# Patient Record
Sex: FEMALE | Race: OTHER | HISPANIC OR LATINO | Employment: PART TIME | URBAN - METROPOLITAN AREA
[De-identification: names, ages, dates, MRNs, and addresses within clinical notes are randomized per-mention and may not be internally consistent; named-entity substitution may affect disease eponyms.]

---

## 2017-01-11 ENCOUNTER — ALLSCRIPTS OFFICE VISIT (OUTPATIENT)
Dept: OTHER | Facility: OTHER | Age: 32
End: 2017-01-11

## 2017-01-11 DIAGNOSIS — M54.50 LOW BACK PAIN: ICD-10-CM

## 2017-01-11 DIAGNOSIS — M46.1 SACROILIITIS, NOT ELSEWHERE CLASSIFIED (HCC): ICD-10-CM

## 2017-02-06 ENCOUNTER — HOSPITAL ENCOUNTER (OUTPATIENT)
Dept: RADIOLOGY | Facility: CLINIC | Age: 32
Discharge: HOME | End: 2017-02-06
Payer: COMMERCIAL

## 2017-02-06 ENCOUNTER — ALLSCRIPTS OFFICE VISIT (OUTPATIENT)
Dept: OTHER | Facility: OTHER | Age: 32
End: 2017-02-06

## 2017-02-06 ENCOUNTER — TRANSCRIBE ORDERS (OUTPATIENT)
Dept: RADIOLOGY | Facility: CLINIC | Age: 32
End: 2017-02-06

## 2017-02-06 DIAGNOSIS — M46.1 SACROILIITIS, NOT ELSEWHERE CLASSIFIED (HCC): ICD-10-CM

## 2017-02-06 PROCEDURE — 73521 X-RAY EXAM HIPS BI 2 VIEWS: CPT

## 2017-02-10 ENCOUNTER — ALLSCRIPTS OFFICE VISIT (OUTPATIENT)
Dept: RADIOLOGY | Facility: CLINIC | Age: 32
End: 2017-02-10
Payer: COMMERCIAL

## 2017-02-17 ENCOUNTER — GENERIC CONVERSION - ENCOUNTER (OUTPATIENT)
Dept: OTHER | Facility: OTHER | Age: 32
End: 2017-02-17

## 2017-02-28 ENCOUNTER — GENERIC CONVERSION - ENCOUNTER (OUTPATIENT)
Dept: OTHER | Facility: OTHER | Age: 32
End: 2017-02-28

## 2017-03-07 ENCOUNTER — GENERIC CONVERSION - ENCOUNTER (OUTPATIENT)
Dept: OTHER | Facility: OTHER | Age: 32
End: 2017-03-07

## 2017-03-07 ENCOUNTER — APPOINTMENT (OUTPATIENT)
Dept: PHYSICAL THERAPY | Facility: CLINIC | Age: 32
End: 2017-03-07
Payer: COMMERCIAL

## 2017-03-07 PROCEDURE — 97110 THERAPEUTIC EXERCISES: CPT

## 2017-03-07 PROCEDURE — 97162 PT EVAL MOD COMPLEX 30 MIN: CPT

## 2017-03-14 ENCOUNTER — APPOINTMENT (OUTPATIENT)
Dept: PHYSICAL THERAPY | Facility: CLINIC | Age: 32
End: 2017-03-14
Payer: COMMERCIAL

## 2017-03-15 ENCOUNTER — APPOINTMENT (OUTPATIENT)
Dept: PHYSICAL THERAPY | Facility: CLINIC | Age: 32
End: 2017-03-15
Payer: COMMERCIAL

## 2017-03-20 ENCOUNTER — APPOINTMENT (OUTPATIENT)
Dept: PHYSICAL THERAPY | Facility: CLINIC | Age: 32
End: 2017-03-20
Payer: COMMERCIAL

## 2017-03-20 PROCEDURE — 97110 THERAPEUTIC EXERCISES: CPT

## 2017-03-20 PROCEDURE — 97014 ELECTRIC STIMULATION THERAPY: CPT

## 2017-03-22 ENCOUNTER — APPOINTMENT (OUTPATIENT)
Dept: PHYSICAL THERAPY | Facility: CLINIC | Age: 32
End: 2017-03-22
Payer: COMMERCIAL

## 2017-03-27 ENCOUNTER — APPOINTMENT (OUTPATIENT)
Dept: PHYSICAL THERAPY | Facility: CLINIC | Age: 32
End: 2017-03-27
Payer: COMMERCIAL

## 2017-03-28 ENCOUNTER — APPOINTMENT (EMERGENCY)
Dept: ULTRASOUND IMAGING | Facility: HOSPITAL | Age: 32
End: 2017-03-28
Payer: COMMERCIAL

## 2017-03-28 ENCOUNTER — HOSPITAL ENCOUNTER (EMERGENCY)
Facility: HOSPITAL | Age: 32
Discharge: HOME | End: 2017-03-28
Admitting: EMERGENCY MEDICINE
Payer: COMMERCIAL

## 2017-03-28 VITALS
HEART RATE: 59 BPM | SYSTOLIC BLOOD PRESSURE: 108 MMHG | OXYGEN SATURATION: 97 % | TEMPERATURE: 97.3 F | RESPIRATION RATE: 18 BRPM | DIASTOLIC BLOOD PRESSURE: 62 MMHG

## 2017-03-28 DIAGNOSIS — L03.115 CELLULITIS OF RIGHT LEG: Primary | ICD-10-CM

## 2017-03-28 PROCEDURE — 99283 EMERGENCY DEPT VISIT LOW MDM: CPT

## 2017-03-28 PROCEDURE — 93971 EXTREMITY STUDY: CPT

## 2017-03-28 RX ORDER — TRAMADOL HYDROCHLORIDE 50 MG/1
50 TABLET ORAL AS NEEDED
COMMUNITY
End: 2017-09-15

## 2017-03-28 RX ORDER — CEPHALEXIN 500 MG/1
500 CAPSULE ORAL 2 TIMES DAILY
Qty: 14 CAPSULE | Refills: 0 | Status: SHIPPED | OUTPATIENT
Start: 2017-03-28 | End: 2017-04-04

## 2017-03-28 RX ORDER — NAPROXEN 250 MG/1
250 TABLET ORAL 2 TIMES DAILY WITH MEALS
Qty: 20 TABLET | Refills: 0 | Status: SHIPPED | OUTPATIENT
Start: 2017-03-28 | End: 2017-09-15

## 2017-03-28 RX ORDER — NAPROXEN 250 MG/1
375 TABLET ORAL ONCE
Status: COMPLETED | OUTPATIENT
Start: 2017-03-28 | End: 2017-03-28

## 2017-03-28 RX ADMIN — NAPROXEN 375 MG: 250 TABLET ORAL at 10:55

## 2017-03-29 ENCOUNTER — APPOINTMENT (OUTPATIENT)
Dept: PHYSICAL THERAPY | Facility: CLINIC | Age: 32
End: 2017-03-29
Payer: COMMERCIAL

## 2017-04-25 ENCOUNTER — ALLSCRIPTS OFFICE VISIT (OUTPATIENT)
Dept: OTHER | Facility: OTHER | Age: 32
End: 2017-04-25

## 2017-09-15 ENCOUNTER — HOSPITAL ENCOUNTER (EMERGENCY)
Facility: HOSPITAL | Age: 32
Discharge: HOME | End: 2017-09-15
Attending: EMERGENCY MEDICINE | Admitting: EMERGENCY MEDICINE
Payer: COMMERCIAL

## 2017-09-15 VITALS
WEIGHT: 155 LBS | RESPIRATION RATE: 16 BRPM | OXYGEN SATURATION: 99 % | SYSTOLIC BLOOD PRESSURE: 112 MMHG | DIASTOLIC BLOOD PRESSURE: 65 MMHG | TEMPERATURE: 98.5 F | HEART RATE: 65 BPM

## 2017-09-15 DIAGNOSIS — S83.91XA SPRAIN OF RIGHT KNEE, UNSPECIFIED LIGAMENT, INITIAL ENCOUNTER: Primary | ICD-10-CM

## 2017-09-15 PROCEDURE — 99283 EMERGENCY DEPT VISIT LOW MDM: CPT

## 2017-09-15 RX ORDER — NAPROXEN 250 MG/1
500 TABLET ORAL ONCE
Status: COMPLETED | OUTPATIENT
Start: 2017-09-15 | End: 2017-09-15

## 2017-09-15 RX ORDER — NAPROXEN 500 MG/1
500 TABLET ORAL 2 TIMES DAILY PRN
Qty: 20 TABLET | Refills: 0 | Status: SHIPPED | OUTPATIENT
Start: 2017-09-15 | End: 2018-03-04

## 2017-09-15 RX ADMIN — NAPROXEN 500 MG: 250 TABLET ORAL at 00:27

## 2017-09-20 ENCOUNTER — HOSPITAL ENCOUNTER (EMERGENCY)
Facility: HOSPITAL | Age: 32
Discharge: HOME | End: 2017-09-20
Attending: EMERGENCY MEDICINE
Payer: COMMERCIAL

## 2017-09-20 VITALS
WEIGHT: 146 LBS | SYSTOLIC BLOOD PRESSURE: 114 MMHG | OXYGEN SATURATION: 100 % | RESPIRATION RATE: 14 BRPM | TEMPERATURE: 98 F | DIASTOLIC BLOOD PRESSURE: 69 MMHG | HEART RATE: 61 BPM

## 2017-09-20 DIAGNOSIS — S83.411A SPRAIN OF MEDIAL COLLATERAL LIGAMENT OF RIGHT KNEE, INITIAL ENCOUNTER: Primary | ICD-10-CM

## 2017-09-20 PROCEDURE — 99283 EMERGENCY DEPT VISIT LOW MDM: CPT

## 2017-09-20 RX ORDER — MORPHINE SULFATE 30 MG/1
30 TABLET ORAL EVERY 4 HOURS PRN
Qty: 5 TABLET | Refills: 0 | Status: SHIPPED | OUTPATIENT
Start: 2017-09-20 | End: 2018-03-04

## 2017-10-10 ENCOUNTER — HOSPITAL ENCOUNTER (EMERGENCY)
Facility: HOSPITAL | Age: 32
Discharge: HOME | End: 2017-10-10
Attending: EMERGENCY MEDICINE | Admitting: EMERGENCY MEDICINE
Payer: COMMERCIAL

## 2017-10-10 ENCOUNTER — APPOINTMENT (EMERGENCY)
Dept: CT IMAGING | Facility: HOSPITAL | Age: 32
End: 2017-10-10
Payer: COMMERCIAL

## 2017-10-10 ENCOUNTER — APPOINTMENT (EMERGENCY)
Dept: ULTRASOUND IMAGING | Facility: HOSPITAL | Age: 32
End: 2017-10-10
Payer: COMMERCIAL

## 2017-10-10 VITALS
HEART RATE: 62 BPM | OXYGEN SATURATION: 99 % | TEMPERATURE: 97.6 F | RESPIRATION RATE: 16 BRPM | SYSTOLIC BLOOD PRESSURE: 100 MMHG | DIASTOLIC BLOOD PRESSURE: 62 MMHG

## 2017-10-10 DIAGNOSIS — Z34.90 PREGNANCY: Primary | ICD-10-CM

## 2017-10-10 LAB
ABO GROUP BLD: NORMAL
ALBUMIN SERPL BCP-MCNC: 3.8 G/DL (ref 3.5–5)
ALP SERPL-CCNC: 68 U/L (ref 46–116)
ALT SERPL W P-5'-P-CCNC: 26 U/L (ref 12–78)
ANION GAP SERPL CALCULATED.3IONS-SCNC: 9 MMOL/L (ref 4–13)
AST SERPL W P-5'-P-CCNC: 19 U/L (ref 5–45)
B-HCG SERPL-ACNC: 2680 MIU/ML
BASOPHILS # BLD AUTO: 0.03 THOUSANDS/ΜL (ref 0–0.1)
BASOPHILS NFR BLD AUTO: 0 % (ref 0–1)
BILIRUB SERPL-MCNC: 0.4 MG/DL (ref 0.2–1)
BLD GP AB SCN SERPL QL: NEGATIVE
BUN SERPL-MCNC: 14 MG/DL (ref 5–25)
CALCIUM SERPL-MCNC: 9.2 MG/DL (ref 8.3–10.1)
CHLORIDE SERPL-SCNC: 103 MMOL/L (ref 100–108)
CLARITY, POC: CLEAR
CO2 SERPL-SCNC: 25 MMOL/L (ref 21–32)
COLOR, POC: YELLOW
CREAT SERPL-MCNC: 0.86 MG/DL (ref 0.6–1.3)
EOSINOPHIL # BLD AUTO: 0.07 THOUSAND/ΜL (ref 0–0.61)
EOSINOPHIL NFR BLD AUTO: 1 % (ref 0–6)
ERYTHROCYTE [DISTWIDTH] IN BLOOD BY AUTOMATED COUNT: 12.8 % (ref 11.6–15.1)
EXT BILIRUBIN, UA: NEGATIVE
EXT BLOOD URINE: ABNORMAL
EXT GLUCOSE, UA: NEGATIVE
EXT KETONES: NEGATIVE
EXT NITRITE, UA: NEGATIVE
EXT PH, UA: 6.5
EXT PREG TEST URINE: POSITIVE
EXT PROTEIN, UA: ABNORMAL
EXT SPECIFIC GRAVITY, UA: 1.01
EXT UROBILINOGEN: 1
GFR SERPL CREATININE-BSD FRML MDRD: 90 ML/MIN/1.73SQ M
GLUCOSE SERPL-MCNC: 102 MG/DL (ref 65–140)
HCT VFR BLD AUTO: 39.6 % (ref 34.8–46.1)
HGB BLD-MCNC: 13 G/DL (ref 11.5–15.4)
HOLD SPECIMEN: NORMAL
LIPASE SERPL-CCNC: 114 U/L (ref 73–393)
LYMPHOCYTES # BLD AUTO: 1.42 THOUSANDS/ΜL (ref 0.6–4.47)
LYMPHOCYTES NFR BLD AUTO: 13 % (ref 14–44)
MCH RBC QN AUTO: 29.1 PG (ref 26.8–34.3)
MCHC RBC AUTO-ENTMCNC: 32.8 G/DL (ref 31.4–37.4)
MCV RBC AUTO: 89 FL (ref 82–98)
MONOCYTES # BLD AUTO: 0.51 THOUSAND/ΜL (ref 0.17–1.22)
MONOCYTES NFR BLD AUTO: 5 % (ref 4–12)
NEUTROPHILS # BLD AUTO: 8.65 THOUSANDS/ΜL (ref 1.85–7.62)
NEUTS SEG NFR BLD AUTO: 81 % (ref 43–75)
PLATELET # BLD AUTO: 237 THOUSANDS/UL (ref 149–390)
PMV BLD AUTO: 10.4 FL (ref 8.9–12.7)
POTASSIUM SERPL-SCNC: 3.7 MMOL/L (ref 3.5–5.3)
PROT SERPL-MCNC: 7.4 G/DL (ref 6.4–8.2)
RBC # BLD AUTO: 4.46 MILLION/UL (ref 3.81–5.12)
RH BLD: POSITIVE
SODIUM SERPL-SCNC: 137 MMOL/L (ref 136–145)
SPECIMEN EXPIRATION DATE: NORMAL
WBC # BLD AUTO: 10.68 THOUSAND/UL (ref 4.31–10.16)
WBC # BLD EST: NEGATIVE 10*3/UL

## 2017-10-10 PROCEDURE — 76801 OB US < 14 WKS SINGLE FETUS: CPT

## 2017-10-10 PROCEDURE — 96361 HYDRATE IV INFUSION ADD-ON: CPT

## 2017-10-10 PROCEDURE — 86850 RBC ANTIBODY SCREEN: CPT | Performed by: EMERGENCY MEDICINE

## 2017-10-10 PROCEDURE — 86900 BLOOD TYPING SEROLOGIC ABO: CPT | Performed by: EMERGENCY MEDICINE

## 2017-10-10 PROCEDURE — 86901 BLOOD TYPING SEROLOGIC RH(D): CPT | Performed by: EMERGENCY MEDICINE

## 2017-10-10 PROCEDURE — 81025 URINE PREGNANCY TEST: CPT | Performed by: EMERGENCY MEDICINE

## 2017-10-10 PROCEDURE — 85025 COMPLETE CBC W/AUTO DIFF WBC: CPT | Performed by: EMERGENCY MEDICINE

## 2017-10-10 PROCEDURE — 99285 EMERGENCY DEPT VISIT HI MDM: CPT

## 2017-10-10 PROCEDURE — 84702 CHORIONIC GONADOTROPIN TEST: CPT | Performed by: EMERGENCY MEDICINE

## 2017-10-10 PROCEDURE — 83690 ASSAY OF LIPASE: CPT | Performed by: EMERGENCY MEDICINE

## 2017-10-10 PROCEDURE — 36415 COLL VENOUS BLD VENIPUNCTURE: CPT | Performed by: EMERGENCY MEDICINE

## 2017-10-10 PROCEDURE — 96374 THER/PROPH/DIAG INJ IV PUSH: CPT

## 2017-10-10 PROCEDURE — 81002 URINALYSIS NONAUTO W/O SCOPE: CPT | Performed by: EMERGENCY MEDICINE

## 2017-10-10 PROCEDURE — 80053 COMPREHEN METABOLIC PANEL: CPT | Performed by: EMERGENCY MEDICINE

## 2017-10-10 RX ORDER — KETOROLAC TROMETHAMINE 30 MG/ML
15 INJECTION, SOLUTION INTRAMUSCULAR; INTRAVENOUS ONCE
Status: DISCONTINUED | OUTPATIENT
Start: 2017-10-10 | End: 2017-10-10

## 2017-10-10 RX ORDER — ONDANSETRON 2 MG/ML
4 INJECTION INTRAMUSCULAR; INTRAVENOUS ONCE
Status: COMPLETED | OUTPATIENT
Start: 2017-10-10 | End: 2017-10-10

## 2017-10-10 RX ADMIN — HYDROMORPHONE HYDROCHLORIDE 0.5 MG: 1 INJECTION, SOLUTION INTRAMUSCULAR; INTRAVENOUS; SUBCUTANEOUS at 17:46

## 2017-10-10 RX ADMIN — ONDANSETRON 4 MG: 2 INJECTION INTRAMUSCULAR; INTRAVENOUS at 16:44

## 2017-10-10 RX ADMIN — SODIUM CHLORIDE 1000 ML: 0.9 INJECTION, SOLUTION INTRAVENOUS at 16:21

## 2017-10-10 NOTE — ED NOTES
Dr Cyndy Penny made aware of + preg test, see new orders       Precilla JEREMY Collins  10/10/17 1473

## 2017-10-10 NOTE — DISCHARGE INSTRUCTIONS
Pregnancy   WHAT YOU NEED TO KNOW:   A normal pregnancy lasts about 40 weeks  The first trimester lasts from your last period through the 12th week of pregnancy  The second trimester lasts from the 13th week of your pregnancy through the 23rd week  The third trimester lasts from your 24th week of pregnancy until your baby is born  If you know the date of your last period, your healthcare provider can estimate your due date  You may give birth to your baby any time from 37 weeks to 2 weeks after your due date  DISCHARGE INSTRUCTIONS:   Return to the emergency department if:   · You develop a severe headache that does not go away  · You have new or increased vision changes, such as blurred or spotted vision  · You have new or increased swelling in your face or hands  · You have pain or cramping in your abdomen or low back  · You have vaginal bleeding  Contact your healthcare provider or obstetrician if:   · You have abdominal cramps, pressure, or tightening  · You have a change in vaginal discharge  · You cannot keep food or drinks down, and you are losing weight  · You have chills or a fever  · You have vaginal itching, burning, or pain  · You have yellow, green, white, or foul-smelling vaginal discharge  · You have pain or burning when you urinate, less urine than usual, or pink or bloody urine  · You have questions or concerns about your condition or care  Medicines:   · Prenatal vitamins  provide some of the extra vitamins and minerals you need during pregnancy  Prenatal vitamins may also help to decrease the risk of certain birth defects  · Take your medicine as directed  Contact your healthcare provider if you think your medicine is not helping or if you have side effects  Tell him or her if you are allergic to any medicine  Keep a list of the medicines, vitamins, and herbs you take  Include the amounts, and when and why you take them   Bring the list or the pill bottles to follow-up visits  Carry your medicine list with you in case of an emergency  Follow up with your healthcare provider or obstetrician as directed:  Go to all of your prenatal visits during your pregnancy  Write down your questions so you remember to ask them during your visits  Body changes that may occur during your pregnancy:   · Breast changes  you will experience include tenderness and tingling during the early part of your pregnancy  Your breasts will become larger  You may need to use a support bra  You may see a thin, yellow fluid, called colostrum, leak from your nipples during the second trimester  Colostrum is a liquid that changes to milk about 3 days after you give birth  · Skin changes and stretch marks  may occur during your pregnancy  You may have red marks, called stretch marks, on your skin  Stretch marks will usually fade after pregnancy  Use lotion if your skin is dry and itchy  The skin on your face, around your nipples, and below your belly button may darken  Most of the time, your skin will return to its normal color after your baby is born  · Morning sickness  is nausea and vomiting that can happen at any time of day  Avoid fatty and spicy foods  Eat small meals throughout the day instead of large meals  Yi may help to decrease nausea  Ask your healthcare provider about other ways of decreasing nausea and vomiting  · Heartburn  may be caused by changes in your hormones during pregnancy  Your growing uterus may also push your stomach upward and force stomach acid to back up into your esophagus  Eat 4 or 5 small meals each day instead of large meals  Avoid spicy foods  Avoid eating right before bedtime  · Constipation  may develop during your pregnancy  To treat constipation, eat foods high in fiber such as fiber cereals, beans, fruits, vegetables, whole-grain breads, and prune juice  Get regular exercise and drink plenty of water   Your healthcare provider may also suggest a fiber supplement to soften your bowel movements  Talk to your healthcare provider before you use any medicines to decrease constipation  · Hemorrhoids  are enlarged veins in the rectal area  They may cause pain, itching, and bright red bleeding from your rectum  To decrease your risk of hemorrhoids, prevent constipation and do not strain to have a bowel movement  If you have hemorrhoids, soak in a tub of warm water to ease discomfort  Ask your healthcare provider how you can treat hemorrhoids  · Leg cramps and swelling  may be caused by low calcium levels or the added weight of pregnancy  Raise your legs above the level of your heart to decrease swelling  During a leg cramp, stretch or massage the muscle that has the cramp  Heat may help decrease pain and muscle spasms  Apply heat on your muscle for 20 to 30 minutes every 2 hours for as many days as directed  · Back pain  may occur as your baby grows  Do not stand for long periods of time or lift heavy items  Use good posture while you stand, squat, or bend  Wear low-heeled shoes with good support  Rest may also help to relieve back pain  Ask your healthcare provider about exercises you can do to strengthen your back muscles  Stay healthy during your pregnancy:   · Eat a variety of healthy foods  Healthy foods include fruits, vegetables, whole-grain breads, low-fat dairy foods, beans, lean meats, and fish  Drink liquids as directed  Ask how much liquid to drink each day and which liquids are best for you  Limit caffeine to less than 200 milligrams each day  Limit your intake of fish to 2 servings each week  Choose fish low in mercury such as canned light tuna, shrimp, crab, salmon, cod, or tilapia  Do not  eat fish high in mercury such as swordfish, tilefish, celestino mackerel, and shark  · Take prenatal vitamins as directed  Your need for certain vitamins and minerals, such as folic acid, increases during pregnancy   Prenatal vitamins provide some of the extra vitamins and minerals you need  Prenatal vitamins may also help to decrease the risk of certain birth defects  · Ask how much weight you should gain during your pregnancy  Too much or too little weight gain can be unhealthy for you and your baby  · Talk to your healthcare provider about exercise  Moderate exercise can help you stay fit  Your healthcare provider will help you plan an exercise program that is safe for you during pregnancy  · Do not smoke  If you smoke, it is never too late to quit  Smoking increases your risk of a miscarriage and other health problems during your pregnancy  Smoking can cause your baby to be born too early or weigh less at birth  Ask your healthcare provider for information if you need help quitting  · Do not drink alcohol  Alcohol passes from your body to your baby through the placenta  It can affect your baby's brain development and cause fetal alcohol syndrome (FAS)  FAS is a group of conditions that causes mental, behavior, and growth problems  · Talk to your healthcare provider before you take any medicines  Many medicines may harm your baby if you take them when you are pregnant  Do not take any medicines, vitamins, herbs, or supplements without first talking to your healthcare provider  Never use illegal or street drugs (such as marijuana or cocaine) while you are pregnant  Safety tips:   · Avoid hot tubs and saunas  Do not use a hot tub or sauna while you are pregnant, especially during your first trimester  Hot tubs and saunas may raise your baby's temperature and increase the risk of birth defects  · Avoid toxoplasmosis  This is an infection caused by eating raw meat or being around infected cat feces  It can cause birth defects, miscarriages, and other problems  Wash your hands after you touch raw meat  Make sure any meat is well-cooked before you eat it  Avoid raw eggs and unpasteurized milk   Use gloves or ask someone else to clean your cat's litter box while you are pregnant  · Ask your healthcare provider about travel  The most comfortable time to travel is during the second trimester  Ask your healthcare provider if you can travel after 36 weeks  You may not be able to travel in an airplane after 36 weeks  He may also recommend that you avoid long road trips  © 2017 [employer] Information is for End User's use only and may not be sold, redistributed or otherwise used for commercial purposes  All illustrations and images included in CareNotes® are the copyrighted property of [employer]  or [employer]  The above information is an  only  It is not intended as medical advice for individual conditions or treatments  Talk to your doctor, nurse or pharmacist before following any medical regimen to see if it is safe and effective for you

## 2017-10-11 NOTE — ED PROVIDER NOTES
History  Chief Complaint   Patient presents with    Abdominal Pain     Pt reports severe abd pain and nausea since 150       28-year-old female comes in complaining of abrupt onset of abdominal pain  The patient states pain started about an hour half ago and is severe and all-over some nausea no vomiting no diarrhea  Patient states her last   Within the is the last month  History provided by:  Patient and significant other   used: No    Abdominal Pain   Pain location:  Generalized  Pain quality: sharp and stabbing    Pain radiates to:  Does not radiate  Pain severity:  Severe  Onset quality:  Sudden  Duration:  2 hours  Timing:  Constant  Progression:  Worsening  Chronicity:  New  Context: not alcohol use, not recent illness and not trauma    Ineffective treatments:  None tried  Associated symptoms: anorexia    Associated symptoms: no chest pain, no cough, no diarrhea, no fatigue, no fever, no hematuria, no shortness of breath and no vaginal bleeding    Risk factors: alcohol abuse    Risk factors: no NSAID use and no recent hospitalization        Prior to Admission Medications   Prescriptions Last Dose Informant Patient Reported? Taking? morphine (MSIR) 30 MG tablet   No No   Sig: Take 1 tablet by mouth every 4 (four) hours as needed for severe pain for up to 5 doses Max Daily Amount: 180 mg   naproxen (NAPROSYN) 500 mg tablet   No No   Sig: Take 1 tablet by mouth 2 (two) times a day as needed for mild pain      Facility-Administered Medications: None       Past Medical History:   Diagnosis Date    Asthma        History reviewed  No pertinent surgical history  History reviewed  No pertinent family history  I have reviewed and agree with the history as documented  Social History   Substance Use Topics    Smoking status: Never Smoker    Smokeless tobacco: Never Used    Alcohol use No        Review of Systems   Constitutional: Negative for fatigue and fever     HENT: Negative for congestion and ear pain  Eyes: Negative for discharge and redness  Respiratory: Negative for apnea, cough, shortness of breath and wheezing  Cardiovascular: Negative for chest pain  Gastrointestinal: Positive for abdominal pain and anorexia  Negative for diarrhea  Endocrine: Negative for cold intolerance and polydipsia  Genitourinary: Negative for difficulty urinating, hematuria and vaginal bleeding  Musculoskeletal: Negative for arthralgias and back pain  Skin: Negative for color change and rash  Allergic/Immunologic: Negative for environmental allergies and immunocompromised state  Neurological: Negative for numbness and headaches  Hematological: Negative for adenopathy  Does not bruise/bleed easily  Psychiatric/Behavioral: Negative for agitation and behavioral problems  Physical Exam  ED Triage Vitals   Temperature Pulse Respirations Blood Pressure SpO2   10/10/17 1517 10/10/17 1517 10/10/17 1517 10/10/17 1517 10/10/17 1517   97 6 °F (36 4 °C) 72 20 97/58 100 %      Temp Source Heart Rate Source Patient Position - Orthostatic VS BP Location FiO2 (%)   10/10/17 1517 10/10/17 1517 10/10/17 1517 10/10/17 1615 --   Oral Monitor Lying Right arm       Pain Score       10/10/17 1517       9           Physical Exam   Constitutional: She is oriented to person, place, and time  Vital signs are normal  She appears well-developed and well-nourished  Non-toxic appearance  She appears distressed  HENT:   Head: Normocephalic and atraumatic  Right Ear: Tympanic membrane and external ear normal    Left Ear: Tympanic membrane and external ear normal    Nose: Nose normal  No rhinorrhea, sinus tenderness or nasal deformity  Mouth/Throat: Uvula is midline and oropharynx is clear and moist  Normal dentition  Eyes: Conjunctivae, EOM and lids are normal  Pupils are equal, round, and reactive to light  Right eye exhibits no discharge  Left eye exhibits no discharge     Neck: Trachea normal and normal range of motion  Neck supple  No JVD present  Carotid bruit is not present  Cardiovascular: Normal rate, regular rhythm, intact distal pulses and normal pulses  No extrasystoles are present  PMI is not displaced  Pulmonary/Chest: Effort normal and breath sounds normal  No accessory muscle usage  No respiratory distress  She has no wheezes  She has no rhonchi  She has no rales  Abdominal: Soft  Normal appearance and bowel sounds are normal  She exhibits no mass  There is generalized tenderness  There is no rigidity, no rebound and no guarding  Musculoskeletal:        Right shoulder: She exhibits normal range of motion, no bony tenderness, no swelling and no deformity  Cervical back: Normal  She exhibits normal range of motion, no tenderness, no bony tenderness and no deformity  Lymphadenopathy:     She has no cervical adenopathy  She has no axillary adenopathy  Neurological: She is alert and oriented to person, place, and time  She has normal strength and normal reflexes  No cranial nerve deficit or sensory deficit  GCS eye subscore is 4  GCS verbal subscore is 5  GCS motor subscore is 6  Skin: Skin is warm and dry  No rash noted  Psychiatric: She has a normal mood and affect  Her speech is normal and behavior is normal    Nursing note and vitals reviewed        ED Medications  Medications   ondansetron (ZOFRAN) injection 4 mg (4 mg Intravenous Given 10/10/17 1644)   HYDROmorphone (DILAUDID) 1 mg/mL injection 0 5 mg (0 5 mg Intravenous Given 10/10/17 1746)   sodium chloride 0 9 % bolus 1,000 mL (0 mL Intravenous Stopped 10/10/17 1846)       Diagnostic Studies  Labs Reviewed   CBC AND DIFFERENTIAL - Abnormal        Result Value Ref Range Status    WBC 10 68 (*) 4 31 - 10 16 Thousand/uL Final    Neutrophils Relative 81 (*) 43 - 75 % Final    Lymphocytes Relative 13 (*) 14 - 44 % Final    Neutrophils Absolute 8 65 (*) 1 85 - 7 62 Thousands/µL Final    RBC 4 46  3 81 - 5 12 Million/uL Final    Hemoglobin 13 0  11 5 - 15 4 g/dL Final    Hematocrit 39 6  34 8 - 46 1 % Final    MCV 89  82 - 98 fL Final    MCH 29 1  26 8 - 34 3 pg Final    MCHC 32 8  31 4 - 37 4 g/dL Final    RDW 12 8  11 6 - 15 1 % Final    MPV 10 4  8 9 - 12 7 fL Final    Platelets 893  521 - 390 Thousands/uL Final    Monocytes Relative 5  4 - 12 % Final    Eosinophils Relative 1  0 - 6 % Final    Basophils Relative 0  0 - 1 % Final    Lymphocytes Absolute 1 42  0 60 - 4 47 Thousands/µL Final    Monocytes Absolute 0 51  0 17 - 1 22 Thousand/µL Final    Eosinophils Absolute 0 07  0 00 - 0 61 Thousand/µL Final    Basophils Absolute 0 03  0 00 - 0 10 Thousands/µL Final   HCG, QUANTITATIVE - Abnormal     HCG, Quant 2,680 (*) <=6 mIU/mL Final    Narrative:      Expected Ranges:     Approximate               Approximate HCG  Gestation age          Concentration ( mIU/mL)  _____________          ______________________   Becky Simmers                      HCG values  0 2-1                       5-50  1-2                           2-3                         100-5000  3-4                         500-68368  4-5                         1000-03012  5-6                         [id number]  6-8                         [id number]  8-12                        [id number]   POCT PREGNANCY, URINE - Abnormal     EXT PREG TEST UR (Ref: Negative) POSITIVE   Final   POCT URINALYSIS DIPSTICK - Abnormal     Color, UA yellow   Final    Clarity, UA clear   Final    EXT Glucose, UA negative   Final    EXT Bilirubin, UA (Ref: Negative) negative   Final    EXT Ketones, UA (Ref: Negative) negative   Final    EXT Spec Grav, UA 1 015   Final    EXT Blood, UA (Ref: Negative) trace   Final    EXT pH, UA 6 5   Final    EXT Protein, UA (Ref: Negative) 30++   Final    EXT Urobilinogen, UA (Ref: 0 2- 1 0) 1   Final    EXT Leukocytes, UA (Ref: Negative) negative   Final    EXT Nitrite, UA (Ref: Negative) negative   Final   LIPASE - Normal    Lipase 114  73 - 393 u/L Final   COMPREHENSIVE METABOLIC PANEL    Sodium 174  136 - 145 mmol/L Final    Potassium 3 7  3 5 - 5 3 mmol/L Final    Chloride 103  100 - 108 mmol/L Final    CO2 25  21 - 32 mmol/L Final    Anion Gap 9  4 - 13 mmol/L Final    BUN 14  5 - 25 mg/dL Final    Creatinine 0 86  0 60 - 1 30 mg/dL Final    Comment: Standardized to IDMS reference method    Glucose 102  65 - 140 mg/dL Final    Comment:   If the patient is fasting, the ADA then defines impaired fasting glucose as > 100 mg/dL and diabetes as > or equal to 123 mg/dL  Specimen collection should occur prior to Sulfasalazine administration due to the potential for falsely depressed results  Specimen collection should occur prior to Sulfapyridine administration due to the potential for falsely elevated results  Calcium 9 2  8 3 - 10 1 mg/dL Final    AST 19  5 - 45 U/L Final    Comment:   Specimen collection should occur prior to Sulfasalazine administration due to the potential for falsely depressed results  ALT 26  12 - 78 U/L Final    Comment:   Specimen collection should occur prior to Sulfasalazine administration due to the potential for falsely depressed results  Alkaline Phosphatase 68  46 - 116 U/L Final    Total Protein 7 4  6 4 - 8 2 g/dL Final    Albumin 3 8  3 5 - 5 0 g/dL Final    Total Bilirubin 0 40  0 20 - 1 00 mg/dL Final    eGFR 90  ml/min/1 73sq m Final    Narrative:     National Kidney Disease Education Program recommendations are as follows:  GFR calculation is accurate only with a steady state creatinine  Chronic Kidney disease less than 60 ml/min/1 73 sq  meters  Kidney failure less than 15 ml/min/1 73 sq  meters  RAINBOW DRAW    Narrative: The following orders were created for panel order [place] draw    Procedure                               Abnormality         Status                     ---------                               -----------         ------                     Eloisa Mccabe on [id number]] Final result               Gold top on AKOX[[id number]]                                  In process                 Green / Black tube on MNOB[[id number]]                        Final result                 Please view results for these tests on the individual orders  TYPE AND SCREEN    ABO Grouping O   Final    Rh Factor Positive   Final    Antibody Screen Negative   Final    Specimen Expiration Date [id number]   Final   LIGHT BLUE TOP   GREEN / BLACK TUBE ON HOLD    Extra Tube Hold for add-ons  Final    Comment: Auto resulted  GOLD TOP ON HOLD       US OB < 14 weeks with transvaginal   Final Result      Evidence of intrauterine gestation with gestational sac containing a yolk sac  Fetal pole not seen  No acute findings  Workstation performed: [id number]             Procedures  Pelvic Ultrasound  Performed by: Tor Palacios  Authorized by: oTr Palacios     Procedure date/time:  10/10/2017 4:15 PM  Patient location:  Bedside  Procedure details:     Indications: evaluate for IUP, suspected ectopic pregnancy and pregnant with abdominal pain      Assess:  Intrauterine pregnancy    Technique:  Transabdominal obstetric (limited) exam  Uterine findings:     Intrauterine pregnancy: identified      Single gestation: identified      Gestational sac: identified    Other findings:     Free pelvic fluid: not identified      Free peritoneal fluid: not identified            Phone Contacts  ED Phone Contact    ED Course  ED Course                                MDM  Number of Diagnoses or Management Options  Pregnancy: new and requires workup  Diagnosis management comments: Patient's urine pregnancy test came back urine will cancel CT of the abdomen and pelvis looking for a kidney stone  Will get lab work to assess pregnancy as well as an ultrasound         Amount and/or Complexity of Data Reviewed  Clinical lab tests: ordered and reviewed  Tests in the radiology section of CPT®: ordered and reviewed  Tests in the medicine section of CPT®: ordered and reviewed  Independent visualization of images, tracings, or specimens: yes (Bedside ultrasound shows no free fluid in the pelvis and a gestational sac )    Patient Progress  Patient progress: stable    CritCare Time    Disposition  Final diagnoses:   Pregnancy     ED Disposition     ED Disposition Condition Comment    Discharge  [facility] discharge to home/self care  Condition at discharge: Good        Follow-up Information     Follow up With Specialties Details Why [id number] [facility] Ob[facility]etrics and [facility]necology Schedule an appointment as soon as possible for a visit  [address] [place] [address]  [phone]          Discharge Medication List as of 10/10/2017  6:13 PM      CONTINUE these medications which have NOT CHANGED    Details   morphine (MSIR) 30 MG tablet Take 1 tablet by mouth every 4 (four) hours as needed for severe pain for up to 5 doses Max Daily Amount: 180 mg, Starting Wed 9/20/2017, Print      naproxen (NAPROSYN) 500 mg tablet Take 1 tablet by mouth 2 (two) times a day as needed for mild pain, Starting Fri 9/15/2017, Print           No discharge procedures on file      ED Provider  Electronically Signed by       Kirti Carpenter DO  10/11/17 3278

## 2017-10-31 ENCOUNTER — APPOINTMENT (EMERGENCY)
Dept: CT IMAGING | Facility: HOSPITAL | Age: 32
End: 2017-10-31
Payer: COMMERCIAL

## 2017-10-31 ENCOUNTER — APPOINTMENT (EMERGENCY)
Dept: RADIOLOGY | Facility: HOSPITAL | Age: 32
End: 2017-10-31
Payer: COMMERCIAL

## 2017-10-31 ENCOUNTER — HOSPITAL ENCOUNTER (EMERGENCY)
Facility: HOSPITAL | Age: 32
Discharge: HOME | End: 2017-10-31
Attending: EMERGENCY MEDICINE | Admitting: EMERGENCY MEDICINE
Payer: COMMERCIAL

## 2017-10-31 VITALS
DIASTOLIC BLOOD PRESSURE: 86 MMHG | SYSTOLIC BLOOD PRESSURE: 117 MMHG | TEMPERATURE: 97.9 F | WEIGHT: 146.39 LBS | HEART RATE: 77 BPM | OXYGEN SATURATION: 99 % | RESPIRATION RATE: 18 BRPM

## 2017-10-31 DIAGNOSIS — R07.89 ATYPICAL CHEST PAIN: Primary | ICD-10-CM

## 2017-10-31 LAB
ANION GAP SERPL CALCULATED.3IONS-SCNC: 7 MMOL/L (ref 4–13)
BASOPHILS # BLD AUTO: 0.04 THOUSANDS/ΜL (ref 0–0.1)
BASOPHILS NFR BLD AUTO: 1 % (ref 0–1)
BUN SERPL-MCNC: 11 MG/DL (ref 5–25)
CALCIUM SERPL-MCNC: 8.6 MG/DL (ref 8.3–10.1)
CHLORIDE SERPL-SCNC: 103 MMOL/L (ref 100–108)
CO2 SERPL-SCNC: 28 MMOL/L (ref 21–32)
CREAT SERPL-MCNC: 0.69 MG/DL (ref 0.6–1.3)
DEPRECATED D DIMER PPP: 406 NG/ML (FEU) (ref 0–424)
EOSINOPHIL # BLD AUTO: 0.17 THOUSAND/ΜL (ref 0–0.61)
EOSINOPHIL NFR BLD AUTO: 3 % (ref 0–6)
ERYTHROCYTE [DISTWIDTH] IN BLOOD BY AUTOMATED COUNT: 12.4 % (ref 11.6–15.1)
EXT PREG TEST URINE: NEGATIVE
GFR SERPL CREATININE-BSD FRML MDRD: 116 ML/MIN/1.73SQ M
GLUCOSE SERPL-MCNC: 96 MG/DL (ref 65–140)
HCT VFR BLD AUTO: 37.8 % (ref 34.8–46.1)
HGB BLD-MCNC: 12.1 G/DL (ref 11.5–15.4)
LYMPHOCYTES # BLD AUTO: 1.62 THOUSANDS/ΜL (ref 0.6–4.47)
LYMPHOCYTES NFR BLD AUTO: 27 % (ref 14–44)
MCH RBC QN AUTO: 28.8 PG (ref 26.8–34.3)
MCHC RBC AUTO-ENTMCNC: 32 G/DL (ref 31.4–37.4)
MCV RBC AUTO: 90 FL (ref 82–98)
MONOCYTES # BLD AUTO: 0.61 THOUSAND/ΜL (ref 0.17–1.22)
MONOCYTES NFR BLD AUTO: 10 % (ref 4–12)
NEUTROPHILS # BLD AUTO: 3.56 THOUSANDS/ΜL (ref 1.85–7.62)
NEUTS SEG NFR BLD AUTO: 59 % (ref 43–75)
PLATELET # BLD AUTO: 211 THOUSANDS/UL (ref 149–390)
PMV BLD AUTO: 10.1 FL (ref 8.9–12.7)
POTASSIUM SERPL-SCNC: 3.5 MMOL/L (ref 3.5–5.3)
RBC # BLD AUTO: 4.2 MILLION/UL (ref 3.81–5.12)
SODIUM SERPL-SCNC: 138 MMOL/L (ref 136–145)
TROPONIN I SERPL-MCNC: <0.02 NG/ML
TSH SERPL DL<=0.05 MIU/L-ACNC: 0.78 UIU/ML (ref 0.36–3.74)
WBC # BLD AUTO: 6 THOUSAND/UL (ref 4.31–10.16)

## 2017-10-31 PROCEDURE — 71020 HB CHEST X-RAY 2VW FRONTAL&LATL: CPT

## 2017-10-31 PROCEDURE — 99285 EMERGENCY DEPT VISIT HI MDM: CPT

## 2017-10-31 PROCEDURE — 93005 ELECTROCARDIOGRAM TRACING: CPT | Performed by: PHYSICIAN ASSISTANT

## 2017-10-31 PROCEDURE — 85379 FIBRIN DEGRADATION QUANT: CPT | Performed by: PHYSICIAN ASSISTANT

## 2017-10-31 PROCEDURE — 71275 CT ANGIOGRAPHY CHEST: CPT

## 2017-10-31 PROCEDURE — 93005 ELECTROCARDIOGRAM TRACING: CPT

## 2017-10-31 PROCEDURE — 84443 ASSAY THYROID STIM HORMONE: CPT | Performed by: PHYSICIAN ASSISTANT

## 2017-10-31 PROCEDURE — 80048 BASIC METABOLIC PNL TOTAL CA: CPT | Performed by: PHYSICIAN ASSISTANT

## 2017-10-31 PROCEDURE — 81025 URINE PREGNANCY TEST: CPT | Performed by: PHYSICIAN ASSISTANT

## 2017-10-31 PROCEDURE — 84484 ASSAY OF TROPONIN QUANT: CPT | Performed by: PHYSICIAN ASSISTANT

## 2017-10-31 PROCEDURE — 94640 AIRWAY INHALATION TREATMENT: CPT

## 2017-10-31 PROCEDURE — 36415 COLL VENOUS BLD VENIPUNCTURE: CPT | Performed by: PHYSICIAN ASSISTANT

## 2017-10-31 PROCEDURE — 85025 COMPLETE CBC W/AUTO DIFF WBC: CPT | Performed by: PHYSICIAN ASSISTANT

## 2017-10-31 RX ORDER — ALBUTEROL SULFATE 2.5 MG/3ML
5 SOLUTION RESPIRATORY (INHALATION) ONCE
Status: COMPLETED | OUTPATIENT
Start: 2017-10-31 | End: 2017-10-31

## 2017-10-31 RX ADMIN — ALBUTEROL SULFATE 5 MG: 2.5 SOLUTION RESPIRATORY (INHALATION) at 19:11

## 2017-10-31 RX ADMIN — IOHEXOL 85 ML: 350 INJECTION, SOLUTION INTRAVENOUS at 21:33

## 2017-10-31 NOTE — ED PROVIDER NOTES
History  Chief Complaint   Patient presents with    Chest Pain     Pt reports chest pain since this morning that "went down my arm twice  "Pt reports being dizzy while walking twice today  Pt  denies SOB     70-year-old female presents emergency room for evaluation of chest pain  Onset this morning after waking up around 7:00 a m , described as a tightness  Located midsternal area  Patient states she thought maybe it was her asthma so she used her son's albuterol inhaler  States this did not help  Patient states around 11:00 a m  it started giving her a sharp pain into her right shoulder  This lasted for a short time then around 2 o'clock pain in her chest increased as she was at work  Patient is a  and was drying someones hair at the time  She is right-handed  Pain improved with rest   States every time she is more active the pain increases  Complains of a sharp pain with taking a deep breath  Denies anxiety  Admits to increased stress due to her kids being sick and the 3year-old kept her up last night  Patient states she also had an elective  about 8-10 days ago  No complications with this procedure  The vaginal bleeding has progressively improved  History provided by:  Patient  Chest Pain   Associated symptoms: nausea    Associated symptoms: no abdominal pain, no back pain, no cough, no diaphoresis, no dizziness, no fever, no headache, no numbness, no palpitations, no shortness of breath, not vomiting and no weakness        Prior to Admission Medications   Prescriptions Last Dose Informant Patient Reported? Taking?    morphine (MSIR) 30 MG tablet   No No   Sig: Take 1 tablet by mouth every 4 (four) hours as needed for severe pain for up to 5 doses Max Daily Amount: 180 mg   naproxen (NAPROSYN) 500 mg tablet   No No   Sig: Take 1 tablet by mouth 2 (two) times a day as needed for mild pain      Facility-Administered Medications: None       Past Medical History:   Diagnosis Date    Asthma        History reviewed  No pertinent surgical history  History reviewed  No pertinent family history  I have reviewed and agree with the history as documented  Social History   Substance Use Topics    Smoking status: Never Smoker    Smokeless tobacco: Never Used    Alcohol use No        Review of Systems   Constitutional: Negative for chills, diaphoresis and fever  HENT: Negative for congestion  Respiratory: Negative for cough and shortness of breath  Cardiovascular: Positive for chest pain  Negative for palpitations and leg swelling  Gastrointestinal: Positive for nausea  Negative for abdominal pain and vomiting  Musculoskeletal: Negative for back pain and neck pain  Skin: Negative for rash and wound  Neurological: Negative for dizziness, syncope, weakness, numbness and headaches  Physical Exam  ED Triage Vitals [10/31/17 1803]   Temperature Pulse Respirations Blood Pressure SpO2   97 9 °F (36 6 °C) 70 20 117/62 100 %      Temp Source Heart Rate Source Patient Position - Orthostatic VS BP Location FiO2 (%)   Oral Monitor Sitting Left arm --      Pain Score       5           Orthostatic Vital Signs  Vitals:    10/31/17 1803 10/31/17 1830 10/31/17 2025 10/31/17 2100   BP: 117/62 112/69 102/58 112/78   Pulse: 70 70 80 92   Patient Position - Orthostatic VS: Sitting Lying Sitting Lying       Physical Exam   Constitutional: She appears well-developed and well-nourished  Eyes: Conjunctivae are normal    Neck: Neck supple  Cardiovascular: Normal rate, regular rhythm, normal heart sounds and intact distal pulses  Pulmonary/Chest: Effort normal and breath sounds normal    Abdominal: Soft  She exhibits no distension  There is no tenderness  There is no CVA tenderness  Musculoskeletal: She exhibits no edema or tenderness  Neurological: She is alert  Skin: Skin is warm and dry  No rash noted  Psychiatric: She has a normal mood and affect     Nursing note and vitals reviewed  ED Medications  Medications   albuterol inhalation solution 5 mg (5 mg Nebulization Given 10/31/17 1911)   iohexol (OMNIPAQUE) 350 MG/ML injection (SINGLE-DOSE) 85 mL (85 mL Intravenous Given 10/31/17 2133)       Diagnostic Studies  Results Reviewed     Procedure Component Value Units Date/Time    Basic metabolic panel [[id number]] Collected:  10/31/17 1907    Lab Status:  Final result Specimen:  Blood from Arm, Right Updated:  10/31/17 1957     Sodium 138 mmol/L      Potassium 3 5 mmol/L      Chloride 103 mmol/L      CO2 28 mmol/L      Anion Gap 7 mmol/L      BUN 11 mg/dL      Creatinine 0 69 mg/dL      Glucose 96 mg/dL      Calcium 8 6 mg/dL      eGFR 116 ml/min/1 73sq m     Narrative:         National Kidney Disease Education Program recommendations are as follows:  GFR calculation is accurate only with a steady state creatinine  Chronic Kidney disease less than 60 ml/min/1 73 sq  meters  Kidney failure less than 15 ml/min/1 73 sq  meters  TSH [[id number]]  (Normal) Collected:  10/31/17 1907    Lab Status:  Final result Specimen:  Blood from Arm, Right Updated:  10/31/17 1957     TSH 3RD GENERATON 0 779 uIU/mL     Narrative:         Patients undergoing fluorescein dye angiography may retain small amounts of fluorescein in the body for 48-72 hours post procedure  Samples containing fluorescein can produce falsely depressed TSH values  If the patient had this procedure,a specimen should be resubmitted post fluorescein clearance            The recommended reference ranges for TSH during pregnancy are as follows:  First trimester 0 1 to 2 5 uIU/mL  Second trimester  0 2 to 3 0 uIU/mL  Third trimester 0 3 to 3 0 uIU/m      Troponin I [[id number]]  (Normal) Collected:  10/31/17 1907    Lab Status:  Final result Specimen:  Blood from Arm, Right Updated:  10/31/17 1951     Troponin I <0 02 ng/mL     Narrative:         [employer] Chemistry analyzer 99% cutoff is > 0 04 ng/mL in network labs    o cTnI 99% cutoff is useful only when applied to patients in the clinical setting of myocardial ischemia  o cTnI 99% cutoff should be interpreted in the context of clinical history, ECG findings and possibly cardiac imaging to establish correct diagnosis  o cTnI 99% cutoff may be suggestive but clearly not indicative of a coronary event without the clinical setting of myocardial ischemia  D-Dimer [[id number]]  (Normal) Collected:  10/31/17 1907    Lab Status:  Final result Specimen:  Blood from Arm, Right Updated:  10/31/17 1944     D-Dimer, Quant 406 ng/ml (FEU)     CBC and differential [[id number]]  (Normal) Collected:  10/31/17 1907    Lab Status:  Final result Specimen:  Blood from Arm, Right Updated:  10/31/17 1932     WBC 6 00 Thousand/uL      RBC 4 20 Million/uL      Hemoglobin 12 1 g/dL      Hematocrit 37 8 %      MCV 90 fL      MCH 28 8 pg      MCHC 32 0 g/dL      RDW 12 4 %      MPV 10 1 fL      Platelets 147 Thousands/uL      Neutrophils Relative 59 %      Lymphocytes Relative 27 %      Monocytes Relative 10 %      Eosinophils Relative 3 %      Basophils Relative 1 %      Neutrophils Absolute 3 56 Thousands/µL      Lymphocytes Absolute 1 62 Thousands/µL      Monocytes Absolute 0 61 Thousand/µL      Eosinophils Absolute 0 17 Thousand/µL      Basophils Absolute 0 04 Thousands/µL     POCT pregnancy, urine [[id number]]  (Normal) Resulted:  10/31/17 1918    Lab Status:  Final result Updated:  10/31/17 1918     EXT PREG TEST UR (Ref: Negative) Negative                 CTA chest pe study   Final Result by Donya Breaux MD (10/31 2201)   1  No pulmonary emboli demonstrated                                      Workstation performed: [id number]         XR chest 2 views   ED Interpretation by Beryl Robertson PA-C (10/31 1924)   No acute disease                 Procedures  ECG 12 Lead Documentation  Date/Time: 10/31/2017 7:00 PM  Performed by: Ramone Rodriguez by: Noemí Singh     Indications / Diagnosis:  Chest pain  ECG reviewed by me, the ED Provider: yes    Patient location:  ED  Previous ECG:     Previous ECG:  Compared to current    Similarity:  Changes noted (t waves now flipped in V3, and upright in V2)  Interpretation:     Interpretation: normal    Rate:     ECG rate:  62    ECG rate assessment: normal    Rhythm:     Rhythm: sinus rhythm    Ectopy:     Ectopy: none    QRS:     QRS axis:  Normal  Conduction:     Conduction: normal    ST segments:     ST segments:  Normal  T waves:     T waves: inverted      Inverted:  V3           Phone Contacts  ED Phone Contact    ED Course  ED Course as of Oct 31 2210   Tue Oct 31, 2017   1922 Symptoms unchanged after neb treatment    2110 Updates patient that we will get CT to further r/o PE as her ambulatory O2 dropped to 93%, she understands and agrees to do so                          Lucian' Criteria for PE    Flowsheet Row Most Recent Value   Wells' Criteria for PE   Clinical signs and symptoms of DVT  0 Filed at: 10/31/2017 7102   PE is primary diagnosis or equally likely  0 Filed at: 10/31/2017 1832   HR >100  0 Filed at: 10/31/2017 1832   Immobilization at least 3 days or Surgery in the previous 4 weeks  0 Filed at: 10/31/2017 1832   Previous, objectively diagnosed PE or DVT  0 Filed at: 10/31/2017 1832   Hemoptysis  0 Filed at: 10/31/2017 1832   Malignancy with treatment within 6 months or palliative  0 Filed at: 10/31/2017 [address] Criteria Total  0 Filed at: 10/31/2017 1832            MDM  Number of Diagnoses or Management Options  Atypical chest pain:      Amount and/or Complexity of Data Reviewed  Clinical lab tests: ordered and reviewed  Tests in the radiology section of CPT®: ordered and reviewed  Discuss the patient with other providers: yes    Risk of Complications, Morbidity, and/or Mortality  Presenting problems: high  Diagnostic procedures: high  Management options: high    Patient Progress  Patient progress: stable    CritCare Time    Disposition  Final diagnoses: Atypical chest pain     Time reflects when diagnosis was documented in both MDM as applicable and the Disposition within this note     Time User Action Codes Description Comment    10/31/2017 10:10 PM Cassandra Dow Add [R07 89] Atypical chest pain       ED Disposition     ED Disposition Condition Comment    Discharge  [facility] discharge to home/self care  Condition at discharge: Good        Follow-up Information     Follow up With Specialties Details Why Contact Info Additional Information    Tor Juan MD Family Medicine In 3 days  [facility] [address] Emergency Department Emergency Medicine  If symptoms worsen [address] [facility]  AN ED,  [address], [place] [address]        Patient's Medications   Discharge Prescriptions    No medications on file     No discharge procedures on file      ED Provider  Electronically Signed by           Kinsey Rabago PA-C  11/03/17 0022

## 2017-10-31 NOTE — ED NOTES
Provider at bedside      Mona Rodas [facility]  10/31/17 2318

## 2017-11-01 LAB
ATRIAL RATE: 62 BPM
P AXIS: 50 DEGREES
PR INTERVAL: 148 MS
QRS AXIS: 25 DEGREES
QRSD INTERVAL: 104 MS
QT INTERVAL: 382 MS
QTC INTERVAL: 387 MS
T WAVE AXIS: 45 DEGREES
VENTRICULAR RATE: 62 BPM

## 2017-11-01 NOTE — DISCHARGE INSTRUCTIONS
Noncardiac Chest Pain   WHAT YOU NEED TO KNOW:   Noncardiac chest pain is pain or discomfort in your chest not caused by a heart problem  It may be caused by acid reflux, nerve or muscle problems within your esophagus, or chest wall, muscle, or rib pain  It may also be caused by panic attacks, anxiety, or depression  DISCHARGE INSTRUCTIONS:   Return to the emergency department if:   · You have severe chest pain  Contact your healthcare provider if:   · Your chest pain does not get better, even with treatment  · You have questions or concerns about your condition or care  Medicines:   · Medicines  may be given to treat the cause of your chest pain  You may be given medicines to decrease pain, relieve anxiety, decrease acid reflux, or relax muscles in your esophagus  · Take your medicine as directed  Contact your healthcare provider if you think your medicine is not helping or if you have side effects  Tell him of her if you are allergic to any medicine  Keep a list of the medicines, vitamins, and herbs you take  Include the amounts, and when and why you take them  Bring the list or the pill bottles to follow-up visits  Carry your medicine list with you in case of an emergency  Cognitive therapy:  Cognitive therapy may be helpful if you have panic attacks, anxiety, or depression  It can help you change how you react to situations that tend to trigger your chest pain  Follow up with your healthcare provider as directed:  Write down your questions so you remember to ask them during your visits  © 2017 [address] Information is for End User's use only and may not be sold, redistributed or otherwise used for commercial purposes  All illustrations and images included in CareNotes® are the copyrighted property of A D A M , Inc  or [employer] Crenshaw  The above information is an  only  It is not intended as medical advice for individual conditions or treatments   Talk to your doctor, nurse or pharmacist before following any medical regimen to see if it is safe and effective for you

## 2017-11-01 NOTE — ED NOTES
Pt ambulated without difficulties O2 stats was 93-98 pt did complain of SOB and chest tightness pain level 3 no dizziness or light headedness      [address] ROGER  10/31/17 2028

## 2017-12-11 ENCOUNTER — ALLSCRIPTS OFFICE VISIT (OUTPATIENT)
Dept: OTHER | Facility: OTHER | Age: 32
End: 2017-12-11

## 2017-12-12 NOTE — PROGRESS NOTES
Assessment    1  URTI (acute upper respiratory infection) (465 9) (J06 9)    Plan  URTI (acute upper respiratory infection)    · Azithromycin 250 MG Oral Tablet; TAKE 2 TABLETS ON DAY 1 THEN TAKE 1TABLET A DAY FOR 4 DAYS   · Promethazine-Codeine 6 25-10 MG/5ML Oral Syrup; TAKE 5 ML EVERY 4 TO 6HOURS AS NEEDED    Discussion/Summary    Upper respiratory infection  Patient given prescription for Zithromax Z-Jack to take as directed  She was also given promethazine with codeine to use at as ordered  Patient will call if symptoms persist after medication completed  Chief Complaint  Pt states that she has not felt well for the past week  Pt is c/o runny nose, headaches, and scratchy throat  Pt states that she now has chest congestion and hurts from coughing, but can not bring anything up  History of Present Illness  HPI: I reviewed chief complaint with the patient  She states her three children have also been sick with similar symptoms  She is a nonsmoker      Review of Systems   Constitutional: feeling tired  ENT: as noted in HPI  Cardiovascular: no complaints of slow or fast heart rate, no chest pain, no palpitations, no leg claudication or lower extremity edema  Respiratory: as noted in HPI  Gastrointestinal: no complaints of abdominal pain, no constipation, no nausea or diarrhea, no vomiting, no bloody stools  Genitourinary: no complaints of dysuria, no incontinence, no pelvic pain, no dysmenorrhea, no vaginal discharge or abnormal vaginal bleeding  Musculoskeletal: Patient complained of some mild chest and back pain related to her cough  Active Problems  1  Acute bronchospasm (519 11) (J98 01)   2  Acute conjunctivitis of both eyes, unspecified acute conjunctivitis type (372 00) (H10 33)   3  Back Pain   4  Bacterial vaginosis (616 10,041 9) (N76 0,B96 89)   5  Hamstring tendinitis at origin (727 09) (M76 899)   6  Insomnia (780 52) (G47 00)   7  Lower back pain (724 2) (M54 5)   8   Lumbar radiculopathy (724 4) (M54 16)   9  Migraine headache (346 90) (G43 909)   10  Paresthesia (782 0) (R20 2)   11  Pregnancy, obstetrical care (V22 1) (Z34 90)   12  Presence of (intrauterine) contraceptive device (V45 51) (Z97 5)   13  Right lower quadrant pain (789 03) (R10 31)   14  Sacroiliitis (720 2) (M46 1)   15  URTI (acute upper respiratory infection) (465 9) (J06 9)   16  Viral infection (079 99) (B34 9)    Past Medical History  1  History of Age At First Period 15 Years Old (Menarche)   2  History of Asthma (493 90) (J45 909)   3  History Of 4  Previous Pregnancies (V61 5)   4  History of chlamydia infection (V12 09) (Z86 19)   5  History of pregnancy (V13 29)    Family History  Mother    1  No pertinent family history    Social History   · Denied: History of Alcohol use   · Denied: History of Drug use   · Never A Smoker   · Presence of (intrauterine) contraceptive device (V45 51) (Z97 5)  The social history was reviewed and updated today  Current Meds    The medication list was reviewed and updated today  Allergies  1  No Known Drug Allergies  2  No Known Environmental Allergies   3  No Known Food Allergies    Vitals   Recorded: 22SPR7569 10:06AM   Temperature 97 7 F   Heart Rate 70   Systolic 222   Diastolic 60   Height 5 ft 5 in   Weight 145 lb 6 oz   BMI Calculated 24 19   BSA Calculated 1 73       Physical Exam   Constitutional  General appearance: No acute distress, well appearing and well nourished  Ears, Nose, Mouth, and Throat  External inspection of ears and nose: Normal    Otoscopic examination: Tympanic membranes translucent with normal light reflex  Canals patent without erythema  Oropharynx: Normal with no erythema, edema, exudate or lesions  -- hoarse voice  Pulmonary  Respiratory effort: No increased work of breathing or signs of respiratory distress  Auscultation of lungs: Clear to auscultation  -- Harsh cough    Cardiovascular  Auscultation of heart: Normal rate and rhythm, normal S1 and S2, without murmurs  Lymphatic  Palpation of lymph nodes in neck: No lymphadenopathy  Skin  Skin and subcutaneous tissue: Normal without rashes or lesions           Signatures   Electronically signed by : FAINA Hidalgo ; Dec 11 9690 10:37AM EST                       (Author)

## 2018-01-13 VITALS
SYSTOLIC BLOOD PRESSURE: 102 MMHG | TEMPERATURE: 98.7 F | HEIGHT: 65 IN | WEIGHT: 155.25 LBS | DIASTOLIC BLOOD PRESSURE: 68 MMHG | BODY MASS INDEX: 25.87 KG/M2 | HEART RATE: 72 BPM | RESPIRATION RATE: 14 BRPM

## 2018-01-13 VITALS
HEART RATE: 76 BPM | BODY MASS INDEX: 25.33 KG/M2 | WEIGHT: 152 LBS | DIASTOLIC BLOOD PRESSURE: 68 MMHG | SYSTOLIC BLOOD PRESSURE: 100 MMHG | RESPIRATION RATE: 14 BRPM | HEIGHT: 65 IN

## 2018-01-13 VITALS
HEIGHT: 65 IN | TEMPERATURE: 98.8 F | WEIGHT: 149 LBS | BODY MASS INDEX: 24.83 KG/M2 | SYSTOLIC BLOOD PRESSURE: 118 MMHG | DIASTOLIC BLOOD PRESSURE: 72 MMHG | HEART RATE: 74 BPM

## 2018-01-13 NOTE — RESULT NOTES
Message   can you please let pt know thta her x ray was normal? Thank you     Verified Results  * XR SPINE LUMBAR MINIMUM 4 VIEWS 30Jun2016 11:43AM Rayma Knobel Order Number: [id number]     Test Name Result Flag Reference   XR SPINE LUMBAR MINIMUM 4 VIEWS (Report)     LUMBAR SPINE     INDICATION: Lower back pain for 2 weeks  COMPARISON: None     VIEWS: AP, lateral, bilateral oblique and coned down projections; 5 images     FINDINGS:     Alignment is unremarkable  There is no radiographic evidence of acute fracture or destructive osseous lesion  No significant lumbar degenerative change noted  Visualized soft tissues appear unremarkable  IMPRESSION:     Normal examination  Workstation performed: [id number]     Signed by:    Pati Oneil MD   7/1/16   WD positioned

## 2018-01-13 NOTE — MISCELLANEOUS
Message   Recorded as Task   Date: 02/15/2017 10:57 AM, Created By: Cameron Murphy   Task Name: Follow Up   Assigned To: Latisha Talavera procedure,Team   Regarding Patient: Smiley Mann, Status: Active   CommentEzequiel Wilson - 15 Feb 2017 10:57 AM     TASK CREATED  s/p R SIJ INJECTION with FQ on 2/10/17  No POVS scheduled  Cecilia Knutson - 17 Feb 2017 3:32 PM     TASK EDITED  Spoke with pt who reports 100% relief from inj  Pt will call should pain return  Alma Hawkins - 74 Feb 2017 4:07 PM     TASK REPLIED TO: Previously Assigned To Alma Hawkins md aware   Lisa Wang - 17 Feb 2017 4:26 PM     TASK COMPLETED   Libia Montez - 28 Feb 2017 10:14 AM     TASK REACTIVATED   Libia Montez - 28 Feb 2017 10:26 AM     TASK EDITED  Received a vmlom from 810 today from the pt  stating for the past four days she has been having pain  Looks like her pain has returned  FQ to advise  Thanks   Alma Hawkins - 28 Feb 2017 10:36 AM     TASK REPLIED TO: Previously Assigned To Alma Hawkins  pt has to start PT now   pain will get better   Kishorbrandy Chaya - 28 Feb 2017 11:32 AM     TASK EDITED  spoke to pt she stated she will start PT but would like a med for her pain  Her pain is worse when she is laying down and gets up  She is taken Tylenol OTC but is not helping   Alma Hawkins - 28 Feb 2017 12:03 PM     TASK REPLIED TO: Previously Assigned To Alma Hawkins  e-rx sent for diclofenac 75 mg BID   Vidya García - 28 Feb 2017 1:17 PM     TASK EDITED  pt made aware        Active Problems    1  Acute bronchospasm (519 11) (J98 01)   2  Back Pain   3  Bacterial vaginosis (616 10,041 9) (N76 0,B96 89)   4  Hamstring tendinitis at origin (727 09) (M76 899)   5  Lower back pain (724 2) (M54 5)   6  Lumbar radiculopathy (724 4) (M54 16)   7  Migraine headache (346 90) (G43 909)   8  Paresthesia (782 0) (R20 2)   9  Pregnancy, obstetrical care (V22 1) (Z87 90)   10   Presence of (intrauterine) contraceptive device (V45 51) (Z97 5)   11  Right lower quadrant pain (789 03) (R10 31)   12  Sacroiliitis (720 2) (M46 1)   13  URTI (acute upper respiratory infection) (465 9) (J06 9)   14  Viral infection (079 99) (B34 9)    Current Meds   1  Aviane 0 1-20 MG-MCG Oral Tablet; Take 1 tablet daily; Therapy: 24QBF9492 to (Evaluate:28Jun2017) Recorded   2  Diclofenac Sodium 75 MG Oral Tablet Delayed Release; TAKE 1 TABLET 2 TIMES DAILY   AFTER MEALS; Therapy: [id number] to (Juana Avendaño)  Requested for: 85FHF0807; Last   Rx:[id number] Ordered   3  TraMADol HCl - 50 MG Oral Tablet; TAKE ONE (1) TABLET(S) THREE TIMES DAILYAS   NEEDED FOR PAIN;   Therapy: 64GOS4995 to (Evaluate:31Jan2017); Last Rx:11Jan2017 Ordered    Allergies    1  No Known Drug Allergies    2  No Known Environmental Allergies   3   No Known Food Allergies    Signatures   Electronically signed by : Cosme Eagle, ; Feb 28 2017  1:17PM EST                       (Author)

## 2018-01-13 NOTE — MISCELLANEOUS
Message   Recorded as Task   Date: 02/15/2017 10:57 AM, Created By: Ledy Castillo   Task Name: Follow Up   Assigned To: Mynor martin,Team   Regarding Patient: Johnathan Marshall, Status: Active   CommentJade Greg - 15 Feb 2017 10:57 AM     TASK CREATED  s/p R SIJ INJECTION with FQ on 2/10/17  No POVS scheduled  Cecilia Knutson - 17 Feb 2017 3:32 PM     TASK EDITED  Spoke with pt who reports 100% relief from inj  Pt will call should pain return  Josie Bueno - 91 Feb 2017 4:07 PM     TASK REPLIED TO: Previously Assigned To Josie Bueno md aware        Active Problems    1  Acute bronchospasm (519 11) (J98 01)   2  Back Pain   3  Bacterial vaginosis (616 10,041 9) (N76 0,B96 89)   4  Hamstring tendinitis at origin (727 09) (M76 899)   5  Lower back pain (724 2) (M54 5)   6  Lumbar radiculopathy (724 4) (M54 16)   7  Migraine headache (346 90) (G43 909)   8  Paresthesia (782 0) (R20 2)   9  Pregnancy, obstetrical care (V22 1) (Z34 90)   10  Presence of (intrauterine) contraceptive device (V45 51) (Z97 5)   11  Right lower quadrant pain (789 03) (R10 31)   12  Sacroiliitis (720 2) (M46 1)   13  URTI (acute upper respiratory infection) (465 9) (J06 9)   14  Viral infection (079 99) (B34 9)    Current Meds   1  Aviane 0 1-20 MG-MCG Oral Tablet; Take 1 tablet daily; Therapy: 37CPN8595 to (Evaluate:28Jun2017) Recorded   2  TraMADol HCl - 50 MG Oral Tablet; TAKE ONE (1) TABLET(S) THREE TIMES DAILYAS   NEEDED FOR PAIN;   Therapy: 56IAM2960 to (Evaluate:31Jan2017); Last Rx:11Jan2017 Ordered    Allergies    1  No Known Drug Allergies    2  No Known Environmental Allergies   3   No Known Food Allergies    Signatures   Electronically signed by : Indra Bennett, ; Feb 17 2017  4:26PM EST                       (Author)

## 2018-01-22 VITALS
DIASTOLIC BLOOD PRESSURE: 60 MMHG | HEART RATE: 70 BPM | WEIGHT: 145.38 LBS | BODY MASS INDEX: 24.22 KG/M2 | SYSTOLIC BLOOD PRESSURE: 118 MMHG | TEMPERATURE: 97.7 F | HEIGHT: 65 IN

## 2018-03-04 ENCOUNTER — HOSPITAL ENCOUNTER (EMERGENCY)
Facility: HOSPITAL | Age: 33
Discharge: HOME | End: 2018-03-04
Attending: EMERGENCY MEDICINE | Admitting: EMERGENCY MEDICINE
Payer: COMMERCIAL

## 2018-03-04 VITALS
OXYGEN SATURATION: 100 % | WEIGHT: 135 LBS | HEART RATE: 66 BPM | TEMPERATURE: 98.7 F | BODY MASS INDEX: 21.69 KG/M2 | SYSTOLIC BLOOD PRESSURE: 120 MMHG | RESPIRATION RATE: 18 BRPM | DIASTOLIC BLOOD PRESSURE: 82 MMHG | HEIGHT: 66 IN

## 2018-03-04 DIAGNOSIS — I80.01 SUPERFICIAL THROMBOPHLEBITIS OF RIGHT LEG: Primary | ICD-10-CM

## 2018-03-04 PROCEDURE — 99283 EMERGENCY DEPT VISIT LOW MDM: CPT

## 2018-03-04 RX ORDER — CEPHALEXIN 250 MG/1
500 CAPSULE ORAL EVERY 6 HOURS SCHEDULED
Qty: 56 CAPSULE | Refills: 0 | Status: SHIPPED | OUTPATIENT
Start: 2018-03-04 | End: 2018-03-11

## 2018-03-04 RX ORDER — NAPROXEN 500 MG/1
500 TABLET ORAL 2 TIMES DAILY WITH MEALS
Qty: 14 TABLET | Refills: 0 | Status: SHIPPED | OUTPATIENT
Start: 2018-03-04 | End: 2018-04-10 | Stop reason: ALTCHOICE

## 2018-03-04 RX ORDER — CEPHALEXIN 250 MG/1
500 CAPSULE ORAL ONCE
Status: COMPLETED | OUTPATIENT
Start: 2018-03-04 | End: 2018-03-04

## 2018-03-04 RX ORDER — CEPHALEXIN 250 MG/1
500 CAPSULE ORAL EVERY 12 HOURS SCHEDULED
Qty: 28 CAPSULE | Refills: 0 | Status: SHIPPED | OUTPATIENT
Start: 2018-03-04 | End: 2018-03-04

## 2018-03-04 RX ORDER — NAPROXEN 500 MG/1
500 TABLET ORAL ONCE
Status: COMPLETED | OUTPATIENT
Start: 2018-03-04 | End: 2018-03-04

## 2018-03-04 RX ADMIN — CEPHALEXIN 500 MG: 250 CAPSULE ORAL at 16:03

## 2018-03-04 RX ADMIN — NAPROXEN 500 MG: 500 TABLET ORAL at 16:03

## 2018-03-04 NOTE — DISCHARGE INSTRUCTIONS
Take medications as directed  Stay well hydrated  Follow up with primary care physician within 2-3 days for reevaluation of symptoms  Return to ED if new or worsening symptoms for immediate reevaluation  Superficial Thrombophlebitis   WHAT YOU NEED TO KNOW:   Superficial thrombophlebitis (STP) is inflammation of a vein just under your skin (superficial vein)  The inflammation causes a blood clot to form in your vein  STP most often happens in your leg but may also happen in your arm  DISCHARGE INSTRUCTIONS:   Call 911 for any of the following:   · You feel lightheaded, short of breath, and have chest pain  · You cough up blood  Return to the emergency department if:   · Your leg or arm turns pale or blue  · Your leg or arm feels hot or cold  · Your arm or leg feels warm, tender, and painful  It may look swollen and red  Contact your healthcare provider or hematologist if:   · Your symptoms return after treatment  · You have questions or concerns about your condition or care  Medicines: You may  need any of the following:  · Antibiotics  may be given to treat a bacterial infection  · NSAIDs , such as ibuprofen, help decrease swelling, pain, and fever  NSAIDs can cause stomach bleeding or kidney problems in certain people  If you take blood thinner medicine, always ask your healthcare provider if NSAIDs are safe for you  Always read the medicine label and follow directions  · Blood thinners    help prevent blood clots  Examples of blood thinners include heparin and warfarin  Clots can cause strokes, heart attacks, and death  The following are general safety guidelines to follow while you are taking a blood thinner:    ¨ Watch for bleeding and bruising while you take blood thinners  Watch for bleeding from your gums or nose  Watch for blood in your urine and bowel movements  Use a soft washcloth on your skin, and a soft toothbrush to brush your teeth   This can keep your skin and gums from bleeding  If you shave, use an electric shaver  Do not play contact sports  ¨ Tell your dentist and other healthcare providers that you take anticoagulants  Wear a bracelet or necklace that says you take this medicine  ¨ Do not start or stop any medicines unless your healthcare provider tells you to  Many medicines cannot be used with blood thinners  ¨ Tell your healthcare provider right away if you forget to take the medicine, or if you take too much  ¨ Warfarin  is a blood thinner that you may need to take  The following are things you should be aware of if you take warfarin  § Foods and medicines can affect the amount of warfarin in your blood  Do not make major changes to your diet while you take warfarin  Warfarin works best when you eat about the same amount of vitamin K every day  Vitamin K is found in green leafy vegetables and certain other foods  Ask for more information about what to eat when you are taking warfarin  § You will need to see your healthcare provider for follow-up visits when you are on warfarin  You will need regular blood tests  These tests are used to decide how much medicine you need  · Antiplatelets , such as aspirin, help prevent blood clots  Take your antiplatelet medicine exactly as directed  These medicines make it more likely for you to bleed or bruise  If you are told to take aspirin, do not take acetaminophen or ibuprofen instead  · Take your medicine as directed  Contact your healthcare provider if you think your medicine is not helping or if you have side effects  Tell him of her if you are allergic to any medicine  Keep a list of the medicines, vitamins, and herbs you take  Include the amounts, and when and why you take them  Bring the list or the pill bottles to follow-up visits  Carry your medicine list with you in case of an emergency    Follow up with your healthcare provider as directed:  Write down your questions so you remember to ask them during your visits  Manage your symptoms and prevent STP:  STP can increase your risk for a blood clot in deeper veins in your arms or legs  It can also increase your risk for a blood clot in your lungs  Do the following to decrease your risk for more blood clots and manage your symptoms:  · Wear pressure stockings as directed  Pressure stockings improve blood flow and help prevent clots in your legs  Wear the stockings during the day  Do not wear them when you sleep  · Elevate your leg or arm above the level of your heart as often as you can  This will help decrease swelling and pain  Prop your leg or arm on pillows or blankets to keep it elevated comfortably  · Apply a warm compress to your arm or leg  This will help decrease swelling and pain  Wet a washcloth in warm water  Do not  use hot water  Apply the warm compress for 10 minutes  Repeat this 4 times each day  · Maintain a healthy weight  This will help decrease your risk for another blood clot  Ask your healthcare provider how much you should weigh  Ask him or her to help you create a weight loss plan if you are overweight  · Do not smoke  Nicotine and other chemicals in cigarettes and cigars can damage blood vessels and increase your risk for blood clots  Ask your healthcare provider for information if you currently smoke and need help to quit  E-cigarettes or smokeless tobacco still contain nicotine  Talk to your healthcare provider before you use these products  · Stay active  Activity helps prevent blood clots  Do not sit for more than an hour  If you travel by car or work at a desk, move and stretch in your seat several times each hour  In an airplane, get up and walk every hour  Exercise your legs while you are sitting by raising and lowering your heels  Keep your toes on the floor while you do this  You can also raise and lower your toes while keeping your heels on the floor   Also tighten and release your leg muscles while you are sitting  · Exercise regularly  Exercise can help increase your blood flow and prevent a blood clot  Walking is a good low-impact exercise  Talk to your healthcare provider about the best exercise plan for you  · Do not inject illegal drugs  Talk to your healthcare provider if you use IV drugs and need help to quit  © 2017 [employer] Information is for End User's use only and may not be sold, redistributed or otherwise used for commercial purposes  All illustrations and images included in CareNotes® are the copyrighted property of [employer]  or [employer]  The above information is an  only  It is not intended as medical advice for individual conditions or treatments  Talk to your doctor, nurse or pharmacist before following any medical regimen to see if it is safe and effective for you

## 2018-03-04 NOTE — ED PROVIDER NOTES
History  Chief Complaint   Patient presents with    Leg Swelling     Pt states that she started with leg swelling behind her R leg Friday  Pt denies any injury to the leg     27 yo F presents to ED for evaluation of localized area of redness/swelling/pain to posterolateral right thigh that was first noted on Friday  Pt also c/o several day hx of nasal congestion, rhinorrhea and postnasal drip  Pt denies any known trauma/injury, HA, visual changes, neck/back pain, chest pain, dyspnea, productive cough, fever/chills, abdominal pain, N/V/D, urinary symptoms, unilateral/bilateral calf swelling/pain or focal weakness/numbness/paresthesias  Pt has PMH of remote asthma, no significant PSH  Pt is a nonsmoker, social ETOH use, denies recreational drug use  No improvement in symptoms with ice application, no other interventions prior to arrival  No recent travel or known sick contacts  No recent trauma, surgery, malignancy, OCPs or hemoptysis  Pt with similar symptoms in April 2017 with negative duplex, treated with Keflex with resolution of symptoms  None       Past Medical History:   Diagnosis Date    Asthma        History reviewed  No pertinent surgical history  History reviewed  No pertinent family history  I have reviewed and agree with the history as documented  Social History   Substance Use Topics    Smoking status: Never Smoker    Smokeless tobacco: Never Used    Alcohol use Yes      Comment: social        Review of Systems   Constitutional: Negative for chills, fatigue and fever  HENT: Positive for congestion, postnasal drip and rhinorrhea  Negative for sore throat  Eyes: Negative for pain, redness and visual disturbance  Respiratory: Negative for cough, chest tightness, shortness of breath, wheezing and stridor  Cardiovascular: Negative for chest pain, palpitations and leg swelling     Gastrointestinal: Negative for abdominal pain, blood in stool, constipation, diarrhea, nausea and vomiting  Genitourinary: Negative for dysuria, frequency, hematuria and urgency  Musculoskeletal: Negative for back pain and neck pain  Localized R posterolateral thigh pain with redness/warmth, no radiculopathy or focal neuro deficits,    Skin: Negative for pallor and wound  Neurological: Negative for dizziness, seizures, syncope, weakness, light-headedness and headaches  Psychiatric/Behavioral: Negative for agitation and confusion  Physical Exam  ED Triage Vitals [03/04/18 1507]   Temperature Pulse Respirations Blood Pressure SpO2   98 7 °F (37 1 °C) 76 18 116/56 99 %      Temp Source Heart Rate Source Patient Position - Orthostatic VS BP Location FiO2 (%)   Oral Monitor Sitting Left arm --      Pain Score       --           Orthostatic Vital Signs  Vitals:    03/04/18 1507 03/04/18 1627   BP: 116/56 120/82   Pulse: 76 66   Patient Position - Orthostatic VS: Sitting Sitting       Physical Exam   Constitutional: She is oriented to person, place, and time  She appears well-developed and well-nourished  No distress  HENT:   Head: Normocephalic and atraumatic  Mouth/Throat: Oropharynx is clear and moist  No oropharyngeal exudate  Eyes: Conjunctivae and EOM are normal  Pupils are equal, round, and reactive to light  Right eye exhibits no discharge  Left eye exhibits no discharge  Neck: Normal range of motion  Neck supple  No JVD present  No tracheal deviation present  Cardiovascular: Normal rate, regular rhythm, normal heart sounds and intact distal pulses  Exam reveals no gallop and no friction rub  No murmur heard  Pulmonary/Chest: Effort normal and breath sounds normal  No stridor  No respiratory distress  She has no wheezes  She has no rales  She exhibits no tenderness  Abdominal: Soft  Bowel sounds are normal  She exhibits no distension  There is no tenderness  There is no rebound and no guarding  Musculoskeletal: Normal range of motion  She exhibits tenderness   She exhibits no deformity  Area of mild localized tenderness/redness/warmth to distal lateral right hamstring, no fluctuance noted, bedside ultrasound shows no loculated fluid collection   Neurological: She is alert and oriented to person, place, and time  No cranial nerve deficit  Skin: Skin is warm and dry  No rash noted  She is not diaphoretic  Psychiatric: She has a normal mood and affect  Nursing note and vitals reviewed  ED Medications  Medications   naproxen (NAPROSYN) tablet 500 mg (500 mg Oral Given 3/4/18 1603)   cephalexin (KEFLEX) capsule 500 mg (500 mg Oral Given 3/4/18 1603)       Diagnostic Studies  Results Reviewed     None                 No orders to display         Procedures  Procedures      Phone Consults  ED Phone Contact    ED Course  ED Course                 Patient updated on plan of care  Discharge instructions given including medications, follow-up and return precautions with understanding verbalized  MDM  CritCare Time    Disposition  Final diagnoses:   Superficial thrombophlebitis of right leg     Time reflects when diagnosis was documented in both MDM as applicable and the Disposition within this note     Time User Action Codes Description Comment    3/4/2018  4:15 PM Antonina Blair Add [I80 01] Superficial thrombophlebitis of right leg       ED Disposition     ED Disposition Condition Comment    Discharge  [facility] discharge to home/self care      Condition at discharge: Stable        Follow-up Information     Follow up With Specialties Details Why Contact Aileen Mejía MD Family Medicine  within 2-3 days for reevaluation of symptoms [id number]  [phone]          Discharge Medication List as of 3/4/2018  4:28 PM      START taking these medications    Details   cephalexin (KEFLEX) 250 mg capsule Take 2 capsules (500 mg total) by mouth every 6 (six) hours for 7 days, Starting Sun 3/4/2018, Until Sun 3/11/2018, Print naproxen (NAPROSYN) 500 mg tablet Take 1 tablet (500 mg total) by mouth 2 (two) times a day with meals, Starting Sun 3/4/2018, Print           No discharge procedures on file  ED Provider  Attending physically available and evaluated [facility]  I managed the patient along with the ED Attending      Electronically Signed by         Rudolph Falcon DO  03/04/18 7976

## 2018-03-04 NOTE — ED ATTENDING ATTESTATION
Aminata Rojas MD, saw and evaluated the patient  I have discussed the patient with the resident/non-physician practitioner and agree with the resident's/non-physician practitioner's findings, Plan of Care, and MDM as documented in the resident's/non-physician practitioner's note, except where noted  All available labs and Radiology studies were reviewed  At this point I agree with the current assessment done in the Emergency Department  I have conducted an independent evaluation of this patient a history and physical is as follows:Noted tenderness swelling right posterolateral thigh just above  Localized swelling with redness and warmth  No fluid on US   Local area of erythema with palpable superfiical cord      Critical Care Time  CritCare Time    Procedures

## 2018-03-06 ENCOUNTER — OFFICE VISIT (OUTPATIENT)
Dept: FAMILY MEDICINE CLINIC | Facility: CLINIC | Age: 33
End: 2018-03-06
Payer: COMMERCIAL

## 2018-03-06 VITALS
TEMPERATURE: 98.2 F | SYSTOLIC BLOOD PRESSURE: 96 MMHG | HEART RATE: 80 BPM | HEIGHT: 66 IN | DIASTOLIC BLOOD PRESSURE: 58 MMHG | WEIGHT: 148.2 LBS | BODY MASS INDEX: 23.82 KG/M2

## 2018-03-06 DIAGNOSIS — F39 MOOD DISORDER (HCC): ICD-10-CM

## 2018-03-06 DIAGNOSIS — L03.115 CELLULITIS OF RIGHT LOWER EXTREMITY: Primary | ICD-10-CM

## 2018-03-06 PROCEDURE — 99214 OFFICE O/P EST MOD 30 MIN: CPT | Performed by: FAMILY MEDICINE

## 2018-03-06 RX ORDER — AMOXICILLIN AND CLAVULANATE POTASSIUM 875; 125 MG/1; MG/1
1 TABLET, FILM COATED ORAL EVERY 12 HOURS SCHEDULED
Qty: 20 TABLET | Refills: 0 | Status: SHIPPED | OUTPATIENT
Start: 2018-03-06 | End: 2018-03-16

## 2018-03-06 RX ORDER — PREDNISONE 20 MG/1
TABLET ORAL
Qty: 7 TABLET | Refills: 0 | Status: SHIPPED | OUTPATIENT
Start: 2018-03-06 | End: 2018-04-10 | Stop reason: ALTCHOICE

## 2018-03-06 RX ORDER — OXYCODONE HYDROCHLORIDE AND ACETAMINOPHEN 5; 325 MG/1; MG/1
1 TABLET ORAL EVERY 6 HOURS PRN
Qty: 30 TABLET | Refills: 0 | Status: SHIPPED | OUTPATIENT
Start: 2018-03-06 | End: 2018-04-10 | Stop reason: ALTCHOICE

## 2018-03-06 NOTE — ASSESSMENT & PLAN NOTE
Mood disorder  I had a long discussion with the patient regarding her symptoms  She was given referral to get behavioral counseling with Dedrick Cano  Once she has treated her cellulitis she may call back to consider initiating medication    At this time patient would like to hold off on any medication treatment

## 2018-03-06 NOTE — PROGRESS NOTES
FAMILY PRACTICE OFFICE VISIT       NAME: Sandra Bocanegra  AGE: 28 y o  SEX: female       : 1985        MRN: [id number]    DATE: 3/6/2018  TIME: 6:37 PM    Assessment and Plan     Problem List Items Addressed This Visit     Cellulitis of right lower extremity - Primary     Cellulitis of right leg  Patient told to discontinue cephalexin and naproxen in place of Augmentin 875 milligrams b i d  with food as well as a prednisone tapering dose as ordered  Patient will continue with warm compresses to the affected area  If symptoms do not decrease in swelling or redness over the next 48 hours she was instructed to go back to the emergency room for re-evaluation and possible IV antibiotics  Relevant Medications    amoxicillin-clavulanate (AUGMENTIN) 875-125 mg per tablet    predniSONE 20 mg tablet    oxyCODONE-acetaminophen (PERCOCET) 5-325 mg per tablet    Mood disorder (HCC)     Mood disorder  I had a long discussion with the patient regarding her symptoms  She was given referral to get behavioral counseling with Marco A Eldridge  Once she has treated her cellulitis she may call back to consider initiating medication  At this time patient would like to hold off on any medication treatment                 There are no Patient Instructions on file for this visit  Chief Complaint     Chief Complaint   Patient presents with    Follow-up     Pt states that she was evaluated in [facility] on   Pt went to the ER with right leg pain  Pt states that she was prescribed cephalexin and Naproxen  Pt states that the medication has not helped at all but feels that the lump is getting bigger and warm to touch  History of Present Illness     Patient states she was seen a few days ago at emergency room for suspected superficial phlebitis  She was prescribed cephalexin and naproxen to use for symptoms    Unfortunately over the past 48 hours symptoms have become worse with increased swelling redness and pain along right popliteal area  Patient has difficulties ambulating due to pain  She states she did have a fever yesterday  Patient also reports unrelated 4 week history of increased mood swings where by patient is easily irritated  She lives at home with her children and her fiance  Her fiance recently lost his job 4 weeks ago  She has had increased stress with work and home life  Some days she feels fine and other days she feels very emotional with crying spells and anger  Her mother does have a history of bipolar disorder  Patient states she has never been treated for any psychiatric condition previously  Review of Systems   Review of Systems   Constitutional: Positive for fatigue and fever  Respiratory: Negative  Cardiovascular: Negative  Gastrointestinal: Negative  Musculoskeletal:        As per HPI   Skin:        As per HPI   Psychiatric/Behavioral:        As per HPI       Active Problem List     Patient Active Problem List   Diagnosis    Cellulitis of right lower extremity    Mood disorder [facility])       Past Medical History:  Past Medical History:   Diagnosis Date    Asthma        Past Surgical History:  No past surgical history on file  Family History:  No family history on file  Social History:  Social History     Social History    Marital status: /Civil Union     Spouse name: N/A    Number of children: N/A    Years of education: N/A     Occupational History    Not on file       Social History Main Topics    Smoking status: Never Smoker    Smokeless tobacco: Never Used    Alcohol use Yes      Comment: social    Drug use: No    Sexual activity: Not on file     Other Topics Concern    Not on file     Social History Narrative    No narrative on file       Objective     Vitals:    03/06/18 1551   BP: 96/58   Pulse: 80   Temp: 98 2 °F (36 8 °C)     Wt Readings from Last 3 Encounters:   03/06/18 67 2 kg (148 lb 3 2 oz)   03/04/18 61 2 kg (135 lb) 12/11/17 65 9 kg (145 lb 6 1 oz)       Physical Exam   Constitutional:   Patient no acute distress   Skin:   Patient with large area of redness and swelling along right popliteal area measuring approximately 20 x 10 millimeters  The area is tender to palpation  Patient has difficulties having full extension of her leg at the knee  Negative Homans sign  Psychiatric:   Patient denies suicidal ideations    She is frustrated with her ability to control her mood swings from irritability, crying, anger her to feeling back to which she describes as her normal          Pertinent Laboratory/Diagnostic Studies:  Lab Results   Component Value Date    GLUCOSE 96 10/31/2017    BUN 11 10/31/2017    CREATININE 0 69 10/31/2017    CALCIUM 8 6 10/31/2017     10/31/2017    K 3 5 10/31/2017    CO2 28 10/31/2017     10/31/2017     Lab Results   Component Value Date    ALT 26 10/10/2017    AST 19 10/10/2017    ALKPHOS 68 10/10/2017    BILITOT 0 40 10/10/2017       Lab Results   Component Value Date    WBC 6 00 10/31/2017    HGB 12 1 10/31/2017    HCT 37 8 10/31/2017    MCV 90 10/31/2017     10/31/2017       No results found for: TSH    No results found for: CHOL  No results found for: TRIG  No results found for: HDL  No results found for: LDLCALC  No results found for: HGBA1C    Results for orders placed or performed during the hospital encounter of 10/31/17   CBC and differential   Result Value Ref Range    WBC 6 00 4 31 - 10 16 Thousand/uL    RBC 4 20 3 81 - 5 12 Million/uL    Hemoglobin 12 1 11 5 - 15 4 g/dL    Hematocrit 37 8 34 8 - 46 1 %    MCV 90 82 - 98 fL    MCH 28 8 26 8 - 34 3 pg    MCHC 32 0 31 4 - 37 4 g/dL    RDW 12 4 11 6 - 15 1 %    MPV 10 1 8 9 - 12 7 fL    Platelets 276 979 - 617 Thousands/uL    Neutrophils Relative 59 43 - 75 %    Lymphocytes Relative 27 14 - 44 %    Monocytes Relative 10 4 - 12 %    Eosinophils Relative 3 0 - 6 %    Basophils Relative 1 0 - 1 %    Neutrophils Absolute 3 56 1 85 - 7 62 Thousands/µL    Lymphocytes Absolute 1 62 0 60 - 4 47 Thousands/µL    Monocytes Absolute 0 61 0 17 - 1 22 Thousand/µL    Eosinophils Absolute 0 17 0 00 - 0 61 Thousand/µL    Basophils Absolute 0 04 0 00 - 0 10 Thousands/µL   Basic metabolic panel   Result Value Ref Range    Sodium 138 136 - 145 mmol/L    Potassium 3 5 3 5 - 5 3 mmol/L    Chloride 103 100 - 108 mmol/L    CO2 28 21 - 32 mmol/L    Anion Gap 7 4 - 13 mmol/L    BUN 11 5 - 25 mg/dL    Creatinine 0 69 0 60 - 1 30 mg/dL    Glucose 96 65 - 140 mg/dL    Calcium 8 6 8 3 - 10 1 mg/dL    eGFR 116 ml/min/1 73sq m   Troponin I   Result Value Ref Range    Troponin I <0 02 <=0 04 ng/mL   TSH   Result Value Ref Range    TSH 3RD GENERATON 0 779 0 358 - 3 740 uIU/mL   D-Dimer   Result Value Ref Range    D-Dimer, Quant 406 0 - 424 ng/ml (FEU)   POCT pregnancy, urine   Result Value Ref Range    EXT PREG TEST UR (Ref: Negative) Negative    ECG 12 lead   Result Value Ref Range    Ventricular Rate 62 BPM    Atrial Rate 62 BPM    VT Interval 148 ms    QRSD Interval 104 ms    QT Interval 382 ms    QTC Interval 387 ms    P Axis 50 degrees    QRS Axis 25 degrees    T Wave Axis 45 degrees       No orders of the defined types were placed in this encounter        ALLERGIES:  No Known Allergies    Current Medications     Current Outpatient Prescriptions   Medication Sig Dispense Refill    cephalexin (KEFLEX) 250 mg capsule Take 2 capsules (500 mg total) by mouth every 6 (six) hours for 7 days 56 capsule 0    naproxen (NAPROSYN) 500 mg tablet Take 1 tablet (500 mg total) by mouth 2 (two) times a day with meals 14 tablet 0    amoxicillin-clavulanate (AUGMENTIN) 875-125 mg per tablet Take 1 tablet by mouth every 12 (twelve) hours for 10 days 20 tablet 0    oxyCODONE-acetaminophen (PERCOCET) 5-325 mg per tablet Take 1 tablet by mouth every 6 (six) hours as needed for moderate pain Max Daily Amount: 4 tablets 30 tablet 0    predniSONE 20 mg tablet 2 tablets daily X 2 days, 1 tablet daily x 2 days, 1/2 tablet daily X 2 days 7 tablet 0     No current facility-administered medications for this visit  Health Maintenance     Health Maintenance   Topic Date Due    PNEUMOCOCCAL POLYSACCHARIDE VACCINE AGE 2-64 HIGH RISK  09/04/1987    Depression Screening PHQ-9  09/04/1997    DTaP,Tdap,and Td Vaccines (3 - Td) 07/20/2015    HIV SCREENING  06/24/2017    INFLUENZA VACCINE  09/01/2017       There is no immunization history on file for this patient      Karolina Wetzel MD

## 2018-03-06 NOTE — ASSESSMENT & PLAN NOTE
Cellulitis of right leg  Patient told to discontinue cephalexin and naproxen in place of Augmentin 875 milligrams b i d  with food as well as a prednisone tapering dose as ordered  Patient will continue with warm compresses to the affected area  If symptoms do not decrease in swelling or redness over the next 48 hours she was instructed to go back to the emergency room for re-evaluation and possible IV antibiotics

## 2018-04-10 ENCOUNTER — OFFICE VISIT (OUTPATIENT)
Dept: FAMILY MEDICINE CLINIC | Facility: CLINIC | Age: 33
End: 2018-04-10
Payer: COMMERCIAL

## 2018-04-10 VITALS
TEMPERATURE: 98 F | BODY MASS INDEX: 23.46 KG/M2 | DIASTOLIC BLOOD PRESSURE: 68 MMHG | HEART RATE: 62 BPM | SYSTOLIC BLOOD PRESSURE: 116 MMHG | HEIGHT: 66 IN | WEIGHT: 146 LBS | RESPIRATION RATE: 16 BRPM

## 2018-04-10 DIAGNOSIS — L03.90 CELLULITIS, UNSPECIFIED CELLULITIS SITE: Primary | ICD-10-CM

## 2018-04-10 PROCEDURE — 99213 OFFICE O/P EST LOW 20 MIN: CPT | Performed by: FAMILY MEDICINE

## 2018-04-10 RX ORDER — AMOXICILLIN AND CLAVULANATE POTASSIUM 875; 125 MG/1; MG/1
1 TABLET, FILM COATED ORAL EVERY 12 HOURS SCHEDULED
Qty: 20 TABLET | Refills: 0 | Status: SHIPPED | OUTPATIENT
Start: 2018-04-10 | End: 2018-04-20

## 2018-04-11 ENCOUNTER — TELEPHONE (OUTPATIENT)
Dept: FAMILY MEDICINE CLINIC | Facility: CLINIC | Age: 33
End: 2018-04-11

## 2018-04-11 DIAGNOSIS — L03.90 CELLULITIS, UNSPECIFIED CELLULITIS SITE: Primary | ICD-10-CM

## 2018-04-11 RX ORDER — PREDNISONE 20 MG/1
TABLET ORAL
Qty: 7 TABLET | Refills: 0 | Status: SHIPPED | OUTPATIENT
Start: 2018-04-11 | End: 2018-04-16

## 2018-04-11 NOTE — TELEPHONE ENCOUNTER
Called pt, she reports the pain is behind right knee  She did take take one abx yesterday and this morning,  However, pain is very bad, Unable to bend knee, she is limping, pain is described as a burning and throbbing, it worsens when pt is standing or walking  Swelling is the same as yesterday  Pt is asking for pain medication, pain scale 7/10

## 2018-04-11 NOTE — TELEPHONE ENCOUNTER
Patient states she was here yesterday and was given an antibiotic but now she is having pain and had to cancel her afternoon schedule  Can you please order her a pain medicine?   Please call and advise

## 2018-04-16 ENCOUNTER — HOSPITAL ENCOUNTER (EMERGENCY)
Facility: HOSPITAL | Age: 33
Discharge: HOME | End: 2018-04-16
Attending: EMERGENCY MEDICINE
Payer: COMMERCIAL

## 2018-04-16 ENCOUNTER — OFFICE VISIT (OUTPATIENT)
Dept: FAMILY MEDICINE CLINIC | Facility: CLINIC | Age: 33
End: 2018-04-16
Payer: COMMERCIAL

## 2018-04-16 VITALS
HEART RATE: 65 BPM | BODY MASS INDEX: 23.57 KG/M2 | DIASTOLIC BLOOD PRESSURE: 57 MMHG | RESPIRATION RATE: 18 BRPM | WEIGHT: 146 LBS | TEMPERATURE: 97.5 F | OXYGEN SATURATION: 97 % | SYSTOLIC BLOOD PRESSURE: 110 MMHG

## 2018-04-16 VITALS
DIASTOLIC BLOOD PRESSURE: 62 MMHG | HEART RATE: 64 BPM | WEIGHT: 146.6 LBS | SYSTOLIC BLOOD PRESSURE: 98 MMHG | TEMPERATURE: 96.8 F | RESPIRATION RATE: 14 BRPM | BODY MASS INDEX: 23.56 KG/M2 | HEIGHT: 66 IN

## 2018-04-16 DIAGNOSIS — L03.115 CELLULITIS OF RIGHT LOWER EXTREMITY: Primary | ICD-10-CM

## 2018-04-16 DIAGNOSIS — M77.9 TENDONITIS: Primary | ICD-10-CM

## 2018-04-16 PROCEDURE — 99212 OFFICE O/P EST SF 10 MIN: CPT | Performed by: FAMILY MEDICINE

## 2018-04-16 PROCEDURE — 99283 EMERGENCY DEPT VISIT LOW MDM: CPT

## 2018-04-16 NOTE — ED PROVIDER NOTES
History  Chief Complaint   Patient presents with    Leg Pain     pt reports right leg infection  for 7 days on antibiotics no relief , no fever or chills     60-year-old female presents to emergency room for evaluation of right lateral knee pain swelling, redness, and itching  Onset 1 week ago  States she had similar incident about 1 month ago where she was given Augmentin and prednisone and symptoms resolved  Previous incident resolved  Pain is increased with the fully flexing and extending the knee  Patient seen by primary physician last week for this and since she restarted antibiotic and prednisone she has noticed that the redness has gone down, however pain and itching continues to increase  She denies recent travel or surgery  Denies history of blood clots  Denies new allergic contacts  States calf only hurts when she walks  Denies ankle or hip pain  Denies shortness of breath or chest pain  History provided by:  Patient      Prior to Admission Medications   Prescriptions Last Dose Informant Patient Reported? Taking?   amoxicillin-clavulanate (AUGMENTIN) 875-125 mg per tablet 4/16/2018 at Unknown time  No Yes   Sig: Take 1 tablet by mouth every 12 (twelve) hours for 10 days      Facility-Administered Medications: None       Past Medical History:   Diagnosis Date    Asthma        History reviewed  No pertinent surgical history  History reviewed  No pertinent family history  I have reviewed and agree with the history as documented  Social History   Substance Use Topics    Smoking status: Never Smoker    Smokeless tobacco: Never Used    Alcohol use Yes      Comment: social        Review of Systems   Constitutional: Negative for chills and fever  Respiratory: Negative for shortness of breath  Cardiovascular: Negative for chest pain  Skin: Positive for rash  Negative for wound  Neurological: Negative for weakness and numbness         Physical Exam  ED Triage Vitals [04/16/18 1609]   Temperature Pulse Respirations Blood Pressure SpO2   97 5 °F (36 4 °C) 65 18 110/57 97 %      Temp Source Heart Rate Source Patient Position - Orthostatic VS BP Location FiO2 (%)   Tympanic Monitor Sitting Left arm --      Pain Score       3           Orthostatic Vital Signs  Vitals:    04/16/18 1609   BP: 110/57   Pulse: 65   Patient Position - Orthostatic VS: Sitting       Physical Exam   Constitutional: She appears well-developed and well-nourished  Cardiovascular: Intact distal pulses  Musculoskeletal:        Right knee: She exhibits decreased range of motion (mild) and swelling (mild)  She exhibits normal patellar mobility  Right ankle: Normal         Right lower leg: She exhibits no tenderness, no swelling and no edema  Skin: Skin is warm and dry  Capillary refill takes less than 2 seconds  Faint local area of erythema over the right lateral/posterior knee, there is local tenderness over this area with mild cord palpable just outside or posterior to this area  States this is where the redness she reported improved  No warmth  Mild generalized knee swelling without effusion  No fluctuance or induration of this local area  Patient complains of itching over this area  There is no streaking  Negative Homans sign  Psychiatric: She has a normal mood and affect  Nursing note and vitals reviewed        ED Medications  Medications - No data to display    Diagnostic Studies  Results Reviewed     None                 No orders to display              Procedures  Procedures       Phone Contacts  ED Phone Contact    ED Course  ED Course                          Wells' Criteria for PE    Flowsheet Row Most Recent Value   Wells' Criteria for PE   Clinical signs and symptoms of DVT  0 Filed at: 04/16/2018 1718   PE is primary diagnosis or equally likely  0 Filed at: 04/16/2018 1718   HR >100  0 Filed at: 04/16/2018 1718   Immobilization at least 3 days or Surgery in the previous 4 weeks  0 Filed at: 04/16/2018 1718   Previous, objectively diagnosed PE or DVT  0 Filed at: 04/16/2018 1718   Hemoptysis  0 Filed at: 04/16/2018 1718   Malignancy with treatment within 6 months or palliative  0 Filed at: 04/16/2018 1718   Wells' Criteria Total  0 Filed at: 04/16/2018 1718            [facility]  Number of Diagnoses or Management Options  Tendonitis:      Amount and/or Complexity of Data Reviewed  Review and summarize past medical records: yes  Discuss the patient with other providers: yes (Dr Janneth Hsieh - reviewed case with him, saw and examined patient with bedside US, and advised asprin/warm compresses, ace wrap, and f/u with Orthopedics for tendonitis of lateral head of biceps femoris  States the vein was not inflamed )    Risk of Complications, Morbidity, and/or Mortality  General comments: Reviewed plan mentioned above with patient she is in understanding and agreement  Patient Progress  Patient progress: stable    CritCare Time    Disposition  Final diagnoses:   Tendonitis     Time reflects when diagnosis was documented in both MDM as applicable and the Disposition within this note     Time User Action Codes Description Comment    4/16/2018  5:46 PM Anaid Garner Add [M77 9] Tendonitis       ED Disposition     ED Disposition Condition Comment    Discharge  [facility] discharge to home/self care  Condition at discharge: Good        Follow-up Information     Follow up With Specialties Details Why Contact Info Additional Information    [facility] [address] Orthopedic Surgery In 3 days  [address] [id number]  [phone]     [address] [place] [address] Emergency Department Emergency Medicine  If symptoms worsen [address]  [phone]  ED, [address] [place] [address], [id number]        Patient's Medications   Discharge Prescriptions    No medications on file     No discharge procedures on file      ED Provider  Electronically Signed by           Jennifer Tamayo PA-C  04/16/18 0748

## 2018-04-16 NOTE — PROGRESS NOTES
FAMILY PRACTICE OFFICE VISIT       NAME: Ollie Cardenas  AGE: 28 y o  SEX: female       : 1985        MRN: [id number]    DATE: 2018  TIME: 3:21 PM    Assessment and Plan     Problem List Items Addressed This Visit     None            There are no Patient Instructions on file for this visit  Chief Complaint     Chief Complaint   Patient presents with    Leg Pain     Patient is here c/o an ongoing issue with right lower leg pain and swelling x's 1 wk which has gottten progressively worse  History of Present Illness     Patient feels swelling and pain in right leg has been worsening over the past 48 hours  She is on her Augmentin as well as prednisone tapering dose  The patient had similar incident 1 month ago that resolved after 48 hours of medication  The she also has noticed increased pain that has made it difficult to ambulate  She did have a prior Doppler ultrasound in the emergency room 1 month ago that was negative for DVT  Review of Systems   Review of Systems   Constitutional: Negative for fatigue and fever  Respiratory: Negative  Cardiovascular: Negative  Musculoskeletal:        As per HPI   Skin:        As per HPI       Active Problem List     Patient Active Problem List   Diagnosis    Cellulitis of right lower extremity    Mood disorder ([facility] [address] )    Cellulitis       Past Medical History:  Past Medical History:   Diagnosis Date    Asthma        Past Surgical History:  No past surgical history on file  Family History:  No family history on file  Social History:  Social History     Social History    Marital status: /Civil Union     Spouse name: N/A    Number of children: N/A    Years of education: N/A     Occupational History    Not on file       Social History Main Topics    Smoking status: Never Smoker    Smokeless tobacco: Never Used    Alcohol use Yes      Comment: social    Drug use: No    Sexual activity: Not on file     Other Topics Concern    Not on file     Social History Narrative    No narrative on file       Objective     Vitals:    04/16/18 1502   BP: 98/62   Pulse: 64   Resp: 14   Temp: (!) 96 8 °F (36 °C)     Wt Readings from Last 3 Encounters:   04/16/18 66 5 kg (146 lb 9 6 oz)   04/10/18 66 2 kg (146 lb)   03/06/18 67 2 kg (148 lb 3 2 oz)       Physical Exam   Constitutional: No distress  Musculoskeletal: She exhibits no edema  Patient with increasing swelling of right popliteal area  There is also increasing redness in the same area and tenderness to palpation along the lateral joint space of knee  Patient is ambulating with slight limping due to pain of right knee  Area of redness along right popliteal areas approximately 3 x 4 inches   Skin: Rash noted         Pertinent Laboratory/Diagnostic Studies:  Lab Results   Component Value Date    GLUCOSE 96 10/31/2017    BUN 11 10/31/2017    CREATININE 0 69 10/31/2017    CALCIUM 8 6 10/31/2017     10/31/2017    K 3 5 10/31/2017    CO2 28 10/31/2017     10/31/2017     Lab Results   Component Value Date    ALT 26 10/10/2017    AST 19 10/10/2017    ALKPHOS 68 10/10/2017    BILITOT 0 40 10/10/2017       Lab Results   Component Value Date    WBC 6 00 10/31/2017    HGB 12 1 10/31/2017    HCT 37 8 10/31/2017    MCV 90 10/31/2017     10/31/2017       No results found for: TSH    No results found for: CHOL  No results found for: TRIG  No results found for: HDL  No results found for: LDLCALC  No results found for: HGBA1C    Results for orders placed or performed during the hospital encounter of 10/31/17   CBC and differential   Result Value Ref Range    WBC 6 00 4 31 - 10 16 Thousand/uL    RBC 4 20 3 81 - 5 12 Million/uL    Hemoglobin 12 1 11 5 - 15 4 g/dL    Hematocrit 37 8 34 8 - 46 1 %    MCV 90 82 - 98 fL    MCH 28 8 26 8 - 34 3 pg    MCHC 32 0 31 4 - 37 4 g/dL    RDW 12 4 11 6 - 15 1 %    MPV 10 1 8 9 - 12 7 fL    Platelets 863 779 - 724 Thousands/uL    Neutrophils Relative 59 43 - 75 %    Lymphocytes Relative 27 14 - 44 %    Monocytes Relative 10 4 - 12 %    Eosinophils Relative 3 0 - 6 %    Basophils Relative 1 0 - 1 %    Neutrophils Absolute 3 56 1 85 - 7 62 Thousands/µL    Lymphocytes Absolute 1 62 0 60 - 4 47 Thousands/µL    Monocytes Absolute 0 61 0 17 - 1 22 Thousand/µL    Eosinophils Absolute 0 17 0 00 - 0 61 Thousand/µL    Basophils Absolute 0 04 0 00 - 0 10 Thousands/µL   Basic metabolic panel   Result Value Ref Range    Sodium 138 136 - 145 mmol/L    Potassium 3 5 3 5 - 5 3 mmol/L    Chloride 103 100 - 108 mmol/L    CO2 28 21 - 32 mmol/L    Anion Gap 7 4 - 13 mmol/L    BUN 11 5 - 25 mg/dL    Creatinine 0 69 0 60 - 1 30 mg/dL    Glucose 96 65 - 140 mg/dL    Calcium 8 6 8 3 - 10 1 mg/dL    eGFR 116 ml/min/1 73sq m   Troponin I   Result Value Ref Range    Troponin I <0 02 <=0 04 ng/mL   TSH   Result Value Ref Range    TSH 3RD GENERATON 0 779 0 358 - 3 740 uIU/mL   D-Dimer   Result Value Ref Range    D-Dimer, Quant 406 0 - 424 ng/ml (FEU)   POCT pregnancy, urine   Result Value Ref Range    EXT PREG TEST UR (Ref: Negative) Negative    ECG 12 lead   Result Value Ref Range    Ventricular Rate 62 BPM    Atrial Rate 62 BPM    ME Interval 148 ms    QRSD Interval 104 ms    QT Interval 382 ms    QTC Interval 387 ms    P Axis 50 degrees    QRS Axis 25 degrees    T Wave Axis 45 degrees       No orders of the defined types were placed in this encounter  ALLERGIES:  No Known Allergies    Current Medications     Current Outpatient Prescriptions   Medication Sig Dispense Refill    amoxicillin-clavulanate (AUGMENTIN) 875-125 mg per tablet Take 1 tablet by mouth every 12 (twelve) hours for 10 days 20 tablet 0    predniSONE 20 mg tablet 2 tabs daily X 2 days, 1 tab daily x 2 days, 1/2 tab daily x 2 days 7 tablet 0     No current facility-administered medications for this visit            Health Maintenance     Health Maintenance   Topic Date Due    PNEUMOCOCCAL POLYSACCHARIDE VACCINE AGE 2-64 HIGH RISK  09/04/1987    Depression Screening PHQ-9  09/04/1997    HIV SCREENING  06/24/2017    INFLUENZA VACCINE  09/01/2017    DTaP,Tdap,and Td Vaccines (2 - Td) 01/20/2025       There is no immunization history on file for this patient      Behzad Zelaya MD

## 2018-04-16 NOTE — DISCHARGE INSTRUCTIONS
Tendinitis   WHAT YOU NEED TO KNOW:   Tendinitis is painful inflammation or breakdown of your tendons  It may also be called tendinopathy  Tendinitis often occurs in the knee, shoulder, ankle, hip, or elbow  DISCHARGE INSTRUCTIONS:   Medicines:   · Pain medicines  such as acetaminophen or NSAIDs may decrease swelling and pain or fever  These medicines are available without a doctor's order  Ask which medicine to take  Ask how much to take and when to take it  Follow directions  Acetaminophen and NSAIDs can cause liver or kidney damage if not taken correctly  If you take blood thinner medicine, always ask your healthcare provider if NSAIDs are safe for you  Always read the medicine label and follow the directions on it before using these medicine  · Take your medicine as directed  Contact your healthcare provider if you think your medicine is not helping or if you have side effects  Tell him if you are allergic to any medicine  Keep a list of the medicines, vitamins, and herbs you take  Include the amounts, and when and why you take them  Bring the list or the pill bottles to follow-up visits  Carry your medicine list with you in case of an emergency  Management:   · Rest  your tendon as directed to help it heal  Ask your healthcare provider if you need to stop putting weight on your affected area  · Ice  helps decrease swelling and pain  Ice may also help prevent tissue damage  Use an ice pack, or put crushed ice in a plastic bag  Cover it with a towel and place it on the affected area for 10 to 15 minutes every hour or as directed  · Support devices  such as a cane, splint, shoe insert, or brace may help reduce your pain  · Physical therapy  may be ordered by your healthcare provider  This may be used to teach you exercises to help improve movement and strength, and to decrease pain  You may also learn how to improve your posture, and how to lift or exercise correctly    Prevention:   · Stretch and warm up  before you exercise  · Exercise regularly  to strengthen the muscles around your joint  Ease into an exercise routine for the first 3 weeks to prevent another injury  Ask your healthcare provider about the best exercise plan for you  Rest fully between activities  · Use the right equipment  for sports and exercise  Wear braces or tape around weak joints as directed  Follow up with your healthcare provider as directed:  Write down your questions so you remember to ask them during your visits  Contact your healthcare provider if:   · You have increased pain even after you take medicine  · You have questions or concerns about your condition or care  Return to the emergency department if:   · You have increased redness over the joint, or swelling in the joint  · You suddenly cannot move your joint  © 2017 [facility] Information is for End User's use only and may not be sold, redistributed or otherwise used for commercial purposes  All illustrations and images included in CareNotes® are the copyrighted property of [employer] D [employer] M [employer]  or [employer]  The above information is an  only  It is not intended as medical advice for individual conditions or treatments  Talk to your doctor, nurse or pharmacist before following any medical regimen to see if it is safe and effective for you

## 2018-04-16 NOTE — ASSESSMENT & PLAN NOTE
Cellulitis of right leg  The patient was referred to [facility] Emergency room for further evaluation of worsening infection of right popliteal area   PAC system was notified of pending arrival

## 2018-04-16 NOTE — ED ATTENDING ATTESTATION
Rakesh Batista DO, saw and evaluated the patient  I have discussed the patient with the resident/non-physician practitioner and agree with the resident's/non-physician practitioner's findings, Plan of Care, and MDM as documented in the resident's/non-physician practitioner's note, except where noted  All available labs and Radiology studies were reviewed  At this point I agree with the current assessment done in the Emergency Department  I have conducted an independent evaluation of this patient a history and physical is as follows:    29 yo female c/o recurrent R lateral leg pain just above the popliteal crease radiating anteriorly  Happened previously, was put on augmentin and prednisone with improvement  Recurred and has now had 5 days of augmentin and prednisone with some improvement  Initially she describes an area just above the popliteal crease on the lateral aspect of the distal thigh which was red, swollen  This seems to have resolved, now has diffuse macular erythema over a 4x4cm area over the lateral knee which is both pruritic and painful  Worse with ambulation, no other a/e factors  Denies f/c, focal weakness/numbness/tingling  No crepitus on exam  FROM  No pain out of proportion  When pt extends knee she feels like something is "pulling" over her lateral head of biceps femoris  No knee effusion noted  NVI distally  Limited bedside ultrasound shows some superficial veins with surrounding inflammation of the subcutaneous tissues but no thrombus  There is trace edema and hyperechoic signal from the lateral head of the biceps femoris tracking approximately 6cm cephalad from the popliteal crease and about 2cm caudad from the popliteal crease  There is very minimal TTP over this entire area  No erythema over the area imaged  Imp: R distal lateral leg pain, redness  ddx includes superficial thrombophlebitis although no thrombus noted, cellulitis, myositis  Plan: continue augmentin  Start ASA  Knee immobilizer, crutches  Recommend f/u with ortho for further eval, potential MRI to elucidate cause of inflammation in leg  At this time no evidence of serious infection or inflammatory process going on        Critical Care Time  CritCare Time    Procedures

## 2018-06-14 ENCOUNTER — OFFICE VISIT (OUTPATIENT)
Dept: FAMILY MEDICINE CLINIC | Facility: CLINIC | Age: 33
End: 2018-06-14
Payer: COMMERCIAL

## 2018-06-14 VITALS
RESPIRATION RATE: 16 BRPM | BODY MASS INDEX: 23.69 KG/M2 | WEIGHT: 147.4 LBS | HEART RATE: 64 BPM | TEMPERATURE: 98.1 F | SYSTOLIC BLOOD PRESSURE: 100 MMHG | DIASTOLIC BLOOD PRESSURE: 60 MMHG | HEIGHT: 66 IN

## 2018-06-14 DIAGNOSIS — M77.9 TENDONITIS: Primary | ICD-10-CM

## 2018-06-14 DIAGNOSIS — M77.9 TENDINITIS: ICD-10-CM

## 2018-06-14 PROCEDURE — 99213 OFFICE O/P EST LOW 20 MIN: CPT | Performed by: FAMILY MEDICINE

## 2018-06-14 PROCEDURE — 3725F SCREEN DEPRESSION PERFORMED: CPT | Performed by: FAMILY MEDICINE

## 2018-06-14 RX ORDER — PREDNISONE 20 MG/1
TABLET ORAL
Qty: 11 TABLET | Refills: 0 | Status: SHIPPED | OUTPATIENT
Start: 2018-06-14 | End: 2018-09-06

## 2018-06-14 NOTE — ASSESSMENT & PLAN NOTE
Tendinitis right leg  Patient given prescription for prednisone tapering dose consisting of 40 mg x3 days, 20 mg x3 days, 10 mg x4 days  She will continue apply ice to the affected area    She was referred to [address] [place] [address] physical therapy in [facility] for further evaluation and treatment

## 2018-06-14 NOTE — PROGRESS NOTES
FAMILY PRACTICE OFFICE VISIT       NAME: Tanya Thorpe  AGE: 28 y o  SEX: female       : 1985        MRN: [id number]    DATE: 2018  TIME: 1:31 PM    Assessment and Plan     Problem List Items Addressed This Visit     Tendinitis     Tendinitis right leg  Patient given prescription for prednisone tapering dose consisting of 40 mg x3 days, 20 mg x3 days, 10 mg x4 days  She will continue apply ice to the affected area  She was referred to [facility] physical therapy in [facility] for further evaluation and treatment           Other Visit Diagnoses     Tendonitis    -  Primary    Relevant Medications    predniSONE 20 mg tablet    Other Relevant Orders    Ambulatory referral to Physical Therapy            There are no Patient Instructions on file for this visit  Chief Complaint     Chief Complaint   Patient presents with    Leg Swelling     Patient states her right leg is swollen x 2 days  She states it has happened 3 times so far  Denies any current treatment  History of Present Illness     Patient states she has began to experience similar discomfort that she has had the past but not to the severity  Her discomfort is currently along lateral aspect of right leg near knee  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  She denies any acute trauma        Review of Systems   Review of Systems   Constitutional: Negative  Musculoskeletal:        As per HPI   Skin: Negative  Active Problem List     Patient Active Problem List   Diagnosis    Cellulitis of right lower extremity    Mood disorder (HCC)    Cellulitis    Tendinitis       Past Medical History:  Past Medical History:   Diagnosis Date    Asthma        Past Surgical History:  No past surgical history on file  Family History:  No family history on file      Social History:  Social History     Social History    Marital status: /Civil Union     Spouse name: N/A  Number of children: N/A    Years of education: N/A     Occupational History    Not on file  Social History Main Topics    Smoking status: Never Smoker    Smokeless tobacco: Never Used    Alcohol use Yes      Comment: social    Drug use: No    Sexual activity: Not on file     Other Topics Concern    Not on file     Social History Narrative    No narrative on file       Objective     Vitals:    06/14/18 1307   BP: 100/60   Pulse: 64   Resp: 16   Temp: 98 1 °F (36 7 °C)     Wt Readings from Last 3 Encounters:   06/14/18 66 9 kg (147 lb 6 4 oz)   04/16/18 66 2 kg (146 lb)   04/16/18 66 5 kg (146 lb 9 6 oz)       Physical Exam   Constitutional: No distress  Musculoskeletal:   Patient is significantly tender along lateral posterior knee area    There is no redness or swelling noted       Pertinent Laboratory/Diagnostic Studies:  Lab Results   Component Value Date    GLUCOSE 96 10/31/2017    BUN 11 10/31/2017    CREATININE 0 69 10/31/2017    CALCIUM 8 6 10/31/2017     10/31/2017    K 3 5 10/31/2017    CO2 28 10/31/2017     10/31/2017     Lab Results   Component Value Date    ALT 26 10/10/2017    AST 19 10/10/2017    ALKPHOS 68 10/10/2017    BILITOT 0 40 10/10/2017       Lab Results   Component Value Date    WBC 6 00 10/31/2017    HGB 12 1 10/31/2017    HCT 37 8 10/31/2017    MCV 90 10/31/2017     10/31/2017       No results found for: TSH    No results found for: CHOL  No results found for: TRIG  No results found for: HDL  No results found for: LDLCALC  No results found for: HGBA1C    Results for orders placed or performed during the hospital encounter of 10/31/17   CBC and differential   Result Value Ref Range    WBC 6 00 4 31 - 10 16 Thousand/uL    RBC 4 20 3 81 - 5 12 Million/uL    Hemoglobin 12 1 11 5 - 15 4 g/dL    Hematocrit 37 8 34 8 - 46 1 %    MCV 90 82 - 98 fL    MCH 28 8 26 8 - 34 3 pg    MCHC 32 0 31 4 - 37 4 g/dL    RDW 12 4 11 6 - 15 1 %    MPV 10 1 8 9 - 12 7 fL Platelets 167 819 - 413 Thousands/uL    Neutrophils Relative 59 43 - 75 %    Lymphocytes Relative 27 14 - 44 %    Monocytes Relative 10 4 - 12 %    Eosinophils Relative 3 0 - 6 %    Basophils Relative 1 0 - 1 %    Neutrophils Absolute 3 56 1 85 - 7 62 Thousands/µL    Lymphocytes Absolute 1 62 0 60 - 4 47 Thousands/µL    Monocytes Absolute 0 61 0 17 - 1 22 Thousand/µL    Eosinophils Absolute 0 17 0 00 - 0 61 Thousand/µL    Basophils Absolute 0 04 0 00 - 0 10 Thousands/µL   Basic metabolic panel   Result Value Ref Range    Sodium 138 136 - 145 mmol/L    Potassium 3 5 3 5 - 5 3 mmol/L    Chloride 103 100 - 108 mmol/L    CO2 28 21 - 32 mmol/L    Anion Gap 7 4 - 13 mmol/L    BUN 11 5 - 25 mg/dL    Creatinine 0 69 0 60 - 1 30 mg/dL    Glucose 96 65 - 140 mg/dL    Calcium 8 6 8 3 - 10 1 mg/dL    eGFR 116 ml/min/1 73sq m   Troponin I   Result Value Ref Range    Troponin I <0 02 <=0 04 ng/mL   TSH   Result Value Ref Range    TSH 3RD GENERATON 0 779 0 358 - 3 740 uIU/mL   D-Dimer   Result Value Ref Range    D-Dimer, Quant 406 0 - 424 ng/ml (FEU)   POCT pregnancy, urine   Result Value Ref Range    EXT PREG TEST UR (Ref: Negative) Negative    ECG 12 lead   Result Value Ref Range    Ventricular Rate 62 BPM    Atrial Rate 62 BPM    NH Interval 148 ms    QRSD Interval 104 ms    QT Interval 382 ms    QTC Interval 387 ms    P Axis 50 degrees    QRS Axis 25 degrees    T Wave Axis 45 degrees       Orders Placed This Encounter   Procedures    Ambulatory referral to Physical Therapy       ALLERGIES:  No Known Allergies    Current Medications     Current Outpatient Prescriptions   Medication Sig Dispense Refill    predniSONE 20 mg tablet Take 2 tabs X 3 days, 1 tab X 3 days, 1/2 tab X 4 days 11 tablet 0     No current facility-administered medications for this visit            Health Maintenance     Health Maintenance   Topic Date Due    Depression Screening PHQ-9  1985    PNEUMOCOCCAL POLYSACCHARIDE VACCINE AGE 2-64 HIGH RISK 09/04/1987    HIV SCREENING  06/24/2017    INFLUENZA VACCINE  09/01/2018    DTaP,Tdap,and Td Vaccines (2 - Td) 01/20/2025       There is no immunization history on file for this patient      Titi Vilchis MD

## 2018-09-06 ENCOUNTER — OFFICE VISIT (OUTPATIENT)
Dept: FAMILY MEDICINE CLINIC | Facility: CLINIC | Age: 33
End: 2018-09-06
Payer: COMMERCIAL

## 2018-09-06 VITALS
RESPIRATION RATE: 16 BRPM | HEIGHT: 66 IN | SYSTOLIC BLOOD PRESSURE: 120 MMHG | TEMPERATURE: 98.7 F | HEART RATE: 72 BPM | BODY MASS INDEX: 24.04 KG/M2 | WEIGHT: 149.6 LBS | DIASTOLIC BLOOD PRESSURE: 70 MMHG

## 2018-09-06 DIAGNOSIS — M54.50 ACUTE MIDLINE LOW BACK PAIN WITHOUT SCIATICA: Primary | ICD-10-CM

## 2018-09-06 PROCEDURE — 99213 OFFICE O/P EST LOW 20 MIN: CPT | Performed by: FAMILY MEDICINE

## 2018-09-06 RX ORDER — METHYLPREDNISOLONE 4 MG/1
TABLET ORAL
Qty: 21 TABLET | Refills: 0 | Status: SHIPPED | OUTPATIENT
Start: 2018-09-06 | End: 2018-09-26 | Stop reason: ALTCHOICE

## 2018-09-06 RX ORDER — LEVONORGESTREL AND ETHINYL ESTRADIOL 0.1-0.02MG
1 KIT ORAL
COMMUNITY
Start: 2018-06-14 | End: 2019-11-07 | Stop reason: ALTCHOICE

## 2018-09-06 NOTE — PROGRESS NOTES
FAMILY PRACTICE OFFICE VISIT       NAME: Felix Cespedes  AGE: 35 y o  SEX: female       : 1985        MRN: [id number]    DATE: 2018  TIME: 3:47 PM    Assessment and Plan     Problem List Items Addressed This Visit     Acute midline low back pain without sciatica - Primary    Relevant Medications    Methylprednisolone 4 MG TBPK    Other Relevant Orders    XR sacrum and coccyx    XR spine lumbar 2 or 3 views injury          Patient was given a prescription to obtain x-rays of her lumbar, sacral, and coccyx area for further evaluation  She was also placed on a Medrol Dosepak to use as directed for 6 days  She may take over-the-counter Tylenol for pain management as well as ice pack to the affected area 3 times daily for 15-20 minutes  Patient will call if symptoms persist after medication completed  There are no Patient Instructions on file for this visit  Chief Complaint     Chief Complaint   Patient presents with    Back Pain     Pt is here for lower back pain and swelling 5 + days       History of Present Illness     Patient states she began experiencing back pain yesterday along her lumbosacral spine area  Today she awoke with a soft tissue swelling in the area in question  Patient has significant discomfort with sitting down or riding in a car  She denies any acute trauma  There is no radiation of pain to her buttocks or legs  She denies any recent illness or fevers        Review of Systems   Review of Systems   Constitutional: Negative  Respiratory: Negative  Cardiovascular: Negative  Gastrointestinal: Negative      Musculoskeletal:        As per HPI   Skin:        As per HPI       Active Problem List     Patient Active Problem List   Diagnosis    Cellulitis of right lower extremity    Mood disorder ([facility] [address] )    Cellulitis    Tendinitis    Acute midline low back pain without sciatica       Past Medical History:  Past Medical History:   Diagnosis Date    Asthma     Chlamydia infection        Past Surgical History:  No past surgical history on file  Family History:  Family History   Problem Relation Age of Onset    No Known Problems Mother        Social History:  Social History     Social History    Marital status: /Civil Union     Spouse name: N/A    Number of children: N/A    Years of education: N/A     Occupational History    Not on file  Social History Main Topics    Smoking status: Never Smoker    Smokeless tobacco: Never Used    Alcohol use Yes      Comment: social ; Denied history of alcohol use    Drug use: No    Sexual activity: Not on file     Other Topics Concern    Not on file     Social History Narrative    No narrative on file       Objective     Vitals:    09/06/18 1501   BP: 120/70   Pulse: 72   Resp: 16   Temp: 98 7 °F (37 1 °C)     Wt Readings from Last 3 Encounters:   09/06/18 67 9 kg (149 lb 9 6 oz)   06/14/18 66 9 kg (147 lb 6 4 oz)   04/16/18 66 2 kg (146 lb)       Physical Exam   Constitutional: No distress  Musculoskeletal:   Patient with soft tissue swelling along lumbosacral spine approximately 3 x 3 inches  It is minimally tender to palpation  Patient with normal range of motion of spine  There is no redness or ecchymosis noted with this area  Muscle strength 5/5 lower extremity  Skin: No rash noted         Pertinent Laboratory/Diagnostic Studies:  Lab Results   Component Value Date    GLUCOSE 88 08/28/2015    BUN 11 10/31/2017    CREATININE 0 69 10/31/2017    CALCIUM 8 6 10/31/2017     10/31/2017    K 3 5 10/31/2017    CO2 28 10/31/2017     10/31/2017     Lab Results   Component Value Date    ALT 26 10/10/2017    AST 19 10/10/2017    ALKPHOS 68 10/10/2017    BILITOT 0 43 08/28/2015       Lab Results   Component Value Date    WBC 6 00 10/31/2017    HGB 12 1 10/31/2017    HCT 37 8 10/31/2017    MCV 90 10/31/2017     10/31/2017       No results found for: TSH    No results found for: CHOL  No results found for: TRIG  No results found for: HDL  No results found for: LDLCALC  No results found for: HGBA1C    Results for orders placed or performed during the hospital encounter of 10/31/17   CBC and differential   Result Value Ref Range    WBC 6 00 4 31 - 10 16 Thousand/uL    RBC 4 20 3 81 - 5 12 Million/uL    Hemoglobin 12 1 11 5 - 15 4 g/dL    Hematocrit 37 8 34 8 - 46 1 %    MCV 90 82 - 98 fL    MCH 28 8 26 8 - 34 3 pg    MCHC 32 0 31 4 - 37 4 g/dL    RDW 12 4 11 6 - 15 1 %    MPV 10 1 8 9 - 12 7 fL    Platelets 212 022 - 399 Thousands/uL    Neutrophils Relative 59 43 - 75 %    Lymphocytes Relative 27 14 - 44 %    Monocytes Relative 10 4 - 12 %    Eosinophils Relative 3 0 - 6 %    Basophils Relative 1 0 - 1 %    Neutrophils Absolute 3 56 1 85 - 7 62 Thousands/µL    Lymphocytes Absolute 1 62 0 60 - 4 47 Thousands/µL    Monocytes Absolute 0 61 0 17 - 1 22 Thousand/µL    Eosinophils Absolute 0 17 0 00 - 0 61 Thousand/µL    Basophils Absolute 0 04 0 00 - 0 10 Thousands/µL   Basic metabolic panel   Result Value Ref Range    Sodium 138 136 - 145 mmol/L    Potassium 3 5 3 5 - 5 3 mmol/L    Chloride 103 100 - 108 mmol/L    CO2 28 21 - 32 mmol/L    ANION GAP 7 4 - 13 mmol/L    BUN 11 5 - 25 mg/dL    Creatinine 0 69 0 60 - 1 30 mg/dL    Glucose 96 65 - 140 mg/dL    Calcium 8 6 8 3 - 10 1 mg/dL    eGFR 116 ml/min/1 73sq m   Troponin I   Result Value Ref Range    Troponin I <0 02 <=0 04 ng/mL   TSH   Result Value Ref Range    TSH 3RD GENERATON 0 779 0 358 - 3 740 uIU/mL   D-Dimer   Result Value Ref Range    D-Dimer, Quant 406 0 - 424 ng/ml (FEU)   POCT pregnancy, urine   Result Value Ref Range    EXT PREG TEST UR (Ref: Negative) Negative    ECG 12 lead   Result Value Ref Range    Ventricular Rate 62 BPM    Atrial Rate 62 BPM    LA Interval 148 ms    QRSD Interval 104 ms    QT Interval 382 ms    QTC Interval 387 ms    P Axis 50 degrees    QRS Axis 25 degrees    T Wave Axis 45 degrees       Orders Placed This Encounter   Procedures    XR sacrum and coccyx    XR spine lumbar 2 or 3 views injury       ALLERGIES:  No Known Allergies    Current Medications     Current Outpatient Prescriptions   Medication Sig Dispense Refill    levonorgestrel-ethinyl estradiol (AVIANE,ALESSE,LESSINA) 0 1-20 MG-MCG per tablet Take 1 tablet by mouth      Methylprednisolone 4 MG TBPK Use as directed on package 21 tablet 0     No current facility-administered medications for this visit  Health Maintenance     Health Maintenance   Topic Date Due    INFLUENZA VACCINE  09/01/2018    Depression Screening PHQ  06/14/2019    DTaP,Tdap,and Td Vaccines (2 - Td) 01/20/2025       There is no immunization history on file for this patient      Sultana Danielson MD

## 2018-09-26 ENCOUNTER — OFFICE VISIT (OUTPATIENT)
Dept: FAMILY MEDICINE CLINIC | Facility: CLINIC | Age: 33
End: 2018-09-26
Payer: COMMERCIAL

## 2018-09-26 VITALS
HEART RATE: 80 BPM | DIASTOLIC BLOOD PRESSURE: 80 MMHG | HEIGHT: 66 IN | RESPIRATION RATE: 16 BRPM | BODY MASS INDEX: 24.46 KG/M2 | WEIGHT: 152.2 LBS | TEMPERATURE: 97.5 F | SYSTOLIC BLOOD PRESSURE: 110 MMHG

## 2018-09-26 DIAGNOSIS — M54.50 ACUTE MIDLINE LOW BACK PAIN WITHOUT SCIATICA: ICD-10-CM

## 2018-09-26 DIAGNOSIS — G89.29 CHRONIC MIDLINE LOW BACK PAIN WITHOUT SCIATICA: Primary | ICD-10-CM

## 2018-09-26 DIAGNOSIS — M54.50 CHRONIC MIDLINE LOW BACK PAIN WITHOUT SCIATICA: Primary | ICD-10-CM

## 2018-09-26 PROCEDURE — 99213 OFFICE O/P EST LOW 20 MIN: CPT | Performed by: FAMILY MEDICINE

## 2018-09-26 PROCEDURE — 3008F BODY MASS INDEX DOCD: CPT | Performed by: FAMILY MEDICINE

## 2018-09-26 RX ORDER — OXYCODONE HYDROCHLORIDE 5 MG/1
5 TABLET ORAL 2 TIMES DAILY PRN
Qty: 40 TABLET | Refills: 0 | Status: SHIPPED | OUTPATIENT
Start: 2018-09-26 | End: 2018-10-12 | Stop reason: SDUPTHER

## 2018-09-26 RX ORDER — IBUPROFEN 600 MG/1
200 TABLET ORAL EVERY 6 HOURS PRN
COMMUNITY
End: 2019-11-07 | Stop reason: ALTCHOICE

## 2018-09-26 RX ORDER — ACETAMINOPHEN 500 MG
500 TABLET ORAL EVERY 6 HOURS PRN
COMMUNITY

## 2018-09-26 NOTE — PROGRESS NOTES
FAMILY PRACTICE OFFICE VISIT       NAME: Donovan David  AGE: 35 y o  SEX: female       : 1985        MRN: [id number]    DATE: 2018  TIME: 11:31 AM    Assessment and Plan     Problem List Items Addressed This Visit     Acute midline low back pain without sciatica     Back pain  Due to the nature of her condition with pain and swelling in the lumbosacral area we will obtain a CT scan without contrast for further evaluation  Patient was given prescription for oxycodone for pain control  Patient may require surgical intervention  We will make further recommendations pending results of test            Other Visit Diagnoses     Chronic midline low back pain without sciatica    -  Primary    Relevant Medications    oxyCODONE (ROXICODONE) 5 mg immediate release tablet    Other Relevant Orders    CT spine lumbar w wo contrast    CT spine lumbar wo contrast            There are no Patient Instructions on file for this visit  Chief Complaint     Chief Complaint   Patient presents with    Back Pain     Patient is here due to having back pain x  2 months  History of Present Illness     Patient states when she took steroid pack symptoms began to slowly improve however by the time she was done with last dose symptoms began intensifying once again  Patient has difficulties with sitting or standing for extended periods of time  She continues to have swelling of her low lumbosacral area  She has difficulties with working and driving due to discomfort        Review of Systems   Review of Systems   Constitutional: Negative      Musculoskeletal:        As per hpi   Skin:        As per hpi       Active Problem List     Patient Active Problem List   Diagnosis    Cellulitis of right lower extremity    Mood disorder ([facility] [address] [facility]    Cellulitis    Tendinitis    Acute midline low back pain without sciatica       Past Medical History:  Past Medical History:   Diagnosis Date    Asthma     Chlamydia infection Past Surgical History:  No past surgical history on file  Family History:  Family History   Problem Relation Age of Onset    No Known Problems Mother        Social History:  Social History     Social History    Marital status: /Civil Union     Spouse name: N/A    Number of children: N/A    Years of education: N/A     Occupational History    Not on file  Social History Main Topics    Smoking status: Never Smoker    Smokeless tobacco: Never Used    Alcohol use Yes      Comment: social ; Denied history of alcohol use    Drug use: No    Sexual activity: Not on file     Other Topics Concern    Not on file     Social History Narrative    No narrative on file       Objective     Vitals:    09/26/18 1032   BP: 110/80   Pulse: 80   Resp: 16   Temp: 97 5 °F (36 4 °C)     Wt Readings from Last 3 Encounters:   09/26/18 69 kg (152 lb 3 2 oz)   09/06/18 67 9 kg (149 lb 9 6 oz)   06/14/18 66 9 kg (147 lb 6 4 oz)       Physical Exam   Constitutional: No distress  Skin:   Soft tissue swelling at lumbosacral area that is mildly tender to palpation  It is mildly red and 3X4" in diameter   No d/c noted       Pertinent Laboratory/Diagnostic Studies:  Lab Results   Component Value Date    GLUCOSE 88 08/28/2015    BUN 11 10/31/2017    CREATININE 0 69 10/31/2017    CALCIUM 8 6 10/31/2017     10/31/2017    K 3 5 10/31/2017    CO2 28 10/31/2017     10/31/2017     Lab Results   Component Value Date    ALT 26 10/10/2017    AST 19 10/10/2017    ALKPHOS 68 10/10/2017    BILITOT 0 43 08/28/2015       Lab Results   Component Value Date    WBC 6 00 10/31/2017    HGB 12 1 10/31/2017    HCT 37 8 10/31/2017    MCV 90 10/31/2017     10/31/2017       No results found for: TSH    No results found for: CHOL  No results found for: TRIG  No results found for: HDL  No results found for: LDLCALC  No results found for: HGBA1C    Results for orders placed or performed during the hospital encounter of 10/31/17 CBC and differential   Result Value Ref Range    WBC 6 00 4 31 - 10 16 Thousand/uL    RBC 4 20 3 81 - 5 12 Million/uL    Hemoglobin 12 1 11 5 - 15 4 g/dL    Hematocrit 37 8 34 8 - 46 1 %    MCV 90 82 - 98 fL    MCH 28 8 26 8 - 34 3 pg    MCHC 32 0 31 4 - 37 4 g/dL    RDW 12 4 11 6 - 15 1 %    MPV 10 1 8 9 - 12 7 fL    Platelets 463 418 - 919 Thousands/uL    Neutrophils Relative 59 43 - 75 %    Lymphocytes Relative 27 14 - 44 %    Monocytes Relative 10 4 - 12 %    Eosinophils Relative 3 0 - 6 %    Basophils Relative 1 0 - 1 %    Neutrophils Absolute 3 56 1 85 - 7 62 Thousands/µL    Lymphocytes Absolute 1 62 0 60 - 4 47 Thousands/µL    Monocytes Absolute 0 61 0 17 - 1 22 Thousand/µL    Eosinophils Absolute 0 17 0 00 - 0 61 Thousand/µL    Basophils Absolute 0 04 0 00 - 0 10 Thousands/µL   Basic metabolic panel   Result Value Ref Range    Sodium 138 136 - 145 mmol/L    Potassium 3 5 3 5 - 5 3 mmol/L    Chloride 103 100 - 108 mmol/L    CO2 28 21 - 32 mmol/L    ANION GAP 7 4 - 13 mmol/L    BUN 11 5 - 25 mg/dL    Creatinine 0 69 0 60 - 1 30 mg/dL    Glucose 96 65 - 140 mg/dL    Calcium 8 6 8 3 - 10 1 mg/dL    eGFR 116 ml/min/1 73sq m   Troponin I   Result Value Ref Range    Troponin I <0 02 <=0 04 ng/mL   TSH   Result Value Ref Range    TSH 3RD GENERATON 0 779 0 358 - 3 740 uIU/mL   D-Dimer   Result Value Ref Range    D-Dimer, Quant 406 0 - 424 ng/ml (FEU)   POCT pregnancy, urine   Result Value Ref Range    EXT PREG TEST UR (Ref: Negative) Negative    ECG 12 lead   Result Value Ref Range    Ventricular Rate 62 BPM    Atrial Rate 62 BPM    NH Interval 148 ms    QRSD Interval 104 ms    QT Interval 382 ms    QTC Interval 387 ms    P Axis 50 degrees    QRS Axis 25 degrees    T Wave Axis 45 degrees       Orders Placed This Encounter   Procedures    CT spine lumbar w wo contrast    CT spine lumbar wo contrast       ALLERGIES:  No Known Allergies    Current Medications     Current Outpatient Prescriptions   Medication Sig Dispense Refill    acetaminophen (TYLENOL) 500 mg tablet Take 500 mg by mouth every 6 (six) hours as needed for mild pain      ibuprofen (MOTRIN) 600 mg tablet Take 200 mg by mouth every 6 (six) hours as needed for mild pain      levonorgestrel-ethinyl estradiol (AVIANE,ALESSE,LESSINA) 0 1-20 MG-MCG per tablet Take 1 tablet by mouth      oxyCODONE (ROXICODONE) 5 mg immediate release tablet Take 1 tablet (5 mg total) by mouth 2 (two) times a day as needed for moderate pain Max Daily Amount: 10 mg 40 tablet 0     No current facility-administered medications for this visit  Health Maintenance     Health Maintenance   Topic Date Due    INFLUENZA VACCINE  09/01/2018    Depression Screening PHQ  06/14/2019    DTaP,Tdap,and Td Vaccines (2 - Td) 01/20/2025       There is no immunization history on file for this patient      Shreya Arnold MD

## 2018-09-26 NOTE — ASSESSMENT & PLAN NOTE
Back pain  Due to the nature of her condition with pain and swelling in the lumbosacral area we will obtain a CT scan without contrast for further evaluation  Patient was given prescription for oxycodone for pain control  Patient may require surgical intervention    We will make further recommendations pending results of test

## 2018-10-03 ENCOUNTER — HOSPITAL ENCOUNTER (OUTPATIENT)
Dept: RADIOLOGY | Facility: HOSPITAL | Age: 33
Discharge: HOME | End: 2018-10-03
Payer: COMMERCIAL

## 2018-10-03 DIAGNOSIS — M54.50 CHRONIC MIDLINE LOW BACK PAIN WITHOUT SCIATICA: ICD-10-CM

## 2018-10-03 DIAGNOSIS — G89.29 CHRONIC MIDLINE LOW BACK PAIN WITHOUT SCIATICA: ICD-10-CM

## 2018-10-03 PROCEDURE — 72131 CT LUMBAR SPINE W/O DYE: CPT

## 2018-10-05 ENCOUNTER — TELEPHONE (OUTPATIENT)
Dept: FAMILY MEDICINE CLINIC | Facility: CLINIC | Age: 33
End: 2018-10-05

## 2018-10-05 NOTE — TELEPHONE ENCOUNTER
----- Message from Jazmine Mccord MD sent at 10/5/2018  4:13 PM EDT -----  Can you please let patient know that her CT scan of her lower spine was overall normal   She has very minor arthritic changes  There were no herniations or bulging discs noted  I would like patient to schedule follow-up appointment with Dr Teo Nix for further recommendations    Thank you

## 2018-10-05 NOTE — PROGRESS NOTES
Can you please let patient know that her CT scan of her lower spine was overall normal   She has very minor arthritic changes  There were no herniations or bulging discs noted  I would like patient to schedule follow-up appointment with Dr Jenelle Granados for further recommendations    Thank you

## 2018-10-11 ENCOUNTER — TELEPHONE (OUTPATIENT)
Dept: FAMILY MEDICINE CLINIC | Facility: CLINIC | Age: 33
End: 2018-10-11

## 2018-10-11 DIAGNOSIS — M54.50 CHRONIC MIDLINE LOW BACK PAIN WITHOUT SCIATICA: Primary | ICD-10-CM

## 2018-10-11 DIAGNOSIS — G89.29 CHRONIC MIDLINE LOW BACK PAIN WITHOUT SCIATICA: Primary | ICD-10-CM

## 2018-10-11 NOTE — TELEPHONE ENCOUNTER
Please call patient  Since her CT scan of her back was not able to determine why she is having pain and swelling I would prefer that she see  OF THE [facility] orthopedist for an evaluation since there is not much I can offer her at this time  I would be willing to continue her pain medication if she needs a refill until she can see someone at the orthopedic office    Please give number and I will put an order in epic

## 2018-10-12 DIAGNOSIS — M54.50 CHRONIC MIDLINE LOW BACK PAIN WITHOUT SCIATICA: ICD-10-CM

## 2018-10-12 DIAGNOSIS — G89.29 CHRONIC MIDLINE LOW BACK PAIN WITHOUT SCIATICA: ICD-10-CM

## 2018-10-12 RX ORDER — OXYCODONE HYDROCHLORIDE 5 MG/1
5 TABLET ORAL 2 TIMES DAILY PRN
Qty: 40 TABLET | Refills: 0 | Status: SHIPPED | OUTPATIENT
Start: 2018-10-12 | End: 2018-11-02 | Stop reason: SDUPTHER

## 2018-11-02 DIAGNOSIS — M54.50 CHRONIC MIDLINE LOW BACK PAIN WITHOUT SCIATICA: ICD-10-CM

## 2018-11-02 DIAGNOSIS — G89.29 CHRONIC MIDLINE LOW BACK PAIN WITHOUT SCIATICA: ICD-10-CM

## 2018-11-02 RX ORDER — OXYCODONE HYDROCHLORIDE 5 MG/1
5 TABLET ORAL 2 TIMES DAILY PRN
Qty: 40 TABLET | Refills: 0 | Status: SHIPPED | OUTPATIENT
Start: 2018-11-02 | End: 2019-10-18

## 2018-11-06 ENCOUNTER — HOSPITAL ENCOUNTER (EMERGENCY)
Facility: HOSPITAL | Age: 33
Discharge: HOME | End: 2018-11-06
Attending: EMERGENCY MEDICINE
Payer: COMMERCIAL

## 2018-11-06 VITALS
OXYGEN SATURATION: 99 % | TEMPERATURE: 98.2 F | RESPIRATION RATE: 18 BRPM | HEART RATE: 61 BPM | WEIGHT: 155.2 LBS | DIASTOLIC BLOOD PRESSURE: 72 MMHG | BODY MASS INDEX: 25.05 KG/M2 | SYSTOLIC BLOOD PRESSURE: 111 MMHG

## 2018-11-06 DIAGNOSIS — M25.461 PAIN AND SWELLING OF RIGHT KNEE: Primary | ICD-10-CM

## 2018-11-06 DIAGNOSIS — M25.561 PAIN AND SWELLING OF RIGHT KNEE: Primary | ICD-10-CM

## 2018-11-06 LAB — EXT PREG TEST URINE: NEGATIVE

## 2018-11-06 PROCEDURE — 96372 THER/PROPH/DIAG INJ SC/IM: CPT

## 2018-11-06 PROCEDURE — 81025 URINE PREGNANCY TEST: CPT | Performed by: PHYSICIAN ASSISTANT

## 2018-11-06 PROCEDURE — 99283 EMERGENCY DEPT VISIT LOW MDM: CPT

## 2018-11-06 RX ORDER — KETOROLAC TROMETHAMINE 30 MG/ML
30 INJECTION, SOLUTION INTRAMUSCULAR; INTRAVENOUS ONCE
Status: COMPLETED | OUTPATIENT
Start: 2018-11-06 | End: 2018-11-06

## 2018-11-06 RX ORDER — IBUPROFEN 600 MG/1
600 TABLET ORAL EVERY 6 HOURS PRN
Qty: 30 TABLET | Refills: 0 | Status: SHIPPED | OUTPATIENT
Start: 2018-11-06 | End: 2019-10-18

## 2018-11-06 RX ADMIN — KETOROLAC TROMETHAMINE 30 MG: 30 INJECTION, SOLUTION INTRAMUSCULAR at 12:57

## 2018-11-06 NOTE — DISCHARGE INSTRUCTIONS
Knee Pain   WHAT YOU NEED TO KNOW:   Knee pain may start suddenly, or it may be a long-term problem  You may have pain on the side, front, or back of your knee  You may have knee stiffness and swelling  You may hear popping sounds or feel like your knee is giving way or locking up as you walk  You may feel pain when you sit, stand, walk, or climb up and down stairs  Knee pain can be caused by conditions such as obesity, inflammation, or strains or tears in ligaments or tendons  DISCHARGE INSTRUCTIONS:   Follow up with your healthcare provider within 24 hours or as directed: You may need follow-up treatments, such as steroid injections to decrease pain  Write down your questions so you remember to ask them during your visits  Self-care:   · Rest  your knee so it can heal  Limit activities that increase your pain  · Ice  can help reduce swelling  Wrap ice in a towel and put it on your knee for as long and as often as directed  · Compression  with a brace or bandage can help reduce swelling  Use a brace or bandage only as directed  · Elevation  helps decrease pain and swelling  Elevate your knee while you are sitting or lying down  Prop your leg on pillows to keep your knee above the level of your heart  Medicines:   · NSAIDs  help decrease swelling and pain or fever  This medicine is available with or without a doctor's order  NSAIDs can cause stomach bleeding or kidney problems in certain people  If you take blood thinner medicine, always ask your healthcare provider if NSAIDs are safe for you  Always read the medicine label and follow directions  · Acetaminophen  decreases pain and fever  It is available without a doctor's order  Ask how much to take and when to take it  Follow directions  Acetaminophen can cause liver damage if not taken correctly  · Take your medicine as directed  Contact your healthcare provider if you think your medicine is not helping or if you have side effects   Tell him or her if you are allergic to any medicine  Keep a list of the medicines, vitamins, and herbs you take  Include the amounts, and when and why you take them  Bring the list or the pill bottles to follow-up visits  Carry your medicine list with you in case of an emergency  Exercise as directed: You may need to see a physical therapist or do recommended exercises to improve movement and decrease your pain  You may be directed to walk, swim, or ride a bike  Follow your exercise plan exactly as directed to avoid further injury  Contact your healthcare provider if:   · You have questions or concerns about your condition or care  Return to the emergency department if:   · Your pain is worse, even after treatment  · You cannot bend or straighten your leg completely  · The swelling around your knee does not go down even with treatment  · Your knee is painful and hot to the touch  © 2017 [address] Information is for End User's use only and may not be sold, redistributed or otherwise used for commercial purposes  All illustrations and images included in CareNotes® are the copyrighted property of [employer]  or [employer]  The above information is an  only  It is not intended as medical advice for individual conditions or treatments  Talk to your doctor, nurse or pharmacist before following any medical regimen to see if it is safe and effective for you  Bakers Cyst   WHAT YOU NEED TO KNOW:   A Bakers cyst, or popliteal cyst, is a bulging lump behind your knee  Inside the lump is a sac filled with fluid  The cyst is caused by fluid buildup in your knee joint  This can happen if you have a knee injury, such as a cartilage tear  Osteoarthritis or rheumatoid arthritis can also cause an abnormal buildup of joint fluid  DISCHARGE INSTRUCTIONS:   Return to the emergency department if:   · You have severe pain      · You have bruising on the ankle below the cyst     · Your calf turns blue below the cyst     · Your calf or knee is swollen or bleeding  Contact your healthcare provider if:   · You have a fever  · Your pain does not improve with medicine  · You have questions or concerns about your condition or care  Medicines:   · NSAIDs  help decrease swelling and pain  This medicine is available without a doctor's order  Your healthcare provider will tell you which medicine to take and how often to take it  Follow directions  NSAIDs can cause stomach bleeding or kidney problems if they are not taken correctly  · Take your medicine as directed  Contact your healthcare provider if you think your medicine is not helping or if you have side effects  Tell him of her if you are allergic to any medicine  Keep a list of the medicines, vitamins, and herbs you take  Include the amounts, and when and why you take them  Bring the list or the pill bottles to follow-up visits  Carry your medicine list with you in case of an emergency  Care for your knee:   · Rest as needed  Limit movement as your knee heals  This will help decrease the risk of more damage to your knee  You may need crutches to take weight off your injured knee  Use crutches as directed  · Ice your knee  Ice helps decrease swelling and pain  Use an ice pack, or put ice in a plastic bag  Cover the ice pack with a towel and place the ice on your knee for 15 to 20 minutes, 3 to 4 times each day  Do this for 2 to 3 days  · Support your knee  Wrap your knee with an elastic bandage  Ask your healthcare provider if you need a brace for more support  This will help decrease swelling and movement so your knee can heal     · Elevate your knee  Use pillows to raise your knee above the level of your heart as often as you can  This will help decrease swelling  · Go to physical therapy as directed    A physical therapist teaches you exercises to help improve movement and strength, and to decrease pain   Follow up with your healthcare provider as directed:  Write down your questions so you remember to ask them during your visits  © 2017 [address] Information is for End User's use only and may not be sold, redistributed or otherwise used for commercial purposes  All illustrations and images included in CareNotes® are the copyrighted property of [employer]  or [employer]  The above information is an  only  It is not intended as medical advice for individual conditions or treatments  Talk to your doctor, nurse or pharmacist before following any medical regimen to see if it is safe and effective for you

## 2018-11-06 NOTE — ED NOTES
PT awake and alert, no distress noted  No other questions upon d/c       Marli Hale, RN  11/06/18 1300

## 2018-11-06 NOTE — ED PROVIDER NOTES
History  Chief Complaint   Patient presents with    Knee Pain     PT reports "Had back problems, but got that taken care of, but now my right leg is giving me problems  Feels like a hard ball behind my knee and pain goes up"     34 y/o F with asthma, who presents to the ED for right knee pain that started suddenly yesterday while the patient was standing  Complains of swelling and pain to the posterior knee that feels like a "hard ball behind the knee" with pain radiating proximally  Describes it as aching, constant, 5/10  Worse with movement of the right knee  Denies numbness, tingling, weakness  Denies fevers, chills, redness, swelling, warmth  No Rx taken prior to arrival in the ED  History provided by:  Patient   used: No    Knee Pain   Associated symptoms: no back pain, no fever and no neck pain        Prior to Admission Medications   Prescriptions Last Dose Informant Patient Reported? Taking?   acetaminophen (TYLENOL) 500 mg tablet  Self Yes Yes   Sig: Take 500 mg by mouth every 6 (six) hours as needed for mild pain   ibuprofen (MOTRIN) 600 mg tablet  Self Yes Yes   Sig: Take 200 mg by mouth every 6 (six) hours as needed for mild pain   levonorgestrel-ethinyl estradiol (AVIANE,ALESSE,LESSINA) 0 1-20 MG-MCG per tablet   Yes Yes   Sig: Take 1 tablet by mouth   oxyCODONE (ROXICODONE) 5 mg immediate release tablet   No Yes   Sig: Take 1 tablet (5 mg total) by mouth 2 (two) times a day as needed for moderate pain Max Daily Amount: 10 mg      Facility-Administered Medications: None       Past Medical History:   Diagnosis Date    Asthma     Chlamydia infection        History reviewed  No pertinent surgical history  Family History   Problem Relation Age of Onset    No Known Problems Mother      I have reviewed and agree with the history as documented      Social History   Substance Use Topics    Smoking status: Never Smoker    Smokeless tobacco: Never Used    Alcohol use Yes Comment: social ; Denied history of alcohol use        Review of Systems   Constitutional: Negative for chills and fever  HENT: Negative  Eyes: Negative  Respiratory: Negative for cough, chest tightness, shortness of breath and wheezing  Cardiovascular: Negative for chest pain, palpitations and leg swelling  Gastrointestinal: Negative for abdominal pain, constipation, diarrhea, nausea and vomiting  Genitourinary: Negative for dysuria, flank pain, frequency, hematuria and urgency  Musculoskeletal: Positive for arthralgias, joint swelling and myalgias  Negative for back pain, gait problem, neck pain and neck stiffness  Skin: Negative for color change, pallor, rash and wound  Neurological: Negative for dizziness, syncope, weakness, light-headedness, numbness and headaches  Psychiatric/Behavioral: Negative  Physical Exam  Physical Exam   Constitutional: She is oriented to person, place, and time  She appears well-developed and well-nourished  No distress  HENT:   Head: Normocephalic and atraumatic  Eyes: Pupils are equal, round, and reactive to light  Conjunctivae and EOM are normal    Neck: Normal range of motion  Neck supple  Cardiovascular: Normal rate, regular rhythm and intact distal pulses  Pulmonary/Chest: Effort normal  No respiratory distress  Abdominal: Soft  There is no tenderness  Musculoskeletal: Normal range of motion  She exhibits tenderness (Mild tenderness to palpation and swelling to the right posterior knee  Mild TTP to the right posterior distal thigh    )  She exhibits no edema or deformity  No calf swelling or pain on palpation  Neurological: She is alert and oriented to person, place, and time  No cranial nerve deficit or sensory deficit  She exhibits normal muscle tone  Coordination normal    Skin: Skin is warm and dry  Capillary refill takes less than 2 seconds  She is not diaphoretic  Psychiatric: She has a normal mood and affect   Her behavior is normal    Nursing note and vitals reviewed  Vital Signs  ED Triage Vitals [11/06/18 1208]   Temperature Pulse Respirations Blood Pressure SpO2   98 2 °F (36 8 °C) 61 18 111/72 99 %      Temp Source Heart Rate Source Patient Position - Orthostatic VS BP Location FiO2 (%)   Oral Monitor Sitting Right arm --      Pain Score       --           Vitals:    11/06/18 1208   BP: 111/72   Pulse: 61   Patient Position - Orthostatic VS: Sitting       Visual Acuity      ED Medications  Medications   ketorolac (TORADOL) injection 30 mg (30 mg Intramuscular Given 11/6/18 1257)       Diagnostic Studies  Results Reviewed     Procedure Component Value Units Date/Time    POCT pregnancy, urine [[id number]]  (Normal) Resulted:  11/06/18 1255    Lab Status:  Final result Updated:  11/06/18 1255     EXT PREG TEST UR (Ref: Negative) NEGATIVE                 No orders to display              Procedures  Procedures       Phone Contacts  ED Phone Contact    ED Course                               MDM  Number of Diagnoses or Management Options  Pain and swelling of right knee:   Diagnosis management comments: Differential Diagnosis includes but is not limited to: sprain/strain, contusion, musculoskeletal pain, baker's cyst  Low suspicion for DVT as patient does not have any distal extremity swelling or pain  Low suspicion for fx dislocation as patient has full ROM  Patient likely has musculoskeletal pain vs baker's cyst  ACE wrap applied  Pain rx given in the ED  Close return precautions discussed with the pt       CritCare Time    Disposition  Final diagnoses:   Pain and swelling of right knee     Time reflects when diagnosis was documented in both MDM as applicable and the Disposition within this note     Time User Action Codes Description Comment    11/6/2018 12:38 PM Divine Yang Add [M29 110,  H00 460] Pain and swelling of right knee     11/6/2018 12:38 PM Lima Crawford Remove [K79 669,  T02 347] Pain and swelling of right knee 11/6/2018 12:38 PM Fly Crawford Add [N23 930,  M25 461] Pain and swelling of right knee       ED Disposition     ED Disposition Condition Comment    Discharge  [facility] discharge to home/self care  Condition at discharge: Good        Follow-up Information     Follow up With Specialties Details Why Contact Info Additional Information    Rosario Guthrie MD Family Medicine In 1 week As needed, If symptoms worsen [address]       [address] [facility] Orthopedic Surgery In 2 weeks As needed, If symptoms worsen [address] [id number]  [facility] [address], [place], [address]          Discharge Medication List as of 11/6/2018 12:40 PM      START taking these medications    Details   !! ibuprofen (MOTRIN) 600 mg tablet Take 1 tablet (600 mg total) by mouth every 6 (six) hours as needed for mild pain or moderate pain, Starting Tue 11/6/2018, Normal       !! - Potential duplicate medications found  Please discuss with provider  CONTINUE these medications which have NOT CHANGED    Details   acetaminophen (TYLENOL) 500 mg tablet Take 500 mg by mouth every 6 (six) hours as needed for mild pain, Historical Med      !! ibuprofen (MOTRIN) 600 mg tablet Take 200 mg by mouth every 6 (six) hours as needed for mild pain, Historical Med      levonorgestrel-ethinyl estradiol (AVIANE,ALESSE,LESSINA) 0 1-20 MG-MCG per tablet Take 1 tablet by mouth, Starting u 6/14/2018, Until Tue 11/6/2018, Historical Med      oxyCODONE (ROXICODONE) 5 mg immediate release tablet Take 1 tablet (5 mg total) by mouth 2 (two) times a day as needed for moderate pain Max Daily Amount: 10 mg, Starting Fri 11/2/2018, Normal       !! - Potential duplicate medications found  Please discuss with provider  No discharge procedures on file      ED Provider  Electronically Signed by           Mounika Santizo PA-C  11/06/18 2581

## 2018-11-19 ENCOUNTER — OFFICE VISIT (OUTPATIENT)
Dept: OBGYN CLINIC | Facility: OTHER | Age: 33
End: 2018-11-19
Payer: COMMERCIAL

## 2018-11-19 VITALS
HEIGHT: 66 IN | SYSTOLIC BLOOD PRESSURE: 115 MMHG | WEIGHT: 154.6 LBS | HEART RATE: 75 BPM | BODY MASS INDEX: 24.85 KG/M2 | DIASTOLIC BLOOD PRESSURE: 84 MMHG

## 2018-11-19 DIAGNOSIS — G89.29 CHRONIC BILATERAL LOW BACK PAIN WITHOUT SCIATICA: Primary | ICD-10-CM

## 2018-11-19 DIAGNOSIS — M54.50 CHRONIC BILATERAL LOW BACK PAIN WITHOUT SCIATICA: Primary | ICD-10-CM

## 2018-11-19 PROCEDURE — 99203 OFFICE O/P NEW LOW 30 MIN: CPT | Performed by: ORTHOPAEDIC SURGERY

## 2018-11-19 NOTE — PROGRESS NOTES
Assessment:       1  Chronic bilateral low back pain without sciatica          Plan:        Explain my current clinical findings and reviewed radiological findings with Abraham Olguin  Her lower back pain is likely multifactorial in nature  I did explain that gynecological problems like urinary infection or pelvic inflammatory disease may also cause lower back pain for which she suggested to follow-up with her gynecologist   I will also refer her for a course of physical therapy and advised her to try TENS therapy for her lower back symptoms  If she fails to improve with these measures, she may consider an opinion from pain management in this regard  Subjective:     Patient ID: Susanne Fothergill is a 35 y o  female  Chief Complaint:  Low back pain    HPI  Abraham Ogluin is a 60-year-old lady who is here today for evaluation of acute on chronic low back pain over the last 3 months  Does have a history of chronic low back pain for which she has previously received a right-sided sacroiliac joint injection through pain management about 1 5 years ago  This did provide her with some relief of her right-sided lower back pain  Currently, she 1st noticed a "swelling" of her lower mid back about 3 months ago which was subsequently followed by some low back burning pain after a few days  Since then, she has reported and intermittent "swelling" and "burning" sensation of her lower mid back and sometimes on the left side  She denies any radiation of her pain currently to the lower extremities and denies any lower extremity burning, tingling, numbness or weakness  She denies any current systemic symptoms like fever, chills or rigors or any vaginal discharge  She has also recently been diagnosed with urinary tract infection for which she is currently on a course of oral cephalexin  She had a CT scan of her lumbar spine on 10/3/2018 which revealed very minor noncompressive degenerative changes of the lumbar spine   There is also no history of preceding trauma prior to the onset of her back symptoms  Currently her back pain is of mild intensity and intermittently moderate intensity  Social History     Occupational History    Not on file  Social History Main Topics    Smoking status: Never Smoker    Smokeless tobacco: Never Used    Alcohol use Yes      Comment: social ; Denied history of alcohol use    Drug use: No    Sexual activity: Not on file      Review of Systems   Constitutional: Negative  HENT: Negative  Eyes: Negative  Respiratory: Negative  Cardiovascular: Negative  Gastrointestinal: Negative  Endocrine: Negative  Genitourinary: Positive for dysuria  Skin: Negative  Allergic/Immunologic: Negative  Neurological: Negative  Hematological: Negative  Psychiatric/Behavioral: Negative  Objective:     Ortho ExamPhysical Exam   Constitutional: She is oriented to person, place, and time  She appears well-developed and well-nourished  HENT:   Head: Normocephalic and atraumatic  Eyes: Pupils are equal, round, and reactive to light  Conjunctivae are normal    Cardiovascular: Normal rate and regular rhythm  Pulmonary/Chest: Effort normal  No respiratory distress  Neurological: She is alert and oriented to person, place, and time  No cranial nerve deficit  Skin: Skin is warm and dry  No erythema  Psychiatric: She has a normal mood and affect  Her behavior is normal  Judgment and thought content normal        Lumbar spine exam:   No swelling or deformity noted  This tenderness to palpation at the L5-S1 as well as the left sacroiliac joint  SLR negative bilaterally  WILMER equivocal on the left  Normal sensation to light touch in bilateral lower extremity in all dermatomes  Grade 5/5 strength of bilateral lower extremity all myotomes  Normal bilateral lower extremity deep tendon reflexes  Negative tripod sign  I have personally reviewed pertinent films in PACS

## 2019-02-06 ENCOUNTER — TELEPHONE (OUTPATIENT)
Dept: FAMILY MEDICINE CLINIC | Facility: CLINIC | Age: 34
End: 2019-02-06

## 2019-02-06 NOTE — TELEPHONE ENCOUNTER
Patient called stating you gave her a name for somebody to talk to regarding her mood swings and she lost the information  I looked in the system and didn't see anybody  Please contact patient with the name and number

## 2019-02-06 NOTE — TELEPHONE ENCOUNTER
I will copy list of Psychiatry offices she may contact however she will have to see if they accept her insurance of [employer]    If she has difficulties finding 1 that accepts her insurance she may have to call the members services number on her card to see if they can recommend someone locally that takes her insurance

## 2019-10-15 ENCOUNTER — HOSPITAL ENCOUNTER (EMERGENCY)
Facility: HOSPITAL | Age: 34
Discharge: HOME | End: 2019-10-15
Attending: EMERGENCY MEDICINE
Payer: COMMERCIAL

## 2019-10-15 VITALS
DIASTOLIC BLOOD PRESSURE: 82 MMHG | BODY MASS INDEX: 25.05 KG/M2 | RESPIRATION RATE: 20 BRPM | WEIGHT: 155.2 LBS | TEMPERATURE: 98 F | HEART RATE: 80 BPM | OXYGEN SATURATION: 96 % | SYSTOLIC BLOOD PRESSURE: 113 MMHG

## 2019-10-15 DIAGNOSIS — J06.9 URI (UPPER RESPIRATORY INFECTION): Primary | ICD-10-CM

## 2019-10-15 PROCEDURE — 99283 EMERGENCY DEPT VISIT LOW MDM: CPT

## 2019-10-15 PROCEDURE — 99282 EMERGENCY DEPT VISIT SF MDM: CPT | Performed by: PHYSICIAN ASSISTANT

## 2019-10-15 PROCEDURE — 94640 AIRWAY INHALATION TREATMENT: CPT

## 2019-10-15 RX ORDER — ALBUTEROL SULFATE 2.5 MG/3ML
5 SOLUTION RESPIRATORY (INHALATION) ONCE
Status: COMPLETED | OUTPATIENT
Start: 2019-10-15 | End: 2019-10-15

## 2019-10-15 RX ORDER — ALBUTEROL SULFATE 90 UG/1
2 AEROSOL, METERED RESPIRATORY (INHALATION) EVERY 6 HOURS PRN
Qty: 1 INHALER | Refills: 0 | Status: SHIPPED | OUTPATIENT
Start: 2019-10-15

## 2019-10-15 RX ADMIN — ALBUTEROL SULFATE 5 MG: 2.5 SOLUTION RESPIRATORY (INHALATION) at 09:21

## 2019-10-15 NOTE — ED PROVIDER NOTES
History  Chief Complaint   Patient presents with    URI     pt reports head and chest congestion for 4 days, no relief from vicks, mucinex and nyquil     77-year-old female with history of asthma, presents for evaluation of nasal congestion, cough and chest tightness for the past 4 days  Patient reports that she has been having a wet cough along with nasal congestion and a mild sore throat  States that since yesterday she started feeling chest tightness as if her asthma was acting up  Patient reports that she has not had asthma symptoms in the past 5 years and does not have medications for this  She denies any wheezing, chest pain, shortness of breath, abdominal pain, diarrhea  She denies any fever, chills, nausea or vomiting  She has around her son who also similar symptoms  Prior to Admission Medications   Prescriptions Last Dose Informant Patient Reported? Taking? Nerve Stimulator (TENS THERAPY PAIN RELIEF) JENNIFER   No No   Si application by Does not apply route 2 (two) times a day as needed (For low back pain)   acetaminophen (TYLENOL) 500 mg tablet  Self Yes No   Sig: Take 500 mg by mouth every 6 (six) hours as needed for mild pain   ibuprofen (MOTRIN) 600 mg tablet  Self Yes No   Sig: Take 200 mg by mouth every 6 (six) hours as needed for mild pain   ibuprofen (MOTRIN) 600 mg tablet   No No   Sig: Take 1 tablet (600 mg total) by mouth every 6 (six) hours as needed for mild pain or moderate pain   levonorgestrel-ethinyl estradiol (AVIANE,ALESSE,LESSINA) 0 1-20 MG-MCG per tablet   Yes No   Sig: Take 1 tablet by mouth   oxyCODONE (ROXICODONE) 5 mg immediate release tablet   No No   Sig: Take 1 tablet (5 mg total) by mouth 2 (two) times a day as needed for moderate pain Max Daily Amount: 10 mg      Facility-Administered Medications: None       Past Medical History:   Diagnosis Date    Asthma     Chlamydia infection        No past surgical history on file      Family History   Problem Relation Age of Onset    No Known Problems Mother      I have reviewed and agree with the history as documented  Social History     Tobacco Use    Smoking status: Never Smoker    Smokeless tobacco: Never Used   Substance Use Topics    Alcohol use: Yes     Comment: social ; Denied history of alcohol use    Drug use: No        Review of Systems   Constitutional: Negative for chills and fever  HENT: Positive for congestion and sinus pain  Negative for ear pain, rhinorrhea, sinus pressure and sore throat  Eyes: Negative for redness  Respiratory: Positive for cough and chest tightness  Negative for shortness of breath and wheezing  Cardiovascular: Negative for chest pain  Gastrointestinal: Negative for abdominal pain, constipation, diarrhea, nausea and vomiting  Physical Exam  Physical Exam   Constitutional: She is oriented to person, place, and time  She appears well-developed and well-nourished  No distress  HENT:   Head: Normocephalic and atraumatic  Right Ear: External ear normal    Left Ear: External ear normal    Mouth/Throat: Oropharynx is clear and moist  No oropharyngeal exudate  Eyes: Conjunctivae are normal    Cardiovascular: Normal rate and normal heart sounds  Pulmonary/Chest: Effort normal  No respiratory distress  She has no rhonchi  She has no rales  She exhibits no tenderness  Musculoskeletal: Normal range of motion  Neurological: She is alert and oriented to person, place, and time  Skin: Skin is warm  She is not diaphoretic  No erythema  Nursing note and vitals reviewed        Vital Signs  ED Triage Vitals [10/15/19 0850]   Temperature Pulse Respirations Blood Pressure SpO2   98 °F (36 7 °C) 80 20 113/82 96 %      Temp Source Heart Rate Source Patient Position - Orthostatic VS BP Location FiO2 (%)   Oral Monitor Sitting Left arm --      Pain Score       No Pain           Vitals:    10/15/19 0850   BP: 113/82   Pulse: 80   Patient Position - Orthostatic VS: Sitting Visual Acuity      ED Medications  Medications   albuterol inhalation solution 5 mg (5 mg Nebulization Given 10/15/19 8726)       Diagnostic Studies  Results Reviewed     None                 No orders to display              Procedures  Procedures       ED Course                               MDM  Number of Diagnoses or Management Options  URI (upper respiratory infection):   Diagnosis management comments: 70-year-old female presents for evaluation of cough and congestion  Patient also reports that she feels chest tightness  No wheezing or decreased lung sounds heard on exam   Given albuterol nebulization and patient reports improvement of symptoms  Will discharge the inhaler  Advised follow up with the family doctor in 2-3 days for recheck if symptoms persist       Disposition  Final diagnoses:   URI (upper respiratory infection)     Time reflects when diagnosis was documented in both MDM as applicable and the Disposition within this note     Time User Action Codes Description Comment    10/15/2019  9:51 AM Shante Davila Add [J06 9] URI (upper respiratory infection)       ED Disposition     ED Disposition Condition Date/Time Comment    Discharge Stable Tue Oct 15, 2019  9:51 AM [facility] discharge to home/self care  Follow-up Information     Follow up With Specialties Details Why Contact Ramona Young MD Family Medicine Schedule an appointment as soon as possible for a visit in 2 days Follow up for re-check of symptoms, If symptoms persist  [address] [facility] [address] [address]  [phone]            Patient's Medications   Discharge Prescriptions    ALBUTEROL (PROVENTIL HFA,VENTOLIN HFA) 90 MCG/ACT INHALER    Inhale 2 puffs every 6 (six) hours as needed for wheezing       Start Date: 10/15/2019End Date: --       Order Dose: 2 puffs       Quantity: 1 Inhaler    Refills: 0     No discharge procedures on file      ED Provider  Electronically Signed by           Juliane Burnett JOHN  10/15/19 2951

## 2019-10-18 ENCOUNTER — APPOINTMENT (EMERGENCY)
Dept: RADIOLOGY | Facility: HOSPITAL | Age: 34
End: 2019-10-18
Payer: COMMERCIAL

## 2019-10-18 ENCOUNTER — HOSPITAL ENCOUNTER (EMERGENCY)
Facility: HOSPITAL | Age: 34
Discharge: HOME | End: 2019-10-18
Attending: EMERGENCY MEDICINE
Payer: COMMERCIAL

## 2019-10-18 VITALS
SYSTOLIC BLOOD PRESSURE: 123 MMHG | BODY MASS INDEX: 23.3 KG/M2 | HEART RATE: 92 BPM | HEIGHT: 66 IN | DIASTOLIC BLOOD PRESSURE: 82 MMHG | TEMPERATURE: 97.9 F | RESPIRATION RATE: 18 BRPM | OXYGEN SATURATION: 98 % | WEIGHT: 145 LBS

## 2019-10-18 DIAGNOSIS — J40 BRONCHITIS: Primary | ICD-10-CM

## 2019-10-18 PROCEDURE — 99284 EMERGENCY DEPT VISIT MOD MDM: CPT | Performed by: PHYSICIAN ASSISTANT

## 2019-10-18 PROCEDURE — 71046 X-RAY EXAM CHEST 2 VIEWS: CPT

## 2019-10-18 PROCEDURE — 99283 EMERGENCY DEPT VISIT LOW MDM: CPT

## 2019-10-18 RX ORDER — BENZONATATE 100 MG/1
100 CAPSULE ORAL EVERY 8 HOURS
Qty: 15 CAPSULE | Refills: 0 | Status: SHIPPED | OUTPATIENT
Start: 2019-10-18 | End: 2019-10-23

## 2019-10-18 RX ORDER — PREDNISONE 20 MG/1
20 TABLET ORAL 2 TIMES DAILY WITH MEALS
Qty: 10 TABLET | Refills: 0 | Status: SHIPPED | OUTPATIENT
Start: 2019-10-18 | End: 2019-10-23

## 2019-10-18 NOTE — ED PROVIDER NOTES
History  Chief Complaint   Patient presents with    Cough     pt reports cough x 2 days with congestion  pt reports she took mucinex, vicks, halls, and a humidifier and its not helping  70-year-old female history of asthma presents worsening cough and congestion for the past 2 days  Patient was seen here 2 days ago and was diagnosed with upper respiratory infection  Patient reports that she has been using albuterol inhaler with relief of her breathing symptoms  States that she denies wheezing but states that she has been having cough that has been come more productive  Also reports having yellow-tinged sputum  She states that she also has post-tussive emesis  She denies any fever chills  Is around her son who also has the same symptoms  Prior to Admission Medications   Prescriptions Last Dose Informant Patient Reported? Taking?    Nerve Stimulator (TENS THERAPY PAIN RELIEF) JENNIFER   No No   Si application by Does not apply route 2 (two) times a day as needed (For low back pain)   acetaminophen (TYLENOL) 500 mg tablet  Self Yes No   Sig: Take 500 mg by mouth every 6 (six) hours as needed for mild pain   albuterol (PROVENTIL HFA,VENTOLIN HFA) 90 mcg/act inhaler   No No   Sig: Inhale 2 puffs every 6 (six) hours as needed for wheezing   ibuprofen (MOTRIN) 600 mg tablet  Self Yes No   Sig: Take 200 mg by mouth every 6 (six) hours as needed for mild pain   ibuprofen (MOTRIN) 600 mg tablet   No No   Sig: Take 1 tablet (600 mg total) by mouth every 6 (six) hours as needed for mild pain or moderate pain   levonorgestrel-ethinyl estradiol (AVIANE,ALESSE,LESSINA) 0 1-20 MG-MCG per tablet   Yes No   Sig: Take 1 tablet by mouth   oxyCODONE (ROXICODONE) 5 mg immediate release tablet   No No   Sig: Take 1 tablet (5 mg total) by mouth 2 (two) times a day as needed for moderate pain Max Daily Amount: 10 mg      Facility-Administered Medications: None       Past Medical History:   Diagnosis Date    Asthma     Chlamydia infection        History reviewed  No pertinent surgical history  Family History   Problem Relation Age of Onset    No Known Problems Mother      I have reviewed and agree with the history as documented  Social History     Tobacco Use    Smoking status: Never Smoker    Smokeless tobacco: Never Used   Substance Use Topics    Alcohol use: Yes     Comment: social ; Denied history of alcohol use    Drug use: No        Review of Systems   Constitutional: Negative for chills and fever  HENT: Positive for congestion  Negative for rhinorrhea, sinus pressure, sinus pain, sore throat and trouble swallowing  Respiratory: Positive for cough  Negative for chest tightness, shortness of breath and wheezing  Gastrointestinal: Negative for abdominal pain, constipation, diarrhea, nausea and vomiting  Physical Exam  Physical Exam   Constitutional: She is oriented to person, place, and time  She appears well-developed and well-nourished  No distress  HENT:   Head: Normocephalic and atraumatic  Right Ear: External ear normal    Left Ear: External ear normal    Mouth/Throat: Oropharynx is clear and moist  No oropharyngeal exudate  Eyes: Conjunctivae are normal    Cardiovascular: Normal rate and normal heart sounds  Pulmonary/Chest: Effort normal  No respiratory distress  She has no rhonchi  She has no rales  She exhibits no tenderness  Musculoskeletal: Normal range of motion  Neurological: She is alert and oriented to person, place, and time  Skin: Skin is warm  She is not diaphoretic  No erythema  Nursing note and vitals reviewed        Vital Signs  ED Triage Vitals [10/18/19 1543]   Temperature Pulse Respirations Blood Pressure SpO2   97 9 °F (36 6 °C) 92 18 123/82 98 %      Temp Source Heart Rate Source Patient Position - Orthostatic VS BP Location FiO2 (%)   Oral Monitor Sitting -- --      Pain Score       --           Vitals:    10/18/19 1543   BP: 123/82   Pulse: 92   Patient Position - Orthostatic VS: Sitting         Visual Acuity      ED Medications  Medications - No data to display    Diagnostic Studies  Results Reviewed     None                 XR chest 2 views   ED Interpretation by Jacqui Khalil PA-C (10/18 1605)   Interpreted by me  No infiltrate, nl cardiac silhouette, no effusions       Final Result by Rodger Cherry MD (10/18 1615)      No acute cardiopulmonary disease  Workstation performed: [id number]                    Procedures  Procedures       ED Course                               MDM  Number of Diagnoses or Management Options  Diagnosis management comments: 3year-old female presents for evaluation of a cough  Chest x-ray shows no signs of pneumonia  Will treat with prednisone and Tessalon  Disposition  Final diagnoses:   Bronchitis     Time reflects when diagnosis was documented in both MDM as applicable and the Disposition within this note     Time User Action Codes Description Comment    10/18/2019  4:33 PM Caleb, [facility] Bronchitis       ED Disposition     ED Disposition Condition Date/Time Comment    Discharge Stable Fri Oct 18, 2019  4:33 PM [facility] discharge to home/self care              Follow-up Information     Follow up With Specialties Details Why Mat Marroquin MD Family Medicine Schedule an appointment as soon as possible for a visit in 1 week Follow up for re-check of symptoms [address]            Patient's Medications   Discharge Prescriptions    BENZONATATE (TESSALON PERLES) 100 MG CAPSULE    Take 1 capsule (100 mg total) by mouth every 8 (eight) hours for 5 days       Start Date: 10/18/2019End Date: 10/23/2019       Order Dose: 100 mg       Quantity: 15 capsule    Refills: 0    PREDNISONE 20 MG TABLET    Take 1 tablet (20 mg total) by mouth 2 (two) times a day with meals for 5 days       Start Date: 10/18/2019End Date: 10/23/2019       Order Dose: 20 mg Quantity: 10 tablet    Refills: 0     No discharge procedures on file      ED Provider  Electronically Signed by           Garcia Hernandez PA-C  10/18/19 9860

## 2019-10-21 RX ORDER — MEDROXYPROGESTERONE ACETATE 150 MG/ML
150 INJECTION, SUSPENSION INTRAMUSCULAR
COMMUNITY
Start: 2019-09-20 | End: 2020-01-30 | Stop reason: ALTCHOICE

## 2019-10-21 NOTE — PROGRESS NOTES
Chart updated per office request  Discrete reportable data documented      *Updated Paps, DOS  6-14-18 6-2-14

## 2019-10-22 ENCOUNTER — OFFICE VISIT (OUTPATIENT)
Dept: FAMILY MEDICINE CLINIC | Facility: CLINIC | Age: 34
End: 2019-10-22
Payer: COMMERCIAL

## 2019-10-22 VITALS
OXYGEN SATURATION: 97 % | TEMPERATURE: 98.9 F | BODY MASS INDEX: 25.05 KG/M2 | SYSTOLIC BLOOD PRESSURE: 100 MMHG | WEIGHT: 155.2 LBS | HEART RATE: 88 BPM | DIASTOLIC BLOOD PRESSURE: 68 MMHG

## 2019-10-22 DIAGNOSIS — L65.9 ALOPECIA: Primary | ICD-10-CM

## 2019-10-22 DIAGNOSIS — J06.9 UPPER RESPIRATORY TRACT INFECTION, UNSPECIFIED TYPE: ICD-10-CM

## 2019-10-22 DIAGNOSIS — M54.50 ACUTE MIDLINE LOW BACK PAIN WITHOUT SCIATICA: ICD-10-CM

## 2019-10-22 PROCEDURE — 99214 OFFICE O/P EST MOD 30 MIN: CPT | Performed by: FAMILY MEDICINE

## 2019-10-22 RX ORDER — TRAMADOL HYDROCHLORIDE 50 MG/1
50 TABLET ORAL EVERY 8 HOURS PRN
Qty: 30 TABLET | Refills: 1 | Status: SHIPPED | OUTPATIENT
Start: 2019-10-22 | End: 2019-11-07 | Stop reason: ALTCHOICE

## 2019-10-22 RX ORDER — AZITHROMYCIN 250 MG/1
TABLET, FILM COATED ORAL
Qty: 6 TABLET | Refills: 0 | Status: SHIPPED | OUTPATIENT
Start: 2019-10-22 | End: 2019-10-27

## 2019-10-22 RX ORDER — PROMETHAZINE HYDROCHLORIDE AND CODEINE PHOSPHATE 6.25; 1 MG/5ML; MG/5ML
5 SYRUP ORAL EVERY 4 HOURS PRN
Qty: 180 ML | Refills: 1 | Status: SHIPPED | OUTPATIENT
Start: 2019-10-22 | End: 2019-11-07 | Stop reason: ALTCHOICE

## 2019-10-22 NOTE — ASSESSMENT & PLAN NOTE
Back pain  Suspect patient's back pain is due to tight or irritated hamstring of right leg  She was given a refill on her tramadol medication to use 50 mg q 8 hours p r n

## 2019-10-22 NOTE — ASSESSMENT & PLAN NOTE
Hair loss  Patient given prescription to obtain blood work as ordered for further evaluation of her complaints of hair loss  I suspect stress is playing a component in the patient's condition

## 2019-10-22 NOTE — PROGRESS NOTES
FAMILY PRACTICE OFFICE VISIT       NAME: Bernardo Santiago  AGE: 29 y o  SEX: female       : 1985        MRN: [id number]    DATE: 10/22/2019  TIME: 2:33 PM    Assessment and Plan     Problem List Items Addressed This Visit        Respiratory    Upper respiratory tract infection     URI  Patient given prescription for Zithromax Z-Jack take as directed for 5 days  She will finish her course of prednisone take 40 mg daily for for total of 3 more days  She was given promethazine with codeine to use 1 tsp every 4-6 hours as needed         Relevant Medications    promethazine-codeine (PHENERGAN WITH CODEINE) 6 25-10 mg/5 mL syrup    azithromycin (ZITHROMAX) 250 mg tablet       Musculoskeletal and Integument    Alopecia - Primary     Hair loss  Patient given prescription to obtain blood work as ordered for further evaluation of her complaints of hair loss  I suspect stress is playing a component in the patient's condition  Relevant Orders    TSH, 3rd generation    T3, free    T4, free    Sedimentation rate, automated    CBC    Comprehensive metabolic panel       Other    Acute midline low back pain without sciatica     Back pain  Suspect patient's back pain is due to tight or irritated hamstring of right leg  She was given a refill on her tramadol medication to use 50 mg q 8 hours p r n  Relevant Medications    traMADol (ULTRAM) 50 mg tablet              Chief Complaint     Chief Complaint   Patient presents with    Follow-up    Back Pain     PS: 4, tolerable, hard to get up in the morning    Alopecia       History of Present Illness     Patient is in the office for follow-up after having 2 emergency room visits for constant cough and history of asthma  Patient had chest x-ray that was within normal limits  She had been given an albuterol inhaler initially and subsequently was placed on prednisone and Tessalon Perles  Patient continues to have significant coughing especially at bedtime  She did have a fever of 101° initially which subsided after using over-the-counter NSAID  Patient also has complaint recurrence of right hamstring pain radiating to her back which she has had a past years  She works 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 working sometimes 12-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 standing most of the day  She requested a refill on her tramadol    The patient also has noticed increase in loss of hair from her scalp  She denies any scalp irritation or pruritus  She notices low large clumps of hair that can fall out easily  Review of Systems   Review of Systems   Constitutional: Positive for fatigue  HENT: Negative  Respiratory: Positive for cough  Negative for wheezing  Cardiovascular: Negative  Gastrointestinal: Negative  Musculoskeletal:        As per HPI   Skin:        As per HPI   Psychiatric/Behavioral:        As per HPI       Active Problem List     Patient Active Problem List   Diagnosis    Cellulitis of right lower extremity    Mood disorder ([facility] [address] )    Cellulitis    Tendinitis    Acute midline low back pain without sciatica    Chronic bilateral low back pain without sciatica    Alopecia    Upper respiratory tract infection       Past Medical History:  Past Medical History:   Diagnosis Date    Asthma     Chlamydia infection        Past Surgical History:  No past surgical history on file      Family History:  Family History   Problem Relation Age of Onset    No Known Problems Mother        Social History:  Social History     Socioeconomic History    Marital status: /Civil Union     Spouse name: Not on file    Number of children: Not on file    Years of education: Not on file    Highest education level: Not on file   Occupational History    Not on file   Social Needs    Financial resource strain: Not on file    Food insecurity:     Worry: Not on file     Inability: Not on file    Transportation needs:     Medical: Not on file     Non-medical: Not on file   Tobacco Use    Smoking status: Never Smoker    Smokeless tobacco: Never Used   Substance and Sexual Activity    Alcohol use: Yes     Comment: social ; Denied history of alcohol use    Drug use: No    Sexual activity: Not on file   Lifestyle    Physical activity:     Days per week: Not on file     Minutes per session: Not on file    Stress: Not on file   Relationships    Social connections:     Talks on phone: Not on file     Gets together: Not on file     Attends [employer] service: Not on file     Active member of club or organization: Not on file     Attends meetings of clubs or organizations: Not on file     Relationship status: Not on file    Intimate partner violence:     Fear of current or ex partner: Not on file     Emotionally abused: Not on file     Physically abused: Not on file     Forced sexual activity: Not on file   Other Topics Concern    Not on file   Social History Narrative    Not on file       Objective     Vitals:    10/22/19 1405   BP: 100/68   Pulse: 88   Temp: 98 9 °F (37 2 °C)   SpO2: 97%     Wt Readings from Last 3 Encounters:   10/22/19 70 4 kg (155 lb 3 2 oz)   10/18/19 65 8 kg (145 lb)   10/15/19 70 4 kg (155 lb 3 3 oz)       Physical Exam   Constitutional: No distress  HENT:   Right Ear: External ear normal    Left Ear: External ear normal    Mouth/Throat: Oropharynx is clear and moist  No oropharyngeal exudate  Tympanic membranes within normal limits bilaterally   Cardiovascular: Normal heart sounds  Regular rate and rhythm with no murmurs   Pulmonary/Chest: Breath sounds normal    Lungs are clear to auscultation without wheezes,rales, or rhonchi  Harsh dry cough   Lymphadenopathy:     She has no cervical adenopathy     Skin:   Patient with thinning hair on scalp no significant patches of alopecia       Pertinent Laboratory/Diagnostic Studies:  Lab Results   Component Value Date    GLUCOSE 88 08/28/2015    BUN 11 10/31/2017    CREATININE 0 69 10/31/2017    CALCIUM 8 6 10/31/2017     08/28/2015    K 3 5 10/31/2017    CO2 28 10/31/2017     10/31/2017     Lab Results   Component Value Date    ALT 26 10/10/2017    AST 19 10/10/2017    ALKPHOS 68 10/10/2017    BILITOT 0 43 08/28/2015       Lab Results   Component Value Date    WBC 6 00 10/31/2017    HGB 12 1 10/31/2017    HCT 37 8 10/31/2017    MCV 90 10/31/2017     10/31/2017       No results found for: TSH    No results found for: CHOL  No results found for: TRIG  No results found for: HDL  No results found for: LDLCALC  No results found for: HGBA1C    Results for orders placed or performed during the hospital encounter of 11/06/18   POCT pregnancy, urine   Result Value Ref Range    EXT PREG TEST UR (Ref: Negative) NEGATIVE        Orders Placed This Encounter   Procedures    TSH, 3rd generation    T3, free    T4, free    Sedimentation rate, automated    CBC    Comprehensive metabolic panel       ALLERGIES:  No Known Allergies    Current Medications     Current Outpatient Medications   Medication Sig Dispense Refill    acetaminophen (TYLENOL) 500 mg tablet Take 500 mg by mouth every 6 (six) hours as needed for mild pain      albuterol (PROVENTIL HFA,VENTOLIN HFA) 90 mcg/act inhaler Inhale 2 puffs every 6 (six) hours as needed for wheezing 1 Inhaler 0    benzonatate (TESSALON PERLES) 100 mg capsule Take 1 capsule (100 mg total) by mouth every 8 (eight) hours for 5 days 15 capsule 0    predniSONE 20 mg tablet Take 1 tablet (20 mg total) by mouth 2 (two) times a day with meals for 5 days 10 tablet 0    azithromycin (ZITHROMAX) 250 mg tablet Take 2 tablets today then 1 tablet daily x 4 days 6 tablet 0    ibuprofen (MOTRIN) 600 mg tablet Take 200 mg by mouth every 6 (six) hours as needed for mild pain      levonorgestrel-ethinyl estradiol (AVIANE,ALESSE,LESSINA) 0 1-20 MG-MCG per tablet Take 1 tablet by mouth      medroxyPROGESTERone (DEPO-PROVERA) 150 mg/mL injection Inject 150 mg into a muscle      promethazine-codeine (PHENERGAN WITH CODEINE) 6 25-10 mg/5 mL syrup Take 5 mL by mouth every 4 (four) hours as needed for cough 180 mL 1    traMADol (ULTRAM) 50 mg tablet Take 1 tablet (50 mg total) by mouth every 8 (eight) hours as needed for moderate pain 30 tablet 1     No current facility-administered medications for this visit            Health Maintenance     Health Maintenance   Topic Date Due    Depression Screening PHQ  06/14/2019    INFLUENZA VACCINE  07/01/2019    BMI: Adult  10/18/2020    Cervical Cancer Screening  06/14/2023    DTaP,Tdap,and Td Vaccines (3 - Td) 01/20/2025    Pneumococcal Vaccine: 65+ Years (1 of 2 - PCV13) 09/04/2050    Pneumococcal Vaccine: Pediatrics (0 to 5 Years) and At-Risk Patients (6 to 59 Years)  Aged Out    HEPATITIS B VACCINES  Aged [employer] History   Administered Date(s) Administered    INFLUENZA 10/28/2014    Tdap 11/11/2014, 99/47/0482       Екатерина Araya MD    I spent 25 minutes with this patient which greater than 50% was spent counseling

## 2019-10-22 NOTE — ASSESSMENT & PLAN NOTE
URI   Patient given prescription for Zithromax Z-Jack take as directed for 5 days  She will finish her course of prednisone take 40 mg daily for for total of 3 more days    She was given promethazine with codeine to use 1 tsp every 4-6 hours as needed

## 2019-10-25 DIAGNOSIS — J06.9 UPPER RESPIRATORY TRACT INFECTION, UNSPECIFIED TYPE: Primary | ICD-10-CM

## 2019-10-25 RX ORDER — GUAIFENESIN AND CODEINE PHOSPHATE 100; 10 MG/5ML; MG/5ML
5 SOLUTION ORAL 3 TIMES DAILY PRN
Qty: 120 ML | Refills: 0 | Status: SHIPPED | OUTPATIENT
Start: 2019-10-25 | End: 2019-11-07 | Stop reason: ALTCHOICE

## 2019-11-07 ENCOUNTER — OFFICE VISIT (OUTPATIENT)
Dept: FAMILY MEDICINE CLINIC | Facility: CLINIC | Age: 34
End: 2019-11-07
Payer: COMMERCIAL

## 2019-11-07 VITALS
DIASTOLIC BLOOD PRESSURE: 80 MMHG | TEMPERATURE: 99.9 F | BODY MASS INDEX: 25.04 KG/M2 | SYSTOLIC BLOOD PRESSURE: 110 MMHG | HEART RATE: 67 BPM | RESPIRATION RATE: 16 BRPM | WEIGHT: 155.8 LBS | OXYGEN SATURATION: 98 % | HEIGHT: 66 IN

## 2019-11-07 DIAGNOSIS — M54.42 ACUTE LEFT-SIDED LOW BACK PAIN WITH LEFT-SIDED SCIATICA: Primary | ICD-10-CM

## 2019-11-07 PROCEDURE — 99213 OFFICE O/P EST LOW 20 MIN: CPT | Performed by: NURSE PRACTITIONER

## 2019-11-07 RX ORDER — CYCLOBENZAPRINE HCL 5 MG
TABLET ORAL
Qty: 30 TABLET | Refills: 0 | Status: SHIPPED | OUTPATIENT
Start: 2019-11-07 | End: 2020-01-30 | Stop reason: ALTCHOICE

## 2019-11-07 RX ORDER — METHYLPREDNISOLONE 4 MG/1
TABLET ORAL
Qty: 21 EACH | Refills: 0 | Status: SHIPPED | OUTPATIENT
Start: 2019-11-07 | End: 2019-11-21 | Stop reason: ALTCHOICE

## 2019-11-07 NOTE — PROGRESS NOTES
FAMILY PRACTICE OFFICE VISIT       NAME: Samy Charles  AGE: 29 y o  SEX: female       : 1985        MRN: [id number]    DATE: 2019    Assessment and Plan     Problem List Items Addressed This Visit     None      Visit Diagnoses     Acute left-sided low back pain with left-sided sciatica    -  Primary    Relevant Medications    methylPREDNISolone 4 MG tablet therapy pack    cyclobenzaprine (FLEXERIL) 5 mg tablet        1  Acute left-sided low back pain with left-sided sciatica  Left sided low back pain with left-sided sciatica  Recommend treatment with Medrol Dosepak and Flexeril 5-10 mg at bedtime as needed for pain  Hold ibuprofen while taking Medrol Dosepak  May take Tylenol if needed  Once Medrol Dosepak is completed, may resume ibuprofen as needed  May continue to use over-the-counter pain creams  May continue use heat or ice as needed  If symptoms worsen, or if symptoms are persistent she will call  methylPREDNISolone 4 MG tablet therapy pack    cyclobenzaprine (FLEXERIL) 5 mg tablet           Chief Complaint     Chief Complaint   Patient presents with    Back Pain     Pt is here for lt sided lower back pain and swelling 3 + day       History of Present Illness     Samy Charles is a 12-year-old female presenting today for left low back pain and swelling for the past 3 days  Denies any injury or trauma past or present  She has had similar symptoms in the past   She has tried physical therapy and had an injection approximately 1 year ago that was effective  She does not recall where she had physical therapy or the injection  She knows where the building is, but does not recall the name  Pain is constant, worsens with walking, worse near the end of her workday  Radiates down toward left thigh  Pain is described as sharp  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, 6 days per week  Has difficulty getting comfortable to sleep at night    Denies numbness or tingling in bilateral lower extremities  No loss of bowel or bladder function  Has been taking Tylenol, Advil, Motrin  Has a tramadol prescription, which does not work  Has tried heat and ice  Has tried over-the-counter pain relieving creams  Notices her pain seems to flare up during her busiest seasons at work  Review of Systems   Review of Systems   Constitutional: Negative  Genitourinary: Negative  Musculoskeletal: Positive for back pain  Skin: Negative for rash  Neurological: Negative for dizziness, weakness, numbness and headaches  Active Problem List     Patient Active Problem List   Diagnosis    Cellulitis of right lower extremity    Mood disorder (HCC)    Cellulitis    Tendinitis    Acute midline low back pain without sciatica    Chronic bilateral low back pain without sciatica    Alopecia    Upper respiratory tract infection       Past Medical History:  Past Medical History:   Diagnosis Date    Asthma     Chlamydia infection        Past Surgical History:  No past surgical history on file      Family History:  Family History   Problem Relation Age of Onset    No Known Problems Mother        Social History:  Social History     Socioeconomic History    Marital status: /Civil Union     Spouse name: Not on file    Number of children: Not on file    Years of education: Not on file    Highest education level: Not on file   Occupational History    Not on file   Social Needs    Financial resource strain: Not on file    Food insecurity:     Worry: Not on file     Inability: Not on file    Transportation needs:     Medical: Not on file     Non-medical: Not on file   Tobacco Use    Smoking status: Never Smoker    Smokeless tobacco: Never Used   Substance and Sexual Activity    Alcohol use: Yes     Comment: social ; Denied history of alcohol use    Drug use: No    Sexual activity: Not on file   Lifestyle    Physical activity:     Days per week: Not on file     Minutes per session: Not on file    Stress: Not on file   Relationships    Social connections:     Talks on phone: Not on file     Gets together: Not on file     Attends Ep[employer] service: Not on file     Active member of club or organization: Not on file     Attends meetings of clubs or organizations: Not on file     Relationship status: Not on file    Intimate partner violence:     Fear of current or ex partner: Not on file     Emotionally abused: Not on file     Physically abused: Not on file     Forced sexual activity: Not on file   Other Topics Concern    Not on file   Social History Narrative    Not on file       I have reviewed the patient's medical history in detail; there are no changes to the history as noted in the electronic medical record  Objective     Vitals:    11/07/19 1051   BP: 110/80   Pulse: 67   Resp: 16   Temp: 99 9 °F (37 7 °C)   TempSrc: Tympanic   SpO2: 98%   Weight: 70 7 kg (155 lb 12 8 oz)   Height: 5' 6" (1 676 m)     Wt Readings from Last 3 Encounters:   11/07/19 70 7 kg (155 lb 12 8 oz)   10/22/19 70 4 kg (155 lb 3 2 oz)   10/18/19 65 8 kg (145 lb)     Body mass index is 25 15 kg/m²  PHQ-9 Depression Screening    PHQ-9:    Frequency of the following problems over the past two weeks:            Physical Exam   Constitutional: She appears well-developed and well-nourished  No distress  Cardiovascular: Normal rate, regular rhythm and normal heart sounds  No murmur heard  Pulmonary/Chest: Effort normal and breath sounds normal    Musculoskeletal:        Lumbar back: She exhibits tenderness (left lumbar and paraspinal region) and spasm (left lumbar paraspinal region)  She exhibits normal range of motion and no swelling  Neurological: No sensory deficit (lower extremity)  Gait normal    Reflex Scores:       Patellar reflexes are 2+ on the right side and 2+ on the left side    Bilateral knee flexion strength 5/5  Bilateral knee extension strength 5/5  Bilateral plantar flexion strength 5/5  Bilateral dorsiflexion strength 5/5  Negative straight leg test      Nursing note and vitals reviewed  ALLERGIES:  No Known Allergies    Current Medications     Current Outpatient Medications   Medication Sig Dispense Refill    acetaminophen (TYLENOL) 500 mg tablet Take 500 mg by mouth every 6 (six) hours as needed for mild pain      albuterol (PROVENTIL HFA,VENTOLIN HFA) 90 mcg/act inhaler Inhale 2 puffs every 6 (six) hours as needed for wheezing 1 Inhaler 0    medroxyPROGESTERone (DEPO-PROVERA) 150 mg/mL injection Inject 150 mg into a muscle      cyclobenzaprine (FLEXERIL) 5 mg tablet Take 1-2 tablets at bedtime as needed for muscle spasm 30 tablet 0    methylPREDNISolone 4 MG tablet therapy pack Use as directed on package 21 each 0     No current facility-administered medications for this visit            Health Maintenance     Health Maintenance   Topic Date Due    BMI: Followup Plan  09/04/2003    INFLUENZA VACCINE  11/07/2020 (Originally 7/1/2019)    Depression Screening PHQ  10/22/2020    BMI: Adult  11/07/2020    Cervical Cancer Screening  06/14/2023    DTaP,Tdap,and Td Vaccines (3 - Td) 01/20/2025    Pneumococcal Vaccine: 65+ Years (1 of 2 - PCV13) 09/04/2050    Pneumococcal Vaccine: Pediatrics (0 to 5 Years) and At-Risk Patients (6 to 59 Years)  Aged Out    HEPATITIS B VACCINES  Aged [employer] History   Administered Date(s) Administered    INFLUENZA 10/28/2014    Tdap 11/11/2014, 01/20/2015       ROSHNI [place]

## 2019-11-12 ENCOUNTER — HOSPITAL ENCOUNTER (EMERGENCY)
Facility: HOSPITAL | Age: 34
Discharge: HOME | End: 2019-11-12
Attending: EMERGENCY MEDICINE | Admitting: EMERGENCY MEDICINE
Payer: COMMERCIAL

## 2019-11-12 VITALS
RESPIRATION RATE: 16 BRPM | HEART RATE: 65 BPM | SYSTOLIC BLOOD PRESSURE: 118 MMHG | DIASTOLIC BLOOD PRESSURE: 85 MMHG | WEIGHT: 156 LBS | BODY MASS INDEX: 25.18 KG/M2 | TEMPERATURE: 97.6 F | OXYGEN SATURATION: 100 %

## 2019-11-12 DIAGNOSIS — R22.2 MASS ON BACK: ICD-10-CM

## 2019-11-12 DIAGNOSIS — M54.9 BACK PAIN: ICD-10-CM

## 2019-11-12 DIAGNOSIS — IMO0002 MASS: Primary | ICD-10-CM

## 2019-11-12 PROCEDURE — 99283 EMERGENCY DEPT VISIT LOW MDM: CPT

## 2019-11-12 PROCEDURE — 99284 EMERGENCY DEPT VISIT MOD MDM: CPT | Performed by: STUDENT IN AN ORGANIZED HEALTH CARE EDUCATION/TRAINING PROGRAM

## 2019-11-12 RX ORDER — LIDOCAINE 50 MG/G
1 PATCH TOPICAL ONCE
Status: DISCONTINUED | OUTPATIENT
Start: 2019-11-12 | End: 2019-11-13 | Stop reason: HOSPADM

## 2019-11-12 RX ADMIN — LIDOCAINE 1 PATCH: 50 PATCH TOPICAL at 21:57

## 2019-11-13 NOTE — ED ATTENDING ATTESTATION
11/12/2019  Natalia Regalado DO saw and evaluated the patient  I have discussed the patient with the resident/non-physician practitioner and agree with the resident's/non-physician practitioner's findings, Plan of Care, and MDM as documented in the resident's/non-physician practitioner's note, except where noted  All available labs and Radiology studies were reviewed  I was present for key portions of any procedure(s) performed by the resident/non-physician practitioner and I was immediately available to provide assistance  At this point I agree with the current assessment done in the Emergency Department  I have conducted an independent evaluation of this patient a history and physical is as follows:    29 yof with mass behind right knee  She is also concerned about a small mass on lower back that was assessed at  several weeks ago  Past Medical History:   Diagnosis Date    Asthma     Chlamydia infection      /85   Pulse 65   Temp 97 6 °F (36 4 °C)   Resp 16   Wt 70 8 kg (156 lb)   SpO2 100%   BMI 25 18 kg/m²   NAD, RLE w cyst-like structure on rigtht, lateral knee  No erythema  +tenderness  No edema  Ragena Lands  +palpable, soft tissue mass overlying sacrum  Presentation c/w cyst on leg and soft tissue mass on sacrum  Identical presentation one year ago  I offered contrast enhanced CT scan in the ED and patient declined because she preferred to continue care as of an outpatient  She will seek MRI from PCP this week  I discussed differential diagnosis of soft tissue mass on back including lipoma and possibly malignancy  Mass on leg appears consistent with cyst like structure on exam and bedside ultrasound            ED Course         Critical Care Time  Procedures

## 2019-11-13 NOTE — ED PROVIDER NOTES
History  Chief Complaint   Patient presents with    Mass     Pt c/o golf ball size mass that she noticed last week and now has one on back of right leg  Pt c/o right finger numbness  This is a 25-year-old female with a past medical history of asthma who presents to the emergency department this evening with a lower back mass and a posterior right knee mass  Patient has been seen for these in the past and was given a Medrol Dosepak and flexeril with little relief for the low back pain  Patient has been treated for the posterior R knee mass with ibuprofen about 1 year ago  Patient denies any fevers, chills, nausea, vomiting, diarrhea, constipation, caudal symptoms, radicular symptoms, abdominal pain, chest pain, shortness of breath, or any numbness, weakness, or tingling  Prior to Admission Medications   Prescriptions Last Dose Informant Patient Reported? Taking?   acetaminophen (TYLENOL) 500 mg tablet  Self Yes Yes   Sig: Take 500 mg by mouth every 6 (six) hours as needed for mild pain   albuterol (PROVENTIL HFA,VENTOLIN HFA) 90 mcg/act inhaler   No Yes   Sig: Inhale 2 puffs every 6 (six) hours as needed for wheezing   cyclobenzaprine (FLEXERIL) 5 mg tablet   No Yes   Sig: Take 1-2 tablets at bedtime as needed for muscle spasm   medroxyPROGESTERone (DEPO-PROVERA) 150 mg/mL injection   Yes Yes   Sig: Inject 150 mg into a muscle   methylPREDNISolone 4 MG tablet therapy pack   No Yes   Sig: Use as directed on package      Facility-Administered Medications: None       Past Medical History:   Diagnosis Date    Asthma     Chlamydia infection        History reviewed  No pertinent surgical history  Family History   Problem Relation Age of Onset    No Known Problems Mother      I have reviewed and agree with the history as documented      Social History     Tobacco Use    Smoking status: Never Smoker    Smokeless tobacco: Never Used   Substance Use Topics    Alcohol use: Yes     Comment: social ; Denied history of alcohol use    Drug use: No        Review of Systems   Constitutional: Negative for chills, fatigue and fever  HENT: Negative for congestion, rhinorrhea, sinus pressure and sore throat  Eyes: Negative for visual disturbance  Respiratory: Negative for cough and shortness of breath  Cardiovascular: Negative for chest pain  Gastrointestinal: Negative for abdominal pain, constipation, diarrhea, nausea and vomiting  Genitourinary: Negative for dysuria, frequency, hematuria and urgency  Musculoskeletal: Positive for back pain and myalgias  Negative for arthralgias  Skin: Negative for color change and rash  Neurological: Negative for dizziness, light-headedness and numbness  Physical Exam  ED Triage Vitals   Temperature Pulse Respirations Blood Pressure SpO2   11/12/19 1936 11/12/19 1934 11/12/19 1934 11/12/19 1934 11/12/19 1934   97 6 °F (36 4 °C) 65 16 118/85 100 %      Temp src Heart Rate Source Patient Position - Orthostatic VS BP Location FiO2 (%)   -- -- -- -- --             Pain Score       --                    Orthostatic Vital Signs  Vitals:    11/12/19 1934   BP: 118/85   Pulse: 65       Physical Exam   Constitutional: She is oriented to person, place, and time  She appears well-developed and well-nourished  No distress  HENT:   Head: Normocephalic and atraumatic  Eyes: Pupils are equal, round, and reactive to light  Conjunctivae and EOM are normal  Right eye exhibits no discharge  Left eye exhibits no discharge  No scleral icterus  Neck: Normal range of motion  Neck supple  No JVD present  Cardiovascular: Normal rate, regular rhythm and normal heart sounds  Exam reveals no gallop and no friction rub  No murmur heard  Pulmonary/Chest: Effort normal and breath sounds normal  No stridor  No respiratory distress  She has no wheezes  She has no rales  Abdominal: Soft  Bowel sounds are normal  She exhibits no distension  There is no tenderness   There is no guarding  Musculoskeletal: Normal range of motion  She exhibits tenderness  She exhibits no edema or deformity  Palpable, mobile, firm, nodular mass in patient's lower back that was unable to be differentiated from surrounding tissues with ultrasound  Firm, nodular, mobile mass patient's posterior lateral right knee that has a cystic appearance on ultrasound with no fluid within it but rather solid material    Neurological: She is alert and oriented to person, place, and time  No cranial nerve deficit or sensory deficit  She exhibits normal muscle tone  Skin: Skin is warm and dry  No rash noted  She is not diaphoretic  No erythema  No pallor  Psychiatric: She has a normal mood and affect  Her behavior is normal    Nursing note and vitals reviewed  ED Medications  Medications - No data to display    Diagnostic Studies  Results Reviewed     None                 No orders to display         Procedures  Procedures        ED Course                               MDM  Number of Diagnoses or Management Options  Back pain:   Mass on back:   Mass:   Diagnosis management comments:   Patient likely with a lipoma in her lower back as well as a sebaceous cyst in the right posterior knee  Will treat patient's lower back pain with a Lidoderm patch and have her follow up with her primary care physician for more detailed imaging  Will also provide Dermatology referral for possible sebaceous cyst removal in right posterior knee  Patient discharged home in good condition with instructions to follow up in the emergency department if she develops any new or otherwise concerning symptoms  Disposition  Final diagnoses:    Mass   Back pain   Mass on back - Sacral     Time reflects when diagnosis was documented in both MDM as applicable and the Disposition within this note     Time User Action Codes Description Comment    11/12/2019  9:57 PM Mary Littlejohn [R22 9] Mass     11/12/2019  9:57 PM Kiran Shoemaker Add [M54 9] Back pain     11/12/2019 10:03 PM Clrick Son Add [R22 2] Mass on back     11/12/2019 10:03 PM Clydene Son Modify [R22 2] Mass on back Sacral      ED Disposition     ED Disposition Condition Date/Time Comment    Discharge Stable Tue Nov 12, 2019  9:57 PM [facility] discharge to home/self care  Follow-up Information     Follow up With Specialties Details Why Contact Info Additional Information    Alondra Christianson MD [facility]   [address] [place] [address] Emergency Department Emergency Medicine  If symptoms worsen [address] [id number]  [phone]  ED, [address] [address]    [facility] D[facility]atology   Dwain Motley [address] [place] [address]  [phone]             Discharge Medication List as of 11/12/2019 10:12 PM      CONTINUE these medications which have NOT CHANGED    Details   acetaminophen (TYLENOL) 500 mg tablet Take 500 mg by mouth every 6 (six) hours as needed for mild pain, Historical Med      albuterol (PROVENTIL HFA,VENTOLIN HFA) 90 mcg/act inhaler Inhale 2 puffs every 6 (six) hours as needed for wheezing, Starting Tue 10/15/2019, Print      cyclobenzaprine (FLEXERIL) 5 mg tablet Take 1-2 tablets at bedtime as needed for muscle spasm, Normal      medroxyPROGESTERone (DEPO-PROVERA) 150 mg/mL injection Inject 150 mg into a muscle, Starting Fri 9/20/2019, Historical Med      methylPREDNISolone 4 MG tablet therapy pack Use as directed on package, Normal           No discharge procedures on file  ED Provider  Attending physically available and evaluated [facility]  I managed the patient along with the ED Attending      Electronically Signed by         Adonay Holloway MD  11/13/19 4921

## 2019-11-13 NOTE — DISCHARGE INSTRUCTIONS
Please follow up with her primary care physician if your symptoms do not improve in order to discuss outpatient detailed imaging  He may also follow up with Dermatology at the contact information below for the mass in your right knee  Return to the emergency department sooner if you develop any new or otherwise concerning symptoms

## 2019-11-21 ENCOUNTER — OFFICE VISIT (OUTPATIENT)
Dept: FAMILY MEDICINE CLINIC | Facility: CLINIC | Age: 34
End: 2019-11-21
Payer: COMMERCIAL

## 2019-11-21 VITALS
TEMPERATURE: 98.6 F | HEIGHT: 65 IN | DIASTOLIC BLOOD PRESSURE: 70 MMHG | BODY MASS INDEX: 26.16 KG/M2 | SYSTOLIC BLOOD PRESSURE: 110 MMHG | RESPIRATION RATE: 16 BRPM | WEIGHT: 157 LBS | HEART RATE: 80 BPM

## 2019-11-21 DIAGNOSIS — M71.21 BAKER'S CYST OF KNEE, RIGHT: ICD-10-CM

## 2019-11-21 DIAGNOSIS — M25.561 ACUTE PAIN OF RIGHT KNEE: Primary | ICD-10-CM

## 2019-11-21 PROCEDURE — 1036F TOBACCO NON-USER: CPT | Performed by: NURSE PRACTITIONER

## 2019-11-21 PROCEDURE — 3725F SCREEN DEPRESSION PERFORMED: CPT | Performed by: NURSE PRACTITIONER

## 2019-11-21 PROCEDURE — 99213 OFFICE O/P EST LOW 20 MIN: CPT | Performed by: NURSE PRACTITIONER

## 2019-11-21 RX ORDER — TRAMADOL HYDROCHLORIDE 50 MG/1
TABLET ORAL
Refills: 1 | COMMUNITY
Start: 2019-11-11 | End: 2020-01-30 | Stop reason: ALTCHOICE

## 2019-11-21 NOTE — PROGRESS NOTES
FAMILY PRACTICE OFFICE VISIT       NAME: Wilder Brooke  AGE: 29 y o  SEX: female       : 1985        MRN: [id number]    DATE: 2019    Assessment and Plan     Problem List Items Addressed This Visit     None      Visit Diagnoses     Acute pain of right knee    -  Primary    Relevant Orders    Ambulatory referral to Orthopedic Surgery    Baker's cyst of knee, right        Relevant Orders    Ambulatory referral to Orthopedic Surgery        1  Acute pain of right knee  Ambulatory referral to Orthopedic Surgery   2  Baker's cyst of knee, right  Ambulatory referral to Orthopedic Surgery     This 43-year-old female presents today with right posterior knee pain, suspected to be a Baker's cyst   Recommend continuing supportive care, rest, ice, elevate  Ibuprofen as needed  Follow up with Orthopedics, name and number provided today  Patient is agreeable will schedule  Chief Complaint     Chief Complaint   Patient presents with    Leg Pain       History of Present Illness     Wilder Brooke is a 29year old female presenting today for right knee pain  She was evaluated in the emergency room on 2019  States she had an ultrasound and was told she has a cyst  She was instructed to follow up with dermatology  First dermatology appointment she could get was   States she returned to the ED yesterday, but was not seen  States she was told "there is nothing we can do "     Posterior knee pain is worsening, having difficulty driving, working, and walking  Hurts to extend knee-feels like pulling  Works as a hairdresser, stands long hours  Has been taking Tramadol and ibuprofen with no relief  Has been trying to ice and rest as much as able  Review of Systems   Review of Systems   Constitutional: Negative  Musculoskeletal: Positive for arthralgias (right knee pain as noted in HPI)         Active Problem List     Patient Active Problem List   Diagnosis    Cellulitis of right lower extremity  Mood disorder (HCC)    Cellulitis    Tendinitis    Acute midline low back pain without sciatica    Chronic bilateral low back pain without sciatica    Alopecia    Upper respiratory tract infection       Past Medical History:  Past Medical History:   Diagnosis Date    Asthma     Chlamydia infection        Past Surgical History:  No past surgical history on file      Family History:  Family History   Problem Relation Age of Onset    No Known Problems Mother        Social History:  Social History     Socioeconomic History    Marital status: /Civil Union     Spouse name: Not on file    Number of children: Not on file    Years of education: Not on file    Highest education level: Not on file   Occupational History    Not on file   Social Needs    Financial resource strain: Not on file    Food insecurity:     Worry: Not on file     Inability: Not on file    Transportation needs:     Medical: Not on file     Non-medical: Not on file   Tobacco Use    Smoking status: Never Smoker    Smokeless tobacco: Never Used   Substance and Sexual Activity    Alcohol use: Yes     Comment: social ; Denied history of alcohol use    Drug use: No    Sexual activity: Not on file   Lifestyle    Physical activity:     Days per week: Not on file     Minutes per session: Not on file    Stress: Not on file   Relationships    Social connections:     Talks on phone: Not on file     Gets together: Not on file     Attends Tenriism service: Not on file     Active member of club or organization: Not on file     Attends meetings of clubs or organizations: Not on file     Relationship status: Not on file    Intimate partner violence:     Fear of current or ex partner: Not on file     Emotionally abused: Not on file     Physically abused: Not on file     Forced sexual activity: Not on file   Other Topics Concern    Not on file   Social History Narrative    Not on file       I have reviewed the patient's medical history in detail; there are no changes to the history as noted in the electronic medical record  Objective     Vitals:    11/21/19 1046   BP: 110/70   BP Location: Right arm   Patient Position: Sitting   Cuff Size: Standard   Pulse: 80   Resp: 16   Temp: 98 6 °F (37 °C)   TempSrc: Tympanic   Weight: 71 2 kg (157 lb)   Height: 5' 4 5" (1 638 m)     Wt Readings from Last 3 Encounters:   11/21/19 71 2 kg (157 lb)   11/12/19 70 8 kg (156 lb)   11/07/19 70 7 kg (155 lb 12 8 oz)     Body mass index is 26 53 kg/m²  PHQ-9 Depression Screening    PHQ-9:    Frequency of the following problems over the past two weeks:       Little interest or pleasure in doing things:  0 - not at all  Feeling down, depressed, or hopeless:  0 - not at all  PHQ-2 Score:  0       Physical Exam   Constitutional: She appears well-developed and well-nourished  No distress  Musculoskeletal:   Posterior knee with soft, tender, bulging across joint consistent with Baker's cyst  No redness, warmth, or skin breakdown  Psychiatric: She has a normal mood and affect  Nursing note and vitals reviewed  ALLERGIES:  No Known Allergies    Current Medications     Current Outpatient Medications   Medication Sig Dispense Refill    acetaminophen (TYLENOL) 500 mg tablet Take 500 mg by mouth every 6 (six) hours as needed for mild pain      albuterol (PROVENTIL HFA,VENTOLIN HFA) 90 mcg/act inhaler Inhale 2 puffs every 6 (six) hours as needed for wheezing 1 Inhaler 0    cyclobenzaprine (FLEXERIL) 5 mg tablet Take 1-2 tablets at bedtime as needed for muscle spasm 30 tablet 0    medroxyPROGESTERone (DEPO-PROVERA) 150 mg/mL injection Inject 150 mg into a muscle      traMADol (ULTRAM) 50 mg tablet TAKE 1 TABLET (50 MG TOTAL) BY MOUTH EVERY 8 (EIGHT) HOURS AS NEEDED FOR MODERATE PAIN  1     No current facility-administered medications for this visit            Health Maintenance     Health Maintenance   Topic Date Due    BMI: Followup Plan  09/04/2003  Influenza Vaccine  11/07/2020 (Originally 7/1/2019)    Depression Screening PHQ  11/21/2020    BMI: Adult  11/21/2020    Cervical Cancer Screening  06/14/2023    DTaP,Tdap,and Td Vaccines (3 - Td) 01/20/2025    Pneumococcal Vaccine: 65+ Years (1 of 2 - PCV13) 09/04/2050    HIV Screening  Completed    Pneumococcal Vaccine: Pediatrics (0 to 5 Years) and At-Risk Patients (6 to 59 Years)  Aged Out    HIB Vaccine  Aged Out    Hepatitis B Vaccine  Aged Out    IPV Vaccine  Aged Out    Hepatitis A Vaccine  Aged Out    Meningococcal ACWY Vaccine  Aged Out    HPV Vaccine  Aged [employer] History   Administered Date(s) Administered    INFLUENZA 10/28/2014    Tdap 11/11/2014, 01/20/2015       ROSHNI Nguyễn

## 2020-01-03 ENCOUNTER — TELEPHONE (OUTPATIENT)
Dept: FAMILY MEDICINE CLINIC | Facility: CLINIC | Age: 35
End: 2020-01-03

## 2020-01-03 DIAGNOSIS — M25.561 ACUTE PAIN OF RIGHT KNEE: Primary | ICD-10-CM

## 2020-01-03 RX ORDER — ACETAMINOPHEN AND CODEINE PHOSPHATE 300; 30 MG/1; MG/1
1 TABLET ORAL EVERY 8 HOURS PRN
Qty: 30 TABLET | Refills: 0 | Status: SHIPPED | OUTPATIENT
Start: 2020-01-03 | End: 2020-09-01 | Stop reason: ALTCHOICE

## 2020-01-03 NOTE — TELEPHONE ENCOUNTER
Pt has an appt with Ortho Dr Penelope Root on 01/06/20  Is experiencing more pain and asked if she could have a script until seen by ortho   Please advise

## 2020-01-06 ENCOUNTER — APPOINTMENT (OUTPATIENT)
Dept: RADIOLOGY | Facility: OTHER | Age: 35
End: 2020-01-06
Payer: COMMERCIAL

## 2020-01-06 ENCOUNTER — OFFICE VISIT (OUTPATIENT)
Dept: OBGYN CLINIC | Facility: OTHER | Age: 35
End: 2020-01-06
Payer: COMMERCIAL

## 2020-01-06 VITALS — WEIGHT: 159 LBS | BODY MASS INDEX: 26.49 KG/M2 | HEIGHT: 65 IN

## 2020-01-06 DIAGNOSIS — M76.31 ILIOTIBIAL BAND TENDINITIS OF RIGHT SIDE: Primary | ICD-10-CM

## 2020-01-06 DIAGNOSIS — M25.561 RIGHT KNEE PAIN, UNSPECIFIED CHRONICITY: ICD-10-CM

## 2020-01-06 PROCEDURE — 99213 OFFICE O/P EST LOW 20 MIN: CPT | Performed by: ORTHOPAEDIC SURGERY

## 2020-01-06 PROCEDURE — 73564 X-RAY EXAM KNEE 4 OR MORE: CPT

## 2020-01-06 NOTE — PATIENT INSTRUCTIONS
Plan :   I reassured the patient I do not think the small cyst seen on ultrasound is clinically significant and this was more of an x-ray finding  She is tender in fact on the outside of the knee over her tendons  whichcross her knee and insert on her shin  This is iliotibial band tendinitis,  a common overuse syndrome  I showed her a strong home exercise program to do which I want her doing morning and evening every day at least for a 4-6 weeks to stretch out these tendons     She can always put ice on the sore area for 15 minutes 4 times a day if needed and she can take Advil, Aleve, or Tylenol if needed for pain  Occasionally, I will add a cortisone injection over the tendon where it crosses the bone to help decrease her symptoms more quickly  I want to see her back again here in 7 weeks for clinical re-evaluation and further treatment options as needed    She will call and come in for the injection sooner, but only if she feels necessary

## 2020-01-06 NOTE — PROGRESS NOTES
Chief Complaint   Patient presents with    Right Knee - Pain, Swelling           Assessment:    Right iliotibial band tendinitis    Plan :   I reassured the patient I do not think the small cyst seen on ultrasound is clinically significant and this was more of an x-ray finding  She is tender in fact on the outside of the knee over her tendons  whichcross her knee and insert on her shin  This is iliotibial band tendinitis,  a common overuse syndrome  I showed her a strong home exercise program to do which I want her doing morning and evening every day at least for a 4-6 weeks to stretch out these tendons     She can always put ice on the sore area for 15 minutes 4 times a day if needed and she can take Advil, Aleve, or Tylenol if needed for pain  Occasionally, I will add a cortisone injection over the tendon where it crosses the bone to help decrease her symptoms more quickly  I want to see her back again here in 7 weeks for clinical re-evaluation and further treatment options as needed  She will call and come in for the injection sooner, but only if she feels necessary    HPI:   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 with chief complaint of right knee pain x4 + months  She was referred by her primary care physician  She denies any specific incident of injury, but notes that when she is standing for long periods of time, on multiple successive days, she has pain and swelling in the posterior aspect of her knee  When she has a few days away from work, which she does not have to be on her feet all day, her symptoms subsided  She denies any associated bruising, numbness, tingling, feelings of instability, or mechanical symptoms  She states that when her symptoms are at their worst, she has significant pain rising from a seated position, and going up and down stairs    She was evaluated on 11/12/2019 at [facility] ED for pain and swelling of the right knee, at that time he brandy Doppler was performed for full out DVT and she was diagnosed as having a Baker's cyst   No x-rays were performed at that time  She states that she was on prescription ibuprofen, and has recently been given a prescription for Tylenol #3  PMHx:         Past Medical History:   Diagnosis Date    Asthma     Chlamydia infection        History reviewed  No pertinent surgical history      Family History   Problem Relation Age of Onset    No Known Problems Mother        Social History     Socioeconomic History    Marital status: /Civil Union     Spouse name: Not on file    Number of children: Not on file    Years of education: Not on file    Highest education level: Not on file   Occupational History    Not on file   Social Needs    Financial resource strain: Not on file    Food insecurity:     Worry: Not on file     Inability: Not on file    Transportation needs:     Medical: Not on file     Non-medical: Not on file   Tobacco Use    Smoking status: Never Smoker    Smokeless tobacco: Never Used   Substance and Sexual Activity    Alcohol use: Yes     Comment: social ; Denied history of alcohol use    Drug use: No    Sexual activity: Not on file   Lifestyle    Physical activity:     Days per week: Not on file     Minutes per session: Not on file    Stress: Not on file   Relationships    Social connections:     Talks on phone: Not on file     Gets together: Not on file     Attends [employer] service: Not on file     Active member of club or organization: Not on file     Attends meetings of clubs or organizations: Not on file     Relationship status: Not on file    Intimate partner violence:     Fear of current or ex partner: Not on file     Emotionally abused: Not on file     Physically abused: Not on file     Forced sexual activity: Not on file   Other Topics Concern    Not on file   Social History Narrative    Not on file       Current Outpatient Medications   Medication Sig Dispense Refill    acetaminophen (TYLENOL) 500 mg tablet Take 500 mg by mouth every 6 (six) hours as needed for mild pain      acetaminophen-codeine (TYLENOL #3) 300-30 mg per tablet Take 1 tablet by mouth every 8 (eight) hours as needed for moderate pain 30 tablet 0    albuterol (PROVENTIL HFA,VENTOLIN HFA) 90 mcg/act inhaler Inhale 2 puffs every 6 (six) hours as needed for wheezing 1 Inhaler 0    cyclobenzaprine (FLEXERIL) 5 mg tablet Take 1-2 tablets at bedtime as needed for muscle spasm 30 tablet 0    medroxyPROGESTERone (DEPO-PROVERA) 150 mg/mL injection Inject 150 mg into a muscle      traMADol (ULTRAM) 50 mg tablet TAKE 1 TABLET (50 MG TOTAL) BY MOUTH EVERY 8 (EIGHT) HOURS AS NEEDED FOR MODERATE PAIN  1     No current facility-administered medications for this visit  Allergies: Patient has no known allergies  ROS:  Positive for mood disorder and musculoskeletal complaints as noted above  The remaining 10/12 systems on the intake sheet that I reviewed were negative  PE:  Ht 5' 4 5" (1 638 m)   Wt 72 1 kg (159 lb)   BMI 26 87 kg/m²   Constitutional: The patient was  oriented to person, place, and time  Well-developed and well-nourished  In no acute distress  HEENT: Vision intact  Hearing normal  Swallowing normal   Head: Normocephalic  Cardiovascular: Intact distal pulses  Pulse regular  Pulmonary/Chest: Effort normal  No respiratory distress  Neurological: Alert and oriented to person, place, and time  Skin: Skin is warm  Psychiatric: Normal mood and affect  Ortho Exam:    Patient presents with no obvious anatomical deformity  Skin is warm and dry to touch with no signs of erythema, ecchymosis, or infection  She had point tenderness over the right iliotibial band on the lateral aspect of the distal thigh where it crosses the knee and inserts on Gerdy's tubercle  She was not tender over the medial joint, posterior medial joint, popliteal fossa or medial patella    She did have some mild lateral patellar tenderness with a minimally positive patellar compression test  She demonstrates active ROM 0°-130°, which is equal to the contralateral left lower extremity  Knee is stable to varus, valgus, anterior, and posterior stress, comparing both knees  There is no popliteal adenopathy noted on exam  She had no calf tenderness  She showed good motion, capillary refill, sensation in the toes of the right foot    Studies reviewed:  I personally reviewed nonweightbearing right knee x-rays  These showed no fracture, dislocation, degenerative change, osteophytes, or soft tissue calcification  There are no loose bodies noted      Scribe Attestation    I,:   Delonte Martel am acting as a scribe while in the presence of the attending physician :        I,:   Segun Hull MD personally performed the services described in this documentation    as scribed in my presence :

## 2020-01-30 ENCOUNTER — APPOINTMENT (OUTPATIENT)
Dept: LAB | Facility: CLINIC | Age: 35
End: 2020-01-30
Payer: COMMERCIAL

## 2020-01-30 ENCOUNTER — OFFICE VISIT (OUTPATIENT)
Dept: FAMILY MEDICINE CLINIC | Facility: CLINIC | Age: 35
End: 2020-01-30
Payer: COMMERCIAL

## 2020-01-30 VITALS
WEIGHT: 160 LBS | BODY MASS INDEX: 26.66 KG/M2 | TEMPERATURE: 98.6 F | HEIGHT: 65 IN | SYSTOLIC BLOOD PRESSURE: 112 MMHG | HEART RATE: 68 BPM | DIASTOLIC BLOOD PRESSURE: 84 MMHG | RESPIRATION RATE: 16 BRPM

## 2020-01-30 DIAGNOSIS — R68.89 COLD INTOLERANCE: ICD-10-CM

## 2020-01-30 DIAGNOSIS — L65.9 ALOPECIA: ICD-10-CM

## 2020-01-30 DIAGNOSIS — F39 MOOD DISORDER (HCC): ICD-10-CM

## 2020-01-30 DIAGNOSIS — M65.332 TRIGGER MIDDLE FINGER OF LEFT HAND: ICD-10-CM

## 2020-01-30 DIAGNOSIS — Z00.00 ANNUAL PHYSICAL EXAM: ICD-10-CM

## 2020-01-30 DIAGNOSIS — Z00.00 ANNUAL PHYSICAL EXAM: Primary | ICD-10-CM

## 2020-01-30 LAB
ALBUMIN SERPL BCP-MCNC: 4.2 G/DL (ref 3.5–5)
ALP SERPL-CCNC: 75 U/L (ref 46–116)
ALT SERPL W P-5'-P-CCNC: 40 U/L (ref 12–78)
ANION GAP SERPL CALCULATED.3IONS-SCNC: 3 MMOL/L (ref 4–13)
AST SERPL W P-5'-P-CCNC: 23 U/L (ref 5–45)
BILIRUB SERPL-MCNC: 0.47 MG/DL (ref 0.2–1)
BUN SERPL-MCNC: 10 MG/DL (ref 5–25)
CALCIUM SERPL-MCNC: 9.4 MG/DL (ref 8.3–10.1)
CHLORIDE SERPL-SCNC: 108 MMOL/L (ref 100–108)
CHOLEST SERPL-MCNC: 202 MG/DL (ref 50–200)
CO2 SERPL-SCNC: 26 MMOL/L (ref 21–32)
CREAT SERPL-MCNC: 0.78 MG/DL (ref 0.6–1.3)
ERYTHROCYTE [DISTWIDTH] IN BLOOD BY AUTOMATED COUNT: 12 % (ref 11.6–15.1)
GFR SERPL CREATININE-BSD FRML MDRD: 99 ML/MIN/1.73SQ M
GLUCOSE P FAST SERPL-MCNC: 81 MG/DL (ref 65–99)
HCT VFR BLD AUTO: 43.1 % (ref 34.8–46.1)
HDLC SERPL-MCNC: 49 MG/DL
HGB BLD-MCNC: 13.9 G/DL (ref 11.5–15.4)
LDLC SERPL CALC-MCNC: 121 MG/DL (ref 0–100)
MCH RBC QN AUTO: 29.2 PG (ref 26.8–34.3)
MCHC RBC AUTO-ENTMCNC: 32.3 G/DL (ref 31.4–37.4)
MCV RBC AUTO: 91 FL (ref 82–98)
NONHDLC SERPL-MCNC: 153 MG/DL
PLATELET # BLD AUTO: 239 THOUSANDS/UL (ref 149–390)
PMV BLD AUTO: 10.3 FL (ref 8.9–12.7)
POTASSIUM SERPL-SCNC: 4.1 MMOL/L (ref 3.5–5.3)
PROT SERPL-MCNC: 8.1 G/DL (ref 6.4–8.2)
RBC # BLD AUTO: 4.76 MILLION/UL (ref 3.81–5.12)
SL AMB  POCT GLUCOSE, UA: NORMAL
SL AMB LEUKOCYTE ESTERASE,UA: NORMAL
SL AMB POCT BILIRUBIN,UA: NORMAL
SL AMB POCT BLOOD,UA: NORMAL
SL AMB POCT CLARITY,UA: CLEAR
SL AMB POCT COLOR,UA: YELLOW
SL AMB POCT KETONES,UA: NORMAL
SL AMB POCT NITRITE,UA: NORMAL
SL AMB POCT PH,UA: 6.5
SL AMB POCT SPECIFIC GRAVITY,UA: 1.01
SL AMB POCT URINE PROTEIN: NORMAL
SL AMB POCT UROBILINOGEN: 0.2
SODIUM SERPL-SCNC: 137 MMOL/L (ref 136–145)
T3FREE SERPL-MCNC: 2.32 PG/ML (ref 2.3–4.2)
T4 FREE SERPL-MCNC: 0.97 NG/DL (ref 0.76–1.46)
TRIGL SERPL-MCNC: 162 MG/DL
TSH SERPL DL<=0.05 MIU/L-ACNC: 1.17 UIU/ML (ref 0.36–3.74)
WBC # BLD AUTO: 8.4 THOUSAND/UL (ref 4.31–10.16)

## 2020-01-30 PROCEDURE — 99395 PREV VISIT EST AGE 18-39: CPT | Performed by: FAMILY MEDICINE

## 2020-01-30 PROCEDURE — 84481 FREE ASSAY (FT-3): CPT

## 2020-01-30 PROCEDURE — 84439 ASSAY OF FREE THYROXINE: CPT

## 2020-01-30 PROCEDURE — 84443 ASSAY THYROID STIM HORMONE: CPT

## 2020-01-30 PROCEDURE — 80053 COMPREHEN METABOLIC PANEL: CPT

## 2020-01-30 PROCEDURE — 81002 URINALYSIS NONAUTO W/O SCOPE: CPT | Performed by: FAMILY MEDICINE

## 2020-01-30 PROCEDURE — 85027 COMPLETE CBC AUTOMATED: CPT

## 2020-01-30 PROCEDURE — 3725F SCREEN DEPRESSION PERFORMED: CPT | Performed by: FAMILY MEDICINE

## 2020-01-30 PROCEDURE — 80061 LIPID PANEL: CPT

## 2020-01-30 PROCEDURE — 36415 COLL VENOUS BLD VENIPUNCTURE: CPT

## 2020-01-30 NOTE — PROGRESS NOTES
FAMILY PRACTICE OFFICE VISIT       NAME: Sachin Ga  AGE: 29 y o  SEX: female       : 1985        MRN: [id number]    DATE: 2020  TIME: 7:08 AM    Assessment and Plan     Problem List Items Addressed This Visit        Musculoskeletal and Integument    Trigger middle finger of left hand     Trigger finger  Patient was referred to [facility] hand orthopedist for evaluation treatment of her trigger finger         Relevant Orders    Ambulatory referral to Orthopedic Surgery       Other    Mood disorder [facility])     Mood Disorder  Patient will obtain blood work as ordered for further evaluation  If blood work is within normal limits we will consider initiation medication for her symptoms of mood swings  Patient may require consultation with Psychiatry         Annual physical exam - Primary     Well adult  Overall the patient appears to be in stable health  She will obtain blood work as ordered for further evaluation  She is up-to-date with her gynecology exam is         Relevant Orders    POCT urine dip (Completed)    CBC    Comprehensive metabolic panel    Lipid panel (Completed)    TSH, 3rd generation      Other Visit Diagnoses     Cold intolerance        Relevant Orders    T3, free    T4, free              Chief Complaint     Chief Complaint   Patient presents with    Annual Exam     Physical, middle finger on left hand gets stuck down, hot and cold all the time, mood swings       History of Present Illness     Patient here for annual physical exam   She describes for the past 1-2 months having significant mood swings and 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   At times she will spend half the day in bed  She has no interest in sexual activity  She lives with her children and the father of her children  She does have a family history of bipolar disorder  At times she does feel a sense of anxiety    She does describe some weight gain over the past year but has no regular form of exercise  Patient states she has been receiving Depo-Provera injections every 3 months for birth control for the past year    She also reports unrelated 2 week history of left middle finger staying in a fixed flexion position after sleeping or taking a nap  She is able to click open her finger to fully extend  She does have a mild-to-moderate discomfort  Review of Systems   Review of Systems   Constitutional: Positive for chills, diaphoresis, fatigue and unexpected weight change  HENT: Negative  Eyes: Negative  Respiratory: Negative  Cardiovascular: Negative  Gastrointestinal: Negative  Genitourinary: Negative  Musculoskeletal:        As per hpi   Skin: Negative  Neurological: Negative  Psychiatric/Behavioral: Positive for agitation, decreased concentration, dysphoric mood and sleep disturbance  The patient is nervous/anxious  As per HPI       Active Problem List     Patient Active Problem List   Diagnosis    Cellulitis of right lower extremity    Mood disorder (HCC)    Iliotibial band tendinitis of right side    Acute midline low back pain without sciatica    Chronic bilateral low back pain without sciatica    Alopecia    Annual physical exam    Trigger middle finger of left hand       Past Medical History:  Past Medical History:   Diagnosis Date    Asthma     Chlamydia infection        Past Surgical History:  No past surgical history on file      Family History:  Family History   Problem Relation Age of Onset    No Known Problems Mother        Social History:  Social History     Socioeconomic History    Marital status: /Civil Union     Spouse name: Not on file    Number of children: Not on file    Years of education: Not on file    Highest education level: Not on file   Occupational History    Not on file   Social Needs    Financial resource strain: Not on file    Food insecurity:     Worry: Not on file     Inability: Not on file   Jovanny Brar Transportation needs:     Medical: Not on file     Non-medical: Not on file   Tobacco Use    Smoking status: Never Smoker    Smokeless tobacco: Never Used   Substance and Sexual Activity    Alcohol use: Yes     Comment: social ; Denied history of alcohol use    Drug use: No    Sexual activity: Not on file   Lifestyle    Physical activity:     Days per week: Not on file     Minutes per session: Not on file    Stress: Not on file   Relationships    Social connections:     Talks on phone: Not on file     Gets together: Not on file     Attends Synagogue service: Not on file     Active member of club or organization: Not on file     Attends meetings of clubs or organizations: Not on file     Relationship status: Not on file    Intimate partner violence:     Fear of current or ex partner: Not on file     Emotionally abused: Not on file     Physically abused: Not on file     Forced sexual activity: Not on file   Other Topics Concern    Not on file   Social History Narrative    Not on file       Objective     Vitals:    01/30/20 1148   BP: 112/84   Pulse: 68   Resp: 16   Temp: 98 6 °F (37 °C)     Wt Readings from Last 3 Encounters:   01/30/20 72 6 kg (160 lb)   01/06/20 72 1 kg (159 lb)   11/21/19 71 2 kg (157 lb)       Physical Exam   Constitutional: She is oriented to person, place, and time  No distress  HENT:   Right Ear: External ear normal    Left Ear: External ear normal    Mouth/Throat: Oropharynx is clear and moist  No oropharyngeal exudate  Tympanic membranes within normal limits bilaterally   Cardiovascular: Normal rate, regular rhythm and normal heart sounds  No murmur heard  Pulmonary/Chest: Effort normal and breath sounds normal  No respiratory distress  She has no wheezes  She has no rales  Abdominal:   Abdomen is soft, nontender with positive bowel sounds  There is no rebound or guarding  No masses palpated   Musculoskeletal: Normal range of motion   She exhibits no edema, tenderness or deformity  Patient has clicking of left middle finger with flexion and extension  No tenderness to palpation along palm of her hand   Lymphadenopathy:     She has no cervical adenopathy  Neurological: She is alert and oriented to person, place, and time  No cranial nerve deficit  Psychiatric: She has a normal mood and affect  Her behavior is normal  Judgment and thought content normal      BMI Counseling: Body mass index is 27 04 kg/m²  The BMI is above normal  Nutrition recommendations include decreasing portion sizes, consuming healthier snacks and moderation in carbohydrate intake  Exercise recommendations include exercising 3-5 times per week  No pharmacotherapy was ordered           Pertinent Laboratory/Diagnostic Studies:  Lab Results   Component Value Date    GLUCOSE 88 08/28/2015    BUN 10 01/30/2020    CREATININE 0 78 01/30/2020    CALCIUM 9 4 01/30/2020     08/28/2015    K 4 1 01/30/2020    CO2 26 01/30/2020     01/30/2020     Lab Results   Component Value Date    ALT 40 01/30/2020    AST 23 01/30/2020    ALKPHOS 75 01/30/2020    BILITOT 0 43 08/28/2015       Lab Results   Component Value Date    WBC 8 40 01/30/2020    HGB 13 9 01/30/2020    HCT 43 1 01/30/2020    MCV 91 01/30/2020     01/30/2020       No results found for: TSH    No results found for: CHOL  Lab Results   Component Value Date    TRIG 162 (H) 01/30/2020     Lab Results   Component Value Date    HDL 49 01/30/2020     Lab Results   Component Value Date    LDLCALC 121 (H) 01/30/2020     No results found for: HGBA1C    Results for orders placed or performed in visit on 01/30/20   POCT urine dip   Result Value Ref Range    LEUKOCYTE ESTERASE,UA -     NITRITE,UA -     SL AMB POCT UROBILINOGEN 0 2     POCT URINE PROTEIN -      PH,UA 6 5     BLOOD,UA -     SPECIFIC GRAVITY,UA 1 010     KETONES,UA -     BILIRUBIN,UA -     GLUCOSE, UA -      COLOR,UA yellow     CLARITY,UA clear        Orders Placed This Encounter   Procedures  CBC    Comprehensive metabolic panel    Lipid panel    TSH, 3rd generation    T3, free    T4, free    Ambulatory referral to Orthopedic Surgery    POCT urine dip       ALLERGIES:  No Known Allergies    Current Medications     Current Outpatient Medications   Medication Sig Dispense Refill    acetaminophen (TYLENOL) 500 mg tablet Take 500 mg by mouth every 6 (six) hours as needed for mild pain      acetaminophen-codeine (TYLENOL #3) 300-30 mg per tablet Take 1 tablet by mouth every 8 (eight) hours as needed for moderate pain 30 tablet 0    albuterol (PROVENTIL HFA,VENTOLIN HFA) 90 mcg/act inhaler Inhale 2 puffs every 6 (six) hours as needed for wheezing 1 Inhaler 0     No current facility-administered medications for this visit            Health Maintenance     Health Maintenance   Topic Date Due    Pneumococcal Vaccine: Pediatrics (0 to 5 Years) and At-Risk Patients (6 to 59 Years) (1 of 1 - PPSV23) 09/04/1991    BMI: Followup Plan  09/04/2003    Annual Physical  09/04/2003    Influenza Vaccine  11/07/2020 (Originally 7/1/2019)    Depression Screening PHQ  01/30/2021    BMI: Adult  01/30/2021    Cervical Cancer Screening  06/14/2023    DTaP,Tdap,and Td Vaccines (3 - Td) 01/20/2025    Pneumococcal Vaccine: 65+ Years (1 of 2 - PCV13) 09/04/2050    HIV Screening  Completed    HIB Vaccine  Aged Out    Hepatitis B Vaccine  Aged Out    IPV Vaccine  Aged Out    Hepatitis A Vaccine  Aged Out    Meningococcal ACWY Vaccine  Aged Out    HPV Vaccine  Aged [employer] History   Administered Date(s) Administered    INFLUENZA 10/28/2014    Tdap 11/11/2014, 19/56/9210       Adela Sam MD

## 2020-01-31 PROBLEM — J06.9 UPPER RESPIRATORY TRACT INFECTION: Status: RESOLVED | Noted: 2019-10-22 | Resolved: 2020-01-31

## 2020-01-31 PROBLEM — L03.90 CELLULITIS: Status: RESOLVED | Noted: 2018-04-10 | Resolved: 2020-01-31

## 2020-01-31 NOTE — ASSESSMENT & PLAN NOTE
Mood Disorder  Patient will obtain blood work as ordered for further evaluation  If blood work is within normal limits we will consider initiation medication for her symptoms of mood swings    Patient may require consultation with Psychiatry

## 2020-01-31 NOTE — ASSESSMENT & PLAN NOTE
Well adult  Overall the patient appears to be in stable health  She will obtain blood work as ordered for further evaluation    She is up-to-date with her gynecology exam is

## 2020-01-31 NOTE — ASSESSMENT & PLAN NOTE
Trigger finger    Patient was referred to [facility] hand orthopedist for evaluation treatment of her trigger finger

## 2020-02-04 ENCOUNTER — TELEPHONE (OUTPATIENT)
Dept: FAMILY MEDICINE CLINIC | Facility: CLINIC | Age: 35
End: 2020-02-04

## 2020-02-04 DIAGNOSIS — F41.9 ANXIETY: Primary | ICD-10-CM

## 2020-02-04 RX ORDER — ESCITALOPRAM OXALATE 10 MG/1
10 TABLET ORAL DAILY
Qty: 30 TABLET | Refills: 5 | Status: SHIPPED | OUTPATIENT
Start: 2020-02-04 | End: 2020-09-01 | Stop reason: SDUPTHER

## 2020-02-04 NOTE — TELEPHONE ENCOUNTER
----- Message from Cruz Hutchinson MD sent at 7/1/9394  8:03 AM EST -----  All recent blood work was stable for patient    If she is agreeable I will send medication to pharmacy to see if this helps her mood swings and fatigue

## 2020-02-17 ENCOUNTER — HOSPITAL ENCOUNTER (EMERGENCY)
Facility: HOSPITAL | Age: 35
Discharge: HOME | End: 2020-02-17
Attending: EMERGENCY MEDICINE | Admitting: EMERGENCY MEDICINE
Payer: COMMERCIAL

## 2020-02-17 VITALS
TEMPERATURE: 97.8 F | BODY MASS INDEX: 27.04 KG/M2 | WEIGHT: 160 LBS | DIASTOLIC BLOOD PRESSURE: 69 MMHG | OXYGEN SATURATION: 100 % | HEART RATE: 78 BPM | SYSTOLIC BLOOD PRESSURE: 118 MMHG | RESPIRATION RATE: 16 BRPM

## 2020-02-17 DIAGNOSIS — L03.011 PARONYCHIA OF FINGER OF RIGHT HAND: Primary | ICD-10-CM

## 2020-02-17 PROCEDURE — 99282 EMERGENCY DEPT VISIT SF MDM: CPT | Performed by: PHYSICIAN ASSISTANT

## 2020-02-17 PROCEDURE — 99283 EMERGENCY DEPT VISIT LOW MDM: CPT

## 2020-02-17 PROCEDURE — 10060 I&D ABSCESS SIMPLE/SINGLE: CPT | Performed by: PHYSICIAN ASSISTANT

## 2020-02-17 RX ORDER — LIDOCAINE HYDROCHLORIDE 10 MG/ML
5 INJECTION, SOLUTION EPIDURAL; INFILTRATION; INTRACAUDAL; PERINEURAL ONCE
Status: COMPLETED | OUTPATIENT
Start: 2020-02-17 | End: 2020-02-17

## 2020-02-17 RX ADMIN — LIDOCAINE HYDROCHLORIDE 5 ML: 10 INJECTION, SOLUTION EPIDURAL; INFILTRATION; INTRACAUDAL; PERINEURAL at 12:35

## 2020-02-17 NOTE — ED PROVIDER NOTES
History  Chief Complaint   Patient presents with    Finger Swelling     pt has swollen and purulent R ring finger  pt reports she gets her nails done regularly and works with hair everyday and it is painful for er to move her finger  Patient is a 60-year-old female presenting to the emergency department for evaluation of right 4th digit finger pain and swelling x1 week  Patient get her nails done regularly at a salon, does not bite her fingernails  Patient states symptoms gradually worsening finger swollen and area of pus noted by nail  Patient states she has not been taking anything pain  Patient denies fever, recent injury/trauma  Prior to Admission Medications   Prescriptions Last Dose Informant Patient Reported? Taking?   acetaminophen (TYLENOL) 500 mg tablet  Self Yes No   Sig: Take 500 mg by mouth every 6 (six) hours as needed for mild pain   acetaminophen-codeine (TYLENOL #3) 300-30 mg per tablet   No No   Sig: Take 1 tablet by mouth every 8 (eight) hours as needed for moderate pain   albuterol (PROVENTIL HFA,VENTOLIN HFA) 90 mcg/act inhaler   No Yes   Sig: Inhale 2 puffs every 6 (six) hours as needed for wheezing   escitalopram (LEXAPRO) 10 mg tablet   No Yes   Sig: Take 1 tablet (10 mg total) by mouth daily      Facility-Administered Medications: None       Past Medical History:   Diagnosis Date    Asthma     Chlamydia infection        History reviewed  No pertinent surgical history  Family History   Problem Relation Age of Onset    No Known Problems Mother      I have reviewed and agree with the history as documented  Social History     Tobacco Use    Smoking status: Never Smoker    Smokeless tobacco: Never Used   Substance Use Topics    Alcohol use: Yes     Comment: social ; Denied history of alcohol use    Drug use: No       Review of Systems   Musculoskeletal:        Finger swelling and pain  All other systems reviewed and are negative        Physical Exam  Physical Exam Constitutional: She is oriented to person, place, and time  She appears well-developed and well-nourished  Non-toxic appearance  She does not have a sickly appearance  She does not appear ill  HENT:   Head: Normocephalic and atraumatic  Nose: Nose normal    Mouth/Throat: Mucous membranes are normal    Neck: Normal range of motion  Cardiovascular: Normal rate and regular rhythm  Pulmonary/Chest: Effort normal and breath sounds normal    Musculoskeletal: Normal range of motion  Hands:  Neurovascularly intact distally  5/5 strength bilateral upper extremities  Radial pulse 2 +  Cap refill <2 seconds  Sensation intact  Neurological: She is alert and oriented to person, place, and time  Skin: Skin is warm and dry  Capillary refill takes less than 2 seconds  Psychiatric: She has a normal mood and affect   Her behavior is normal        Vital Signs  ED Triage Vitals [02/17/20 1218]   Temperature Pulse Respirations Blood Pressure SpO2   97 8 °F (36 6 °C) 78 16 118/69 100 %      Temp Source Heart Rate Source Patient Position - Orthostatic VS BP Location FiO2 (%)   Oral Monitor Sitting Left arm --      Pain Score       5           Vitals:    02/17/20 1218   BP: 118/69   Pulse: 78   Patient Position - Orthostatic VS: Sitting         Visual Acuity      ED Medications  Medications   lidocaine (PF) (XYLOCAINE-MPF) 1 % injection 5 mL (5 mL Infiltration Given 2/17/20 1235)       Diagnostic Studies  Results Reviewed     None                 No orders to display              Procedures  Incision and drain  Date/Time: 2/17/2020 1:29 PM  Performed by: Greg Faulkner PA-C  Authorized by: Greg Faulkner PA-C     Patient location:  ED  Other Assisting Provider: No    Consent:     Consent obtained:  Verbal    Consent given by:  Patient    Risks discussed:  Bleeding, damage to other organs, infection, incomplete drainage and pain  Universal protocol:     Patient identity confirmed:  Verbally with patient  Location:     Indications for incision and drainage: paronychia  Location:  Upper extremity    Upper extremity location:  R ring finger  Pre-procedure details:     Skin preparation:  Antiseptic wash  Procedure details:     Incision types:  Single straight    Scalpel blade:  11    Approach:  Open    Incision depth:  Subungual    Wound management:  Irrigated with saline    Drainage:  Purulent    Drainage amount: Moderate    Wound treatment:  Wound left open  Post-procedure details:     Patient tolerance of procedure: Tolerated well, no immediate complications             ED Course                               MDM  Number of Diagnoses or Management Options  Paronychia of finger of right hand: new and does not require workup  Diagnosis management comments: Patient is a 35-year-old female presenting to the emergency department for evaluation of right 4th digit finger pain and swelling x1 week  Patient get her nails done regularly at a salon, does not bite her fingernails  Patient states symptoms gradually worsening finger swollen and area of pus noted by nail  Patient states she has not been taking anything pain  Patient denies fever, recent injury/trauma  Paronychia noted to right 4th digit  I&D performed with moderate amount of pus expressed from area  Advised patient to take off acrylic nail, keep area clean and promote drainage with warm soaks  Instructed patient follow-up with her primary care physician for re-evaluation  Pt verbalizes understanding and agrees with plan  The management plan was discussed in detail with the patient at bedside and all questions were answered  Prior to discharge, I provided both verbal and written instructions  I discussed with the patient the signs and symptoms for which to return to the emergency department  All questions were answered and patient was comfortable with the plan of care and discharged to home   The patient verbalized understanding of our discussion and plan of care, and agrees to return to the Emergency Department for concerns and progression of illness  Disposition  Final diagnoses:   Paronychia of finger of right hand     Time reflects when diagnosis was documented in both MDM as applicable and the Disposition within this note     Time User Action Codes Description Comment    2/17/2020  1:29 PM Roldan Cortez [[id number]] Paronychia of finger of right hand       ED Disposition     ED Disposition Condition Date/Time Comment    Discharge Stable Mon Feb 17, 2020  1:29 PM [facility] discharge to home/self care  Follow-up Information     Follow up With Specialties Details Why 99 Benji Alicea MD [facility]   [address] [place] [address] Avenue  [phone]            Discharge Medication List as of 2/17/2020  1:29 PM      CONTINUE these medications which have NOT CHANGED    Details   albuterol (PROVENTIL HFA,VENTOLIN HFA) 90 mcg/act inhaler Inhale 2 puffs every 6 (six) hours as needed for wheezing, Starting Tue 10/15/2019, Print      escitalopram (LEXAPRO) 10 mg tablet Take 1 tablet (10 mg total) by mouth daily, Starting Tue 2/4/2020, Normal      acetaminophen (TYLENOL) 500 mg tablet Take 500 mg by mouth every 6 (six) hours as needed for mild pain, Historical Med      acetaminophen-codeine (TYLENOL #3) 300-30 mg per tablet Take 1 tablet by mouth every 8 (eight) hours as needed for moderate pain, Starting Fri 1/3/2020, Normal           No discharge procedures on file      PDMP Review     None          ED Provider  Electronically Signed by           Serafin Rosenberg PA-C  02/17/20 8655

## 2020-06-24 ENCOUNTER — OFFICE VISIT (OUTPATIENT)
Dept: FAMILY MEDICINE CLINIC | Facility: CLINIC | Age: 35
End: 2020-06-24
Payer: COMMERCIAL

## 2020-06-24 VITALS
HEIGHT: 65 IN | BODY MASS INDEX: 28.22 KG/M2 | TEMPERATURE: 97.5 F | RESPIRATION RATE: 15 BRPM | OXYGEN SATURATION: 96 % | DIASTOLIC BLOOD PRESSURE: 76 MMHG | WEIGHT: 169.4 LBS | HEART RATE: 83 BPM | SYSTOLIC BLOOD PRESSURE: 114 MMHG

## 2020-06-24 DIAGNOSIS — R20.2 PARESTHESIA: ICD-10-CM

## 2020-06-24 DIAGNOSIS — G89.29 CHRONIC MIDLINE LOW BACK PAIN WITHOUT SCIATICA: Primary | ICD-10-CM

## 2020-06-24 DIAGNOSIS — M54.50 CHRONIC MIDLINE LOW BACK PAIN WITHOUT SCIATICA: Primary | ICD-10-CM

## 2020-06-24 PROCEDURE — 99214 OFFICE O/P EST MOD 30 MIN: CPT | Performed by: FAMILY MEDICINE

## 2020-06-24 PROCEDURE — 1036F TOBACCO NON-USER: CPT | Performed by: FAMILY MEDICINE

## 2020-06-24 PROCEDURE — 3008F BODY MASS INDEX DOCD: CPT | Performed by: FAMILY MEDICINE

## 2020-06-24 RX ORDER — GABAPENTIN 100 MG/1
CAPSULE ORAL
Qty: 60 CAPSULE | Refills: 3 | Status: SHIPPED | OUTPATIENT
Start: 2020-06-24 | End: 2020-09-01 | Stop reason: ALTCHOICE

## 2020-06-24 RX ORDER — PREDNISONE 20 MG/1
TABLET ORAL
Qty: 11 TABLET | Refills: 0 | Status: SHIPPED | OUTPATIENT
Start: 2020-06-24 | End: 2020-09-01 | Stop reason: ALTCHOICE

## 2020-07-01 ENCOUNTER — TELEPHONE (OUTPATIENT)
Dept: FAMILY MEDICINE CLINIC | Facility: CLINIC | Age: 35
End: 2020-07-01

## 2020-07-01 NOTE — TELEPHONE ENCOUNTER
Patient called asking for a medication that she can take during the day for her pain in her lower back that won't make her sleepy during the day  Please advise patient at [phone], and she uses [facility] [address]  [facility]  In [place]

## 2020-07-06 DIAGNOSIS — G89.29 CHRONIC MIDLINE LOW BACK PAIN WITHOUT SCIATICA: Primary | ICD-10-CM

## 2020-07-06 DIAGNOSIS — M54.50 CHRONIC MIDLINE LOW BACK PAIN WITHOUT SCIATICA: Primary | ICD-10-CM

## 2020-07-06 RX ORDER — IBUPROFEN 800 MG/1
800 TABLET ORAL EVERY 8 HOURS PRN
Qty: 60 TABLET | Refills: 1 | Status: SHIPPED | OUTPATIENT
Start: 2020-07-06 | End: 2020-10-08

## 2020-07-06 NOTE — TELEPHONE ENCOUNTER
Only thing that would not cause drowsiness would be 800mg Ibuprofen up 3X/day as needed   I will send to pharmacy

## 2020-07-14 ENCOUNTER — HOSPITAL ENCOUNTER (OUTPATIENT)
Dept: NEUROLOGY | Facility: CLINIC | Age: 35
Discharge: HOME | End: 2020-07-14
Payer: COMMERCIAL

## 2020-07-14 DIAGNOSIS — R20.2 PARESTHESIA: ICD-10-CM

## 2020-07-14 PROCEDURE — 95886 MUSC TEST DONE W/N TEST COMP: CPT | Performed by: PSYCHIATRY & NEUROLOGY

## 2020-07-14 PROCEDURE — 95911 NRV CNDJ TEST 9-10 STUDIES: CPT | Performed by: PSYCHIATRY & NEUROLOGY

## 2020-07-15 ENCOUNTER — TELEPHONE (OUTPATIENT)
Dept: FAMILY MEDICINE CLINIC | Facility: CLINIC | Age: 35
End: 2020-07-15

## 2020-07-15 DIAGNOSIS — G56.03 BILATERAL CARPAL TUNNEL SYNDROME: Primary | ICD-10-CM

## 2020-07-15 NOTE — TELEPHONE ENCOUNTER
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

## 2020-08-31 RX ORDER — MEDROXYPROGESTERONE ACETATE 150 MG/ML
150 INJECTION, SUSPENSION INTRAMUSCULAR
COMMUNITY
Start: 2020-07-17

## 2020-09-01 ENCOUNTER — OFFICE VISIT (OUTPATIENT)
Dept: FAMILY MEDICINE CLINIC | Facility: CLINIC | Age: 35
End: 2020-09-01
Payer: COMMERCIAL

## 2020-09-01 VITALS
WEIGHT: 163.4 LBS | TEMPERATURE: 98 F | DIASTOLIC BLOOD PRESSURE: 70 MMHG | HEART RATE: 67 BPM | SYSTOLIC BLOOD PRESSURE: 100 MMHG | HEIGHT: 65 IN | OXYGEN SATURATION: 94 % | RESPIRATION RATE: 15 BRPM | BODY MASS INDEX: 27.22 KG/M2

## 2020-09-01 DIAGNOSIS — F41.9 ANXIETY: ICD-10-CM

## 2020-09-01 DIAGNOSIS — M76.31 ILIOTIBIAL BAND TENDINITIS OF RIGHT SIDE: Primary | ICD-10-CM

## 2020-09-01 PROCEDURE — 1036F TOBACCO NON-USER: CPT | Performed by: FAMILY MEDICINE

## 2020-09-01 PROCEDURE — 99214 OFFICE O/P EST MOD 30 MIN: CPT | Performed by: FAMILY MEDICINE

## 2020-09-01 RX ORDER — ESCITALOPRAM OXALATE 20 MG/1
10 TABLET ORAL DAILY
Qty: 90 TABLET | Refills: 1 | Status: SHIPPED | OUTPATIENT
Start: 2020-09-01

## 2020-09-01 RX ORDER — TRAMADOL HYDROCHLORIDE 50 MG/1
50 TABLET ORAL EVERY 8 HOURS PRN
Qty: 90 TABLET | Refills: 0 | Status: SHIPPED | OUTPATIENT
Start: 2020-09-01 | End: 2020-10-08 | Stop reason: SDUPTHER

## 2020-09-01 NOTE — ASSESSMENT & PLAN NOTE
Right leg pain  Patient given prescription for tramadol to try 50 mg q 8 hours as needed  She will also continue with ibuprofen 800 mg as needed for for pain  She will continue with ice to the affected area which appears to her provide more pain relief then he at this time    Patient will call if symptoms persist without change over the 1st 2 weeks for by may consider course of prednisone

## 2020-09-01 NOTE — ASSESSMENT & PLAN NOTE
Anxiety  Patient was instructed to increase her Lexapro to 20 mg once daily    Patient will call if symptoms do not improve over the 1st 2-3 weeks

## 2020-09-01 NOTE — PROGRESS NOTES
FAMILY PRACTICE OFFICE VISIT       NAME: Nicolas Lopez  AGE: 29 y o  SEX: female       : 1985        MRN: [id number]    DATE: 2020  TIME: 12:47 PM    Assessment and Plan     Problem List Items Addressed This Visit        Musculoskeletal and Integument    Iliotibial band tendinitis of right side - Primary     Right leg pain  Patient given prescription for tramadol to try 50 mg q 8 hours as needed  She will also continue with ibuprofen 800 mg as needed for for pain  She will continue with ice to the affected area which appears to her provide more pain relief then he at this time  Patient will call if symptoms persist without change over the 1st 2 weeks for by may consider course of prednisone         Relevant Medications    traMADol (ULTRAM) 50 mg tablet       Other    Anxiety     Anxiety  Patient was instructed to increase her Lexapro to 20 mg once daily  Patient will call if symptoms do not improve over the 1st 2-3 weeks         Relevant Medications    escitalopram (LEXAPRO) 20 mg tablet              Chief Complaint     Chief Complaint   Patient presents with    Leg Problem     back of right knee pain and swelling radiating up into buttocks    Back Pain     low back pain x 4 weeks       History of Present Illness     Patient in the office with recurrence of right-sided buttock discomfort extending to her right popliteal area  She has had similar symptoms in the past for which she received physical therapy  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 which she feels is exacerbating her symptoms  In the past she has tried muscle relaxers and NSAIDs without relief in symptoms  She does use ibuprofen 800 mg on occasion which helps her previously diagnosed carpal tunnel syndrome upper hands  Patient also reports unrelated increased irritability and mood swings especially with her children starting on-line instructions at home for school    She feels she has more stress related to added responsibilities to her home life  She also describes his sleep disorder where she will sleep for 2-3 hours and awaken only to have difficulties falling back to sleep  Review of Systems   Review of Systems   Constitutional: Positive for fatigue  Negative for fever  Respiratory: Negative  Cardiovascular: Negative  Gastrointestinal: Negative  Musculoskeletal: Positive for back pain  As per HPI   Psychiatric/Behavioral: Positive for agitation, dysphoric mood and sleep disturbance  The patient is nervous/anxious  Active Problem List     Patient Active Problem List   Diagnosis    Cellulitis of right lower extremity    Mood disorder (HCC)    Iliotibial band tendinitis of right side    Acute midline low back pain without sciatica    Chronic midline low back pain without sciatica    Alopecia    Annual physical exam    Trigger middle finger of left hand    Paresthesia    Carpal tunnel syndrome, bilateral    Anxiety       Past Medical History:  Past Medical History:   Diagnosis Date    Asthma     Chlamydia infection        Past Surgical History:  No past surgical history on file      Family History:  Family History   Problem Relation Age of Onset    No Known Problems Mother        Social History:  Social History     Socioeconomic History    Marital status: /Civil Union     Spouse name: Not on file    Number of children: Not on file    Years of education: Not on file    Highest education level: Not on file   Occupational History    Not on file   Social Needs    Financial resource strain: Not on file    Food insecurity     Worry: Not on file     Inability: Not on file   [employer] needs     Medical: Not on file     Non-medical: Not on file   Tobacco Use    Smoking status: Never Smoker    Smokeless tobacco: Never Used   Substance and Sexual Activity    Alcohol use: Yes     Comment: social ; Denied history of alcohol use    Drug use: No    Sexual activity: Not on file   Lifestyle    Physical activity     Days per week: Not on file     Minutes per session: Not on file    Stress: Not on file   Relationships    Social connections     Talks on phone: Not on file     Gets together: Not on file     Attends Orthodox service: Not on file     Active member of club or organization: Not on file     Attends meetings of clubs or organizations: Not on file     Relationship status: Not on file    Intimate partner violence     Fear of current or ex partner: Not on file     Emotionally abused: Not on file     Physically abused: Not on file     Forced sexual activity: Not on file   Other Topics Concern    Not on file   Social History Narrative    Not on file       Objective     Vitals:    09/01/20 0904   BP: 100/70   Pulse: 67   Resp: 15   Temp: 98 °F (36 7 °C)   SpO2: 94%     Wt Readings from Last 3 Encounters:   09/01/20 74 1 kg (163 lb 6 4 oz)   06/24/20 76 8 kg (169 lb 6 4 oz)   02/17/20 72 6 kg (160 lb)       Physical Exam  Constitutional:       General: She is not in acute distress  Appearance: Normal appearance  She is not ill-appearing  Musculoskeletal:      Right lower leg: No edema  Left lower leg: No edema  Comments: Muscle strength 5/5 lower extremities  Negative straight leg raising  Tenderness to palpation along right lateral iliotibial band  Normal range of motion of hips   Neurological:      General: No focal deficit present  Mental Status: She is alert and oriented to person, place, and time  Psychiatric:         Mood and Affect: Mood normal          Behavior: Behavior normal          Thought Content:  Thought content normal          Judgment: Judgment normal          Pertinent Laboratory/Diagnostic Studies:  Lab Results   Component Value Date    GLUCOSE 88 08/28/2015    BUN 10 01/30/2020    CREATININE 0 78 01/30/2020    CALCIUM 9 4 01/30/2020     08/28/2015    K 4 1 01/30/2020    CO2 26 01/30/2020     01/30/2020     Lab Results Component Value Date    ALT 40 01/30/2020    AST 23 01/30/2020    ALKPHOS 75 01/30/2020    BILITOT 0 43 08/28/2015       Lab Results   Component Value Date    WBC 8 40 01/30/2020    HGB 13 9 01/30/2020    HCT 43 1 01/30/2020    MCV 91 01/30/2020     01/30/2020       No results found for: TSH    No results found for: CHOL  Lab Results   Component Value Date    TRIG 162 (H) 01/30/2020     Lab Results   Component Value Date    HDL 49 01/30/2020     Lab Results   Component Value Date    LDLCALC 121 (H) 01/30/2020     No results found for: HGBA1C    Results for orders placed or performed in visit on 01/30/20   TSH, 3rd generation   Result Value Ref Range    TSH 3RD GENERATON 1 170 0 358 - 3 740 uIU/mL   T3, free   Result Value Ref Range    T3, Free 2 32 2 30 - 4 20 pg/mL   T4, free   Result Value Ref Range    Free T4 0 97 0 76 - 1 46 ng/dL   CBC   Result Value Ref Range    WBC 8 40 4 31 - 10 16 Thousand/uL    RBC 4 76 3 81 - 5 12 Million/uL    Hemoglobin 13 9 11 5 - 15 4 g/dL    Hematocrit 43 1 34 8 - 46 1 %    MCV 91 82 - 98 fL    MCH 29 2 26 8 - 34 3 pg    MCHC 32 3 31 4 - 37 4 g/dL    RDW 12 0 11 6 - 15 1 %    Platelets 343 854 - 424 Thousands/uL    MPV 10 3 8 9 - 12 7 fL   Comprehensive metabolic panel   Result Value Ref Range    Sodium 137 136 - 145 mmol/L    Potassium 4 1 3 5 - 5 3 mmol/L    Chloride 108 100 - 108 mmol/L    CO2 26 21 - 32 mmol/L    ANION GAP 3 (L) 4 - 13 mmol/L    BUN 10 5 - 25 mg/dL    Creatinine 0 78 0 60 - 1 30 mg/dL    Glucose, Fasting 81 65 - 99 mg/dL    Calcium 9 4 8 3 - 10 1 mg/dL    AST 23 5 - 45 U/L    ALT 40 12 - 78 U/L    Alkaline Phosphatase 75 46 - 116 U/L    Total Protein 8 1 6 4 - 8 2 g/dL    Albumin 4 2 3 5 - 5 0 g/dL    Total Bilirubin 0 47 0 20 - 1 00 mg/dL    eGFR 99 ml/min/1 73sq m   Lipid panel   Result Value Ref Range    Cholesterol 202 (H) 50 - 200 mg/dL    Triglycerides 162 (H) <=150 mg/dL    HDL, Direct 49 >=40 mg/dL    LDL Calculated 121 (H) 0 - 100 mg/dL Non-HDL-Chol (CHOL-HDL) 153 mg/dl       No orders of the defined types were placed in this encounter  ALLERGIES:  No Known Allergies    Current Medications     Current Outpatient Medications   Medication Sig Dispense Refill    ibuprofen (MOTRIN) 800 mg tablet Take 1 tablet (800 mg total) by mouth every 8 (eight) hours as needed for mild pain 60 tablet 1    medroxyPROGESTERone (DEPO-PROVERA) 150 mg/mL injection Inject 150 mg into a muscle      acetaminophen (TYLENOL) 500 mg tablet Take 500 mg by mouth every 6 (six) hours as needed for mild pain      albuterol (PROVENTIL HFA,VENTOLIN HFA) 90 mcg/act inhaler Inhale 2 puffs every 6 (six) hours as needed for wheezing (Patient not taking: Reported on 9/1/2020) 1 Inhaler 0    escitalopram (LEXAPRO) 20 mg tablet Take 0 5 tablets (10 mg total) by mouth daily 90 tablet 1    traMADol (ULTRAM) 50 mg tablet Take 1 tablet (50 mg total) by mouth every 8 (eight) hours as needed for moderate pain 90 tablet 0     No current facility-administered medications for this visit            Health Maintenance     Health Maintenance   Topic Date Due    Pneumococcal Vaccine: Pediatrics (0 to 5 Years) and At-Risk Patients (6 to 59 Years) (1 of 1 - PPSV23) 09/04/1991    Influenza Vaccine  07/01/2020    Depression Screening PHQ  01/30/2021    BMI: Followup Plan  01/31/2021    Annual Physical  01/30/2021    BMI: Adult  09/01/2021    Cervical Cancer Screening  06/14/2023    DTaP,Tdap,and Td Vaccines (3 - Td) 01/20/2025    HIV Screening  Completed    HIB Vaccine  Aged Out    Hepatitis B Vaccine  Aged Out    IPV Vaccine  Aged Out    Hepatitis A Vaccine  Aged Out    Meningococcal ACWY Vaccine  Aged Out    HPV Vaccine  Aged [employer] History   Administered Date(s) Administered    INFLUENZA 10/28/2014    Tdap 11/11/2014, 69/86/2689       Aleta Melton MD    I spent 25 minutes with this patient of which greater than 50% was spent counseling

## 2020-09-03 ENCOUNTER — HOSPITAL ENCOUNTER (EMERGENCY)
Facility: HOSPITAL | Age: 35
Discharge: HOME | End: 2020-09-03
Attending: EMERGENCY MEDICINE | Admitting: EMERGENCY MEDICINE
Payer: COMMERCIAL

## 2020-09-03 VITALS
DIASTOLIC BLOOD PRESSURE: 64 MMHG | OXYGEN SATURATION: 100 % | SYSTOLIC BLOOD PRESSURE: 110 MMHG | HEART RATE: 76 BPM | RESPIRATION RATE: 18 BRPM | TEMPERATURE: 97.8 F

## 2020-09-03 DIAGNOSIS — M25.561 RIGHT KNEE PAIN: Primary | ICD-10-CM

## 2020-09-03 DIAGNOSIS — L03.90 CELLULITIS: ICD-10-CM

## 2020-09-03 DIAGNOSIS — L03.115 CELLULITIS OF RIGHT LOWER EXTREMITY: ICD-10-CM

## 2020-09-03 PROCEDURE — 99284 EMERGENCY DEPT VISIT MOD MDM: CPT | Performed by: EMERGENCY MEDICINE

## 2020-09-03 PROCEDURE — 99283 EMERGENCY DEPT VISIT LOW MDM: CPT

## 2020-09-03 RX ORDER — CEPHALEXIN 500 MG/1
500 CAPSULE ORAL EVERY 6 HOURS SCHEDULED
Qty: 20 CAPSULE | Refills: 0 | Status: SHIPPED | OUTPATIENT
Start: 2020-09-03 | End: 2020-09-08

## 2020-09-04 NOTE — ED PROVIDER NOTES
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  Taking?   acetaminophen (TYLENOL) 500 mg tablet 9/3/2020 at Unknown time Self Yes Yes   Sig: Take 500 mg by mouth every 6 (six) hours as needed for mild pain   albuterol (PROVENTIL HFA,VENTOLIN HFA) 90 mcg/act inhaler Unknown at Unknown time  No No   Sig: Inhale 2 puffs every 6 (six) hours as needed for wheezing   Patient not taking: Reported on 9/1/2020   escitalopram (LEXAPRO) 20 mg tablet Not Taking at Unknown time  No No   Sig: Take 0 5 tablets (10 mg total) by mouth daily   Patient not taking: Reported on 9/3/2020   ibuprofen (MOTRIN) 800 mg tablet 9/3/2020 at Unknown time  No Yes   Sig: Take 1 tablet (800 mg total) by mouth every 8 (eight) hours as needed for mild pain   medroxyPROGESTERone (DEPO-PROVERA) 150 mg/mL injection 9/3/2020 at Unknown time  Yes Yes   Sig: Inject 150 mg into a muscle   traMADol (ULTRAM) 50 mg tablet 9/3/2020 at Unknown time  No Yes   Sig: Take 1 tablet (50 mg total) by mouth every 8 (eight) hours as needed for moderate pain      Facility-Administered Medications: None       Past Medical History:   Diagnosis Date    Asthma     Chlamydia infection        History reviewed  No pertinent surgical history  Family History   Problem Relation Age of Onset    No Known Problems Mother      I have reviewed and agree with the history as documented  E-Cigarette/Vaping    E-Cigarette Use Never User      E-Cigarette/Vaping Substances     Social History     Tobacco Use    Smoking status: Never Smoker    Smokeless tobacco: Never Used   Substance Use Topics    Alcohol use: Yes     Comment: social ; Denied history of alcohol use    Drug use: No        Review of Systems   Constitutional: Negative for fever  Respiratory: Negative for shortness of breath  Cardiovascular: Negative for chest pain  Gastrointestinal: Negative for abdominal pain  Genitourinary: Negative for difficulty urinating, dysuria and frequency  Musculoskeletal: Positive for gait problem and joint swelling  Negative for back pain  Neurological: Negative for headaches  All other systems reviewed and are negative  Physical Exam  ED Triage Vitals [09/03/20 2124]   Temperature Pulse Respirations Blood Pressure SpO2   97 8 °F (36 6 °C) 76 18 110/64 100 %      Temp Source Heart Rate Source Patient Position - Orthostatic VS BP Location FiO2 (%)   Oral Monitor Sitting Right arm --      Pain Score       6             Orthostatic Vital Signs  Vitals:    09/03/20 2124   BP: 110/64   Pulse: 76   Patient Position - Orthostatic VS: Sitting       Physical Exam  Constitutional:       Appearance: Normal appearance  She is normal weight  HENT:      Head: Normocephalic and atraumatic  Nose: Nose normal       Mouth/Throat:      Mouth: Mucous membranes are moist    Eyes:      Extraocular Movements: Extraocular movements intact  Pupils: Pupils are equal, round, and reactive to light     Neck: Musculoskeletal: Normal range of motion and neck supple  Cardiovascular:      Rate and Rhythm: Normal rate and regular rhythm  Pulmonary:      Effort: Pulmonary effort is normal       Breath sounds: Normal breath sounds  Abdominal:      General: Abdomen is flat  Bowel sounds are normal       Palpations: Abdomen is soft  Musculoskeletal:         General: Tenderness (Distal, lateral aspect of the right hamstring  ) present  Comments: There is an area of warmth and hardness at the distal, lateral aspect of the right hamstring, above the knee  Patient is able to ambulate without difficulty  Pt is able to fully ROM the right knee and right hip  Neurological:      Mental Status: She is alert  ED Medications  Medications - No data to display    Diagnostic Studies  Results Reviewed     None                 No orders to display         Procedures  Procedures      ED Course                                           MDM  Number of Diagnoses or Management Options  Cellulitis of right lower extremity:   Cellulitis:   Right knee pain:   Diagnosis management comments: Pt is a 28 yo presenting with right knee pain for 1 month that has worsened to the point where she came in to be evaluated  Pt denies any trauma to the knee  Physical exam reveals an area of warmth and hardness at the distal, lateral aspect of the hamstring  Impression: Baker's cyst, arthritis, cellulitis, superficial thrombophlebitis     Plan: Bedside US  Will d/c with antibiotics if cellulitis  D/c with pain control otherwise  Bedside US reveals cobblestoning of the subcutaneous fat  Superficial veins compressible  Likely cellulitis  Will d/c with abx and return to ED precautions  I reviewed all testing with the patient: Ultrasound  I gave oral return precautions for what to return for in addition to the written return precautions     The patient (and any family present: n/a) verbalized understanding of the discharge instructions and warnings that would necessitate return to the Emergency Department  I specifically highlighted areas of special concern regarding the written and verbal discharge instructions and return precautions  All questions were answered prior to discharge  Disposition  Final diagnoses:   Right knee pain   Cellulitis   Cellulitis of right lower extremity     Time reflects when diagnosis was documented in both MDM as applicable and the Disposition within this note     Time User Action Codes Description Comment    9/3/2020 10:19 PM [facility] Add [M25 561] Right knee pain     9/3/2020 10:19 PM [facility] Add [L03 90] Cellulitis     9/3/2020 10:21 PM [facility] Add [C69 024] Cellulitis of right lower extremity       ED Disposition     ED Disposition Condition Date/Time Comment    Discharge Stable Th Sep 3, 2020 10:23 PM [facility] discharge to home/self care              Follow-up Information     Follow up With Specialties Details Why Contact Info Additional Information    Chelle Cortes MD [facility] Medicine   [address] Emergency Department Emergency Medicine  If symptoms worsen [address]  [phone]  ED, [address], [address]   [phone]          Discharge Medication List as of 9/3/2020 10:23 PM      START taking these medications    Details   cephalexin (KEFLEX) 500 mg capsule Take 1 capsule (500 mg total) by mouth every 6 (six) hours for 5 days, Starting Thu 9/3/2020, Until Tue 9/8/2020, Normal         CONTINUE these medications which have NOT CHANGED    Details   acetaminophen (TYLENOL) 500 mg tablet Take 500 mg by mouth every 6 (six) hours as needed for mild pain, Historical Med      ibuprofen (MOTRIN) 800 mg tablet Take 1 tablet (800 mg total) by mouth every 8 (eight) hours as needed for mild pain, Starting Mon 7/6/2020, Normal medroxyPROGESTERone (DEPO-PROVERA) 150 mg/mL injection Inject 150 mg into a muscle, Starting Fri 7/17/2020, Historical Med      traMADol (ULTRAM) 50 mg tablet Take 1 tablet (50 mg total) by mouth every 8 (eight) hours as needed for moderate pain, Starting Tue 9/1/2020, Print      albuterol (PROVENTIL HFA,VENTOLIN HFA) 90 mcg/act inhaler Inhale 2 puffs every 6 (six) hours as needed for wheezing, Starting Tue 10/15/2019, Print      escitalopram (LEXAPRO) 20 mg tablet Take 0 5 tablets (10 mg total) by mouth daily, Starting Tue 9/1/2020, Print           No discharge procedures on file  PDMP Review     None           ED Provider  Attending physically available and evaluated [facility]  I managed the patient along with the ED Attending      Electronically Signed by         Sanna Mcdonald DO  09/03/20 9244

## 2020-09-04 NOTE — DISCHARGE INSTRUCTIONS
You were seen today in the ED for right knee pain  We suspect a bacterial infection of the skin behind your knee  A prescription for antibiotics were sent to your pharmacy  Please take as prescribed  Please use the following pain medications as prescribed:  - Tylenol 650mg every 6 hours  - Motrin 400mg every 6 hours  They work in different ways so can be used together at the same time  Return to the ED if the pain worsens severely, the area of warmth spreads, or if you develop fevers

## 2020-09-09 NOTE — ED ATTENDING ATTESTATION
9/3/2020  IRadha MD, saw and evaluated the patient  I have discussed the patient with the resident/non-physician practitioner and agree with the resident's/non-physician practitioner's findings, Plan of Care, and MDM as documented in the resident's/non-physician practitioner's note, except where noted  All available labs and Radiology studies were reviewed  I was present for key portions of any procedure(s) performed by the resident/non-physician practitioner and I was immediately available to provide assistance  At this point I agree with the current assessment done in the Emergency Department  I have conducted an independent evaluation of this patient a history and physical is as follows:    ED Course     Patient presents for evaluation due to 1 month of pain behind her right knee  Patient states that she has been taking ibuprofen without improvement  Patient states that pain is worse when she is on her feet all day at her occupation  She states that she saw her PCP 2 days ago who gave her tramadol without much relief  No additional complaints  Exam: AAOx3, NAD, RRR, CTA, S/NT/ND, no motor/sensory deficits, small area firmness and tenderness over lateral aspect distal right hamstring, no fullness in popliteal fossa, no knee tenderness, full range of motion of the leg  A/P:  Right leg pain  Bedside ultrasound shows cobblestone appearance in location of symptoms  Although not classic for an infection, will treat for early cellulitis that patient follow-up      Critical Care Time  Procedures

## 2020-09-22 ENCOUNTER — HOSPITAL ENCOUNTER (EMERGENCY)
Facility: HOSPITAL | Age: 35
Discharge: HOME | End: 2020-09-22
Attending: EMERGENCY MEDICINE | Admitting: EMERGENCY MEDICINE
Payer: COMMERCIAL

## 2020-09-22 VITALS
DIASTOLIC BLOOD PRESSURE: 66 MMHG | OXYGEN SATURATION: 98 % | RESPIRATION RATE: 18 BRPM | TEMPERATURE: 98 F | BODY MASS INDEX: 28.17 KG/M2 | SYSTOLIC BLOOD PRESSURE: 107 MMHG | WEIGHT: 165 LBS | HEIGHT: 64 IN | HEART RATE: 56 BPM

## 2020-09-22 DIAGNOSIS — R21 RASH: Primary | ICD-10-CM

## 2020-09-22 PROCEDURE — 36415 COLL VENOUS BLD VENIPUNCTURE: CPT | Performed by: INTERNAL MEDICINE

## 2020-09-22 PROCEDURE — 86618 LYME DISEASE ANTIBODY: CPT | Performed by: INTERNAL MEDICINE

## 2020-09-22 PROCEDURE — 99284 EMERGENCY DEPT VISIT MOD MDM: CPT | Performed by: EMERGENCY MEDICINE

## 2020-09-22 PROCEDURE — 99283 EMERGENCY DEPT VISIT LOW MDM: CPT

## 2020-09-22 RX ORDER — IBUPROFEN 600 MG/1
600 TABLET ORAL ONCE
Status: COMPLETED | OUTPATIENT
Start: 2020-09-22 | End: 2020-09-22

## 2020-09-22 RX ORDER — GABAPENTIN 300 MG/1
200 CAPSULE ORAL
COMMUNITY

## 2020-09-22 RX ORDER — DOXYCYCLINE HYCLATE 100 MG/1
100 CAPSULE ORAL ONCE
Status: COMPLETED | OUTPATIENT
Start: 2020-09-22 | End: 2020-09-22

## 2020-09-22 RX ORDER — ACETAMINOPHEN 325 MG/1
975 TABLET ORAL ONCE
Status: COMPLETED | OUTPATIENT
Start: 2020-09-22 | End: 2020-09-22

## 2020-09-22 RX ORDER — DOXYCYCLINE HYCLATE 100 MG/1
100 CAPSULE ORAL 2 TIMES DAILY
Qty: 28 CAPSULE | Refills: 0 | Status: SHIPPED | OUTPATIENT
Start: 2020-09-22 | End: 2020-10-06

## 2020-09-22 RX ADMIN — DOXYCYCLINE 100 MG: 100 CAPSULE ORAL at 19:56

## 2020-09-22 RX ADMIN — ACETAMINOPHEN 975 MG: 325 TABLET, FILM COATED ORAL at 19:44

## 2020-09-22 RX ADMIN — IBUPROFEN 600 MG: 600 TABLET, FILM COATED ORAL at 19:44

## 2020-09-22 NOTE — ED ATTENDING ATTESTATION
9/22/2020  IPolly MD, saw and evaluated the patient  I have discussed the patient with the resident/non-physician practitioner and agree with the resident's/non-physician practitioner's findings, Plan of Care, and MDM as documented in the resident's/non-physician practitioner's note, except where noted  All available labs and Radiology studies were reviewed  I was present for key portions of any procedure(s) performed by the resident/non-physician practitioner and I was immediately available to provide assistance  At this point I agree with the current assessment done in the Emergency Department  I have conducted an independent evaluation of this patient a history and physical is as follows:  Pt has lump on back of L leg behind knee Pt was seen and had one on r for which she was given antibiotics  Pain is worse with flexion    No fevers PE: alert r lwoer ext with 5 cm indurated patch small area behind back old area on L leg MDM: will draw lyme and treat  ED Course         Critical Care Time  Procedures

## 2020-09-22 NOTE — ED PROVIDER NOTES
History  Chief Complaint   Patient presents with    Abscess     pt reports she had an avcess on r leg which went away after taking medication we gave her but now has on on the left leg     HPI  29 yo F with recent hx of cellulitis of the RLE presents to the ED with pain, swelling and warmth of the LLE and lower back  Pt reports symptoms have persisted for the last 1-2 weeks  She states she took her course of Keflex and a week later, she developed "the same thing on the other leg  Then my lower back started swelling up and hurt a lot " She reports taking motrin and tylenol with minimal improvement of pain  She denies any tick bites, bug bites, change in shampoos or conditioners or soaps or laundry detergent  She denies recent travels  She denies IV drug use  She denies fatigue, fevers or other rashes  She does report daily chills  Patient denies headache, visual changes, dizziness, chest pain, palpitations, abdominal pain, diarrhea, melena, hematochezia, dysuria, new skin rashes or numbness or tingling of the extremities  Prior to Admission Medications   Prescriptions Last Dose Informant Patient Reported?  Taking?   acetaminophen (TYLENOL) 500 mg tablet Not Taking at Unknown time Self Yes No   Sig: Take 500 mg by mouth every 6 (six) hours as needed for mild pain   albuterol (PROVENTIL HFA,VENTOLIN HFA) 90 mcg/act inhaler   No Yes   Sig: Inhale 2 puffs every 6 (six) hours as needed for wheezing   escitalopram (LEXAPRO) 20 mg tablet Not Taking at Unknown time  No No   Sig: Take 0 5 tablets (10 mg total) by mouth daily   Patient not taking: Reported on 9/3/2020   gabapentin (NEURONTIN) 300 mg capsule Not Taking at Unknown time  Yes No   Sig: Take 200 mg by mouth daily at bedtime   ibuprofen (MOTRIN) 800 mg tablet 9/22/2020 at Unknown time  No Yes   Sig: Take 1 tablet (800 mg total) by mouth every 8 (eight) hours as needed for mild pain   medroxyPROGESTERone (DEPO-PROVERA) 150 mg/mL injection   Yes Yes   Sig: Inject 150 mg into a muscle   traMADol (ULTRAM) 50 mg tablet Not Taking at Unknown time  No No   Sig: Take 1 tablet (50 mg total) by mouth every 8 (eight) hours as needed for moderate pain   Patient not taking: Reported on 9/22/2020      Facility-Administered Medications: None       Past Medical History:   Diagnosis Date    Asthma     Chlamydia infection        History reviewed  No pertinent surgical history  Family History   Problem Relation Age of Onset    No Known Problems Mother      I have reviewed and agree with the history as documented  E-Cigarette/Vaping    E-Cigarette Use Never User      E-Cigarette/Vaping Substances     Social History     Tobacco Use    Smoking status: Never Smoker    Smokeless tobacco: Never Used   Substance Use Topics    Alcohol use: Yes     Comment: social ; Denied history of alcohol use    Drug use: No        Review of Systems   REVIEW OF SYSTEMS  Constitutional:  Reports chills  Denies fever, fatigue or rash   HEENT:  Denies change in visual acuity  Denies nasal congestion or sore throat   Respiratory:  Denies cough or shortness of breath   Cardiovascular:  Denies chest pain or edema   GI:  Denies abdominal pain, nausea, vomiting, bloody stools or diarrhea   :  Denies dysuria, frequency, hesitancy      Musculoskeletal:  Reports lower back pain and LLE pain   Neurologic:  Denies headache, focal weakness or sensory changes   Endocrine:  Denies polyuria or polydipsia   Lymphatic:  Denies swollen glands   Psychiatric:  Denies depression or anxiety       Physical Exam  ED Triage Vitals [09/22/20 1839]   Temperature Pulse Respirations Blood Pressure SpO2   97 9 °F (36 6 °C) (!) 53 18 117/78 97 %      Temp Source Heart Rate Source Patient Position - Orthostatic VS BP Location FiO2 (%)   Oral Monitor Sitting Left arm --      Pain Score       8             Orthostatic Vital Signs  Vitals:    09/22/20 1839 09/22/20 1852   BP: 117/78 107/66   Pulse: (!) 53 56   Patient Position - Orthostatic VS: Sitting Sitting       Physical Exam  PHYSICAL EXAM  Constitutional:  Well developed, well nourished, no acute distress, non-toxic appearance    HEENT:  Conjunctiva normal  Oropharynx moist  Respiratory:  No respiratory distress, normal breath sounds  Cardiovascular:  Normal rate, normal rhythm, no murmurs  GI:  Soft, nondistended, normal bowel sounds, nontender  :  No costovertebral angle tenderness   Musculoskeletal:  LLE lateral to the hamstring - area of induration, warmth with TTP  Lower back pain above the buttocks, slight to the left, - area of induration without warmth, +TTP  Integument:  Well hydrated, no rash   Lymphatic:  No lymphadenopathy noted   Neurologic:  Alert & oriented, normal motor function, normal sensory function, no focal deficits noted   Psychiatric:  Speech and behavior appropriate     ED Medications  Medications   acetaminophen (TYLENOL) tablet 975 mg (975 mg Oral Given 9/22/20 1944)   ibuprofen (MOTRIN) tablet 600 mg (600 mg Oral Given 9/22/20 1944)   doxycycline hyclate (VIBRAMYCIN) capsule 100 mg (100 mg Oral Given 9/22/20 1956)       Diagnostic Studies  Results Reviewed     Procedure Component Value Units Date/Time    Lyme Antibody Profile with reflex to [facility] [[id number]] Collected:  09/22/20 1957    Lab Status: In process Specimen:  Blood from Arm, Right Updated:  09/22/20 2000                 No orders to display         Procedures  Procedures      ED Course                                       MDM  Number of Diagnoses or Management Options  Rash:   Diagnosis management comments: 27 yo F with area of induration over the LLE lateral to the hamstring and lower back  She previously had a rash induration in the right lower extremity  Presentation and her clinical findings are concerning for Lyme disease  Blood draw titers in the ED  Start patient on a 14 day course of doxycycline    Patient given a PCP       Amount and/or Complexity of Data Reviewed  Clinical lab tests: ordered  Discussion of test results with the performing providers: yes  Review and summarize past medical records: yes  Discuss the patient with other providers: yes    Risk of Complications, Morbidity, and/or Mortality  Presenting problems: low  Diagnostic procedures: low  Management options: low    Patient Progress  Patient progress: improved        Disposition  Final diagnoses:   Rash     Time reflects when diagnosis was documented in both MDM as applicable and the Disposition within this note     Time User Action Codes Description Comment    9/22/2020  7:53 PM Tammy Avila Add [R21] Rash       ED Disposition     ED Disposition Condition Date/Time Comment    Discharge Stable Tue Sep 22, 2020  7:52 PM [facility] discharge to home/self care              Follow-up Information     Follow up With Specialties Details Why Contact Info    Mikel Rai MD Family Medicine  As needed [address]  [phone]            Discharge Medication List as of 9/22/2020  8:17 PM      START taking these medications    Details   doxycycline hyclate (VIBRAMYCIN) 100 mg capsule Take 1 capsule (100 mg total) by mouth 2 (two) times a day for 14 days, Starting Tue 9/22/2020, Until Tue 10/6/2020, Print         CONTINUE these medications which have NOT CHANGED    Details   albuterol (PROVENTIL HFA,VENTOLIN HFA) 90 mcg/act inhaler Inhale 2 puffs every 6 (six) hours as needed for wheezing, Starting Tue 10/15/2019, Print      ibuprofen (MOTRIN) 800 mg tablet Take 1 tablet (800 mg total) by mouth every 8 (eight) hours as needed for mild pain, Starting Mon 7/6/2020, Normal      medroxyPROGESTERone (DEPO-PROVERA) 150 mg/mL injection Inject 150 mg into a muscle, Starting Fri 7/17/2020, Historical Med      acetaminophen (TYLENOL) 500 mg tablet Take 500 mg by mouth every 6 (six) hours as needed for mild pain, Historical Med      escitalopram (LEXAPRO) 20 mg tablet Take 0 5 tablets (10 mg total) by mouth daily, Starting Tue 9/1/2020, Print      gabapentin (NEURONTIN) 300 mg capsule Take 200 mg by mouth daily at bedtime, Historical Med      traMADol (ULTRAM) 50 mg tablet Take 1 tablet (50 mg total) by mouth every 8 (eight) hours as needed for moderate pain, Starting Tue 9/1/2020, Print           No discharge procedures on file  PDMP Review     None           ED Provider  Attending physically available and evaluated [facility]  I managed the patient along with the ED Attending      Electronically Signed by         Giulia Singh MD  09/22/20 9371

## 2020-09-23 LAB — B BURGDOR IGG+IGM SER-ACNC: <0.91 ISR (ref 0–0.9)

## 2020-09-23 NOTE — DISCHARGE INSTRUCTIONS
Your skin rashes were consistent in presentation with lyme's disease  We are starting you on an antibiotic, doxycyline, for treatment of lyme  Please take for two weeks  Call your PCP if you rash worsens or fails to improve after the course of antibiotics  Take tylenol and motrin for joint/muscle aches

## 2020-10-08 DIAGNOSIS — M54.50 CHRONIC MIDLINE LOW BACK PAIN WITHOUT SCIATICA: ICD-10-CM

## 2020-10-08 DIAGNOSIS — M76.31 ILIOTIBIAL BAND TENDINITIS OF RIGHT SIDE: ICD-10-CM

## 2020-10-08 DIAGNOSIS — G89.29 CHRONIC MIDLINE LOW BACK PAIN WITHOUT SCIATICA: ICD-10-CM

## 2020-10-08 RX ORDER — TRAMADOL HYDROCHLORIDE 50 MG/1
50 TABLET ORAL EVERY 8 HOURS PRN
Qty: 90 TABLET | Refills: 0 | Status: SHIPPED | OUTPATIENT
Start: 2020-10-08 | End: 2020-10-09 | Stop reason: SDUPTHER

## 2020-10-08 RX ORDER — TRAMADOL HYDROCHLORIDE 50 MG/1
50 TABLET ORAL EVERY 8 HOURS PRN
Qty: 90 TABLET | Refills: 0 | Status: SHIPPED | OUTPATIENT
Start: 2020-10-08 | End: 2020-10-08 | Stop reason: SDUPTHER

## 2020-10-08 RX ORDER — IBUPROFEN 800 MG/1
800 TABLET ORAL EVERY 8 HOURS PRN
Qty: 60 TABLET | Refills: 1 | Status: SHIPPED | OUTPATIENT
Start: 2020-10-08 | End: 2021-01-29

## 2020-10-09 DIAGNOSIS — M76.31 ILIOTIBIAL BAND TENDINITIS OF RIGHT SIDE: ICD-10-CM

## 2020-10-09 RX ORDER — TRAMADOL HYDROCHLORIDE 50 MG/1
50 TABLET ORAL EVERY 8 HOURS PRN
Qty: 90 TABLET | Refills: 0 | Status: SHIPPED | OUTPATIENT
Start: 2020-10-09 | End: 2020-12-08

## 2020-10-27 ENCOUNTER — HOSPITAL ENCOUNTER (EMERGENCY)
Facility: HOSPITAL | Age: 35
Discharge: HOME | End: 2020-10-27
Attending: EMERGENCY MEDICINE | Admitting: EMERGENCY MEDICINE
Payer: COMMERCIAL

## 2020-10-27 VITALS
DIASTOLIC BLOOD PRESSURE: 91 MMHG | OXYGEN SATURATION: 98 % | HEART RATE: 72 BPM | TEMPERATURE: 98.5 F | SYSTOLIC BLOOD PRESSURE: 123 MMHG | WEIGHT: 165 LBS | RESPIRATION RATE: 18 BRPM | BODY MASS INDEX: 28.32 KG/M2

## 2020-10-27 DIAGNOSIS — B34.9 VIRAL SYNDROME: Primary | ICD-10-CM

## 2020-10-27 PROCEDURE — 99283 EMERGENCY DEPT VISIT LOW MDM: CPT

## 2020-10-27 PROCEDURE — U0003 INFECTIOUS AGENT DETECTION BY NUCLEIC ACID (DNA OR RNA); SEVERE ACUTE RESPIRATORY SYNDROME CORONAVIRUS 2 (SARS-COV-2) (CORONAVIRUS DISEASE [COVID-19]), AMPLIFIED PROBE TECHNIQUE, MAKING USE OF HIGH THROUGHPUT TECHNOLOGIES AS DESCRIBED BY CMS-2020-01-R: HCPCS | Performed by: EMERGENCY MEDICINE

## 2020-10-27 PROCEDURE — 99282 EMERGENCY DEPT VISIT SF MDM: CPT | Performed by: EMERGENCY MEDICINE

## 2020-10-28 LAB — SARS-COV-2 RNA SPEC QL NAA+PROBE: NOT DETECTED

## 2020-12-08 DIAGNOSIS — M76.31 ILIOTIBIAL BAND TENDINITIS OF RIGHT SIDE: ICD-10-CM

## 2020-12-08 RX ORDER — TRAMADOL HYDROCHLORIDE 50 MG/1
50 TABLET ORAL EVERY 8 HOURS PRN
Qty: 90 TABLET | Refills: 0 | Status: SHIPPED | OUTPATIENT
Start: 2020-12-08 | End: 2021-01-13

## 2020-12-11 ENCOUNTER — HOSPITAL ENCOUNTER (EMERGENCY)
Facility: HOSPITAL | Age: 35
Discharge: HOME | End: 2020-12-11
Attending: EMERGENCY MEDICINE
Payer: COMMERCIAL

## 2020-12-11 VITALS
WEIGHT: 162 LBS | RESPIRATION RATE: 18 BRPM | HEART RATE: 69 BPM | HEIGHT: 64 IN | DIASTOLIC BLOOD PRESSURE: 81 MMHG | SYSTOLIC BLOOD PRESSURE: 134 MMHG | BODY MASS INDEX: 27.66 KG/M2 | OXYGEN SATURATION: 99 % | TEMPERATURE: 98.3 F

## 2020-12-11 DIAGNOSIS — R05.8 DRY COUGH: Primary | ICD-10-CM

## 2020-12-11 DIAGNOSIS — Z20.822 CLOSE EXPOSURE TO COVID-19 VIRUS: ICD-10-CM

## 2020-12-11 PROCEDURE — 87637 SARSCOV2&INF A&B&RSV AMP PRB: CPT | Performed by: PHYSICIAN ASSISTANT

## 2020-12-11 PROCEDURE — 99284 EMERGENCY DEPT VISIT MOD MDM: CPT | Performed by: PHYSICIAN ASSISTANT

## 2020-12-11 PROCEDURE — 99283 EMERGENCY DEPT VISIT LOW MDM: CPT

## 2020-12-14 LAB
FLUAV RNA NPH QL NAA+PROBE: NOT DETECTED
FLUBV RNA NPH QL NAA+PROBE: NOT DETECTED
RSV RNA NPH QL NAA+PROBE: NOT DETECTED
SARS-COV-2 RNA NPH QL NAA+PROBE: NOT DETECTED

## 2020-12-15 ENCOUNTER — HOSPITAL ENCOUNTER (EMERGENCY)
Facility: HOSPITAL | Age: 35
Discharge: HOME | End: 2020-12-15
Attending: EMERGENCY MEDICINE | Admitting: EMERGENCY MEDICINE
Payer: COMMERCIAL

## 2020-12-15 VITALS
RESPIRATION RATE: 18 BRPM | HEART RATE: 65 BPM | DIASTOLIC BLOOD PRESSURE: 79 MMHG | WEIGHT: 161 LBS | TEMPERATURE: 98.1 F | BODY MASS INDEX: 27.49 KG/M2 | SYSTOLIC BLOOD PRESSURE: 126 MMHG | HEIGHT: 64 IN | OXYGEN SATURATION: 100 %

## 2020-12-15 DIAGNOSIS — N93.9 VAGINAL BLEEDING: Primary | ICD-10-CM

## 2020-12-15 LAB
BACTERIA UR QL AUTO: ABNORMAL /HPF
BASOPHILS # BLD AUTO: 0.05 THOUSANDS/ΜL (ref 0–0.1)
BASOPHILS NFR BLD AUTO: 1 % (ref 0–1)
BILIRUB UR QL STRIP: NEGATIVE
CLARITY UR: CLEAR
COLOR UR: YELLOW
EOSINOPHIL # BLD AUTO: 0.26 THOUSAND/ΜL (ref 0–0.61)
EOSINOPHIL NFR BLD AUTO: 5 % (ref 0–6)
ERYTHROCYTE [DISTWIDTH] IN BLOOD BY AUTOMATED COUNT: 13.1 % (ref 11.6–15.1)
EXT PREG TEST URINE: NEGATIVE
EXT. CONTROL ED NAV: NORMAL
GLUCOSE UR STRIP-MCNC: NEGATIVE MG/DL
HCT VFR BLD AUTO: 39.2 % (ref 34.8–46.1)
HGB BLD-MCNC: 12.4 G/DL (ref 11.5–15.4)
HGB UR QL STRIP.AUTO: ABNORMAL
HYALINE CASTS #/AREA URNS LPF: ABNORMAL /LPF
IMM GRANULOCYTES # BLD AUTO: 0.02 THOUSAND/UL (ref 0–0.2)
IMM GRANULOCYTES NFR BLD AUTO: 0 % (ref 0–2)
KETONES UR STRIP-MCNC: NEGATIVE MG/DL
LEUKOCYTE ESTERASE UR QL STRIP: NEGATIVE
LYMPHOCYTES # BLD AUTO: 1.48 THOUSANDS/ΜL (ref 0.6–4.47)
LYMPHOCYTES NFR BLD AUTO: 26 % (ref 14–44)
MCH RBC QN AUTO: 28.8 PG (ref 26.8–34.3)
MCHC RBC AUTO-ENTMCNC: 31.6 G/DL (ref 31.4–37.4)
MCV RBC AUTO: 91 FL (ref 82–98)
MONOCYTES # BLD AUTO: 0.72 THOUSAND/ΜL (ref 0.17–1.22)
MONOCYTES NFR BLD AUTO: 12 % (ref 4–12)
NEUTROPHILS # BLD AUTO: 3.26 THOUSANDS/ΜL (ref 1.85–7.62)
NEUTS SEG NFR BLD AUTO: 56 % (ref 43–75)
NITRITE UR QL STRIP: NEGATIVE
NON-SQ EPI CELLS URNS QL MICRO: ABNORMAL /HPF
NRBC BLD AUTO-RTO: 0 /100 WBCS
PH UR STRIP.AUTO: 6.5 [PH] (ref 4.5–8)
PLATELET # BLD AUTO: 249 THOUSANDS/UL (ref 149–390)
PMV BLD AUTO: 10.3 FL (ref 8.9–12.7)
PROT UR STRIP-MCNC: NEGATIVE MG/DL
RBC # BLD AUTO: 4.31 MILLION/UL (ref 3.81–5.12)
RBC #/AREA URNS AUTO: ABNORMAL /HPF
SP GR UR STRIP.AUTO: 1.01 (ref 1–1.03)
UROBILINOGEN UR QL STRIP.AUTO: 0.2 E.U./DL
WBC # BLD AUTO: 5.79 THOUSAND/UL (ref 4.31–10.16)
WBC #/AREA URNS AUTO: ABNORMAL /HPF

## 2020-12-15 PROCEDURE — 81001 URINALYSIS AUTO W/SCOPE: CPT

## 2020-12-15 PROCEDURE — 99284 EMERGENCY DEPT VISIT MOD MDM: CPT

## 2020-12-15 PROCEDURE — 81025 URINE PREGNANCY TEST: CPT | Performed by: EMERGENCY MEDICINE

## 2020-12-15 PROCEDURE — 85025 COMPLETE CBC W/AUTO DIFF WBC: CPT | Performed by: EMERGENCY MEDICINE

## 2020-12-15 PROCEDURE — 36415 COLL VENOUS BLD VENIPUNCTURE: CPT | Performed by: EMERGENCY MEDICINE

## 2020-12-15 PROCEDURE — 99284 EMERGENCY DEPT VISIT MOD MDM: CPT | Performed by: EMERGENCY MEDICINE

## 2021-01-02 ENCOUNTER — HOSPITAL ENCOUNTER (EMERGENCY)
Facility: HOSPITAL | Age: 36
Discharge: HOME | End: 2021-01-02
Attending: EMERGENCY MEDICINE
Payer: COMMERCIAL

## 2021-01-02 VITALS
RESPIRATION RATE: 18 BRPM | DIASTOLIC BLOOD PRESSURE: 78 MMHG | TEMPERATURE: 97.8 F | HEART RATE: 73 BPM | OXYGEN SATURATION: 100 % | SYSTOLIC BLOOD PRESSURE: 122 MMHG

## 2021-01-02 DIAGNOSIS — Z20.822 CLOSE EXPOSURE TO COVID-19 VIRUS: Primary | ICD-10-CM

## 2021-01-02 DIAGNOSIS — B34.9 VIRAL ILLNESS: ICD-10-CM

## 2021-01-02 PROCEDURE — 99284 EMERGENCY DEPT VISIT MOD MDM: CPT

## 2021-01-02 PROCEDURE — 87637 SARSCOV2&INF A&B&RSV AMP PRB: CPT | Performed by: EMERGENCY MEDICINE

## 2021-01-02 PROCEDURE — 99284 EMERGENCY DEPT VISIT MOD MDM: CPT | Performed by: EMERGENCY MEDICINE

## 2021-01-02 RX ORDER — ACETAMINOPHEN, ASPIRIN AND CAFFEINE 250; 250; 65 MG/1; MG/1; MG/1
1 TABLET, FILM COATED ORAL EVERY 6 HOURS PRN
COMMUNITY

## 2021-01-02 NOTE — Clinical Note
Hdz Micks was seen and treated in our emergency department on 1/2/2021  Other - See Comments         Diagnosis:      Isidra     She may return on this date:      Please follow [facility] guidelines in terms to returning to work related to your test      If you have any questions or concerns, please don't hesitate to call        Lex Gonzales, DO    ______________________________           _______________          _______________  Hospital Representative                              Date                                Time

## 2021-01-02 NOTE — DISCHARGE INSTRUCTIONS
MyPositioning ch  pdf            These centers are open for FREE COVID-19 Testing:  - [address] , [place]  Open daily from 8a-8p  - [address]  [facility], [place]  Open Monday - Friday, 8a-5p  Open Saturday and Sunday 9a-3p  - [address] [facility], [address] [facility]  Open Monday - Friday, 8a-5p   Open [place], 9a-3p  - [facility] [address], [place]  Open Monday to Friday, 8a-8p   Open Saturday - Sunday, 9a-3p  - [address] [place] [address], [facility] [address], [place], [address] [facility]   Open Monday to Friday, 8a-8p   Open Saturday - Sunday, 9a-3p  - [address] [facility], [facility]  Open Monday to Friday, 8a-5p  - [address] [facility]     Open Monday to Friday, 8a-5p

## 2021-01-02 NOTE — ED PROVIDER NOTES
History  Chief Complaint   Patient presents with    Biological Exposure     Brother postive for CoVid, has been with him, bodyaches "i feel like I went to the gym, sore all over"   Headache     Pt states "even just touching the side of my head hurts"  Pt states 2 days ago onset    Diarrhea     2 days of symptoms  "i don't want to eat"     66-year-old female presents to ED for 2 days of generalized myalgias and diarrhea  Denies blood  Denies any shortness of breath, cough, fevers  Patient states that her brother tested positive coronavirus today and that they were together on new year's  Patient denies any other symptoms  Prior to Admission Medications   Prescriptions Last Dose Informant Patient Reported?  Taking?   acetaminophen (TYLENOL) 500 mg tablet Not Taking at Unknown time Self Yes No   Sig: Take 500 mg by mouth every 6 (six) hours as needed for mild pain   albuterol (PROVENTIL HFA,VENTOLIN HFA) 90 mcg/act inhaler Not Taking at Unknown time  No No   Sig: Inhale 2 puffs every 6 (six) hours as needed for wheezing   Patient not taking: Reported on 1/2/2021   aspirin-acetaminophen-caffeine ([facility]) 250-250-65 MG per tablet 1/2/2021 at Unknown time  Yes Yes   Sig: Take 1 tablet by mouth every 6 (six) hours as needed for headaches   escitalopram (LEXAPRO) 20 mg tablet Not Taking at Unknown time  No No   Sig: Take 0 5 tablets (10 mg total) by mouth daily   Patient not taking: Reported on 9/3/2020   gabapentin (NEURONTIN) 300 mg capsule Not Taking at Unknown time  Yes No   Sig: Take 200 mg by mouth daily at bedtime   ibuprofen (MOTRIN) 800 mg tablet 1/2/2021 at Unknown time  No Yes   Sig: TAKE 1 TABLET (800 MG TOTAL) BY MOUTH EVERY 8 (EIGHT) HOURS AS NEEDED FOR MILD PAIN   medroxyPROGESTERone (DEPO-PROVERA) 150 mg/mL injection Not Taking at Unknown time  Yes No   Sig: Inject 150 mg into a muscle   traMADol (ULTRAM) 50 mg tablet Not Taking at Unknown time  No No   Sig: TAKE 1 TABLET (50 MG TOTAL) BY MOUTH EVERY 8 (EIGHT) HOURS AS NEEDED FOR MODERATE PAIN   Patient not taking: Reported on 1/2/2021      Facility-Administered Medications: None       Past Medical History:   Diagnosis Date    Asthma     Chlamydia infection        History reviewed  No pertinent surgical history  Family History   Problem Relation Age of Onset    No Known Problems Mother      I have reviewed and agree with the history as documented  E-Cigarette/Vaping    E-Cigarette Use Never User      E-Cigarette/Vaping Substances     Social History     Tobacco Use    Smoking status: Never Smoker    Smokeless tobacco: Never Used   Substance Use Topics    Alcohol use: Yes     Frequency: Monthly or less     Comment: social ; Denied history of alcohol use    Drug use: No        Review of Systems   Gastrointestinal: Positive for diarrhea  Musculoskeletal: Positive for myalgias  All other systems reviewed and are negative  Physical Exam  ED Triage Vitals [01/02/21 1811]   Temperature Pulse Respirations Blood Pressure SpO2   97 8 °F (36 6 °C) 73 18 122/78 100 %      Temp Source Heart Rate Source Patient Position - Orthostatic VS BP Location FiO2 (%)   Oral Monitor Sitting Right arm --      Pain Score       --             Orthostatic Vital Signs  Vitals:    01/02/21 1811   BP: 122/78   Pulse: 73   Patient Position - Orthostatic VS: Sitting       Physical Exam  Vitals signs and nursing note reviewed  Constitutional:       General: She is not in acute distress  Appearance: She is well-developed  She is not diaphoretic  HENT:      Head: Normocephalic and atraumatic  Right Ear: External ear normal       Left Ear: External ear normal       Nose: Nose normal    Eyes:      General: No scleral icterus  Right eye: No discharge  Left eye: No discharge  Conjunctiva/sclera: Conjunctivae normal       Pupils: Pupils are equal, round, and reactive to light     Neck:      Musculoskeletal: Normal range of motion and neck supple  Vascular: No JVD  Trachea: No tracheal deviation  Cardiovascular:      Rate and Rhythm: Normal rate and regular rhythm  Heart sounds: Normal heart sounds  No murmur  No friction rub  No gallop  Pulmonary:      Effort: Pulmonary effort is normal  No respiratory distress  Breath sounds: Normal breath sounds  No stridor  No wheezing or rales  Abdominal:      General: Bowel sounds are normal  There is no distension  Palpations: Abdomen is soft  There is no mass  Tenderness: There is no abdominal tenderness  There is no guarding  Musculoskeletal: Normal range of motion  General: No tenderness or deformity  Skin:     General: Skin is warm and dry  Coloration: Skin is not pale  Findings: No erythema or rash  Neurological:      Mental Status: She is alert and oriented to person, place, and time  Cranial Nerves: No cranial nerve deficit  Sensory: No sensory deficit  Motor: No abnormal muscle tone  Psychiatric:         Behavior: Behavior normal          Thought Content: Thought content normal          Judgment: Judgment normal          ED Medications  Medications - No data to display    Diagnostic Studies  Results Reviewed     Procedure Component Value Units Date/Time    Novel Coronavirus 2019(COVID-19), Influenza A/B, RSV [facility] [[id number]] Collected: 01/02/21 1826    Lab Status: In process Specimen: Nose Updated: 01/02/21 1830                 No orders to display         Procedures  Procedures      ED Course                                       MDM  Number of Diagnoses or Management Options  Close exposure to COVID-19 virus:   Viral illness: established and worsening  Diagnosis management comments: Patient appears well at this time  Vitals within normal limits  Discussed return precautions  Described that patient should not be returning to work  Provided with [facility] guidelines    Patient discharged with work note  Testing  Return precautions  Disposition  Final diagnoses:   Close exposure to COVID-19 virus   Viral illness     Time reflects when diagnosis was documented in both MDM as applicable and the Disposition within this note     Time User Action Codes Description Comment    1/2/2021  6:15 PM Nohemi Rojas [Z20 822] Close exposure to COVID-19 virus     1/2/2021  6:15 PM Janusz Cortez [B34 9] Viral illness       ED Disposition     ED Disposition Condition Date/Time Comment    Discharge Stable Sat Jan 2, 2021  6:15 PM [facility] discharge to home/self care              Follow-up Information     Follow up With Specialties Details Why Contact Info Additional Information    Dahlia Mcbride MD Family Medicine   [address] Emergency Department Emergency Medicine Go to  As needed, If symptoms worsen [address]  [phone]  ED, [address] [place] [address], [address]   [phone]          Discharge Medication List as of 1/2/2021  6:22 PM      CONTINUE these medications which have NOT CHANGED    Details   aspirin-acetaminophen-caffeine (EXCEDRIN MIGRAINE) 250-250-65 MG per tablet Take 1 tablet by mouth every 6 (six) hours as needed for headaches, Historical Med      ibuprofen (MOTRIN) 800 mg tablet TAKE 1 TABLET (800 MG TOTAL) BY MOUTH EVERY 8 (EIGHT) HOURS AS NEEDED FOR MILD PAIN, Starting Thu 10/8/2020, Normal      acetaminophen (TYLENOL) 500 mg tablet Take 500 mg by mouth every 6 (six) hours as needed for mild pain, Historical Med      albuterol (PROVENTIL HFA,VENTOLIN HFA) 90 mcg/act inhaler Inhale 2 puffs every 6 (six) hours as needed for wheezing, Starting Tue 10/15/2019, Print      escitalopram (LEXAPRO) 20 mg tablet Take 0 5 tablets (10 mg total) by mouth daily, Starting Tue 9/1/2020, Print      gabapentin (NEURONTIN) 300 mg capsule Take 200 mg by mouth daily at bedtime, Historical Med      medroxyPROGESTERone (DEPO-PROVERA) 150 mg/mL injection Inject 150 mg into a muscle, Starting Fri 7/17/2020, Historical Med      traMADol (ULTRAM) 50 mg tablet TAKE 1 TABLET (50 MG TOTAL) BY MOUTH EVERY 8 (EIGHT) HOURS AS NEEDED FOR MODERATE PAIN, Starting Tue 12/8/2020, Normal           No discharge procedures on file  PDMP Review     None           ED Provider  Attending physically available and evaluated [facility]  I managed the patient along with the ED Attending      Electronically Signed by         Kamlesh Dove DO  01/02/21 7018

## 2021-01-02 NOTE — Clinical Note
Song Alvarez was seen and treated in our emergency department on 1/2/2021  Diagnosis:     Isidra    She may return on this date: If you have any questions or concerns, please don't hesitate to call        Tuyet Jo,     ______________________________           _______________          _______________  Hospital Representative                              Date                                Time

## 2021-01-02 NOTE — ED ATTENDING ATTESTATION
Final Diagnosis:  1  Close exposure to COVID-19 virus    2  Viral illness           IBessie MD, saw and evaluated the patient  All available labs and X-rays were ordered by me or the resident and have been reviewed by myself  I discussed the patient with the resident / non-physician and agree with the resident's / non-physician practitioner's findings and plan as documented in the resident's / non-physician practicitioner's note, except where noted  At this point, I agree with the current assessment done in the ED  I was present during key portions of all procedures performed unless otherwise stated  Chief Complaint   Patient presents with    Biological Exposure     Brother postive for CoVid, has been with him, bodyaches "i feel like I went to the gym, sore all over"   Headache     Pt states "even just touching the side of my head hurts"  Pt states 2 days ago onset    Diarrhea     2 days of symptoms  "i don't want to eat"     This is a 28 y o  female presenting for evaluation of COVID testing  Brother diagnosed with it  Has URI/COVID symptoms including headache and anorexia  Appears well  Requesting testing  PMH:   has a past medical history of Asthma and Chlamydia infection  PSH:   has no past surgical history on file  Social:  Social History     Substance and Sexual Activity   Alcohol Use Yes    Frequency: Monthly or less    Comment: social ; Denied history of alcohol use     Social History     Tobacco Use   Smoking Status Never Smoker   Smokeless Tobacco Never Used     Social History     Substance and Sexual Activity   Drug Use No     PE:  Vitals:    01/02/21 1811   BP: 122/78   BP Location: Right arm   Pulse: 73   Resp: 18   Temp: 97 8 °F (36 6 °C)   TempSrc: Oral   SpO2: 100%   General: VS reviewed  Appears in NAD  awake, alert  Well-nourished, well-developed  Appears stated age  Speaking normally in full sentences  Head: Normocephalic, atraumatic  Eyes: EOM-I   No diplopia  No hyphema  No subconjunctival hemorrhages  Symmetrical lids  ENT: Atraumatic external nose and ears  MMM  No malocclusion  No stridor  Normal phonation  No drooling  Normal swallowing  Neck: No JVD  CV: No pallor noted  Lungs:   No tachypnea  No respiratory distress  MSK:   FROM spontaneously  Skin: Dry, intact  Neuro: Awake, alert, GCS15, CN II-XII grossly intact  Motor grossly intact  Psychiatric/Behavioral: Appropriate mood and affect   Exam: deferred  A:  - COVID testing desired  P:  - COVID test  - DC  - quaruntine  - 13 point ROS was performed and all are normal unless stated in the history above  - Nursing note reviewed  Vitals reviewed  - Orders placed by myself and/or advanced practitioner / resident     - Previous chart was reviewed  - No language barrier    - History obtained from patient  - There are no limitations to the history obtained  - Critical care time: Not applicable for this patient  Code Status: No Order  Advance Directive and Living Will:      Power of :    POLST:      Medications - No data to display  No orders to display     Orders Placed This Encounter   Procedures    Novel Coronavirus 2019(COVID-19), Influenza A/B, RSV [facility]     Labs Reviewed - No data to display  Time reflects when diagnosis was documented in both MDM as applicable and the Disposition within this note     Time User Action Codes Description Comment    1/2/2021  6:15 PM Kady Fields Add [Z20 822] Close exposure to COVID-19 virus     1/2/2021  6:15 PM Kady Fields Add [B34 9] Viral illness       ED Disposition     ED Disposition Condition Date/Time Comment    Discharge Stable Sat Jan 2, 2021  6:15 PM Tyler Carballo discharge to home/self care              Follow-up Information     Follow up With Specialties Details Why Contact Info Additional Information    Cecilio Sepulveda MD [facility]   [address]  [address] [place] [address]       [address]  [facility] Go to  As needed, If symptoms worsen [address] [facility] ED, [address] [facility], [place], [place], [id number] [phone]        Patient's Medications   Discharge Prescriptions    No medications on file     No discharge procedures on file  Prior to Admission Medications   Prescriptions Last Dose Informant Patient Reported? Taking?   acetaminophen (TYLENOL) 500 mg tablet Not Taking at Unknown time Self Yes No   Sig: Take 500 mg by mouth every 6 (six) hours as needed for mild pain   albuterol (PROVENTIL HFA,VENTOLIN HFA) 90 mcg/act inhaler Not Taking at Unknown time  No No   Sig: Inhale 2 puffs every 6 (six) hours as needed for wheezing   Patient not taking: Reported on 1/2/2021   aspirin-acetaminophen-caffeine ([facility]) 250-250-65 MG per tablet 1/2/2021 at Unknown time  Yes Yes   Sig: Take 1 tablet by mouth every 6 (six) hours as needed for headaches   escitalopram (LEXAPRO) 20 mg tablet Not Taking at Unknown time  No No   Sig: Take 0 5 tablets (10 mg total) by mouth daily   Patient not taking: Reported on 9/3/2020   gabapentin (NEURONTIN) 300 mg capsule Not Taking at Unknown time  Yes No   Sig: Take 200 mg by mouth daily at bedtime   ibuprofen (MOTRIN) 800 mg tablet 1/2/2021 at Unknown time  No Yes   Sig: TAKE 1 TABLET (800 MG TOTAL) BY MOUTH EVERY 8 (EIGHT) HOURS AS NEEDED FOR MILD PAIN   medroxyPROGESTERone (DEPO-PROVERA) 150 mg/mL injection Not Taking at Unknown time  Yes No   Sig: Inject 150 mg into a muscle   traMADol (ULTRAM) 50 mg tablet Not Taking at Unknown time  No No   Sig: TAKE 1 TABLET (50 MG TOTAL) BY MOUTH EVERY 8 (EIGHT) HOURS AS NEEDED FOR MODERATE PAIN   Patient not taking: Reported on 1/2/2021      Facility-Administered Medications: None       Portions of the record may have been created with voice recognition software   Occasional wrong word or "sound a like" substitutions may have occurred due to the inherent limitations of voice recognition software  Read the chart carefully and recognize, using context, where substitutions have occurred      Electronically signed by:  Bella Ambrosio

## 2021-01-02 NOTE — Clinical Note
Heriberto Montenegro was seen and treated in our emergency department on 1/2/2021  Other - See Comments        Diagnosis:     Isidra    She may return on this date:     Please follow [facility] guidelines for returning to work     If you have any questions or concerns, please don't hesitate to call        Sukhjinder Juan, DO    ______________________________           _______________          _______________  Hospital Representative                              Date                                Time

## 2021-01-12 DIAGNOSIS — M76.31 ILIOTIBIAL BAND TENDINITIS OF RIGHT SIDE: ICD-10-CM

## 2021-01-13 RX ORDER — TRAMADOL HYDROCHLORIDE 50 MG/1
50 TABLET ORAL EVERY 8 HOURS PRN
Qty: 90 TABLET | Refills: 0 | Status: SHIPPED | OUTPATIENT
Start: 2021-01-13 | End: 2021-02-18

## 2021-01-25 ENCOUNTER — OFFICE VISIT (OUTPATIENT)
Dept: URGENT CARE | Facility: MEDICAL CENTER | Age: 36
End: 2021-01-25
Payer: COMMERCIAL

## 2021-01-25 VITALS
TEMPERATURE: 98 F | WEIGHT: 160 LBS | OXYGEN SATURATION: 100 % | HEIGHT: 63 IN | RESPIRATION RATE: 16 BRPM | HEART RATE: 61 BPM | BODY MASS INDEX: 28.35 KG/M2

## 2021-01-25 DIAGNOSIS — Z20.822 ENCOUNTER FOR LABORATORY TESTING FOR COVID-19 VIRUS: ICD-10-CM

## 2021-01-25 DIAGNOSIS — B34.9 VIRAL SYNDROME: Primary | ICD-10-CM

## 2021-01-25 PROCEDURE — 99283 EMERGENCY DEPT VISIT LOW MDM: CPT | Performed by: PHYSICIAN ASSISTANT

## 2021-01-25 PROCEDURE — U0005 INFEC AGEN DETEC AMPLI PROBE: HCPCS | Performed by: PHYSICIAN ASSISTANT

## 2021-01-25 PROCEDURE — G0382 LEV 3 HOSP TYPE B ED VISIT: HCPCS | Performed by: PHYSICIAN ASSISTANT

## 2021-01-25 PROCEDURE — U0003 INFECTIOUS AGENT DETECTION BY NUCLEIC ACID (DNA OR RNA); SEVERE ACUTE RESPIRATORY SYNDROME CORONAVIRUS 2 (SARS-COV-2) (CORONAVIRUS DISEASE [COVID-19]), AMPLIFIED PROBE TECHNIQUE, MAKING USE OF HIGH THROUGHPUT TECHNOLOGIES AS DESCRIBED BY CMS-2020-01-R: HCPCS | Performed by: PHYSICIAN ASSISTANT

## 2021-01-25 PROCEDURE — 99203 OFFICE O/P NEW LOW 30 MIN: CPT | Performed by: PHYSICIAN ASSISTANT

## 2021-01-25 NOTE — PATIENT INSTRUCTIONS
[address]    Your healthcare provider and/or public health staff have evaluated you and have determined that you do not need to remain in the hospital at this time  At this time you can be isolated at home where you will be monitored by staff from your local or state health department  You should carefully follow the prevention and isolation steps below until a healthcare provider or local or state health department says that you can return to your normal activities  Stay home except to get medical care    People who are mildly ill with COVID-19 are able to isolate at home during their illness  You should restrict activities outside your home, except for getting medical care  Do not go to work, school, or public areas  Avoid using public transportation, ride-sharing, or taxis  Separate yourself from other people and animals in your home    People: As much as possible, you should stay in a specific room and away from other people in your home  Also, you should use a separate bathroom, if available  Animals: You should restrict contact with pets and other animals while you are sick with COVID-19, just like you would around other people  Although there have not been reports of pets or other animals becoming sick with COVID-19, it is still recommended that people sick with COVID-19 limit contact with animals until more information is known about the virus  When possible, have another member of your household care for your animals while you are sick  If you are sick with COVID-19, avoid contact with your pet, including petting, snuggling, being kissed or licked, and sharing food  If you must care for your pet or be around animals while you are sick, wash your hands before and after you interact with pets and wear a facemask  See COVID-19 and Animals for more information      Call ahead before visiting your doctor    If you have a medical appointment, call the healthcare provider and tell them that you have or may have COVID-19  This will help the healthcare providers office take steps to keep other people from getting infected or exposed  Wear a facemask    You should wear a facemask when you are around other people (e g , sharing a room or vehicle) or pets and before you enter a healthcare providers office  If you are not able to wear a facemask (for example, because it causes trouble breathing), then people who live with you should not stay in the same room with you, or they should wear a facemask if they enter your room  Cover your coughs and sneezes    Cover your mouth and nose with a tissue when you cough or sneeze  Throw used tissues in a lined trash can  Immediately wash your hands with soap and water for at least 20 seconds or, if soap and water are not available, clean your hands with an alcohol-based hand  that contains at least 60% alcohol  Clean your hands often    Wash your hands often with soap and water for at least 20 seconds, especially after blowing your nose, coughing, or sneezing; going to the bathroom; and before eating or preparing food  If soap and water are not readily available, use an alcohol-based hand  with at least 60% alcohol, covering all surfaces of your hands and rubbing them together until they feel dry  Soap and water are the best option if hands are visibly dirty  Avoid touching your eyes, nose, and mouth with unwashed hands  Avoid sharing personal household items    You should not share dishes, drinking glasses, cups, eating utensils, towels, or bedding with other people or pets in your home  After using these items, they should be washed thoroughly with soap and water  Clean all high-touch surfaces everyday    High touch surfaces include counters, tabletops, doorknobs, bathroom fixtures, toilets, phones, keyboards, tablets, and bedside tables  Also, clean any surfaces that may have blood, stool, or body fluids on them   Use a household cleaning spray or wipe, according to the label instructions  Labels contain instructions for safe and effective use of the cleaning product including precautions you should take when applying the product, such as wearing gloves and making sure you have good ventilation during use of the product  Monitor your symptoms    Seek prompt medical attention if your illness is worsening (e g , difficulty breathing)  Before seeking care, call your healthcare provider and tell them that you have, or are being evaluated for, COVID-19  Put on a facemask before you enter the facility  These steps will help the healthcare providers office to keep other people in the office or waiting room from getting infected or exposed  Ask your healthcare provider to call the local or [facility] health department  Persons who are placed under active monitoring or facilitated self-monitoring should follow instructions provided by their local health department or occupational health professionals, as appropriate  If you have a medical emergency and need to call 911, notify the dispatch personnel that you have, or are being evaluated for COVID-19  If possible, put on a facemask before emergency medical services arrive      Discontinuing home isolation    Patients with confirmed COVID-19 should remain under home isolation precautions until the following conditions are met:   - They have had no fever for at least 24 hours (that is one full day of no fever without the use medicine that reduces fevers)  AND  - other symptoms have improved (for example, when their cough or shortness of breath have improved)  AND  - If had mild or moderate illness, at least 10 days have passed since their symptoms first appeared or if severe illness (needed oxygen) or immunosuppressed, at least 20 days have passed since symptoms first appeared  Patients with confirmed COVID-19 should also notify close contacts (including their workplace) and ask that they self-quarantine  Currently, close contact is defined as being within 6 feet for 15 minutes or more from the period 24 hours starting 48 hours before symptom onset to the time at which the patient went into isolation  Close contacts of patients diagnosed with COVID-19 should be instructed by the patient to self-quarantine for 14 days from the last time of their last contact with the patient  Source: [employer] Syndrome   WHAT YOU NEED TO KNOW:   Viral syndrome is a term used for symptoms of an infection caused by a virus  Viruses are spread easily from person to person through the air and on shared items  DISCHARGE INSTRUCTIONS:   Call your local emergency number (911 in the [address] [place] [address],[address]) or have someone else call if:   · You have a seizure  · You cannot be woken  · You have chest pain or trouble breathing  Return to the emergency department if:   · You have a stiff neck, a bad headache, and sensitivity to light  · You feel weak, dizzy, or confused  · You stop urinating or urinate a lot less than usual     · You cough up blood or thick yellow or green mucus  · You have severe abdominal pain or your abdomen is larger than usual     Call your doctor if:   · Your symptoms do not get better with treatment or get worse after 3 days  · You have a rash or ear pain  · You have burning when you urinate  · You have questions or concerns about your condition or care  Medicines: You may  need any of the following:  · Acetaminophen  decreases pain and fever  It is available without a doctor's order  Ask how much to take and how often to take it  Follow directions  Read the labels of all other medicines you are using to see if they also contain acetaminophen, or ask your doctor or pharmacist  Acetaminophen can cause liver damage if not taken correctly  Do not use more than 4 grams (4,000 milligrams) total of acetaminophen in one day  · NSAIDs , such as ibuprofen, help decrease swelling, pain, and fever  NSAIDs can cause stomach bleeding or kidney problems in certain people  If you take blood thinner medicine, always ask your healthcare provider if NSAIDs are safe for you  Always read the medicine label and follow directions  · Cold medicine  helps decrease swelling, control a cough, and relieve chest or nasal congestion  · Saline nasal spray  helps decrease nasal congestion  · Take your medicine as directed  Contact your healthcare provider if you think your medicine is not helping or if you have side effects  Tell him of her if you are allergic to any medicine  Keep a list of the medicines, vitamins, and herbs you take  Include the amounts, and when and why you take them  Bring the list or the pill bottles to follow-up visits  Carry your medicine list with you in case of an emergency  Manage your symptoms:   · Drink liquids as directed to prevent dehydration  Ask how much liquid to drink each day and which liquids are best for you  Ask if you should drink an oral rehydration solution (ORS)  An ORS has the right amounts of water, salts, and sugar you need to replace body fluids  This may help prevent dehydration caused by vomiting or diarrhea  Do not drink liquids with caffeine  Liquids with caffeine can make dehydration worse  · Get plenty of rest to help your body heal   Take naps throughout the day  Ask your healthcare provider when you can return to work and your normal activities  · Use a cool mist humidifier to help you breathe easier  Ask your healthcare provider how to use a cool mist humidifier  · Eat honey or use cough drops for a sore throat  Cough drops are available without a doctor's order  Follow directions for taking cough drops  · Do not smoke or be close to anyone who is smoking  Nicotine and other chemicals in cigarettes and cigars can cause lung damage  Smoking can also delay healing   Ask your healthcare provider for information if you currently smoke and need help to quit  E-cigarettes or smokeless tobacco still contain nicotine  Talk to your healthcare provider before you use these products  Prevent the spread of germs:       · Wash your hands often  Wash your hands several times each day  Wash after you use the bathroom, change a child's diaper, and before you prepare or eat food  Use soap and water every time  Rub your soapy hands together, lacing your fingers  Wash the front and back of your hands, and in between your fingers  Use the fingers of one hand to scrub under the fingernails of the other hand  Wash for at least 20 seconds  Rinse with warm, running water for several seconds  Then dry your hands with a clean towel or paper towel  Use hand  that contains alcohol if soap and water are not available  Do not touch your eyes, nose, or mouth without washing your hands first          · Cover a sneeze or cough  Use a tissue that covers your mouth and nose  Throw the tissue away in a trash can right away  Use the bend of your arm if a tissue is not available  Wash your hands well with soap and water or use a hand   · Stay away from others while you are sick  Avoid crowds as much as possible  · Ask about vaccines you may need  Talk to your healthcare provider about your vaccine history  He or she will tell you which vaccines you need, and when to get them  ? Get the influenza (flu) vaccine as soon as recommended each year  The flu vaccine is available starting in September or October  Flu viruses change, so it is important to get a flu vaccine every year  ? Get the pneumonia vaccine if recommended  This vaccine is usually recommended every 5 years  Your provider will tell you when to get this vaccine, if needed  Follow up with your doctor as directed:  Write down your questions so you remember to ask them during your visits    © [address] [employer] Information is for End User's use only and may not be sold, redistributed or otherwise used for commercial purposes  All illustrations and images included in CareNotes® are the copyrighted property of A D A M [employer] Inc  or Diana Gill  The above information is an  only  It is not intended as medical advice for individual conditions or treatments  Talk to your doctor, nurse or pharmacist before following any medical regimen to see if it is safe and effective for you

## 2021-01-25 NOTE — PROGRESS NOTES
[facility]        NAME: Itz Cote is a 28 y o  female  : 1985    MRN: [id number]  DATE: 2021  TIME: 3:13 PM    Pulse 61   Temp 98 °F (36 7 °C)   Resp 16   Ht 5' 3" (1 6 m)   Wt 72 6 kg (160 lb)   LMP 2020 (Approximate)   SpO2 100%   BMI 28 34 kg/m²     Assessment and Plan   Viral syndrome [B34 9]  1  Viral syndrome  Novel Coronavirus (Covid-19),PCR [facility] - Office Collection   2  Encounter for laboratory testing for COVID-19 virus           Patient Instructions       Follow up with PCP in 3-5 days  Proceed to  ER if symptoms worsen  Chief Complaint     Chief Complaint   Patient presents with    Headache     Pt c/o Headache for 2 days  Pt has taken OTC excedrin as well as ibuprofen with some relief  Pt has had contact with a coworker that tested positive 5 days ago  History of Present Illness       Pt with exposure to covid-19 at work 5 days ago , pt with intermittent headaches , none now       Review of Systems   Review of Systems   Constitutional: Negative  HENT: Negative  Eyes: Negative  Respiratory: Negative  Cardiovascular: Negative  Gastrointestinal: Negative  Endocrine: Negative  Genitourinary: Negative  Musculoskeletal: Negative  Skin: Negative  Allergic/Immunologic: Negative  Neurological: Positive for headaches  Hematological: Negative  Psychiatric/Behavioral: Negative  All other systems reviewed and are negative          Current Medications       Current Outpatient Medications:     aspirin-acetaminophen-caffeine (EXCEDRIN MIGRAINE) 250-250-65 MG per tablet, Take 1 tablet by mouth every 6 (six) hours as needed for headaches, Disp: , Rfl:     ibuprofen (MOTRIN) 800 mg tablet, TAKE 1 TABLET (800 MG TOTAL) BY MOUTH EVERY 8 (EIGHT) HOURS AS NEEDED FOR MILD PAIN, Disp: 60 tablet, Rfl: 1    traMADol (ULTRAM) 50 mg tablet, TAKE 1 TABLET (50 MG TOTAL) BY MOUTH EVERY 8 (EIGHT) HOURS AS NEEDED FOR MODERATE PAIN, Disp: 90 tablet, Rfl: 0    acetaminophen (TYLENOL) 500 mg tablet, Take 500 mg by mouth every 6 (six) hours as needed for mild pain, Disp: , Rfl:     albuterol (PROVENTIL HFA,VENTOLIN HFA) 90 mcg/act inhaler, Inhale 2 puffs every 6 (six) hours as needed for wheezing (Patient not taking: Reported on 1/2/2021), Disp: 1 Inhaler, Rfl: 0    escitalopram (LEXAPRO) 20 mg tablet, Take 0 5 tablets (10 mg total) by mouth daily (Patient not taking: Reported on 9/3/2020), Disp: 90 tablet, Rfl: 1    gabapentin (NEURONTIN) 300 mg capsule, Take 200 mg by mouth daily at bedtime, Disp: , Rfl:     medroxyPROGESTERone (DEPO-PROVERA) 150 mg/mL injection, Inject 150 mg into a muscle, Disp: , Rfl:     Current Allergies     Allergies as of 01/25/2021    (No Known Allergies)            The following portions of the patient's history were reviewed and updated as appropriate: allergies, current medications, past family history, past medical history, past social history, past surgical history and problem list      Past Medical History:   Diagnosis Date    Asthma     Chlamydia infection        History reviewed  No pertinent surgical history  Family History   Problem Relation Age of Onset    No Known Problems Mother          Medications have been verified  Objective   Pulse 61   Temp 98 °F (36 7 °C)   Resp 16   Ht 5' 3" (1 6 m)   Wt 72 6 kg (160 lb)   LMP 01/02/2020 (Approximate)   SpO2 100%   BMI 28 34 kg/m²        Physical Exam     Physical Exam  Vitals signs and nursing note reviewed  Constitutional:       Appearance: Normal appearance  She is normal weight  HENT:      Head: Normocephalic and atraumatic  Right Ear: Tympanic membrane, ear canal and external ear normal       Left Ear: Tympanic membrane, ear canal and external ear normal       Nose: Nose normal       Mouth/Throat:      Mouth: Mucous membranes are moist       Pharynx: Oropharynx is clear     Eyes:      Extraocular Movements: Extraocular movements intact  Conjunctiva/sclera: Conjunctivae normal       Pupils: Pupils are equal, round, and reactive to light  Neck:      Musculoskeletal: Normal range of motion and neck supple  Cardiovascular:      Rate and Rhythm: Normal rate and regular rhythm  Pulses: Normal pulses  Heart sounds: Normal heart sounds  Pulmonary:      Effort: Pulmonary effort is normal       Breath sounds: Normal breath sounds  Abdominal:      General: Abdomen is flat  Bowel sounds are normal       Palpations: Abdomen is soft  Musculoskeletal: Normal range of motion  Skin:     General: Skin is warm  Capillary Refill: Capillary refill takes less than 2 seconds  Neurological:      General: No focal deficit present  Mental Status: She is alert and oriented to person, place, and time     Psychiatric:         Mood and Affect: Mood normal          Behavior: Behavior normal

## 2021-01-26 LAB — SARS-COV-2 RNA RESP QL NAA+PROBE: NEGATIVE

## 2021-01-29 DIAGNOSIS — M54.50 CHRONIC MIDLINE LOW BACK PAIN WITHOUT SCIATICA: ICD-10-CM

## 2021-01-29 DIAGNOSIS — G89.29 CHRONIC MIDLINE LOW BACK PAIN WITHOUT SCIATICA: ICD-10-CM

## 2021-01-29 RX ORDER — IBUPROFEN 800 MG/1
800 TABLET ORAL EVERY 8 HOURS PRN
Qty: 60 TABLET | Refills: 1 | Status: SHIPPED | OUTPATIENT
Start: 2021-01-29 | End: 2021-06-04

## 2021-02-17 DIAGNOSIS — M76.31 ILIOTIBIAL BAND TENDINITIS OF RIGHT SIDE: ICD-10-CM

## 2021-02-18 RX ORDER — TRAMADOL HYDROCHLORIDE 50 MG/1
50 TABLET ORAL EVERY 8 HOURS PRN
Qty: 90 TABLET | Refills: 0 | Status: SHIPPED | OUTPATIENT
Start: 2021-02-18 | End: 2021-03-15

## 2021-03-13 ENCOUNTER — HOSPITAL ENCOUNTER (EMERGENCY)
Facility: HOSPITAL | Age: 36
Discharge: HOME | End: 2021-03-13
Attending: EMERGENCY MEDICINE | Admitting: EMERGENCY MEDICINE
Payer: COMMERCIAL

## 2021-03-13 VITALS
OXYGEN SATURATION: 96 % | TEMPERATURE: 97.7 F | DIASTOLIC BLOOD PRESSURE: 84 MMHG | WEIGHT: 160 LBS | HEART RATE: 75 BPM | RESPIRATION RATE: 20 BRPM | SYSTOLIC BLOOD PRESSURE: 124 MMHG | BODY MASS INDEX: 28.34 KG/M2

## 2021-03-13 DIAGNOSIS — Z20.822 ENCOUNTER FOR LABORATORY TESTING FOR COVID-19 VIRUS: ICD-10-CM

## 2021-03-13 DIAGNOSIS — R05.9 COUGH: Primary | ICD-10-CM

## 2021-03-13 PROCEDURE — 99283 EMERGENCY DEPT VISIT LOW MDM: CPT

## 2021-03-13 PROCEDURE — 99284 EMERGENCY DEPT VISIT MOD MDM: CPT | Performed by: EMERGENCY MEDICINE

## 2021-03-13 PROCEDURE — 87635 SARS-COV-2 COVID-19 AMP PRB: CPT | Performed by: EMERGENCY MEDICINE

## 2021-03-13 RX ORDER — FAMOTIDINE 20 MG/1
20 TABLET, FILM COATED ORAL 2 TIMES DAILY
Qty: 30 TABLET | Refills: 0 | Status: SHIPPED | OUTPATIENT
Start: 2021-03-13

## 2021-03-13 NOTE — DISCHARGE INSTRUCTIONS
Quaruntine Guidelines for patients:      Quarantine Guidelines for Employees: If sending COVID+ to [facility] or [facility], please do so in order of preference:  COVID+ patients that are DNR/DNI (They do have HFNC)  Inpatients on improving trajectory that are towards end of admission with ongoing low O2 requirements  Low risk patients with sat >90 at rest, but <90% with ambulation  High risk patients with SPO2>94 at rest, but <94% with ambulation - High risk = Age>65, BMI > 35, Immunocompromised, CKD, or chronic heart or lung condition     COVID-19 Home Care Guidelines    Your healthcare provider and/or public health staff have evaluated you and have determined that you do not need to remain in the hospital at this time  At this time you can be isolated at home where you will be monitored by staff from your local or state health department  You should carefully follow the prevention and isolation steps below until a healthcare provider or local or state health department says that you can return to your normal activities  Stay home except to get medical care    People who are mildly ill with COVID-19 are able to isolate at home during their illness  You should restrict activities outside your home, except for getting medical care  Do not go to work, school, or public areas  Avoid using public transportation, ride-sharing, or taxis  Separate yourself from other people and animals in your home    People: As much as possible, you should stay in a specific room and away from other people in your home  Also, you should use a separate bathroom, if available  Animals: You should restrict contact with pets and other animals while you are sick with COVID-19, just like you would around other people   Although there have not been reports of pets or other animals becoming sick with COVID-19, it is still recommended that people sick with COVID-19 limit contact with animals until more information is known about the virus  When possible, have another member of your household care for your animals while you are sick  If you are sick with COVID-19, avoid contact with your pet, including petting, snuggling, being kissed or licked, and sharing food  If you must care for your pet or be around animals while you are sick, wash your hands before and after you interact with pets and wear a facemask  See COVID-19 and Animals for more information  Call ahead before visiting your doctor    If you have a medical appointment, call the healthcare provider and tell them that you have or may have COVID-19  This will help the healthcare providers office take steps to keep other people from getting infected or exposed  Wear a facemask    You should wear a facemask when you are around other people (e g , sharing a room or vehicle) or pets and before you enter a healthcare providers office  If you are not able to wear a facemask (for example, because it causes trouble breathing), then people who live with you should not stay in the same room with you, or they should wear a facemask if they enter your room  Cover your coughs and sneezes    Cover your mouth and nose with a tissue when you cough or sneeze  Throw used tissues in a lined trash can  Immediately wash your hands with soap and water for at least 20 seconds or, if soap and water are not available, clean your hands with an alcohol-based hand  that contains at least 60% alcohol  Clean your hands often    Wash your hands often with soap and water for at least 20 seconds, especially after blowing your nose, coughing, or sneezing; going to the bathroom; and before eating or preparing food  If soap and water are not readily available, use an alcohol-based hand  with at least 60% alcohol, covering all surfaces of your hands and rubbing them together until they feel dry  Soap and water are the best option if hands are visibly dirty   Avoid touching your eyes, nose, and mouth with unwashed hands  Avoid sharing personal household items    You should not share dishes, drinking glasses, cups, eating utensils, towels, or bedding with other people or pets in your home  After using these items, they should be washed thoroughly with soap and water  Clean all high-touch surfaces everyday    High touch surfaces include counters, tabletops, doorknobs, bathroom fixtures, toilets, phones, keyboards, tablets, and bedside tables  Also, clean any surfaces that may have blood, stool, or body fluids on them  Use a household cleaning spray or wipe, according to the label instructions  Labels contain instructions for safe and effective use of the cleaning product including precautions you should take when applying the product, such as wearing gloves and making sure you have good ventilation during use of the product  Monitor your symptoms    Seek prompt medical attention if your illness is worsening (e g , difficulty breathing)  Before seeking care, call your healthcare provider and tell them that you have, or are being evaluated for, COVID-19  Put on a facemask before you enter the facility  These steps will help the healthcare providers office to keep other people in the office or waiting room from getting infected or exposed  Ask your healthcare provider to call the local or [facility] health department  Persons who are placed under active monitoring or facilitated self-monitoring should follow instructions provided by their local health department or occupational health professionals, as appropriate  If you have a medical emergency and need to call 911, notify the dispatch personnel that you have, or are being evaluated for COVID-19  If possible, put on a facemask before emergency medical services arrive  Discontinuing home isolation    Patients with confirmed COVID-19 should remain under home isolation precautions until the following conditions are met:    They have had no fever for at least 24 hours (that is one full day of no fever without the use medicine that reduces fevers)  AND  other symptoms have improved (for example, when their cough or shortness of breath have improved)  AND  If had mild or moderate illness, at least 10 days have passed since their symptoms first appeared or if severe illness (needed oxygen) or immunosuppressed, at least 20 days have passed since symptoms first appeared  Patients with confirmed COVID-19 should also notify close contacts (including their workplace) and ask that they self-quarantine  Currently, close contact is defined as being within 6 feet for 15 minutes or more from the period 24 hours starting 48 hours before symptom onset to the time at which the patient went into isolation  Close contacts of patients diagnosed with COVID-19 should be instructed by the patient to self-quarantine for 14 days from the last time of their last contact with the patient       Source: [employer]

## 2021-03-13 NOTE — Clinical Note
Tyler Carballo was seen and treated in our emergency department on 3/13/2021  Diagnosis:     Isidra    She may return on this date:     If COVID test is negative may return after fever free for 24 hours and improving symptoms  If COVID test is positive may return after 14 days from start of symptoms if fever has resolved and symptoms are improving  If you have any questions or concerns, please don't hesitate to call        Rossy Catherine, DO    ______________________________           _______________          _______________  Hospital Representative                              Date                                Time

## 2021-03-13 NOTE — ED ATTENDING ATTESTATION
3/13/2021  IJohan MD, saw and evaluated the patient  I have discussed the patient with the resident/non-physician practitioner and agree with the resident's/non-physician practitioner's findings, Plan of Care, and MDM as documented in the resident's/non-physician practitioner's note, except where noted  All available labs and Radiology studies were reviewed  I was present for key portions of any procedure(s) performed by the resident/non-physician practitioner and I was immediately available to provide assistance  At this point I agree with the current assessment done in the Emergency Department  I have conducted an independent evaluation of this patient a history and physical is as follows:    ED Course         Critical Care Time  Procedures      27 yo female with cough, covid exposure, work required  Pt with multiple previous tests  Pt with some diarrhea, no sputum, no fever, no sob  No cp   pmh asthma  Vss, afebrile, lungs cta, rrr, abdomen soft nontender  covid swab

## 2021-03-13 NOTE — ED PROVIDER NOTES
History  Chief Complaint   Patient presents with    Cough     pt c/o cough, states she is here for covid test  + exposure  Patient is a 40-year-old female past medical history of asthma who presents the ED for evaluation of COVID-19 testing  She states that she has had a chronic cough, worse of the last week and worse in the evening and occasionally associated with reflux symptoms  Her work is requiring her to obtain COVID-19 testing prior to returning  She denies any trouble breathing, has been using her albuterol inhaler for alleviation of her symptoms  Additionally, she had some diarrhea yesterday and 1 episode of vomiting, no recurrence today  She denies any abdominal pain, denies any fever chills, no difficulty breathing, denies possibility of pregnancy  History provided by:  Patient   used: No    Cough  Cough characteristics:  Non-productive  Onset quality:  Gradual  Timing:  Constant  Progression:  Unchanged  Chronicity:  Recurrent  Smoker: no    Context: not upper respiratory infection    Relieved by:  Beta-agonist inhaler  Worsened by:  Nothing  Associated symptoms: no chest pain, no chills, no fever, no shortness of breath and no wheezing        Prior to Admission Medications   Prescriptions Last Dose Informant Patient Reported?  Taking?   acetaminophen (TYLENOL) 500 mg tablet  Self Yes No   Sig: Take 500 mg by mouth every 6 (six) hours as needed for mild pain   albuterol (PROVENTIL HFA,VENTOLIN HFA) 90 mcg/act inhaler   No No   Sig: Inhale 2 puffs every 6 (six) hours as needed for wheezing   Patient not taking: Reported on 1/2/2021   aspirin-acetaminophen-caffeine (EXCEDRIN MIGRAINE) 250-250-65 MG per tablet   Yes No   Sig: Take 1 tablet by mouth every 6 (six) hours as needed for headaches   escitalopram (LEXAPRO) 20 mg tablet   No No   Sig: Take 0 5 tablets (10 mg total) by mouth daily   Patient not taking: Reported on 9/3/2020   gabapentin (NEURONTIN) 300 mg capsule Yes No   Sig: Take 200 mg by mouth daily at bedtime   ibuprofen (MOTRIN) 800 mg tablet   No No   Sig: TAKE 1 TABLET (800 MG TOTAL) BY MOUTH EVERY 8 (EIGHT) HOURS AS NEEDED FOR MILD PAIN   medroxyPROGESTERone (DEPO-PROVERA) 150 mg/mL injection   Yes No   Sig: Inject 150 mg into a muscle   traMADol (ULTRAM) 50 mg tablet   No No   Sig: TAKE 1 TABLET (50 MG TOTAL) BY MOUTH EVERY 8 (EIGHT) HOURS AS NEEDED FOR MODERATE PAIN      Facility-Administered Medications: None       Past Medical History:   Diagnosis Date    Asthma     Chlamydia infection        History reviewed  No pertinent surgical history  Family History   Problem Relation Age of Onset    No Known Problems Mother      I have reviewed and agree with the history as documented  E-Cigarette/Vaping    E-Cigarette Use Never User      E-Cigarette/Vaping Substances     Social History     Tobacco Use    Smoking status: Never Smoker    Smokeless tobacco: Never Used   Substance Use Topics    Alcohol use: Yes     Frequency: Monthly or less     Comment: social ; Denied history of alcohol use    Drug use: No        Review of Systems   Constitutional: Negative  Negative for chills and fever  HENT: Negative  Eyes: Negative  Respiratory: Positive for cough  Negative for shortness of breath and wheezing  Cardiovascular: Negative for chest pain  Gastrointestinal: Positive for diarrhea, nausea and vomiting  Negative for abdominal pain  Endocrine: Negative  Genitourinary: Negative  Musculoskeletal: Negative  Skin: Negative  Allergic/Immunologic: Negative  Neurological: Negative  Hematological: Negative  Psychiatric/Behavioral: Negative          Physical Exam  ED Triage Vitals [03/13/21 1741]   Temperature Pulse Respirations Blood Pressure SpO2   97 7 °F (36 5 °C) 75 20 124/84 96 %      Temp Source Heart Rate Source Patient Position - Orthostatic VS BP Location FiO2 (%)   Oral Monitor Sitting Left arm --      Pain Score       -- Orthostatic Vital Signs  Vitals:    03/13/21 1741   BP: 124/84   Pulse: 75   Patient Position - Orthostatic VS: Sitting       Physical Exam  Vitals signs reviewed  Constitutional:       Appearance: She is well-developed  She is not diaphoretic  HENT:      Head: Normocephalic and atraumatic  Right Ear: External ear normal       Left Ear: External ear normal       Nose: Nose normal    Eyes:      General: No scleral icterus  Conjunctiva/sclera: Conjunctivae normal    Neck:      Musculoskeletal: Normal range of motion  Vascular: No JVD  Trachea: No tracheal deviation  Cardiovascular:      Rate and Rhythm: Normal rate and regular rhythm  Heart sounds: Normal heart sounds  No murmur  Pulmonary:      Effort: Pulmonary effort is normal  No respiratory distress  Breath sounds: Normal breath sounds  Abdominal:      General: Bowel sounds are normal  There is no distension  Palpations: Abdomen is soft  Tenderness: There is no abdominal tenderness  There is no guarding  Musculoskeletal: Normal range of motion  Skin:     General: Skin is warm and dry  Capillary Refill: Capillary refill takes less than 2 seconds  Neurological:      Mental Status: She is alert and oriented to person, place, and time  Motor: No abnormal muscle tone  Psychiatric:         Behavior: Behavior normal          ED Medications  Medications - No data to display    Diagnostic Studies  Results Reviewed     Procedure Component Value Units Date/Time    Novel Coronavirus Moriah [facility] [[id number]] Collected: 03/13/21 1813    Lab Status:  In process Specimen: Nares from Nasopharyngeal Swab Updated: 03/13/21 1820                 No orders to display         Procedures  Procedures      ED Course                                       MDM  Number of Diagnoses or Management Options  Cough: new and does not require workup  Encounter for laboratory testing for COVID-19 virus: new and requires workup  Diagnosis management comments: Well-appearing female with normal vital signs presents the ED for evaluation of COVID-19 testing  He has a cough, diarrhea yesterday  Her lungs are clear to auscultation bilaterally, she is not hypoxic, no acute respiratory distress  She has albuterol at home, prescribe Atrovent inhaler as well, and prescribed Pepcid given her reflux symptoms and nightly cough as some of her cough could be attributable to GERD  COVID-19 testing obtained, patient given instructions as to quarantine, she was given a work note to that effect as well  All questions answered prior to discharge  Amount and/or Complexity of Data Reviewed  Tests in the radiology section of CPT®: ordered  Review and summarize past medical records: yes        Disposition  Final diagnoses:   Cough   Encounter for laboratory testing for COVID-19 virus     Time reflects when diagnosis was documented in both MDM as applicable and the Disposition within this note     Time User Action Codes Description Comment    3/13/2021  6:05 PM Lacy Villegas Add [R05] Cough     3/13/2021  6:06 PM Lacy Villegas Add [Z20 822] Encounter for laboratory testing for COVID-19 virus       ED Disposition     ED Disposition Condition Date/Time Comment    Discharge Stable Sat Mar 13, 2021  6:05 PM [facility] discharge to home/self care              Follow-up Information     Follow up With Specialties Details Why Contact Info Additional Information    Sunday MD [facility] Schedule an appointment as soon as possible for a visit in 1 week  [address]       [address] [place] [address] 34 [facility] Emergency Department Emergency Medicine Go to  As needed, If symptoms worsen [address]  958 [facility] 64 [facility] Emergency Department, [address], [facility] 108          Discharge Medication List as of 3/13/2021  6:09 PM      START taking these medications    Details   famotidine (PEPCID) 20 mg tablet Take 1 tablet (20 mg total) by mouth 2 (two) times a day, Starting Sat 3/13/2021, Normal      ipratropium (ATROVENT HFA) 17 mcg/act inhaler Inhale 2 puffs every 6 (six) hours as needed for wheezing, Starting Sat 3/13/2021, Normal         CONTINUE these medications which have NOT CHANGED    Details   acetaminophen (TYLENOL) 500 mg tablet Take 500 mg by mouth every 6 (six) hours as needed for mild pain, Historical Med      albuterol (PROVENTIL HFA,VENTOLIN HFA) 90 mcg/act inhaler Inhale 2 puffs every 6 (six) hours as needed for wheezing, Starting Tue 10/15/2019, Print      aspirin-acetaminophen-caffeine (EXCEDRIN MIGRAINE) 250-250-65 MG per tablet Take 1 tablet by mouth every 6 (six) hours as needed for headaches, Historical Med      escitalopram (LEXAPRO) 20 mg tablet Take 0 5 tablets (10 mg total) by mouth daily, Starting Tue 9/1/2020, Print      gabapentin (NEURONTIN) 300 mg capsule Take 200 mg by mouth daily at bedtime, Historical Med      ibuprofen (MOTRIN) 800 mg tablet TAKE 1 TABLET (800 MG TOTAL) BY MOUTH EVERY 8 (EIGHT) HOURS AS NEEDED FOR MILD PAIN, Starting Fri 1/29/2021, Normal      medroxyPROGESTERone (DEPO-PROVERA) 150 mg/mL injection Inject 150 mg into a muscle, Starting Fri 7/17/2020, Historical Med      traMADol (ULTRAM) 50 mg tablet TAKE 1 TABLET (50 MG TOTAL) BY MOUTH EVERY 8 (EIGHT) HOURS AS NEEDED FOR MODERATE PAIN, Starting Thu 2/18/2021, Normal           No discharge procedures on file  PDMP Review     None           ED Provider  Attending physically available and evaluated [facility]  I managed the patient along with the ED Attending      Electronically Signed by         Samy Molina DO  03/13/21 3431

## 2021-03-14 LAB — SARS-COV-2 RNA RESP QL NAA+PROBE: POSITIVE

## 2021-03-15 DIAGNOSIS — M76.31 ILIOTIBIAL BAND TENDINITIS OF RIGHT SIDE: ICD-10-CM

## 2021-03-15 RX ORDER — TRAMADOL HYDROCHLORIDE 50 MG/1
50 TABLET ORAL EVERY 8 HOURS PRN
Qty: 90 TABLET | Refills: 0 | Status: SHIPPED | OUTPATIENT
Start: 2021-03-15 | End: 2021-04-20

## 2021-04-20 DIAGNOSIS — M76.31 ILIOTIBIAL BAND TENDINITIS OF RIGHT SIDE: ICD-10-CM

## 2021-04-20 RX ORDER — TRAMADOL HYDROCHLORIDE 50 MG/1
50 TABLET ORAL EVERY 8 HOURS PRN
Qty: 90 TABLET | Refills: 0 | Status: SHIPPED | OUTPATIENT
Start: 2021-04-20 | End: 2021-05-11

## 2021-05-08 ENCOUNTER — HOSPITAL ENCOUNTER (EMERGENCY)
Facility: HOSPITAL | Age: 36
Discharge: HOME | End: 2021-05-08
Attending: EMERGENCY MEDICINE | Admitting: EMERGENCY MEDICINE
Payer: COMMERCIAL

## 2021-05-08 VITALS
RESPIRATION RATE: 16 BRPM | SYSTOLIC BLOOD PRESSURE: 135 MMHG | HEART RATE: 67 BPM | TEMPERATURE: 98.1 F | DIASTOLIC BLOOD PRESSURE: 78 MMHG | OXYGEN SATURATION: 99 %

## 2021-05-08 DIAGNOSIS — S05.00XA CORNEAL ABRASION, UNSPECIFIED LATERALITY, INITIAL ENCOUNTER: Primary | ICD-10-CM

## 2021-05-08 PROCEDURE — 99284 EMERGENCY DEPT VISIT MOD MDM: CPT | Performed by: EMERGENCY MEDICINE

## 2021-05-08 PROCEDURE — 99283 EMERGENCY DEPT VISIT LOW MDM: CPT

## 2021-05-08 RX ORDER — OFLOXACIN 3 MG/ML
1 SOLUTION/ DROPS OPHTHALMIC
Status: DISCONTINUED | OUTPATIENT
Start: 2021-05-08 | End: 2021-05-09 | Stop reason: HOSPADM

## 2021-05-08 RX ORDER — KETOROLAC TROMETHAMINE 5 MG/ML
1 SOLUTION OPHTHALMIC 4 TIMES DAILY
Status: DISCONTINUED | OUTPATIENT
Start: 2021-05-08 | End: 2021-05-09 | Stop reason: HOSPADM

## 2021-05-08 RX ORDER — TETRACAINE HYDROCHLORIDE 5 MG/ML
2 SOLUTION OPHTHALMIC ONCE
Status: COMPLETED | OUTPATIENT
Start: 2021-05-08 | End: 2021-05-08

## 2021-05-08 RX ADMIN — OFLOXACIN 1 DROP: 3 SOLUTION/ DROPS OPHTHALMIC at 23:04

## 2021-05-08 RX ADMIN — TETRACAINE HYDROCHLORIDE 2 DROP: 5 SOLUTION OPHTHALMIC at 22:02

## 2021-05-08 RX ADMIN — KETOROLAC TROMETHAMINE 1 DROP: 5 SOLUTION OPHTHALMIC at 23:04

## 2021-05-08 RX ADMIN — FLUORESCEIN SODIUM 1 STRIP: 1 STRIP OPHTHALMIC at 22:02

## 2021-05-08 NOTE — Clinical Note
Rebeca Chow was seen and treated in our emergency department on 5/8/2021  Diagnosis:     Isidra    She may return on this date: 05/10/2021         If you have any questions or concerns, please don't hesitate to call        Domingo Syed DO    ______________________________           _______________          _______________  Hospital Representative                              Date                                Time

## 2021-05-09 NOTE — ED NOTES
"I wear glasses or contacts"  Pt states wears contacts on a regular basis  Is not wearing them now   Left eye is worse than right  "I have an astigmatism "     Herminia Jimenez, JEREMY  05/08/21 [id number]

## 2021-05-09 NOTE — DISCHARGE INSTRUCTIONS
You were seen today in the Emergency Department for eye redness and pain  Your workup included an exam by two physicians and revealed abrasions of the cornea of both eyes  Please follow up with your eye doctor and Primary Care Provider in the next 1-2 days to recheck your symptoms and to follow up on your visit to the Emergency Department today  I have prescribed you antibiotics for your eyes  Please complete the entire course, even if you start the feel better before completing it  Oxifloxacin: 1 drop in each eye every two hours for 5 days  Ketorolac: 1 drop in each eye every 6 hours for 3 days      Please return to the Emergency Department if you have worsening eye pain or decreased vision, fevers, chills, chest pain, shortness of breath, are unable to eat or drink, or have any other symptoms that concern you  Please look this over and let your nurse and/or me know if you have any further questions before you leave

## 2021-05-09 NOTE — ED ATTENDING ATTESTATION
5/8/2021  I, Maximo Leahy MD, saw and evaluated the patient  I have discussed the patient with the resident/non-physician practitioner and agree with the resident's/non-physician practitioner's findings, Plan of Care, and MDM as documented in the resident's/non-physician practitioner's note, except where noted  All available labs and Radiology studies were reviewed  I was present for key portions of any procedure(s) performed by the resident/non-physician practitioner and I was immediately available to provide assistance  At this point I agree with the current assessment done in the Emergency Department  I have conducted an independent evaluation of this patient a history and physical is as follows:    ED Course     Patient is a 49-year-old female who contact lens wearer who presents with bilateral eye redness irritation and pain  Onset symptoms were yesterday  Patient took her contact lenses out after work note her eyes were red and injected and irritated  No pain with extraocular motion no headache  Visual acuity noted with corrective lenses  Examination of conjunctiva there are erythematous injected  Pupils equal round reactive to light EOMs intact  Eyes examined with fluorescein using slit-lamp noted to vertical corneal abrasions noted as per resident chart  Impression:  Corneal abrasion likely due to recent trauma with contact lenses patient was counseled to refrain from using contact lenses  Ophthalmology follow-up as outpatient  Prescribe quinolone antibiotic drops  Pain control   Return precautions given    Critical Care Time  Procedures

## 2021-05-11 DIAGNOSIS — M76.31 ILIOTIBIAL BAND TENDINITIS OF RIGHT SIDE: ICD-10-CM

## 2021-05-11 RX ORDER — TRAMADOL HYDROCHLORIDE 50 MG/1
50 TABLET ORAL EVERY 8 HOURS PRN
Qty: 90 TABLET | Refills: 0 | Status: SHIPPED | OUTPATIENT
Start: 2021-05-11 | End: 2021-06-09

## 2021-06-03 DIAGNOSIS — M54.50 CHRONIC MIDLINE LOW BACK PAIN WITHOUT SCIATICA: ICD-10-CM

## 2021-06-03 DIAGNOSIS — G89.29 CHRONIC MIDLINE LOW BACK PAIN WITHOUT SCIATICA: ICD-10-CM

## 2021-06-04 RX ORDER — IBUPROFEN 800 MG/1
800 TABLET ORAL EVERY 8 HOURS PRN
Qty: 60 TABLET | Refills: 1 | Status: SHIPPED | OUTPATIENT
Start: 2021-06-04 | End: 2021-08-09

## 2021-06-09 DIAGNOSIS — M76.31 ILIOTIBIAL BAND TENDINITIS OF RIGHT SIDE: ICD-10-CM

## 2021-06-09 RX ORDER — TRAMADOL HYDROCHLORIDE 50 MG/1
50 TABLET ORAL EVERY 8 HOURS PRN
Qty: 90 TABLET | Refills: 0 | Status: SHIPPED | OUTPATIENT
Start: 2021-06-09 | End: 2021-07-12 | Stop reason: SDUPTHER

## 2021-07-12 DIAGNOSIS — M76.31 ILIOTIBIAL BAND TENDINITIS OF RIGHT SIDE: ICD-10-CM

## 2021-07-12 RX ORDER — TRAMADOL HYDROCHLORIDE 50 MG/1
50 TABLET ORAL EVERY 8 HOURS PRN
Qty: 90 TABLET | Refills: 0 | Status: SHIPPED | OUTPATIENT
Start: 2021-07-12 | End: 2021-08-09

## 2021-07-24 ENCOUNTER — APPOINTMENT (EMERGENCY)
Dept: RADIOLOGY | Facility: HOSPITAL | Age: 36
End: 2021-07-24
Payer: COMMERCIAL

## 2021-07-24 ENCOUNTER — HOSPITAL ENCOUNTER (EMERGENCY)
Facility: HOSPITAL | Age: 36
Discharge: HOME | End: 2021-07-24
Attending: EMERGENCY MEDICINE | Admitting: EMERGENCY MEDICINE
Payer: COMMERCIAL

## 2021-07-24 VITALS
TEMPERATURE: 98.1 F | DIASTOLIC BLOOD PRESSURE: 66 MMHG | SYSTOLIC BLOOD PRESSURE: 118 MMHG | HEART RATE: 62 BPM | OXYGEN SATURATION: 98 % | RESPIRATION RATE: 18 BRPM

## 2021-07-24 DIAGNOSIS — L03.312 CELLULITIS OF LOWER BACK: Primary | ICD-10-CM

## 2021-07-24 DIAGNOSIS — M54.50 ACUTE MIDLINE LOW BACK PAIN WITHOUT SCIATICA: ICD-10-CM

## 2021-07-24 LAB
ANION GAP SERPL CALCULATED.3IONS-SCNC: 4 MMOL/L (ref 4–13)
BASOPHILS # BLD AUTO: 0.08 THOUSANDS/ΜL (ref 0–0.1)
BASOPHILS NFR BLD AUTO: 1 % (ref 0–1)
BUN SERPL-MCNC: 14 MG/DL (ref 5–25)
CALCIUM SERPL-MCNC: 8.6 MG/DL (ref 8.3–10.1)
CHLORIDE SERPL-SCNC: 106 MMOL/L (ref 100–108)
CO2 SERPL-SCNC: 27 MMOL/L (ref 21–32)
CREAT SERPL-MCNC: 0.76 MG/DL (ref 0.6–1.3)
EOSINOPHIL # BLD AUTO: 0.39 THOUSAND/ΜL (ref 0–0.61)
EOSINOPHIL NFR BLD AUTO: 4 % (ref 0–6)
ERYTHROCYTE [DISTWIDTH] IN BLOOD BY AUTOMATED COUNT: 12.5 % (ref 11.6–15.1)
EXT PREG TEST URINE: NEGATIVE
EXT. CONTROL ED NAV: NORMAL
GFR SERPL CREATININE-BSD FRML MDRD: 102 ML/MIN/1.73SQ M
GLUCOSE SERPL-MCNC: 109 MG/DL (ref 65–140)
HCT VFR BLD AUTO: 36.4 % (ref 34.8–46.1)
HGB BLD-MCNC: 11.9 G/DL (ref 11.5–15.4)
IMM GRANULOCYTES # BLD AUTO: 0.02 THOUSAND/UL (ref 0–0.2)
IMM GRANULOCYTES NFR BLD AUTO: 0 % (ref 0–2)
LYMPHOCYTES # BLD AUTO: 3.31 THOUSANDS/ΜL (ref 0.6–4.47)
LYMPHOCYTES NFR BLD AUTO: 36 % (ref 14–44)
MCH RBC QN AUTO: 29.6 PG (ref 26.8–34.3)
MCHC RBC AUTO-ENTMCNC: 32.7 G/DL (ref 31.4–37.4)
MCV RBC AUTO: 91 FL (ref 82–98)
MONOCYTES # BLD AUTO: 0.85 THOUSAND/ΜL (ref 0.17–1.22)
MONOCYTES NFR BLD AUTO: 9 % (ref 4–12)
NEUTROPHILS # BLD AUTO: 4.6 THOUSANDS/ΜL (ref 1.85–7.62)
NEUTS SEG NFR BLD AUTO: 50 % (ref 43–75)
NRBC BLD AUTO-RTO: 0 /100 WBCS
PLATELET # BLD AUTO: 247 THOUSANDS/UL (ref 149–390)
PMV BLD AUTO: 10.5 FL (ref 8.9–12.7)
POTASSIUM SERPL-SCNC: 3.9 MMOL/L (ref 3.5–5.3)
RBC # BLD AUTO: 4.02 MILLION/UL (ref 3.81–5.12)
SODIUM SERPL-SCNC: 137 MMOL/L (ref 136–145)
WBC # BLD AUTO: 9.25 THOUSAND/UL (ref 4.31–10.16)

## 2021-07-24 PROCEDURE — 36415 COLL VENOUS BLD VENIPUNCTURE: CPT | Performed by: EMERGENCY MEDICINE

## 2021-07-24 PROCEDURE — 99284 EMERGENCY DEPT VISIT MOD MDM: CPT | Performed by: EMERGENCY MEDICINE

## 2021-07-24 PROCEDURE — 96372 THER/PROPH/DIAG INJ SC/IM: CPT

## 2021-07-24 PROCEDURE — 85025 COMPLETE CBC W/AUTO DIFF WBC: CPT | Performed by: EMERGENCY MEDICINE

## 2021-07-24 PROCEDURE — 99284 EMERGENCY DEPT VISIT MOD MDM: CPT

## 2021-07-24 PROCEDURE — 81025 URINE PREGNANCY TEST: CPT | Performed by: EMERGENCY MEDICINE

## 2021-07-24 PROCEDURE — 80048 BASIC METABOLIC PNL TOTAL CA: CPT | Performed by: EMERGENCY MEDICINE

## 2021-07-24 PROCEDURE — 74177 CT ABD & PELVIS W/CONTRAST: CPT

## 2021-07-24 PROCEDURE — G1004 CDSM NDSC: HCPCS

## 2021-07-24 RX ORDER — CEPHALEXIN 500 MG/1
500 CAPSULE ORAL EVERY 6 HOURS SCHEDULED
Qty: 28 CAPSULE | Refills: 0 | Status: SHIPPED | OUTPATIENT
Start: 2021-07-24 | End: 2021-07-31

## 2021-07-24 RX ORDER — KETOROLAC TROMETHAMINE 30 MG/ML
15 INJECTION, SOLUTION INTRAMUSCULAR; INTRAVENOUS ONCE
Status: COMPLETED | OUTPATIENT
Start: 2021-07-24 | End: 2021-07-24

## 2021-07-24 RX ADMIN — KETOROLAC TROMETHAMINE 15 MG: 30 INJECTION, SOLUTION INTRAMUSCULAR; INTRAVENOUS at 02:18

## 2021-07-24 RX ADMIN — IOHEXOL 100 ML: 350 INJECTION, SOLUTION INTRAVENOUS at 04:00

## 2021-07-24 NOTE — ED ATTENDING ATTESTATION
7/24/2021  Roosevelt Batista DO, saw and evaluated the patient  I have discussed the patient with the resident/non-physician practitioner and agree with the resident's/non-physician practitioner's findings, Plan of Care, and MDM as documented in the resident's/non-physician practitioner's note, except where noted  All available labs and Radiology studies were reviewed  I was present for key portions of any procedure(s) performed by the resident/non-physician practitioner and I was immediately available to provide assistance  At this point I agree with the current assessment done in the Emergency Department  I have conducted an independent evaluation of this patient a history and physical is as follows:    60-year-old female with history of chronic lower back pain presents for evaluation of acute on chronic lower back pain with associated swelling over the L5-S1 region  This has been ongoing for the past 3 days  There is a palpable mass in this area which is not erythematous or fluctuant  She states that she only has tenderness with very deep palpation  Denies radiation of pain, pain is constant, no other alleviating exacerbating factors  Denies urinary symptoms, fever, chills, focal weakness, numbness, tingling  Denies use of steroids or IV drugs  Examination there is an approximately 5 by 10 cm area of hard swelling lacking erythema and fluctuance  Point of care ultrasound is nondiagnostic will obtain CT scan for further evaluation        ED Course         Critical Care Time  Procedures

## 2021-07-24 NOTE — ED PROVIDER NOTES
History  Chief Complaint   Patient presents with    Back Pain     Pt started with a lump in the center of her lower back that has been getting larger over the past three days and causing more pain  Pt has no hx of abscess or cyst       Jessica Soctt is a 28y o  year old female with PMH of chronic low back pain presenting to the [facility] ED for back pain and swelling  Patient has had three days of worsening pain in the midline lower back pain  Nonradiating  Rated as 4/10  Pain is worse with changes in position  No weakness/numbness/tingling in LE  Patient has a lump in the region which is progressively worsening  Patient denies history of pilonidal cyst/abscess previously  Patient denies associated fever/chills  Patient has history of cellulitis in leg, no history of MRSA infection previously  The patient has taken ibuprofen and tramadol at home for symptomatic treatment  History notable for chronic low back pain and previously completed physical therapy  Patient states she is currently on her menstrual period  Denies history of kidney stones previously  Patient denies bowel/bladder incontinence, saddle anesthesia  Patient denies recent trauma to the area  Denies unexplained fever/night sweats, weight loss, neurologic symptoms, urinary retention  No history of bacteremia  Denies Hx of IVDU  No history of chronic steroid nor hx of cancer (breast, renal, lung)  History provided by:  Patient   used: No        Prior to Admission Medications   Prescriptions Last Dose Informant Patient Reported?  Taking?   acetaminophen (TYLENOL) 500 mg tablet  Self Yes No   Sig: Take 500 mg by mouth every 6 (six) hours as needed for mild pain   albuterol (PROVENTIL HFA,VENTOLIN HFA) 90 mcg/act inhaler   No No   Sig: Inhale 2 puffs every 6 (six) hours as needed for wheezing   Patient not taking: Reported on 1/2/2021   aspirin-acetaminophen-caffeine (EXCEDRIN MIGRAINE) 250-250-65 MG per tablet Not Taking at Unknown time  Yes No   Sig: Take 1 tablet by mouth every 6 (six) hours as needed for headaches   Patient not taking: Reported on 7/24/2021   escitalopram (LEXAPRO) 20 mg tablet   No No   Sig: Take 0 5 tablets (10 mg total) by mouth daily   Patient not taking: Reported on 9/3/2020   famotidine (PEPCID) 20 mg tablet Not Taking at Unknown time  No No   Sig: Take 1 tablet (20 mg total) by mouth 2 (two) times a day   Patient not taking: Reported on 7/24/2021   gabapentin (NEURONTIN) 300 mg capsule Not Taking at Unknown time  Yes No   Sig: Take 200 mg by mouth daily at bedtime   Patient not taking: Reported on 7/24/2021   ibuprofen (MOTRIN) 800 mg tablet 7/23/2021 at Unknown time  No Yes   Sig: TAKE 1 TABLET (800 MG TOTAL) BY MOUTH EVERY 8 (EIGHT) HOURS AS NEEDED FOR MILD PAIN   ipratropium (ATROVENT HFA) 17 mcg/act inhaler Not Taking at Unknown time  No No   Sig: Inhale 2 puffs every 6 (six) hours as needed for wheezing   Patient not taking: Reported on 7/24/2021   medroxyPROGESTERone (DEPO-PROVERA) 150 mg/mL injection   Yes Yes   Sig: Inject 150 mg into a muscle   traMADol (ULTRAM) 50 mg tablet 7/23/2021 at Unknown time  No Yes   Sig: Take 1 tablet (50 mg total) by mouth every 8 (eight) hours as needed for moderate pain      Facility-Administered Medications: None       Past Medical History:   Diagnosis Date    Asthma     Chlamydia infection        History reviewed  No pertinent surgical history  Family History   Problem Relation Age of Onset    No Known Problems Mother      I have reviewed and agree with the history as documented      E-Cigarette/Vaping    E-Cigarette Use Never User      E-Cigarette/Vaping Substances     Social History     Tobacco Use    Smoking status: Never Smoker    Smokeless tobacco: Never Used   Vaping Use    Vaping Use: Never used   Substance Use Topics    Alcohol use: Yes     Comment: social ; Denied history of alcohol use    Drug use: No        Review of Systems Constitutional: Negative for chills and fever  HENT: Negative for congestion  Respiratory: Negative for cough and shortness of breath  Cardiovascular: Negative for chest pain  Gastrointestinal: Negative for abdominal distention, abdominal pain, constipation, diarrhea, nausea and vomiting  Endocrine: Negative for polyuria  Genitourinary: Negative for difficulty urinating, dysuria, flank pain and hematuria  Musculoskeletal: Positive for back pain  Skin: Negative for rash  Neurological: Negative for syncope, light-headedness and headaches  Psychiatric/Behavioral: Negative for behavioral problems and confusion  All other systems reviewed and are negative  Physical Exam  ED Triage Vitals [07/24/21 0214]   Temperature Pulse Respirations Blood Pressure SpO2   98 1 °F (36 7 °C) 62 18 118/66 98 %      Temp Source Heart Rate Source Patient Position - Orthostatic VS BP Location FiO2 (%)   Oral Monitor Sitting Right arm --      Pain Score       9             Orthostatic Vital Signs  Vitals:    07/24/21 0214   BP: 118/66   Pulse: 62   Patient Position - Orthostatic VS: Sitting       Physical Exam  Vitals and nursing note reviewed  Constitutional:       General: She is not in acute distress  Appearance: Normal appearance  She is well-developed  She is not ill-appearing, toxic-appearing or diaphoretic  HENT:      Head: Normocephalic and atraumatic  Nose: No congestion or rhinorrhea  Eyes:      General:         Right eye: No discharge  Left eye: No discharge  Cardiovascular:      Rate and Rhythm: Normal rate and regular rhythm  Pulmonary:      Effort: Pulmonary effort is normal  No accessory muscle usage or respiratory distress  Breath sounds: Normal breath sounds  No stridor  No decreased breath sounds, wheezing, rhonchi or rales  Abdominal:      General: Bowel sounds are normal  There is no distension  Palpations: Abdomen is soft  Tenderness:  There is no abdominal tenderness  There is no right CVA tenderness, left CVA tenderness, guarding or rebound  Musculoskeletal:      Cervical back: Normal range of motion and neck supple  No bony tenderness  Thoracic back: No bony tenderness  Lumbar back: Swelling and edema present  No tenderness or bony tenderness  Right lower leg: No tenderness  No edema  Left lower leg: No tenderness  No edema  Comments: Confluent swelling along midline and paraspinal region of lower lumbar region  Skin:     Capillary Refill: Capillary refill takes less than 2 seconds  Findings: No erythema or rash  Comments: No erythema, rash or abscess noted in lumbar region on exam    Neurological:      Mental Status: She is alert and oriented to person, place, and time  GCS: GCS eye subscore is 4  GCS verbal subscore is 5  GCS motor subscore is 6        Gait: Gait normal       Comments: 5/5 strength b/l LE  Sensation grossly intact b/l LE   Psychiatric:         Mood and Affect: Mood normal          Behavior: Behavior normal          ED Medications  Medications   ketorolac (TORADOL) injection 15 mg (15 mg Intramuscular Given 7/24/21 0218)   iohexol (OMNIPAQUE) 350 MG/ML injection (SINGLE-DOSE) 100 mL (100 mL Intravenous Given 7/24/21 0400)       Diagnostic Studies  Results Reviewed     Procedure Component Value Units Date/Time    Basic metabolic panel [[id number]] Collected: 07/24/21 0315    Lab Status: Final result Specimen: Blood from Arm, Left Updated: 07/24/21 0343     Sodium 137 mmol/L      Potassium 3 9 mmol/L      Chloride 106 mmol/L      CO2 27 mmol/L      ANION GAP 4 mmol/L      BUN 14 mg/dL      Creatinine 0 76 mg/dL      Glucose 109 mg/dL      Calcium 8 6 mg/dL      eGFR 102 ml/min/1 73sq m     Narrative:      [place] guidelines for Chronic Kidney Disease (CKD):     Stage 1 with normal or high GFR (GFR > 90 mL/min/1 73 square meters)    Stage 2 Mild CKD (GFR = 60-89 mL/min/1 73 square meters)    Stage 3A Moderate CKD (GFR = 45-59 mL/min/1 73 square meters)    Stage 3B Moderate CKD (GFR = 30-44 mL/min/1 73 square meters)    Stage 4 Severe CKD (GFR = 15-29 mL/min/1 73 square meters)    Stage 5 End Stage CKD (GFR <15 mL/min/1 73 square meters)  Note: GFR calculation is accurate only with a steady state creatinine    POCT pregnancy, urine [[id number]]  (Normal) Resulted: 07/24/21 0337    Lab Status: Final result Updated: 07/24/21 0338     EXT PREG TEST UR (Ref: Negative) NEGATIVE     Control Valid    CBC and differential [[id number]] Collected: 07/24/21 0315    Lab Status: Final result Specimen: Blood from Arm, Left Updated: 07/24/21 0324     WBC 9 25 Thousand/uL      RBC 4 02 Million/uL      Hemoglobin 11 9 g/dL      Hematocrit 36 4 %      MCV 91 fL      MCH 29 6 pg      MCHC 32 7 g/dL      RDW 12 5 %      MPV 10 5 fL      Platelets 870 Thousands/uL      nRBC 0 /100 WBCs      Neutrophils Relative 50 %      Immat GRANS % 0 %      Lymphocytes Relative 36 %      Monocytes Relative 9 %      Eosinophils Relative 4 %      Basophils Relative 1 %      Neutrophils Absolute 4 60 Thousands/µL      Immature Grans Absolute 0 02 Thousand/uL      Lymphocytes Absolute 3 31 Thousands/µL      Monocytes Absolute 0 85 Thousand/µL      Eosinophils Absolute 0 39 Thousand/µL      Basophils Absolute 0 08 Thousands/µL                  CT abdomen pelvis with contrast   Final Result by Yosef Bell DO (07/24 1836)      Inflammatory changes overlying the right without definite drainable fluid collection  Workstation performed: [id number]               Procedures  Procedures      ED Course  ED Course as of Jul 24 0627   Sat Jul 24, 2021   0334 WBC: 9 25                             SBIRT 20yo+      Most Recent Value   SBIRT (25 yo +)   In order to provide better care to our patients, we are screening all of our patients for alcohol and drug use  Would it be okay to ask you these screening questions? Yes Filed at: 07/24/2021 0220   Initial Alcohol Screen: US AUDIT-C    1  How often do you have a drink containing alcohol? 1 Filed at: 07/24/2021 0220   2  How many drinks containing alcohol do you have on a typical day you are drinking? 1 Filed at: 07/24/2021 0220   3b  FEMALE Any Age, or MALE 65+: How often do you have 4 or more drinks on one occassion? 0 Filed at: 07/24/2021 0220   Audit-C Score  2 Filed at: 07/24/2021 2954   MARY: How many times in the past year have you    Used an illegal drug or used a prescription medication for non-medical reasons? Never Filed at: 07/24/2021 0220                MDM  Number of Diagnoses or Management Options  Acute midline low back pain without sciatica  Cellulitis of lower back  Diagnosis management comments:   28 y o  female presenting for low back pain  Afebrile, 5/5 strength b/l LE  No red flag symptoms  Confluent swelling noted in lumbar region without overlying rash  Will check labs and CT to eval for psoas abscess or other lumbar abnormality  Symptoms possibly related to developing cellulitis  Patient ambulatory in emergency department without assistance  After diagnostic lab testing and imaging, no definitive abnormality was noted that would explain the patient's symptoms  I explained to the patient the relevant test results  I explained to the patient that although no definitive diagnosis could be made today, the possibility exists that it may be too early in the disease process to diagnose serious illness  I have discussed with the patient our plan to discharge them from the ED and the patient is in agreement with this plan  The patient was provided a written after visit summary with strict RTED precautions  Discharge Plan: Rx for cephalexin, PCP f/u    Followup: I have discussed with the patient plan to follow up with their PCP   Contact information provided in AVS        Amount and/or Complexity of Data Reviewed  Clinical lab tests: ordered and reviewed  Tests in the radiology section of CPT®: ordered and reviewed  Review and summarize past medical records: yes  Independent visualization of images, tracings, or specimens: yes    Patient Progress  Patient progress: stable      Disposition  Final diagnoses:   Cellulitis of lower back   Acute midline low back pain without sciatica     Time reflects when diagnosis was documented in both MDM as applicable and the Disposition within this note     Time User Action Codes Description Comment    7/24/2021  4:49 AM Roxanna Carroll Add [L03 312] Cellulitis of lower back     7/24/2021  4:53 AM Roxanna Carroll Add [M54 5] Acute midline low back pain without sciatica       ED Disposition     ED Disposition Condition Date/Time Comment    Discharge Stable Sat Jul 24, 2021  4:49 AM [facility] discharge to home/self care  Follow-up Information     Follow up With Specialties Details Why [address], [place] Family Medicine Call  To make appointment for reevaluation in 3-5 days if symptoms do not improve   [address]            Discharge Medication List as of 7/24/2021  4:56 AM      START taking these medications    Details   cephalexin (KEFLEX) 500 mg capsule Take 1 capsule (500 mg total) by mouth every 6 (six) hours for 7 days, Starting Sat 7/24/2021, Until Sat 7/31/2021, Print         CONTINUE these medications which have NOT CHANGED    Details   ibuprofen (MOTRIN) 800 mg tablet TAKE 1 TABLET (800 MG TOTAL) BY MOUTH EVERY 8 (EIGHT) HOURS AS NEEDED FOR MILD PAIN, Starting Fri 6/4/2021, Normal      medroxyPROGESTERone (DEPO-PROVERA) 150 mg/mL injection Inject 150 mg into a muscle, Starting Fri 7/17/2020, Historical Med      traMADol (ULTRAM) 50 mg tablet Take 1 tablet (50 mg total) by mouth every 8 (eight) hours as needed for moderate pain, Starting Mon 7/12/2021, Normal      acetaminophen (TYLENOL) 500 mg tablet Take 500 mg by mouth every 6 (six) hours as needed for mild pain, Historical Med      albuterol (PROVENTIL HFA,VENTOLIN HFA) 90 mcg/act inhaler Inhale 2 puffs every 6 (six) hours as needed for wheezing, Starting Tue 10/15/2019, Print      aspirin-acetaminophen-caffeine (EXCEDRIN MIGRAINE) 250-250-65 MG per tablet Take 1 tablet by mouth every 6 (six) hours as needed for headaches, Historical Med      escitalopram (LEXAPRO) 20 mg tablet Take 0 5 tablets (10 mg total) by mouth daily, Starting Tue 9/1/2020, Print      famotidine (PEPCID) 20 mg tablet Take 1 tablet (20 mg total) by mouth 2 (two) times a day, Starting Sat 3/13/2021, Normal      gabapentin (NEURONTIN) 300 mg capsule Take 200 mg by mouth daily at bedtime, Historical Med      ipratropium (ATROVENT HFA) 17 mcg/act inhaler Inhale 2 puffs every 6 (six) hours as needed for wheezing, Starting Sat 3/13/2021, Normal           No discharge procedures on file  PDMP Review     None           ED Provider  Attending physically available and evaluated [facility]  I managed the patient along with the ED Attending      Electronically Signed by Ilya Saldana 162,   07/24/21 2046

## 2021-07-24 NOTE — DISCHARGE INSTRUCTIONS
You have been seen for low back pain  Please complete the full course of antibiotics as prescribed for cellulitis  Return to the emergency department if you develop worsening pain, fevers, weakness, lethargy or any other symptoms of concern  Please follow up with your PCP as needed by calling the number provided

## 2021-08-08 DIAGNOSIS — M76.31 ILIOTIBIAL BAND TENDINITIS OF RIGHT SIDE: ICD-10-CM

## 2021-08-08 DIAGNOSIS — G89.29 CHRONIC MIDLINE LOW BACK PAIN WITHOUT SCIATICA: ICD-10-CM

## 2021-08-08 DIAGNOSIS — M54.50 CHRONIC MIDLINE LOW BACK PAIN WITHOUT SCIATICA: ICD-10-CM

## 2021-08-09 RX ORDER — TRAMADOL HYDROCHLORIDE 50 MG/1
50 TABLET ORAL EVERY 8 HOURS PRN
Qty: 90 TABLET | Refills: 0 | Status: SHIPPED | OUTPATIENT
Start: 2021-08-09 | End: 2021-09-08

## 2021-08-09 RX ORDER — IBUPROFEN 800 MG/1
800 TABLET ORAL EVERY 8 HOURS PRN
Qty: 60 TABLET | Refills: 0 | Status: SHIPPED | OUTPATIENT
Start: 2021-08-09 | End: 2021-09-08

## 2021-08-13 ENCOUNTER — OFFICE VISIT (OUTPATIENT)
Dept: DENTISTRY | Facility: CLINIC | Age: 36
End: 2021-08-13

## 2021-08-13 VITALS — TEMPERATURE: 98.2 F

## 2021-08-13 DIAGNOSIS — K04.7 DENTAL ABSCESS: ICD-10-CM

## 2021-08-13 DIAGNOSIS — K08.89 PAIN, DENTAL: Primary | ICD-10-CM

## 2021-08-13 PROCEDURE — D0270 BITEWING - SINGLE RADIOGRAPHIC IMAGE: HCPCS | Performed by: DENTIST

## 2021-08-13 PROCEDURE — D0140 LIMITED ORAL EVALUATION - PROBLEM FOCUSED: HCPCS | Performed by: DENTIST

## 2021-08-13 PROCEDURE — D0220 INTRAORAL - PERIAPICAL FIRST RADIOGRAPHIC IMAGE: HCPCS | Performed by: DENTIST

## 2021-08-13 RX ORDER — AMOXICILLIN 500 MG/1
500 CAPSULE ORAL EVERY 8 HOURS SCHEDULED
Qty: 21 CAPSULE | Refills: 0 | Status: SHIPPED | OUTPATIENT
Start: 2021-08-13 | End: 2021-08-20

## 2021-08-13 NOTE — PROGRESS NOTES
Patient presents for limited exam with CC of "Pain from upper left for past 2 days"  [facility], [place], ASA II EOE WNl, no swelling noted, no lymph involvement noted  IOE shows intact IRM temporary filling in #15-O, no swelling or sinus tracts noted, #15 very sensitive to percussion (13 and 16 not sensitive to percussion), sensitive to palpation on buccal and palatal in posterior upper left  1 PA and 1 BW taken of #15 showing intact restoration, tooth deemed restorable, no PARL noted  RCT #15 was initiated in January 2021, but never completed  Amoxicillin 500mg q8h for 7 days prescribed  Discussed need to return for definitive Tx for Tooth #15 with patient, patient understands      NV: #15 RCT  NNV: Periodic exam

## 2021-08-19 RX ORDER — CLINDAMYCIN HYDROCHLORIDE 300 MG/1
300 CAPSULE ORAL
Qty: 21 CAPSULE | Refills: 0 | Status: SHIPPED | OUTPATIENT
Start: 2021-08-19 | End: 2021-08-26

## 2021-08-20 ENCOUNTER — OFFICE VISIT (OUTPATIENT)
Dept: DENTISTRY | Facility: CLINIC | Age: 36
End: 2021-08-20

## 2021-08-20 VITALS — SYSTOLIC BLOOD PRESSURE: 118 MMHG | DIASTOLIC BLOOD PRESSURE: 83 MMHG | HEART RATE: 61 BPM | TEMPERATURE: 97 F

## 2021-08-20 DIAGNOSIS — K04.01 PULPITIS: Primary | ICD-10-CM

## 2021-08-20 RX ORDER — METHYLPREDNISOLONE 4 MG/1
TABLET ORAL
Qty: 21 TABLET | Refills: 0 | Status: SHIPPED | OUTPATIENT
Start: 2021-08-20

## 2021-08-20 NOTE — PROGRESS NOTES
Emergency  S: CC: "I have pain in the upper left" I feel throbbing pain that is constant and keeps me up  I have had this pain since I started a root canal last week  Pt was given amoxicillin but after 4 days stated she was having discomfort and previous provider gave her clindamycin for 7 days instead  Pt still has pain  O:   #15:IRM visible in center of tooth  RCT has been initieated  (+) percussion, (+) palpation, (+) bite  Tooth is still in occlusion     A: Pain in #15 related to tooth still in occlusion  Lingual cusp is large and rounded  Suspected fracture is possible but tooth does not have deep pocketing  Pt has appt on 8/23 to continue endo and may have opportunity to visualize if there is a fracture once IRM is removed  P: Applied topical benzocaine, administered 1 carps 4% articaine 1:100k epi via infiltration  #15 taken out of occlusion  Prescribed medrol chalino to alleviate pain until 8/23 to continue endo  Pt stated she feels better once tooth has been numbed  Pt left in good spirits       NV: Continue RCT #15

## 2021-08-23 ENCOUNTER — OFFICE VISIT (OUTPATIENT)
Dept: DENTISTRY | Facility: CLINIC | Age: 36
End: 2021-08-23

## 2021-08-23 VITALS — SYSTOLIC BLOOD PRESSURE: 120 MMHG | DIASTOLIC BLOOD PRESSURE: 87 MMHG | TEMPERATURE: 99 F | HEART RATE: 54 BPM

## 2021-08-23 DIAGNOSIS — K08.89 PAIN, DENTAL: Primary | ICD-10-CM

## 2021-08-23 DIAGNOSIS — K04.1 NECROSIS OF DENTAL PULP: ICD-10-CM

## 2021-08-23 PROCEDURE — D9110 PALLIATIVE (EMERGENCY) TREATMENT OF DENTAL PAIN - MINOR PROCEDURE: HCPCS | Performed by: DENTIST

## 2021-08-24 NOTE — PROGRESS NOTES
Patient presents for palliative Tx for pain from previously initiated RCT #15  [facility], [place], ASA II   EOE WNL  IOE shows slight tenderness to percussion and palpation of #15       1 Carpule of 4% articaine w/1:100,000 epi given for buccal and palatal infiltration  Rubber dam isolation  Temp restoration and cotton pellets removed  Visualized palatal, MB, DB canals  Debrided necrotic canals with hand files with RC prep lubrication and sodium hypochlorite irrigation  Calcium hydroxide placed into canals, cotton pellets placed over canal orifices, temporized with IRM, occlusion checked and adjusted  Bitewing radiograph taken confirming restorability  NV: #15 RCT

## 2021-09-08 DIAGNOSIS — G89.29 CHRONIC MIDLINE LOW BACK PAIN WITHOUT SCIATICA: ICD-10-CM

## 2021-09-08 DIAGNOSIS — M54.50 CHRONIC MIDLINE LOW BACK PAIN WITHOUT SCIATICA: ICD-10-CM

## 2021-09-08 DIAGNOSIS — M76.31 ILIOTIBIAL BAND TENDINITIS OF RIGHT SIDE: ICD-10-CM

## 2021-09-08 RX ORDER — IBUPROFEN 800 MG/1
800 TABLET ORAL EVERY 8 HOURS PRN
Qty: 60 TABLET | Refills: 0 | Status: SHIPPED | OUTPATIENT
Start: 2021-09-08 | End: 2021-10-05

## 2021-09-08 RX ORDER — TRAMADOL HYDROCHLORIDE 50 MG/1
50 TABLET ORAL EVERY 8 HOURS PRN
Qty: 90 TABLET | Refills: 0 | Status: SHIPPED | OUTPATIENT
Start: 2021-09-08 | End: 2021-10-05

## 2021-10-05 DIAGNOSIS — M54.50 CHRONIC MIDLINE LOW BACK PAIN WITHOUT SCIATICA: ICD-10-CM

## 2021-10-05 DIAGNOSIS — G89.29 CHRONIC MIDLINE LOW BACK PAIN WITHOUT SCIATICA: ICD-10-CM

## 2021-10-05 DIAGNOSIS — M76.31 ILIOTIBIAL BAND TENDINITIS OF RIGHT SIDE: ICD-10-CM

## 2021-10-05 RX ORDER — TRAMADOL HYDROCHLORIDE 50 MG/1
50 TABLET ORAL EVERY 8 HOURS PRN
Qty: 90 TABLET | Refills: 0 | Status: SHIPPED | OUTPATIENT
Start: 2021-10-05 | End: 2021-11-10

## 2021-10-05 RX ORDER — IBUPROFEN 800 MG/1
800 TABLET ORAL EVERY 8 HOURS PRN
Qty: 60 TABLET | Refills: 0 | Status: SHIPPED | OUTPATIENT
Start: 2021-10-05 | End: 2021-11-29

## 2021-10-12 ENCOUNTER — HOSPITAL ENCOUNTER (EMERGENCY)
Facility: HOSPITAL | Age: 36
Discharge: HOME | End: 2021-10-12
Attending: EMERGENCY MEDICINE
Payer: COMMERCIAL

## 2021-10-12 VITALS
DIASTOLIC BLOOD PRESSURE: 82 MMHG | RESPIRATION RATE: 22 BRPM | TEMPERATURE: 98 F | OXYGEN SATURATION: 99 % | SYSTOLIC BLOOD PRESSURE: 130 MMHG | HEART RATE: 66 BPM

## 2021-10-12 DIAGNOSIS — L03.115 CELLULITIS OF RIGHT LOWER LEG: Primary | ICD-10-CM

## 2021-10-12 PROCEDURE — 99284 EMERGENCY DEPT VISIT MOD MDM: CPT | Performed by: EMERGENCY MEDICINE

## 2021-10-12 PROCEDURE — 99282 EMERGENCY DEPT VISIT SF MDM: CPT

## 2021-10-12 RX ORDER — CEPHALEXIN 500 MG/1
500 CAPSULE ORAL EVERY 6 HOURS SCHEDULED
Qty: 28 CAPSULE | Refills: 0 | Status: SHIPPED | OUTPATIENT
Start: 2021-10-12 | End: 2021-10-19

## 2021-10-15 ENCOUNTER — HOSPITAL ENCOUNTER (EMERGENCY)
Facility: HOSPITAL | Age: 36
Discharge: HOME | End: 2021-10-16
Attending: EMERGENCY MEDICINE | Admitting: EMERGENCY MEDICINE
Payer: COMMERCIAL

## 2021-10-15 VITALS
RESPIRATION RATE: 18 BRPM | DIASTOLIC BLOOD PRESSURE: 82 MMHG | SYSTOLIC BLOOD PRESSURE: 127 MMHG | HEART RATE: 85 BPM | TEMPERATURE: 97.8 F | BODY MASS INDEX: 23.05 KG/M2 | OXYGEN SATURATION: 99 % | WEIGHT: 135 LBS | HEIGHT: 64 IN

## 2021-10-15 DIAGNOSIS — M25.469 KNEE SWELLING: ICD-10-CM

## 2021-10-15 DIAGNOSIS — L03.115 CELLULITIS OF RIGHT LOWER EXTREMITY: Primary | ICD-10-CM

## 2021-10-15 DIAGNOSIS — M25.561 RIGHT KNEE PAIN: ICD-10-CM

## 2021-10-15 LAB
ALBUMIN SERPL BCP-MCNC: 3.3 G/DL (ref 3.5–5)
ALP SERPL-CCNC: 71 U/L (ref 46–116)
ALT SERPL W P-5'-P-CCNC: 29 U/L (ref 12–78)
ANION GAP SERPL CALCULATED.3IONS-SCNC: 5 MMOL/L (ref 4–13)
AST SERPL W P-5'-P-CCNC: 24 U/L (ref 5–45)
BASOPHILS # BLD AUTO: 0.05 THOUSANDS/ΜL (ref 0–0.1)
BASOPHILS NFR BLD AUTO: 1 % (ref 0–1)
BILIRUB SERPL-MCNC: 0.32 MG/DL (ref 0.2–1)
BUN SERPL-MCNC: 12 MG/DL (ref 5–25)
CALCIUM ALBUM COR SERPL-MCNC: 9.6 MG/DL (ref 8.3–10.1)
CALCIUM SERPL-MCNC: 9 MG/DL (ref 8.3–10.1)
CHLORIDE SERPL-SCNC: 110 MMOL/L (ref 100–108)
CO2 SERPL-SCNC: 25 MMOL/L (ref 21–32)
CREAT SERPL-MCNC: 0.91 MG/DL (ref 0.6–1.3)
EOSINOPHIL # BLD AUTO: 0.27 THOUSAND/ΜL (ref 0–0.61)
EOSINOPHIL NFR BLD AUTO: 3 % (ref 0–6)
ERYTHROCYTE [DISTWIDTH] IN BLOOD BY AUTOMATED COUNT: 12.3 % (ref 11.6–15.1)
GFR SERPL CREATININE-BSD FRML MDRD: 81 ML/MIN/1.73SQ M
GLUCOSE SERPL-MCNC: 129 MG/DL (ref 65–140)
HCT VFR BLD AUTO: 36.7 % (ref 34.8–46.1)
HGB BLD-MCNC: 12.1 G/DL (ref 11.5–15.4)
IMM GRANULOCYTES # BLD AUTO: 0.03 THOUSAND/UL (ref 0–0.2)
IMM GRANULOCYTES NFR BLD AUTO: 0 % (ref 0–2)
LACTATE SERPL-SCNC: 1.9 MMOL/L (ref 0.5–2)
LYMPHOCYTES # BLD AUTO: 1.89 THOUSANDS/ΜL (ref 0.6–4.47)
LYMPHOCYTES NFR BLD AUTO: 20 % (ref 14–44)
MCH RBC QN AUTO: 29.4 PG (ref 26.8–34.3)
MCHC RBC AUTO-ENTMCNC: 33 G/DL (ref 31.4–37.4)
MCV RBC AUTO: 89 FL (ref 82–98)
MONOCYTES # BLD AUTO: 0.87 THOUSAND/ΜL (ref 0.17–1.22)
MONOCYTES NFR BLD AUTO: 9 % (ref 4–12)
NEUTROPHILS # BLD AUTO: 6.29 THOUSANDS/ΜL (ref 1.85–7.62)
NEUTS SEG NFR BLD AUTO: 67 % (ref 43–75)
NRBC BLD AUTO-RTO: 0 /100 WBCS
PLATELET # BLD AUTO: 249 THOUSANDS/UL (ref 149–390)
PMV BLD AUTO: 10.3 FL (ref 8.9–12.7)
POTASSIUM SERPL-SCNC: 3.5 MMOL/L (ref 3.5–5.3)
PROT SERPL-MCNC: 7.3 G/DL (ref 6.4–8.2)
RBC # BLD AUTO: 4.12 MILLION/UL (ref 3.81–5.12)
SODIUM SERPL-SCNC: 140 MMOL/L (ref 136–145)
WBC # BLD AUTO: 9.4 THOUSAND/UL (ref 4.31–10.16)

## 2021-10-15 PROCEDURE — 99284 EMERGENCY DEPT VISIT MOD MDM: CPT | Performed by: EMERGENCY MEDICINE

## 2021-10-15 PROCEDURE — 96372 THER/PROPH/DIAG INJ SC/IM: CPT

## 2021-10-15 PROCEDURE — 85025 COMPLETE CBC W/AUTO DIFF WBC: CPT | Performed by: EMERGENCY MEDICINE

## 2021-10-15 PROCEDURE — 87040 BLOOD CULTURE FOR BACTERIA: CPT | Performed by: EMERGENCY MEDICINE

## 2021-10-15 PROCEDURE — 99283 EMERGENCY DEPT VISIT LOW MDM: CPT

## 2021-10-15 PROCEDURE — 83605 ASSAY OF LACTIC ACID: CPT | Performed by: EMERGENCY MEDICINE

## 2021-10-15 PROCEDURE — 36415 COLL VENOUS BLD VENIPUNCTURE: CPT

## 2021-10-15 PROCEDURE — 80053 COMPREHEN METABOLIC PANEL: CPT | Performed by: EMERGENCY MEDICINE

## 2021-10-15 RX ORDER — KETOROLAC TROMETHAMINE 30 MG/ML
30 INJECTION, SOLUTION INTRAMUSCULAR; INTRAVENOUS ONCE
Status: COMPLETED | OUTPATIENT
Start: 2021-10-15 | End: 2021-10-15

## 2021-10-15 RX ORDER — DOXYCYCLINE 100 MG/1
100 TABLET ORAL 2 TIMES DAILY
Qty: 20 TABLET | Refills: 0 | Status: SHIPPED | OUTPATIENT
Start: 2021-10-15 | End: 2021-10-25

## 2021-10-15 RX ORDER — DOXYCYCLINE HYCLATE 100 MG/1
100 CAPSULE ORAL ONCE
Status: COMPLETED | OUTPATIENT
Start: 2021-10-15 | End: 2021-10-15

## 2021-10-15 RX ORDER — ACETAMINOPHEN 325 MG/1
975 TABLET ORAL ONCE
Status: COMPLETED | OUTPATIENT
Start: 2021-10-15 | End: 2021-10-15

## 2021-10-15 RX ADMIN — DOXYCYCLINE 100 MG: 100 CAPSULE ORAL at 23:13

## 2021-10-15 RX ADMIN — ACETAMINOPHEN 975 MG: 325 TABLET, FILM COATED ORAL at 23:13

## 2021-10-15 RX ADMIN — KETOROLAC TROMETHAMINE 30 MG: 30 INJECTION, SOLUTION INTRAMUSCULAR at 23:13

## 2021-10-21 LAB — BACTERIA BLD CULT: NORMAL

## 2021-11-04 ENCOUNTER — OFFICE VISIT (OUTPATIENT)
Dept: FAMILY MEDICINE CLINIC | Facility: CLINIC | Age: 36
End: 2021-11-04
Payer: COMMERCIAL

## 2021-11-04 VITALS
RESPIRATION RATE: 18 BRPM | SYSTOLIC BLOOD PRESSURE: 100 MMHG | WEIGHT: 150 LBS | HEIGHT: 64 IN | DIASTOLIC BLOOD PRESSURE: 60 MMHG | BODY MASS INDEX: 25.61 KG/M2 | TEMPERATURE: 98 F | HEART RATE: 86 BPM | OXYGEN SATURATION: 95 %

## 2021-11-04 DIAGNOSIS — M79.605 PAIN OF LEFT LOWER EXTREMITY: Primary | ICD-10-CM

## 2021-11-04 PROCEDURE — 99214 OFFICE O/P EST MOD 30 MIN: CPT | Performed by: FAMILY MEDICINE

## 2021-11-04 RX ORDER — SULFAMETHOXAZOLE AND TRIMETHOPRIM 800; 160 MG/1; MG/1
TABLET ORAL
Qty: 20 TABLET | Refills: 0 | Status: SHIPPED | OUTPATIENT
Start: 2021-11-04 | End: 2021-11-14

## 2021-11-10 DIAGNOSIS — M76.31 ILIOTIBIAL BAND TENDINITIS OF RIGHT SIDE: ICD-10-CM

## 2021-11-10 RX ORDER — TRAMADOL HYDROCHLORIDE 50 MG/1
50 TABLET ORAL EVERY 8 HOURS PRN
Qty: 90 TABLET | Refills: 0 | Status: SHIPPED | OUTPATIENT
Start: 2021-11-10 | End: 2021-12-14

## 2021-11-28 DIAGNOSIS — G89.29 CHRONIC MIDLINE LOW BACK PAIN WITHOUT SCIATICA: ICD-10-CM

## 2021-11-28 DIAGNOSIS — M54.50 CHRONIC MIDLINE LOW BACK PAIN WITHOUT SCIATICA: ICD-10-CM

## 2021-11-29 RX ORDER — IBUPROFEN 800 MG/1
800 TABLET ORAL EVERY 8 HOURS PRN
Qty: 60 TABLET | Refills: 0 | Status: SHIPPED | OUTPATIENT
Start: 2021-11-29 | End: 2021-12-14

## 2021-12-12 DIAGNOSIS — M54.50 CHRONIC MIDLINE LOW BACK PAIN WITHOUT SCIATICA: ICD-10-CM

## 2021-12-12 DIAGNOSIS — M76.31 ILIOTIBIAL BAND TENDINITIS OF RIGHT SIDE: ICD-10-CM

## 2021-12-12 DIAGNOSIS — G89.29 CHRONIC MIDLINE LOW BACK PAIN WITHOUT SCIATICA: ICD-10-CM

## 2021-12-14 RX ORDER — IBUPROFEN 800 MG/1
800 TABLET ORAL EVERY 8 HOURS PRN
Qty: 60 TABLET | Refills: 0 | Status: SHIPPED | OUTPATIENT
Start: 2021-12-14 | End: 2022-01-17

## 2021-12-14 RX ORDER — TRAMADOL HYDROCHLORIDE 50 MG/1
50 TABLET ORAL EVERY 8 HOURS PRN
Qty: 90 TABLET | Refills: 0 | Status: SHIPPED | OUTPATIENT
Start: 2021-12-14 | End: 2022-01-17

## 2022-02-28 DIAGNOSIS — M76.31 ILIOTIBIAL BAND TENDINITIS OF RIGHT SIDE: ICD-10-CM

## 2022-02-28 RX ORDER — TRAMADOL HYDROCHLORIDE 50 MG/1
50 TABLET ORAL EVERY 8 HOURS PRN
Qty: 90 TABLET | Refills: 0 | Status: SHIPPED | OUTPATIENT
Start: 2022-02-28 | End: 2022-04-12

## 2022-04-12 DIAGNOSIS — M76.31 ILIOTIBIAL BAND TENDINITIS OF RIGHT SIDE: ICD-10-CM

## 2022-04-12 RX ORDER — TRAMADOL HYDROCHLORIDE 50 MG/1
50 TABLET ORAL EVERY 8 HOURS PRN
Qty: 90 TABLET | Refills: 0 | Status: SHIPPED | OUTPATIENT
Start: 2022-04-12

## 2022-05-16 ENCOUNTER — HOSPITAL ENCOUNTER (EMERGENCY)
Facility: HOSPITAL | Age: 37
Discharge: HOME | End: 2022-05-16
Attending: EMERGENCY MEDICINE | Admitting: EMERGENCY MEDICINE
Payer: COMMERCIAL

## 2022-05-16 VITALS
TEMPERATURE: 97.9 F | RESPIRATION RATE: 17 BRPM | DIASTOLIC BLOOD PRESSURE: 87 MMHG | HEART RATE: 82 BPM | SYSTOLIC BLOOD PRESSURE: 118 MMHG | OXYGEN SATURATION: 98 %
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
## 2022-05-16 DIAGNOSIS — M54.50 LOW BACK PAIN: ICD-10-CM

## 2022-05-16 DIAGNOSIS — M76.31 ILIOTIBIAL BAND TENDINITIS OF RIGHT SIDE: ICD-10-CM

## 2022-05-16 LAB
BACTERIA UR QL AUTO: ABNORMAL /HPF
BILIRUB UR QL STRIP: NEGATIVE
CLARITY UR: CLEAR
COLOR UR: YELLOW
EXT PREG TEST URINE: ABNORMAL
EXT. CONTROL ED NAV: ABNORMAL
GLUCOSE UR STRIP-MCNC: NEGATIVE MG/DL
HGB UR QL STRIP.AUTO: ABNORMAL
KETONES UR STRIP-MCNC: NEGATIVE MG/DL
LEUKOCYTE ESTERASE UR QL STRIP: NEGATIVE
MUCOUS THREADS UR QL AUTO: ABNORMAL
NITRITE UR QL STRIP: NEGATIVE
NON-SQ EPI CELLS URNS QL MICRO: ABNORMAL /HPF
PH UR STRIP.AUTO: 6 [PH] (ref 4.5–8)
PROT UR STRIP-MCNC: NEGATIVE MG/DL
RBC #/AREA URNS AUTO: ABNORMAL /HPF
SP GR UR STRIP.AUTO: >=1.03 (ref 1–1.03)
UROBILINOGEN UR QL STRIP.AUTO: 0.2 E.U./DL
WBC #/AREA URNS AUTO: ABNORMAL /HPF

## 2022-05-16 PROCEDURE — 81025 URINE PREGNANCY TEST: CPT | Performed by: EMERGENCY MEDICINE

## 2022-05-16 PROCEDURE — 99283 EMERGENCY DEPT VISIT LOW MDM: CPT

## 2022-05-16 PROCEDURE — 87086 URINE CULTURE/COLONY COUNT: CPT

## 2022-05-16 PROCEDURE — 81001 URINALYSIS AUTO W/SCOPE: CPT

## 2022-05-16 PROCEDURE — 76815 OB US LIMITED FETUS(S): CPT | Performed by: EMERGENCY MEDICINE

## 2022-05-16 PROCEDURE — 99284 EMERGENCY DEPT VISIT MOD MDM: CPT | Performed by: EMERGENCY MEDICINE

## 2022-05-16 RX ORDER — ACETAMINOPHEN 500 MG
500 TABLET ORAL EVERY 6 HOURS PRN
Qty: 30 TABLET | Refills: 0 | Status: SHIPPED | OUTPATIENT
Start: 2022-05-16

## 2022-05-16 RX ORDER — ACETAMINOPHEN 325 MG/1
975 TABLET ORAL ONCE
Status: COMPLETED | OUTPATIENT
Start: 2022-05-16 | End: 2022-05-16

## 2022-05-16 RX ADMIN — ACETAMINOPHEN 975 MG: 325 TABLET, FILM COATED ORAL at 13:15

## 2022-05-16 NOTE — ED ATTENDING ATTESTATION
Final Diagnosis:  1  Fall, initial encounter    2  Low back pain      ED Course as of 05/16/22 1435   Mon May 16, 2022   1318 Leukocytes, UA: Negative   1319 Nitrite, UA: Negative   1319 SL AMB SPECIFIC GRAVITY_URINE: >=1 030   1409 MUCUS THREADS(!): Moderate   1409 Epithelial Cells: None Seen   1409 Bacteria, UA: None Seen       ILorenzo MD, saw and evaluated the patient  All available labs and X-rays were ordered by me or the resident and have been reviewed by myself  I discussed the patient with the resident / non-physician and agree with the resident's / non-physician practitioner's findings and plan as documented in the resident's / non-physician practicitioner's note, except where noted  At this point, I agree with the current assessment done in the ED  I was present during key portions of all procedures performed unless otherwise stated  Chief Complaint   Patient presents with    Fall     Pt reports falling down 13 hardwood steps this morning  Pt reports landing on her back, denies headstrike  Pt reports just finding out last week that she is pregnant  Pt states after the fall she has been experiencing lower abdominal cramping      This is a 39 y o  female presenting for evaluation of LBP  The patient is pregnant (has IUP on ultrasound here; no vaginal complaints, no vaginal bleeding, this is her 4th pregnancy)  She slipped, fell, hit her back  No f/ch/nv/cp/sob  Tried no medications  PMH:   has a past medical history of Asthma and Chlamydia infection  PSH:   has no past surgical history on file      Social:  Social History     Substance and Sexual Activity   Alcohol Use Not Currently    Comment: social ; Denied history of alcohol use     Social History     Tobacco Use   Smoking Status Never Smoker   Smokeless Tobacco Never Used     Social History     Substance and Sexual Activity   Drug Use No     PE:  Vitals:    05/16/22 1156 05/16/22 1158   BP:  118/87   Pulse: 82    Resp: 17    Temp: 97 9 °F (36 6 °C)    TempSrc: Oral    SpO2: 98%    General: VSS, NAD, awake, alert  Well-nourished, well-developed  Appears stated age  Head: Normocephalic, atraumatic, nontender  Eyes: PERRL, EOM-I  No diplopia  No hyphema  No subconjunctival hemorrhages  Symmetrical lids  ENTAtraumatic external nose and ears  MMM  No stridor  Normal phonation  No drooling  Base of mouth is soft  No mastoid tenderness  Neck: Symmetric, trachea midline  No JVD  CV: Peripheral pulses +2 throughout  No chest wall tenderness  Lungs:   Unlabored   No retractions  No crepitus  No tachypnea  No paradoxical motion  Abd: +BS, soft, NT/ND  No guarding  No rigidity  No rebound  No peritoneal signs  MSK:   FROM   EHL/FHL/PF/DF/KF/KE/HF/HE 5/5  No saddle anesthesia  No midline   Back:   No C/T/L-spine tenderness  +paravertebral tenderness bilaterally equal  Skin: Dry, intact  Neuro: AAOx3, GCS 15, CN II-XII grossly intact  Motor grossly intact  Psychiatric/Behavioral: Appropriate mood and affect   Exam: deferred  A:  - LBP  P:  - supportive measures  - DC    - 13 point ROS was performed and all are normal unless stated in the history above  - Nursing note reviewed  Vitals reviewed  - Orders placed by myself and/or advanced practitioner / resident     - Previous chart was reviewed  - No language barrier    - History obtained from patient  - There are no limitations to the history obtained  - Critical care time: Not applicable for this patient       Code Status: No Order  Advance Directive and Living Will:      Power of :    POLST:      Medications   acetaminophen (TYLENOL) tablet 975 mg (975 mg Oral Given 5/16/22 1315)     No orders to display     Orders Placed This Encounter   Procedures    Pelvic Ultrasound    Urine culture    Urine Microscopic    POCT pregnancy, urine     Labs Reviewed   URINE MICROSCOPIC - Abnormal       Result Value Ref Range Status    RBC, UA None Seen  None Seen, 1-2 /hpf Final    WBC, UA 1-2  None Seen, 1-2 /hpf Final    Epithelial Cells None Seen  None Seen, Occasional /hpf Final    Bacteria, UA None Seen  None Seen, Occasional /hpf Final    MUCUS THREADS Moderate (*) None Seen Final   POCT PREGNANCY, URINE - Abnormal    EXT PREG TEST UR (Ref: Negative) abnormal   Final    Control valid   Final   URINE MACROSCOPIC, POC - Abnormal    Color, UA Yellow   Final    Clarity, UA Clear   Final    pH, UA 6 0  4 5 - 8 0 Final    Leukocytes, UA Negative  Negative Final    Nitrite, UA Negative  Negative Final    Protein, UA Negative  Negative mg/dl Final    Glucose, UA Negative  Negative mg/dl Final    Ketones, UA Negative  Negative mg/dl Final    Urobilinogen, UA 0 2  0 2, 1 0 E U /dl E U /dl Final    Bilirubin, UA Negative  Negative Final    Blood, UA Trace (*) Negative Final    Specific Gravity, UA >=1 030  1 003 - 1 030 Final    Narrative:     CLINITEK RESULT   URINE CULTURE     Time reflects when diagnosis was documented in both MDM as applicable and the Disposition within this note     Time User Action Codes Description Comment    5/16/2022  2:07 PM Augustine Robison Add Ovie Long  BYBC] Fall, initial encounter     5/16/2022  2:07 PM Augustine Robison Add [M54 50] Low back pain       ED Disposition     ED Disposition   Discharge    Condition   Stable    Date/Time   Mon May 16, 2022  2:07 PM    Comment   [place] discharge to home/self care                 Follow-up Information     Follow up With Specialties Details Why Contact Info Additional Information    Junito Burris MD [facility] Medicine Call   [address] [facility] [address] [place] [address] Obstetrics and Gynecology Call  For re-evaluation [address]  [address] [facility] [address]  [address], [address], [address] [place], [place], [address] [phone]        Discharge Medication List as of 5/16/2022  2:08 PM      START taking these medications    Details   !! acetaminophen (TYLENOL) 500 mg tablet Take 1 tablet (500 mg total) by mouth every 6 (six) hours as needed for mild pain, Starting Mon 5/16/2022, Normal       !! - Potential duplicate medications found  Please discuss with provider  CONTINUE these medications which have NOT CHANGED    Details   !! acetaminophen (TYLENOL) 500 mg tablet Take 500 mg by mouth every 6 (six) hours as needed for mild pain, Historical Med      albuterol (PROVENTIL HFA,VENTOLIN HFA) 90 mcg/act inhaler Inhale 2 puffs every 6 (six) hours as needed for wheezing, Starting Tue 10/15/2019, Print      aspirin-acetaminophen-caffeine (EXCEDRIN MIGRAINE) 250-250-65 MG per tablet Take 1 tablet by mouth every 6 (six) hours as needed for headaches, Historical Med      escitalopram (LEXAPRO) 20 mg tablet Take 0 5 tablets (10 mg total) by mouth daily, Starting Tue 9/1/2020, Print      famotidine (PEPCID) 20 mg tablet Take 1 tablet (20 mg total) by mouth 2 (two) times a day, Starting Sat 3/13/2021, Normal      gabapentin (NEURONTIN) 300 mg capsule Take 200 mg by mouth daily at bedtime, Historical Med      ibuprofen (MOTRIN) 800 mg tablet TAKE 1 TABLET (800 MG TOTAL) BY MOUTH EVERY 8 (EIGHT) HOURS AS NEEDED FOR MILD PAIN, Starting Mon 1/17/2022, Normal      ipratropium (ATROVENT HFA) 17 mcg/act inhaler Inhale 2 puffs every 6 (six) hours as needed for wheezing, Starting Sat 3/13/2021, Normal      medroxyPROGESTERone (DEPO-PROVERA) 150 mg/mL injection Inject 150 mg into a muscle, Starting Fri 7/17/2020, Historical Med      methylPREDNISolone 4 MG tablet therapy pack Use as directed on package, Normal      traMADol (ULTRAM) 50 mg tablet TAKE 1 TABLET (50 MG TOTAL) BY MOUTH EVERY 8 (EIGHT) HOURS AS NEEDED FOR MODERATE PAIN, Starting Tue 4/12/2022, Normal       !! - Potential duplicate medications found  Please discuss with provider          No discharge procedures on file   Prior to Admission Medications   Prescriptions Last Dose Informant Patient Reported?  Taking?   acetaminophen (TYLENOL) 500 mg tablet Not Taking at Unknown time Self Yes No   Sig: Take 500 mg by mouth every 6 (six) hours as needed for mild pain   Patient not taking: No sig reported   albuterol (PROVENTIL HFA,VENTOLIN HFA) 90 mcg/act inhaler Past Month at Unknown time  No Yes   Sig: Inhale 2 puffs every 6 (six) hours as needed for wheezing   aspirin-acetaminophen-caffeine (EXCEDRIN MIGRAINE) 250-250-65 MG per tablet Not Taking at Unknown time  Yes No   Sig: Take 1 tablet by mouth every 6 (six) hours as needed for headaches   Patient not taking: No sig reported   escitalopram (LEXAPRO) 20 mg tablet Not Taking at Unknown time  No No   Sig: Take 0 5 tablets (10 mg total) by mouth daily   Patient not taking: No sig reported   famotidine (PEPCID) 20 mg tablet Not Taking at Unknown time  No No   Sig: Take 1 tablet (20 mg total) by mouth 2 (two) times a day   Patient not taking: No sig reported   gabapentin (NEURONTIN) 300 mg capsule Not Taking at Unknown time  Yes No   Sig: Take 200 mg by mouth daily at bedtime   Patient not taking: No sig reported   ibuprofen (MOTRIN) 800 mg tablet Past Month at Unknown time  No Yes   Sig: TAKE 1 TABLET (800 MG TOTAL) BY MOUTH EVERY 8 (EIGHT) HOURS AS NEEDED FOR MILD PAIN   ipratropium (ATROVENT HFA) 17 mcg/act inhaler Not Taking at Unknown time  No No   Sig: Inhale 2 puffs every 6 (six) hours as needed for wheezing   Patient not taking: No sig reported   medroxyPROGESTERone (DEPO-PROVERA) 150 mg/mL injection More than a month at Unknown time  Yes No   Sig: Inject 150 mg into a muscle   methylPREDNISolone 4 MG tablet therapy pack Not Taking at Unknown time  No No   Sig: Use as directed on package   Patient not taking: No sig reported   traMADol (ULTRAM) 50 mg tablet More than a month at Unknown time  No No   Sig: TAKE 1 TABLET (50 MG TOTAL) BY MOUTH EVERY 8 (EIGHT) HOURS AS NEEDED FOR MODERATE PAIN      Facility-Administered Medications: None       Portions of the record may have been created with voice recognition software  Occasional wrong word or "sound a like" substitutions may have occurred due to the inherent limitations of voice recognition software  Read the chart carefully and recognize, using context, where substitutions have occurred      Electronically signed by:  Kobe Gaspar

## 2022-05-16 NOTE — ED PROVIDER NOTES
History  Chief Complaint   Patient presents with    Fall     Pt reports falling down 13 hardwood steps this morning  Pt reports landing on her back, denies headstrike  Pt reports just finding out last week that she is pregnant  Pt states after the fall she has been experiencing lower abdominal cramping      39year old F with a PMHx of back pain, last known menstrual period on March 29th, who presents for fall  Patient states she fell down 13 hardwood steps this morning  This was a mechanical fall, she slipped on liquid that was on the floor on the top step  She denies head strike, LOC  No AP/AC  Has not taken anything for pain  She reports no weakness, no numbness, or tingling  Her only pain is in her left lower back, radiates to left leg and left buttocks  She was able to ambulate here  Her only concern is that she felt some slight abdominal cramping after without N/V/D and without urinary changes, and thought she would come in  No vaginal bleeding  No fevers, no IV drug use, no bilateral lower extremity weakness, urinary or fecal incontinence, no saddle anesthesia, no regular steroid use  ROS otherwise negative  Prior to Admission Medications   Prescriptions Last Dose Informant Patient Reported?  Taking?   acetaminophen (TYLENOL) 500 mg tablet Not Taking at Unknown time Self Yes No   Sig: Take 500 mg by mouth every 6 (six) hours as needed for mild pain   Patient not taking: No sig reported   albuterol (PROVENTIL HFA,VENTOLIN HFA) 90 mcg/act inhaler Past Month at Unknown time  No Yes   Sig: Inhale 2 puffs every 6 (six) hours as needed for wheezing   aspirin-acetaminophen-caffeine (EXCEDRIN MIGRAINE) 250-250-65 MG per tablet Not Taking at Unknown time  Yes No   Sig: Take 1 tablet by mouth every 6 (six) hours as needed for headaches   Patient not taking: No sig reported   escitalopram (LEXAPRO) 20 mg tablet Not Taking at Unknown time  No No   Sig: Take 0 5 tablets (10 mg total) by mouth daily   Patient not taking: No sig reported   famotidine (PEPCID) 20 mg tablet Not Taking at Unknown time  No No   Sig: Take 1 tablet (20 mg total) by mouth 2 (two) times a day   Patient not taking: No sig reported   gabapentin (NEURONTIN) 300 mg capsule Not Taking at Unknown time  Yes No   Sig: Take 200 mg by mouth daily at bedtime   Patient not taking: No sig reported   ibuprofen (MOTRIN) 800 mg tablet Past Month at Unknown time  No Yes   Sig: TAKE 1 TABLET (800 MG TOTAL) BY MOUTH EVERY 8 (EIGHT) HOURS AS NEEDED FOR MILD PAIN   ipratropium (ATROVENT HFA) 17 mcg/act inhaler Not Taking at Unknown time  No No   Sig: Inhale 2 puffs every 6 (six) hours as needed for wheezing   Patient not taking: No sig reported   medroxyPROGESTERone (DEPO-PROVERA) 150 mg/mL injection More than a month at Unknown time  Yes No   Sig: Inject 150 mg into a muscle   methylPREDNISolone 4 MG tablet therapy pack Not Taking at Unknown time  No No   Sig: Use as directed on package   Patient not taking: No sig reported   traMADol (ULTRAM) 50 mg tablet More than a month at Unknown time  No No   Sig: TAKE 1 TABLET (50 MG TOTAL) BY MOUTH EVERY 8 (EIGHT) HOURS AS NEEDED FOR MODERATE PAIN      Facility-Administered Medications: None       Past Medical History:   Diagnosis Date    Asthma     Chlamydia infection        History reviewed  No pertinent surgical history  Family History   Problem Relation Age of Onset    No Known Problems Mother      I have reviewed and agree with the history as documented  E-Cigarette/Vaping    E-Cigarette Use Never User      E-Cigarette/Vaping Substances    Nicotine No     THC No      Social History     Tobacco Use    Smoking status: Never Smoker    Smokeless tobacco: Never Used   Vaping Use    Vaping Use: Never used   Substance Use Topics    Alcohol use: Not Currently     Comment: social ; Denied history of alcohol use    Drug use: No        Review of Systems   Constitutional: Negative for chills and fever     HENT: Negative for congestion, rhinorrhea and sore throat  Respiratory: Negative for cough and shortness of breath  Cardiovascular: Negative for chest pain and palpitations  Gastrointestinal: Positive for abdominal pain  Negative for constipation, diarrhea, nausea and vomiting  Genitourinary: Negative for difficulty urinating and flank pain  Musculoskeletal: Positive for back pain  Negative for arthralgias  Neurological: Negative for dizziness, weakness, light-headedness and headaches  Psychiatric/Behavioral: Negative for agitation, behavioral problems and confusion  All other systems reviewed and are negative  Physical Exam  ED Triage Vitals   Temperature Pulse Respirations Blood Pressure SpO2   05/16/22 1156 05/16/22 1156 05/16/22 1156 05/16/22 1158 05/16/22 1156   97 9 °F (36 6 °C) 82 17 118/87 98 %      Temp Source Heart Rate Source Patient Position - Orthostatic VS BP Location FiO2 (%)   05/16/22 1156 05/16/22 1156 05/16/22 1156 05/16/22 1156 --   Oral Monitor Lying Right arm       Pain Score       05/16/22 1156       4             Orthostatic Vital Signs  Vitals:    05/16/22 1156 05/16/22 1158   BP:  118/87   Pulse: 82    Patient Position - Orthostatic VS: Lying        Physical Exam  Constitutional:       Appearance: She is well-developed  HENT:      Head: Normocephalic and atraumatic  Nose: No congestion or rhinorrhea  Eyes:      Pupils: Pupils are equal, round, and reactive to light  Cardiovascular:      Rate and Rhythm: Normal rate and regular rhythm  Heart sounds: Normal heart sounds  No murmur heard  No friction rub  Pulmonary:      Effort: Pulmonary effort is normal  No respiratory distress  Breath sounds: Normal breath sounds  No wheezing or rales  Abdominal:      General: Bowel sounds are normal  There is no distension  Palpations: Abdomen is soft  Tenderness: There is no abdominal tenderness  Comments: Soft belly, non-tender, non-distended  Musculoskeletal:         General: Tenderness present  Normal range of motion  Cervical back: Normal range of motion and neck supple  Comments: Patient has tenderness in left lumbar back, without midline tenderness  No weakness in the legs, 5/5 strength throughout  No C-spine tenderness   Skin:     General: Skin is warm  Comments: No ecchymosis appreciated anywhere   Neurological:      Mental Status: She is alert and oriented to person, place, and time  Cranial Nerves: No cranial nerve deficit  Sensory: No sensory deficit  Motor: No weakness  Coordination: Coordination normal       Comments: No sensory changes on exam    Psychiatric:         Behavior: Behavior normal          Thought Content: Thought content normal          Judgment: Judgment normal          ED Medications  Medications   acetaminophen (TYLENOL) tablet 975 mg (975 mg Oral Given 5/16/22 1315)       Diagnostic Studies  Results Reviewed     Procedure Component Value Units Date/Time    Urine Microscopic [[id number]]  (Abnormal) Collected: 05/16/22 1225    Lab Status: Final result Specimen: Urine, Clean Catch Updated: 05/16/22 1406     RBC, UA None Seen /hpf      WBC, UA 1-2 /hpf      Epithelial Cells None Seen /hpf      Bacteria, UA None Seen /hpf      MUCUS THREADS Moderate    POCT pregnancy, urine [[id number]]  (Abnormal) Resulted: 05/16/22 1225    Lab Status: Final result Updated: 05/16/22 1249     EXT PREG TEST UR (Ref: Negative) abnormal     Control valid    Urine culture [[id number]] Collected: 05/16/22 1225    Lab Status:  In process Specimen: Urine, Clean Catch Updated: 05/16/22 1237    Urine Macroscopic, POC [[id number]]  (Abnormal) Collected: 05/16/22 1225    Lab Status: Final result Specimen: Urine Updated: 05/16/22 1226     Color, UA Yellow     Clarity, UA Clear     pH, UA 6 0     Leukocytes, UA Negative     Nitrite, UA Negative     Protein, UA Negative mg/dl      Glucose, UA Negative mg/dl      Ketones, UA Negative mg/dl      Urobilinogen, UA 0 2 E U /dl      Bilirubin, UA Negative     Blood, UA Trace     Specific Gravity, UA >=1 030    Narrative:      CLINITEK RESULT                 No orders to display         Procedures  POC Pelvic US    Date/Time: 5/16/2022 12:52 PM  Performed by: Dary Roberson MD  Authorized by: Dary Roberson MD     Patient location:  ED  Other Assisting Provider: Yes (comment) (Dr Digna Mcclure)    Procedure details:     Exam Type:  Diagnostic and educational    Indications: evaluate for IUP and pregnant with abdominal pain      Assessment for: determine estimated gestational age, confirm intrauterine pregnancy and evaluate fetal viability      Technique:  Transabdominal obstetric (HCG+) exam    Views obtained: uterus (transverse and sagittal)      Image quality: diagnostic      Image availability:  Images available in PACS  Uterine findings:     Single gestation: identified      Gestational sac: identified      Yolk sac: identified      Fetal heart rate (bpm): Unable to identify  Other findings:     Free pelvic fluid: not identified      Free peritoneal fluid: not identified    Interpretation:     Ultrasound impressions comment:  IUP found    Pregnancy findings: intrauterine pregnancy (IUP)            ED Course  ED Course as of 05/16/22 2152   Mon May 16, 2022   1309 Will await urine micro, tylenol   1409 RBC, UA: None Seen   1409 Epithelial Cells: None Seen   1409 Bacteria, UA: None Seen   1409 WBC, UA: 1-2                                       MDM  Number of Diagnoses or Management Options  Fall, initial encounter  Low back pain  Diagnosis management comments: Patient's presentation is consistent with muscular pain  Will check urine, urine preg  Will treat with tylenol  Patient is very well-appearing, readily able to ambulate  US was performed that was limited, but demonstrates IUP   Difficulty obtaining HR although upon attending and my eval, it does appear that FHR is present  Pt dc'd with strict return precautions after negative UA  Disposition  Final diagnoses:   Fall, initial encounter   Low back pain     Time reflects when diagnosis was documented in both MDM as applicable and the Disposition within this note     Time User Action Codes Description Comment    5/16/2022  2:07 PM [facility], [facility] [[id number]  LDPL] Fall, initial encounter     5/16/2022  2:07 PM Martina Sports Add [M54 50] Low back pain       ED Disposition     ED Disposition   Discharge    Condition   Stable    Date/Time   Mon May 16, 2022  2:07 PM    Comment   [place] discharge to home/self care  Follow-up Information     Follow up With Specialties Details Why Contact Info Additional Information    Alondra Christianson MD [facility] Medicine Call   [facility] Obstetrics and Gynecology Call  For re-evaluation [facility] [address] [facility] [address]  [address] [place] [address], [address] [place] [address], [address] [place] [address], [address] [phone]          Discharge Medication List as of 5/16/2022  2:08 PM      START taking these medications    Details   !! acetaminophen (TYLENOL) 500 mg tablet Take 1 tablet (500 mg total) by mouth every 6 (six) hours as needed for mild pain, Starting Mon 5/16/2022, Normal       !! - Potential duplicate medications found  Please discuss with provider        CONTINUE these medications which have NOT CHANGED    Details   !! acetaminophen (TYLENOL) 500 mg tablet Take 500 mg by mouth every 6 (six) hours as needed for mild pain, Historical Med      albuterol (PROVENTIL HFA,VENTOLIN HFA) 90 mcg/act inhaler Inhale 2 puffs every 6 (six) hours as needed for wheezing, Starting Tue 10/15/2019, Print      aspirin-acetaminophen-caffeine (EXCEDRIN MIGRAINE) 250-250-65 MG per tablet Take 1 tablet by mouth every 6 (six) hours as needed for headaches, Historical Med      escitalopram (LEXAPRO) 20 mg tablet Take 0 5 tablets (10 mg total) by mouth daily, Starting Tue 9/1/2020, Print      famotidine (PEPCID) 20 mg tablet Take 1 tablet (20 mg total) by mouth 2 (two) times a day, Starting Sat 3/13/2021, Normal      gabapentin (NEURONTIN) 300 mg capsule Take 200 mg by mouth daily at bedtime, Historical Med      ibuprofen (MOTRIN) 800 mg tablet TAKE 1 TABLET (800 MG TOTAL) BY MOUTH EVERY 8 (EIGHT) HOURS AS NEEDED FOR MILD PAIN, Starting Mon 1/17/2022, Normal      ipratropium (ATROVENT HFA) 17 mcg/act inhaler Inhale 2 puffs every 6 (six) hours as needed for wheezing, Starting Sat 3/13/2021, Normal      medroxyPROGESTERone (DEPO-PROVERA) 150 mg/mL injection Inject 150 mg into a muscle, Starting Fri 7/17/2020, Historical Med      methylPREDNISolone 4 MG tablet therapy pack Use as directed on package, Normal      traMADol (ULTRAM) 50 mg tablet TAKE 1 TABLET (50 MG TOTAL) BY MOUTH EVERY 8 (EIGHT) HOURS AS NEEDED FOR MODERATE PAIN, Starting Tue 4/12/2022, Normal       !! - Potential duplicate medications found  Please discuss with provider  No discharge procedures on file  PDMP Review       Value Time User    PDMP Reviewed  Yes 8/9/2021  1:53 PM Saima Mccain, [address]           ED Provider  Attending physically available and evaluated [facility]  I managed the patient along with the ED Attending      Electronically Signed by         Gene Garcia MD  05/16/22 0653

## 2022-05-16 NOTE — Clinical Note
Bonny Matthews was seen and treated in our emergency department on 5/16/2022  Diagnosis:     Isidra    She may return on this date:     Excused on 5/16     If you have any questions or concerns, please don't hesitate to call        Yoni Del Angel MD    ______________________________           _______________          _______________  Hospital Representative                              Date                                Time

## 2022-05-17 RX ORDER — TRAMADOL HYDROCHLORIDE 50 MG/1
50 TABLET ORAL EVERY 8 HOURS PRN
Qty: 90 TABLET | Refills: 0 | OUTPATIENT
Start: 2022-05-17

## 2022-05-17 NOTE — TELEPHONE ENCOUNTER
If there is any chance that pt is pregnant she should not be using Tramadol   She needs to discuss this with her OBGYN

## 2022-05-18 LAB — BACTERIA UR CULT: ABNORMAL

## 2022-05-24 ENCOUNTER — HOSPITAL ENCOUNTER (EMERGENCY)
Facility: HOSPITAL | Age: 37
Discharge: HOME | End: 2022-05-24
Attending: EMERGENCY MEDICINE
Payer: COMMERCIAL

## 2022-05-24 ENCOUNTER — APPOINTMENT (EMERGENCY)
Dept: RADIOLOGY | Facility: HOSPITAL | Age: 37
End: 2022-05-24
Payer: COMMERCIAL

## 2022-05-24 VITALS
SYSTOLIC BLOOD PRESSURE: 137 MMHG | RESPIRATION RATE: 18 BRPM | HEART RATE: 90 BPM | TEMPERATURE: 97.9 F | DIASTOLIC BLOOD PRESSURE: 101 MMHG | OXYGEN SATURATION: 99 %

## 2022-05-24 DIAGNOSIS — J06.9 URI (UPPER RESPIRATORY INFECTION): ICD-10-CM

## 2022-05-24 DIAGNOSIS — O20.0 THREATENED MISCARRIAGE IN EARLY PREGNANCY: Primary | ICD-10-CM

## 2022-05-24 LAB
B-HCG SERPL-ACNC: ABNORMAL MIU/ML
FLUAV RNA RESP QL NAA+PROBE: NEGATIVE
FLUBV RNA RESP QL NAA+PROBE: NEGATIVE
RSV RNA RESP QL NAA+PROBE: NEGATIVE
SARS-COV-2 RNA RESP QL NAA+PROBE: NEGATIVE

## 2022-05-24 PROCEDURE — 99284 EMERGENCY DEPT VISIT MOD MDM: CPT

## 2022-05-24 PROCEDURE — 36415 COLL VENOUS BLD VENIPUNCTURE: CPT | Performed by: EMERGENCY MEDICINE

## 2022-05-24 PROCEDURE — 76801 OB US < 14 WKS SINGLE FETUS: CPT

## 2022-05-24 PROCEDURE — 84702 CHORIONIC GONADOTROPIN TEST: CPT | Performed by: EMERGENCY MEDICINE

## 2022-05-24 PROCEDURE — 99284 EMERGENCY DEPT VISIT MOD MDM: CPT | Performed by: EMERGENCY MEDICINE

## 2022-05-24 PROCEDURE — 0241U HB NFCT DS VIR RESP RNA 4 TRGT: CPT | Performed by: EMERGENCY MEDICINE

## 2022-05-24 RX ORDER — ACETAMINOPHEN 325 MG/1
650 TABLET ORAL ONCE
Status: COMPLETED | OUTPATIENT
Start: 2022-05-24 | End: 2022-05-24

## 2022-05-24 RX ADMIN — ACETAMINOPHEN 650 MG: 325 TABLET, FILM COATED ORAL at 12:08

## 2022-05-24 RX ADMIN — DEXAMETHASONE SODIUM PHOSPHATE 10 MG: 10 INJECTION, SOLUTION INTRAMUSCULAR; INTRAVENOUS at 12:08

## 2022-05-24 NOTE — ED PROVIDER NOTES
History  Chief Complaint   Patient presents with    Sore Throat     Pt reports sore throat and now losing her voice    Vaginal Bleeding - Pregnant     Pt reports approx 4wks pregnant with vaginal bleeding last night  Denies cramping/pain       HPI    80-year-old female, , approximately 5 weeks pregnant by last known menstrual period, presenting for evaluation of sore throat and vaginal bleeding  Patient states that over the past 3-4 days she has had sore throat, cough, congestion  She has taken number over-the-counter medications without relief of her symptoms  She denies being vaccinated for COVID-19 and/ or influenza  Admits to 3 prior COVID-19 infections, most recently approximately 3-4 months ago  Patient also admits to 1 episode of vaginal bleeding yesterday where she passed a large blood clot  Denied any pelvic pain, pelvic cramping  She has not had any additional episodes of vaginal bleeding  Patient is O positive  Patient has not any routine OB follow-up for this pregnancy, 1st appointment is early   Denies fever, chills, nausea, vomiting, urinary symptoms, abdominal pain, chest pain, shortness of breath, neck pain, neck stiffness, difficulty swallowing  Prior to Admission Medications   Prescriptions Last Dose Informant Patient Reported?  Taking?   acetaminophen (TYLENOL) 500 mg tablet  Self Yes No   Sig: Take 500 mg by mouth every 6 (six) hours as needed for mild pain   Patient not taking: No sig reported   acetaminophen (TYLENOL) 500 mg tablet   No No   Sig: Take 1 tablet (500 mg total) by mouth every 6 (six) hours as needed for mild pain   albuterol (PROVENTIL HFA,VENTOLIN HFA) 90 mcg/act inhaler   No No   Sig: Inhale 2 puffs every 6 (six) hours as needed for wheezing   aspirin-acetaminophen-caffeine (EXCEDRIN MIGRAINE) 250-250-65 MG per tablet   Yes No   Sig: Take 1 tablet by mouth every 6 (six) hours as needed for headaches   Patient not taking: No sig reported   escitalopram (LEXAPRO) 20 mg tablet   No No   Sig: Take 0 5 tablets (10 mg total) by mouth daily   Patient not taking: No sig reported   famotidine (PEPCID) 20 mg tablet   No No   Sig: Take 1 tablet (20 mg total) by mouth 2 (two) times a day   Patient not taking: No sig reported   gabapentin (NEURONTIN) 300 mg capsule   Yes No   Sig: Take 200 mg by mouth daily at bedtime   Patient not taking: No sig reported   ibuprofen (MOTRIN) 800 mg tablet   No No   Sig: TAKE 1 TABLET (800 MG TOTAL) BY MOUTH EVERY 8 (EIGHT) HOURS AS NEEDED FOR MILD PAIN   ipratropium (ATROVENT HFA) 17 mcg/act inhaler   No No   Sig: Inhale 2 puffs every 6 (six) hours as needed for wheezing   Patient not taking: No sig reported   medroxyPROGESTERone (DEPO-PROVERA) 150 mg/mL injection   Yes No   Sig: Inject 150 mg into a muscle   methylPREDNISolone 4 MG tablet therapy pack   No No   Sig: Use as directed on package   Patient not taking: No sig reported   traMADol (ULTRAM) 50 mg tablet   No No   Sig: TAKE 1 TABLET (50 MG TOTAL) BY MOUTH EVERY 8 (EIGHT) HOURS AS NEEDED FOR MODERATE PAIN      Facility-Administered Medications: None       Past Medical History:   Diagnosis Date    Asthma     Chlamydia infection        No past surgical history on file  Family History   Problem Relation Age of Onset    No Known Problems Mother      I have reviewed and agree with the history as documented  E-Cigarette/Vaping    E-Cigarette Use Never User      E-Cigarette/Vaping Substances    Nicotine No     THC No      Social History     Tobacco Use    Smoking status: Never Smoker    Smokeless tobacco: Never Used   Vaping Use    Vaping Use: Never used   Substance Use Topics    Alcohol use: Not Currently     Comment: social ; Denied history of alcohol use    Drug use: No        Review of Systems   Constitutional: Negative for chills and fever  HENT: Positive for congestion and sore throat  Respiratory: Positive for cough  Negative for shortness of breath  Cardiovascular: Negative for chest pain, palpitations and leg swelling  Gastrointestinal: Negative for abdominal pain, diarrhea, nausea and vomiting  Genitourinary: Positive for vaginal bleeding  Negative for dysuria and frequency  Musculoskeletal: Negative for myalgias  Skin: Negative for rash and wound  Neurological: Negative for dizziness, light-headedness and headaches  All other systems reviewed and are negative  Physical Exam  ED Triage Vitals [05/24/22 1131]   Temperature Pulse Respirations Blood Pressure SpO2   97 9 °F (36 6 °C) 90 18 (!) 137/101 99 %      Temp Source Heart Rate Source Patient Position - Orthostatic VS BP Location FiO2 (%)   Temporal Monitor Sitting Left arm --      Pain Score       4             Orthostatic Vital Signs  Vitals:    05/24/22 1131   BP: (!) 137/101   Pulse: 90   Patient Position - Orthostatic VS: Sitting       Physical Exam  Vitals and nursing note reviewed  Constitutional:       General: She is not in acute distress  Appearance: Normal appearance  She is not ill-appearing or toxic-appearing  HENT:      Head: Normocephalic and atraumatic  Right Ear: External ear normal       Left Ear: External ear normal       Nose: Nose normal       Mouth/Throat:      Mouth: Mucous membranes are moist       Pharynx: Oropharynx is clear  Uvula midline  No uvula swelling  Tonsils: No tonsillar exudate or tonsillar abscesses  2+ on the right  2+ on the left  Eyes:      General: No scleral icterus  Extraocular Movements: Extraocular movements intact  Cardiovascular:      Rate and Rhythm: Normal rate and regular rhythm  Pulses: Normal pulses  Pulmonary:      Effort: Pulmonary effort is normal  No respiratory distress  Breath sounds: Normal breath sounds  Abdominal:      Palpations: Abdomen is soft  Tenderness: There is no abdominal tenderness  There is no guarding or rebound  Musculoskeletal:         General: Normal range of motion  Cervical back: Normal range of motion and neck supple  Skin:     General: Skin is warm  Capillary Refill: Capillary refill takes less than 2 seconds  Neurological:      General: No focal deficit present  Mental Status: She is alert and oriented to person, place, and time  ED Medications  Medications   dexamethasone oral liquid 10 mg 1 mL (10 mg Oral Given 5/24/22 1208)   acetaminophen (TYLENOL) tablet 650 mg (650 mg Oral Given 5/24/22 1208)       Diagnostic Studies  Results Reviewed     Procedure Component Value Units Date/Time    hCG, quantitative [[id number]]  (Abnormal) Collected: 05/24/22 1212    Lab Status: Final result Specimen: Blood from Arm, Left Updated: 05/24/22 1340     HCG, Quant 11,192 mIU/mL     Narrative:       Expected Ranges:     Approximate               Approximate HCG  Gestation age          Concentration ( mIU/mL)  _____________          ______________________   Shellia Leys                      HCG values  0 2-1                       5-50  1-2                           2-3                         100-5000  3-4                         500-97842  4-5                         1000-16382  5-6                         [id number]  6-8                         [id number]  8-12                        [id number]      COVID/FLU/RSV - 2 hour TAT [[id number]]  (Normal) Collected: 05/24/22 1212    Lab Status: Final result Specimen: Nares from Nose Updated: 05/24/22 1311     SARS-CoV-2 Negative     INFLUENZA A PCR Negative     INFLUENZA B PCR Negative     RSV PCR Negative    Narrative:      FOR PEDIATRIC PATIENTS - copy/paste COVID Guidelines URL to browser: [email or web address]/  [employer]ix    SARS-CoV-2 assay is a Nucleic Acid Amplification assay intended for the  qualitative detection of nucleic acid from SARS-CoV-2 in nasopharyngeal  swabs  Results are for the presumptive identification of SARS-CoV-2 RNA      Positive results are indicative of infection with SARS-CoV-2, the virus  causing COVID-19, but do not rule out bacterial infection or co-infection  with other viruses  Laboratories within the [place] and its  territories are required to report all positive results to the appropriate  public health authorities  Negative results do not preclude SARS-CoV-2  infection and should not be used as the sole basis for treatment or other  patient management decisions  Negative results must be combined with  clinical observations, patient history, and epidemiological information  This test has not been FDA cleared or approved  This test has been authorized by FDA under an Emergency Use Authorization  (EUA)  This test is only authorized for the duration of time the  declaration that circumstances exist justifying the authorization of the  emergency use of an in vitro diagnostic tests for detection of SARS-CoV-2  virus and/or diagnosis of COVID-19 infection under section 564(b)(1) of  the Act, 21 U  S C  968FSQ-2(B)(8), unless the authorization is terminated  or revoked sooner  The test has been validated but independent review by FDA  and CLIA is pending  Test performed using [employer] Gene[employer]pert: This RT-PCR assay targets N2,  a region unique to SARS-CoV-2  A conserved region in the E-gene was chosen  for pan-Sarbecovirus detection which includes SARS-CoV-2                  US OB < 14 weeks with transvaginal   Final Result by Marva Hurtado MD (05/24 9738)      Suspicious for nonviable intrauterine pregnancy given irregular contour of the gestational sac, disproportionately large relative to the fetal pole  No fetal cardiac activity is identified though this is not definitive in a fetal pole less than 6 mm  Confirmation with serial beta-hCGs recommended  The study was marked in [facility] for immediate notification        Workstation performed: [id number]               Procedures  POC Pelvic US    Date/Time: 5/24/2022 1:00 PM  Performed by: Yonas Rossi DO  Authorized by: Yonas Rossi DO     Patient location:  ED  Procedure details:     Exam Type:  Diagnostic    Indications: evaluate for IUP and pregnant with vaginal bleeding      Assessment for: confirm intrauterine pregnancy      Technique:  Transabdominal obstetric (HCG+) exam    Views obtained: uterus (transverse and sagittal)      Image quality: non-diagnostic      Image availability:  Not saved  Uterine findings:     Single gestation: not identified      Gestational sac: not identified      Yolk sac: not identified      Fetal pole: not identified      Fetal heart rate: not identified    Interpretation:     Ultrasound impressions: indeterminate      Pregnancy findings: indeterminate    Comments: Indeterminate TAUS, will get TVUS  ED Course  ED Course as of 22 1555   Tue May 24, 2022   1317 COVID/FLU/RSV - 2 hour TAT  Negative   1350 HCG QUANTITATIVE(!): 11,192                                       MDM  Number of Diagnoses or Management Options  Threatened miscarriage in early pregnancy  URI (upper respiratory infection)  Diagnosis management comments: 29-year-old female, , 5 weeks pregnant presenting for evaluation of sore throat and vaginal bleeding  On examination patient has mild tonsillar swelling, is otherwise well-appearing and has no abdominal tenderness  I suspect that the patient has a URI, will get COVID, flu, RSV testing and treat with Tylenol, dexamethasone  For vaginal bleeding, concern for miscarriage versus ectopic pregnancy  Patient is O positive per chart review  Will not require RhoGAM   Will check a quantitative beta HCG, attempt transabdominal ultrasound, will require transvaginal ultrasound if transabdominal is indeterminate  Unable to confirm IUP on my transabdominal ultrasound, will get transvaginal ultrasound       Transvaginal ultrasound shows "Suspicious for nonviable intrauterine pregnancy given irregular contour of the gestational sac, disproportionately large relative to the fetal pole  No fetal cardiac activity is identified though this is not definitive in a fetal pole less than 6 mm  Confirmation with serial beta-hCGs recommended"  Patient was informed for results, repeat beta-hCG was ordered, patient was advised to follow-up with her OB, given return to ED precautions  I reviewed all testing with the patient: b-hcg, COVID, flu, RSV, TVUS  I gave oral return precautions for what to return for in addition to the written return precautions  The patient (and any family present: ) verbalized understanding of the discharge instructions and warnings that would necessitate return to the Emergency Department  I specifically highlighted areas of special concern regarding the written and verbal discharge instructions and return precautions  All questions were answered prior to discharge  Disposition  Final diagnoses:   Threatened miscarriage in early pregnancy   URI (upper respiratory infection)     Time reflects when diagnosis was documented in both MDM as applicable and the Disposition within this note     Time User Action Codes Description Comment    5/24/2022  3:13 PM Clearence Sill Add [O20 0] Threatened miscarriage in early pregnancy     5/24/2022  3:15 PM Clearence Sill Add [J06 9] URI (upper respiratory infection)       ED Disposition     ED Disposition   Discharge    Condition   Stable    Date/Time   Tue May 24, 2022  3:15 PM    Comment   [place] discharge to home/self care                 Follow-up Information     Follow up With Specialties Details Why Contact Info Additional [facility]  For Emergency Department Follow-up [address] [place] [id number]  [address], [address] [place] [address], [address] [place], [place], [phone] [address] [place] Emergency Department Emergency Medicine  If symptoms worsen [address]  [address] [address] [address] Emergency Department, [facility], [place], [place], [address]          Discharge Medication List as of 5/24/2022  3:18 PM      CONTINUE these medications which have NOT CHANGED    Details   !! acetaminophen (TYLENOL) 500 mg tablet Take 500 mg by mouth every 6 (six) hours as needed for mild pain, Historical Med      !! acetaminophen (TYLENOL) 500 mg tablet Take 1 tablet (500 mg total) by mouth every 6 (six) hours as needed for mild pain, Starting Mon 5/16/2022, Normal      albuterol (PROVENTIL HFA,VENTOLIN HFA) 90 mcg/act inhaler Inhale 2 puffs every 6 (six) hours as needed for wheezing, Starting Tue 10/15/2019, Print      aspirin-acetaminophen-caffeine (EXCEDRIN MIGRAINE) 250-250-65 MG per tablet Take 1 tablet by mouth every 6 (six) hours as needed for headaches, Historical Med      escitalopram (LEXAPRO) 20 mg tablet Take 0 5 tablets (10 mg total) by mouth daily, Starting Tue 9/1/2020, Print      famotidine (PEPCID) 20 mg tablet Take 1 tablet (20 mg total) by mouth 2 (two) times a day, Starting Sat 3/13/2021, Normal      gabapentin (NEURONTIN) 300 mg capsule Take 200 mg by mouth daily at bedtime, Historical Med      ibuprofen (MOTRIN) 800 mg tablet TAKE 1 TABLET (800 MG TOTAL) BY MOUTH EVERY 8 (EIGHT) HOURS AS NEEDED FOR MILD PAIN, Starting Mon 1/17/2022, Normal      ipratropium (ATROVENT HFA) 17 mcg/act inhaler Inhale 2 puffs every 6 (six) hours as needed for wheezing, Starting Sat 3/13/2021, Normal      medroxyPROGESTERone (DEPO-PROVERA) 150 mg/mL injection Inject 150 mg into a muscle, Starting Fri 7/17/2020, Historical Med      methylPREDNISolone 4 MG tablet therapy pack Use as directed on package, Normal      traMADol (ULTRAM) 50 mg tablet TAKE 1 TABLET (50 MG TOTAL) BY MOUTH EVERY 8 (EIGHT) HOURS AS NEEDED FOR MODERATE PAIN, Starting Tue 4/12/2022, Normal       !! - Potential duplicate medications found  Please discuss with provider  Outpatient Discharge Orders   hCG, quantitative   Standing Status: Future Standing Exp  Date: 05/24/23       PDMP Review       Value Time User    PDMP Reviewed  Yes 8/9/2021  1:53 PM Saima Brewer Ron, [address]           ED Provider  Attending physically available and evaluated [facility]  I managed the patient along with the ED Attending      Electronically Signed by         Desmond Santiago DO  05/24/22 0017

## 2022-05-24 NOTE — ED ATTENDING ATTESTATION
5/24/2022  I, Tony Cuellar DO, saw and evaluated the patient  I have discussed the patient with the resident/non-physician practitioner and agree with the resident's/non-physician practitioner's findings, Plan of Care, and MDM as documented in the resident's/non-physician practitioner's note, except where noted  All available labs and Radiology studies were reviewed  I was present for key portions of any procedure(s) performed by the resident/non-physician practitioner and I was immediately available to provide assistance  At this point I agree with the current assessment done in the Emergency Department  I have conducted an independent evaluation of this patient a history and physical is as follows:      39 yof with pregnancy, bleeding, sore throat  lnmp about 5 weeks gestation  Passed blood last night  No abdominal pain  No other modifying factors or associated symptoms other normal exam   Has not had ultrasound yet    Plan is ultrasound likely viral syndrome confirm IUP    ED Course         Critical Care Time  Procedures

## 2022-05-24 NOTE — DISCHARGE INSTRUCTIONS
US: "Suspicious for nonviable intrauterine pregnancy given irregular contour of the gestational sac, disproportionately large relative to the fetal pole  No fetal cardiac activity is identified though this is not definitive in a fetal pole less than 6 mm  Confirmation with serial beta-hCGs recommended"     Please follow up with your OB and get your b-hcg in 2-3 days  Return to the ED if symptoms worsen

## 2022-05-28 NOTE — ED PROVIDER NOTES
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 but denies any contact with chemicals that could get in her eyes  She does not operate heavy machinery or been cutting metal or anything else that could enter into her eyes  She states that yesterday at work she started to have a foreign body sensation in her eyes that felt like sandpaper and when she got home from work her eyes were both very red  She does wear contacts but has not been wearing them since she noticed the eye redness  They are monthly contact consult several last changed about a week ago  She has blurry vision that is worse especially when she looks up  Does not have a foreign body sensation any longer  Only other symptom is a runny nose  No other rashes  No new detergents, soaps, sprays, or other environmental exposures that are new  The patient denies cough, sore throat, fevers, headache  The patient has no sick contacts, recent travel history, new or changing medications, or immunocompromise  History provided by:  Medical records and patient   used: No    Eye Problem      Prior to Admission Medications   Prescriptions Last Dose Informant Patient Reported?  Taking?   acetaminophen (TYLENOL) 500 mg tablet  Self Yes No   Sig: Take 500 mg by mouth every 6 (six) hours as needed for mild pain   albuterol (PROVENTIL HFA,VENTOLIN HFA) 90 mcg/act inhaler   No No   Sig: Inhale 2 puffs every 6 (six) hours as needed for wheezing   Patient not taking: Reported on 1/2/2021   aspirin-acetaminophen-caffeine (Jamse Mowers) 250-250-65 MG per tablet   Yes No   Sig: Take 1 tablet by mouth every 6 (six) hours as needed for headaches   escitalopram (LEXAPRO) 20 mg tablet   No No   Sig: Take 0 5 tablets (10 mg total) by mouth daily   Patient not taking: Reported on 9/3/2020   famotidine (PEPCID) 20 mg tablet   No No   Sig: Take 1 tablet (20 mg total) by mouth 2 (two) times a day   gabapentin (NEURONTIN) 300 mg capsule   Yes No   Sig: Take 200 mg by mouth daily at bedtime   ibuprofen (MOTRIN) 800 mg tablet 5/7/2021 at Unknown time  No Yes   Sig: TAKE 1 TABLET (800 MG TOTAL) BY MOUTH EVERY 8 (EIGHT) HOURS AS NEEDED FOR MILD PAIN   ipratropium (ATROVENT HFA) 17 mcg/act inhaler   No No   Sig: Inhale 2 puffs every 6 (six) hours as needed for wheezing   medroxyPROGESTERone (DEPO-PROVERA) 150 mg/mL injection   Yes No   Sig: Inject 150 mg into a muscle   traMADol (ULTRAM) 50 mg tablet 5/7/2021 at Unknown time  No Yes   Sig: TAKE 1 TABLET (50 MG TOTAL) BY MOUTH EVERY 8 (EIGHT) HOURS AS NEEDED FOR MODERATE PAIN      Facility-Administered Medications: None       Past Medical History:   Diagnosis Date    Asthma     Chlamydia infection        History reviewed  No pertinent surgical history  Family History   Problem Relation Age of Onset    No Known Problems Mother      I have reviewed and agree with the history as documented  E-Cigarette/Vaping    E-Cigarette Use Never User      E-Cigarette/Vaping Substances     Social History     Tobacco Use    Smoking status: Never Smoker    Smokeless tobacco: Never Used   Substance Use Topics    Alcohol use: Yes     Frequency: Monthly or less     Comment: social ; Denied history of alcohol use    Drug use: No        Review of Systems   All other systems reviewed and are negative        Physical Exam  ED Triage Vitals [05/08/21 2133]   Temperature Pulse Respirations Blood Pressure SpO2   98 1 °F (36 7 °C) 67 16 135/78 99 %      Temp Source Heart Rate Source Patient Position - Orthostatic VS BP Location FiO2 (%)   Oral Monitor Sitting Right arm --      Pain Score       6             Orthostatic Vital Signs  Vitals:    05/08/21 2133   BP: 135/78   Pulse: 67   Patient Position - Orthostatic VS: Sitting       Physical Exam  Vitals signs and nursing note reviewed  Constitutional:       General: She is not in acute distress  Appearance: She is well-developed  She is not diaphoretic  HENT:      Head: Normocephalic and atraumatic  Eyes:      General: Lids are normal  Lids are everted, no foreign bodies appreciated  Gaze aligned appropriately  No scleral icterus  Right eye: No foreign body or discharge  Left eye: No foreign body or discharge  Extraocular Movements: Extraocular movements intact  Conjunctiva/sclera:      Right eye: Right conjunctiva is injected  No exudate or hemorrhage  Left eye: Left conjunctiva is injected  No exudate or hemorrhage  Comments: Corneal abrasions as above   Neck:      Musculoskeletal: Neck supple  Vascular: No JVD  Trachea: No tracheal deviation  Cardiovascular:      Rate and Rhythm: Normal rate and regular rhythm  Pulmonary:      Effort: Pulmonary effort is normal  No respiratory distress  Abdominal:      General: There is no distension  Musculoskeletal:         General: No deformity  Skin:     General: Skin is warm and dry  Findings: No rash  Neurological:      Mental Status: She is alert        Comments: Moves all extremities   Psychiatric:         Behavior: Behavior normal          ED Medications  Medications   ketorolac (ACULAR) 0 5 % ophthalmic solution 1 drop (has no administration in time range)   ofloxacin (OCUFLOX) 0 3 % ophthalmic solution 1 drop (has no administration in time range)   fluorescein sodium sterile ophthalmic strip 1 strip (1 strip Both Eyes Given by Other 5/8/21 2202)   tetracaine 0 5 % ophthalmic solution 2 drop (2 drops Both Eyes Given by Other 5/8/21 2202)       Diagnostic Studies  Results Reviewed     None                 No orders to display         Procedures  Procedures      ED Course                                       MDM  Number of Diagnoses or Management Options  Diagnosis management comments:  Patient has no history of acute or chronic angle closure glaucoma, eye surgeries, trauma to eye, rashes, or concern for caustic injury  No temporal tenderness, HA, neurologic deficits, Vital signs are normal     Exam reveals b/l corneal abrasions without ulceration  Will treat with topical abx given contact use and instruct patient to f/u with her eye doctor on Monday  Pt agreeable and expresses understanding  Amount and/or Complexity of Data Reviewed  Review and summarize past medical records: yes  Discuss the patient with other providers: yes    Risk of Complications, Morbidity, and/or Mortality  Presenting problems: low  Diagnostic procedures: low  Management options: low    Patient Progress  Patient progress: stable      Disposition  Final diagnoses:   Corneal abrasion, unspecified laterality, initial encounter     Time reflects when diagnosis was documented in both MDM as applicable and the Disposition within this note     Time User Action Codes Description Comment    5/8/2021 10:23 PM Maximus Broderick Add [S05 00XA] Corneal abrasion, unspecified laterality, initial encounter       ED Disposition     ED Disposition Condition Date/Time Comment    Discharge Stable Sat May 8, 2021 10:25 PM [facility] discharge to home/self care  Results of completed tests discussed  Return to ER precautions given, verbal and written, and questions answered satisfactorily                  Follow-up Information     Follow up With Specialties Details Why Rola Gallo MD Family Medicine Call in 1 day To recheck symptoms and follow up on your ER visit [address]      Hebert Williamson MD Ophthalmology Call in 1 day For specialty evaluation and/or treatment Jamie Sloer [address] [place] [address]  [phone]            Patient's Medications   Discharge Prescriptions    No medications on file     No discharge procedures on file  PDMP Review     None           ED Provider  Attending physically available and evaluated [facility]  I managed the patient along with the ED Attending      Electronically Signed by         Olegario Slade DO  05/08/21 7785 147.4

## 2022-05-30 ENCOUNTER — HOSPITAL ENCOUNTER (EMERGENCY)
Facility: HOSPITAL | Age: 37
Discharge: HOME | End: 2022-05-31
Attending: EMERGENCY MEDICINE | Admitting: EMERGENCY MEDICINE
Payer: COMMERCIAL

## 2022-05-30 DIAGNOSIS — N93.9 VAGINAL BLEEDING: ICD-10-CM

## 2022-05-30 DIAGNOSIS — O20.0 THREATENED MISCARRIAGE IN EARLY PREGNANCY: Primary | ICD-10-CM

## 2022-05-30 DIAGNOSIS — R55 VASOVAGAL EPISODE: ICD-10-CM

## 2022-05-30 LAB
BASOPHILS # BLD AUTO: 0.09 THOUSANDS/ΜL (ref 0–0.1)
BASOPHILS NFR BLD AUTO: 1 % (ref 0–1)
EOSINOPHIL # BLD AUTO: 0.14 THOUSAND/ΜL (ref 0–0.61)
EOSINOPHIL NFR BLD AUTO: 1 % (ref 0–6)
ERYTHROCYTE [DISTWIDTH] IN BLOOD BY AUTOMATED COUNT: 12.2 % (ref 11.6–15.1)
HCT VFR BLD AUTO: 34.1 % (ref 34.8–46.1)
HGB BLD-MCNC: 11.5 G/DL (ref 11.5–15.4)
IMM GRANULOCYTES # BLD AUTO: 0.17 THOUSAND/UL (ref 0–0.2)
IMM GRANULOCYTES NFR BLD AUTO: 1 % (ref 0–2)
LYMPHOCYTES # BLD AUTO: 2.8 THOUSANDS/ΜL (ref 0.6–4.47)
LYMPHOCYTES NFR BLD AUTO: 19 % (ref 14–44)
MCH RBC QN AUTO: 29.5 PG (ref 26.8–34.3)
MCHC RBC AUTO-ENTMCNC: 33.7 G/DL (ref 31.4–37.4)
MCV RBC AUTO: 87 FL (ref 82–98)
MONOCYTES # BLD AUTO: 0.87 THOUSAND/ΜL (ref 0.17–1.22)
MONOCYTES NFR BLD AUTO: 6 % (ref 4–12)
NEUTROPHILS # BLD AUTO: 10.67 THOUSANDS/ΜL (ref 1.85–7.62)
NEUTS SEG NFR BLD AUTO: 72 % (ref 43–75)
NRBC BLD AUTO-RTO: 0 /100 WBCS
PLATELET # BLD AUTO: 296 THOUSANDS/UL (ref 149–390)
PMV BLD AUTO: 9.7 FL (ref 8.9–12.7)
RBC # BLD AUTO: 3.9 MILLION/UL (ref 3.81–5.12)
WBC # BLD AUTO: 14.74 THOUSAND/UL (ref 4.31–10.16)

## 2022-05-30 PROCEDURE — 99285 EMERGENCY DEPT VISIT HI MDM: CPT | Performed by: EMERGENCY MEDICINE

## 2022-05-30 PROCEDURE — 96360 HYDRATION IV INFUSION INIT: CPT

## 2022-05-30 PROCEDURE — 36415 COLL VENOUS BLD VENIPUNCTURE: CPT | Performed by: EMERGENCY MEDICINE

## 2022-05-30 PROCEDURE — 99284 EMERGENCY DEPT VISIT MOD MDM: CPT

## 2022-05-30 PROCEDURE — 85025 COMPLETE CBC W/AUTO DIFF WBC: CPT | Performed by: EMERGENCY MEDICINE

## 2022-05-30 PROCEDURE — 84702 CHORIONIC GONADOTROPIN TEST: CPT | Performed by: EMERGENCY MEDICINE

## 2022-05-30 PROCEDURE — 93005 ELECTROCARDIOGRAM TRACING: CPT

## 2022-05-30 RX ORDER — ACETAMINOPHEN 325 MG/1
975 TABLET ORAL ONCE
Status: COMPLETED | OUTPATIENT
Start: 2022-05-31 | End: 2022-05-30

## 2022-05-30 RX ADMIN — ACETAMINOPHEN 975 MG: 325 TABLET, FILM COATED ORAL at 23:51

## 2022-05-30 RX ADMIN — SODIUM CHLORIDE 1000 ML: 0.9 INJECTION, SOLUTION INTRAVENOUS at 22:51

## 2022-05-30 NOTE — Clinical Note
Dolores Cohen was seen and treated in our emergency department on 5/30/2022  No restrictions            Diagnosis: medical problem    Shira Albright  may return to work on return date  She may return on this date: 06/02/2022         If you have any questions or concerns, please don't hesitate to call        Julianne Gonzalez MD    ______________________________           _______________          _______________  Hospital Representative                              Date                                Time

## 2022-05-31 VITALS
OXYGEN SATURATION: 100 % | DIASTOLIC BLOOD PRESSURE: 69 MMHG | HEART RATE: 78 BPM | RESPIRATION RATE: 18 BRPM | SYSTOLIC BLOOD PRESSURE: 117 MMHG

## 2022-05-31 LAB
ATRIAL RATE: 104 BPM
B-HCG SERPL-ACNC: 4607 MIU/ML
P AXIS: 84 DEGREES
PR INTERVAL: 124 MS
QRS AXIS: 70 DEGREES
QRSD INTERVAL: 98 MS
QT INTERVAL: 322 MS
QTC INTERVAL: 423 MS
T WAVE AXIS: 69 DEGREES
VENTRICULAR RATE: 104 BPM

## 2022-05-31 PROCEDURE — 93010 ELECTROCARDIOGRAM REPORT: CPT | Performed by: INTERNAL MEDICINE

## 2022-05-31 NOTE — ED PROVIDER NOTES
History  Chief Complaint   Patient presents with    Vaginal Bleeding     Pt seen last week and told she was miscarrying at approx 5 weeks pregnant  Today pt states the bleeding is excessive including many large clots and filling a large pad in 30 min  Pt states she feels lightheaded and like her heart is racing  80-year-old  female at approximately 6 weeks gestation by last menstrual period presents to the emergency department for evaluation of vaginal bleeding  Per chart review the patient was seen here last week for vaginal bleeding as well  At that time an intrauterine pregnancy was confirmed by transvaginal ultrasound  The read at that time was suspicious for a nonviable gestation  The patient reports that since being discharged she has not yet followed up with OBGYN or had any repeat blood testing  She states that she is scheduled to see OBGYN in 2 days  Reports that today she started having increased vaginal bleeding and has been passing clots  Reports associated abdominal cramping  The patient also reports that she felt like her heart was racing with associated lightheadedness  Patient denies fevers, chills, nausea, vomiting, diarrhea, sick contacts and recent travel  Prior to Admission Medications   Prescriptions Last Dose Informant Patient Reported?  Taking?   acetaminophen (TYLENOL) 500 mg tablet  Self Yes No   Sig: Take 500 mg by mouth every 6 (six) hours as needed for mild pain   Patient not taking: No sig reported   acetaminophen (TYLENOL) 500 mg tablet   No No   Sig: Take 1 tablet (500 mg total) by mouth every 6 (six) hours as needed for mild pain   albuterol (PROVENTIL HFA,VENTOLIN HFA) 90 mcg/act inhaler   No No   Sig: Inhale 2 puffs every 6 (six) hours as needed for wheezing   aspirin-acetaminophen-caffeine (EXCEDRIN MIGRAINE) 250-250-65 MG per tablet   Yes No   Sig: Take 1 tablet by mouth every 6 (six) hours as needed for headaches   Patient not taking: No sig reported escitalopram (LEXAPRO) 20 mg tablet   No No   Sig: Take 0 5 tablets (10 mg total) by mouth daily   Patient not taking: No sig reported   famotidine (PEPCID) 20 mg tablet   No No   Sig: Take 1 tablet (20 mg total) by mouth 2 (two) times a day   Patient not taking: No sig reported   gabapentin (NEURONTIN) 300 mg capsule   Yes No   Sig: Take 200 mg by mouth daily at bedtime   Patient not taking: No sig reported   ibuprofen (MOTRIN) 800 mg tablet   No No   Sig: TAKE 1 TABLET (800 MG TOTAL) BY MOUTH EVERY 8 (EIGHT) HOURS AS NEEDED FOR MILD PAIN   ipratropium (ATROVENT HFA) 17 mcg/act inhaler   No No   Sig: Inhale 2 puffs every 6 (six) hours as needed for wheezing   Patient not taking: No sig reported   medroxyPROGESTERone (DEPO-PROVERA) 150 mg/mL injection   Yes No   Sig: Inject 150 mg into a muscle   methylPREDNISolone 4 MG tablet therapy pack   No No   Sig: Use as directed on package   Patient not taking: No sig reported   traMADol (ULTRAM) 50 mg tablet   No No   Sig: TAKE 1 TABLET (50 MG TOTAL) BY MOUTH EVERY 8 (EIGHT) HOURS AS NEEDED FOR MODERATE PAIN      Facility-Administered Medications: None       Past Medical History:   Diagnosis Date    Asthma     Chlamydia infection        No past surgical history on file  Family History   Problem Relation Age of Onset    No Known Problems Mother      I have reviewed and agree with the history as documented  E-Cigarette/Vaping    E-Cigarette Use Never User      E-Cigarette/Vaping Substances    Nicotine No     THC No      Social History     Tobacco Use    Smoking status: Never Smoker    Smokeless tobacco: Never Used   Vaping Use    Vaping Use: Never used   Substance Use Topics    Alcohol use: Not Currently     Comment: social ; Denied history of alcohol use    Drug use: No        Review of Systems   Constitutional: Negative for chills and fever  HENT: Negative for ear pain and sore throat  Eyes: Negative for pain and visual disturbance     Respiratory: Negative for cough and shortness of breath  Cardiovascular: Negative for chest pain and palpitations  Gastrointestinal: Positive for abdominal pain (cramping)  Negative for vomiting  Genitourinary: Positive for vaginal bleeding  Negative for dysuria and hematuria  Musculoskeletal: Negative for arthralgias and back pain  Skin: Negative for color change and rash  Neurological: Positive for light-headedness  Negative for seizures and syncope  All other systems reviewed and are negative  Physical Exam  ED Triage Vitals   Temp Pulse Respirations Blood Pressure SpO2   -- 05/30/22 2239 05/30/22 2239 05/30/22 2239 05/30/22 2239    (!) 131 18 132/86 99 %      Temp src Heart Rate Source Patient Position - Orthostatic VS BP Location FiO2 (%)   -- 05/30/22 2239 05/30/22 2239 05/30/22 2239 --    Monitor Lying Right arm       Pain Score       05/30/22 2351       8             Orthostatic Vital Signs  Vitals:    05/31/22 0000 05/31/22 0015 05/31/22 0030 05/31/22 0045   BP: 108/73 110/75 110/73 117/69   Pulse: 82 82 78 78   Patient Position - Orthostatic VS: Lying Lying Lying Lying       Physical Exam  Vitals and nursing note reviewed  Exam conducted with a chaperone present  Constitutional:       General: She is not in acute distress  Appearance: She is well-developed  HENT:      Head: Normocephalic and atraumatic  Eyes:      Conjunctiva/sclera: Conjunctivae normal    Cardiovascular:      Rate and Rhythm: Regular rhythm  Tachycardia present  Heart sounds: No murmur heard  Pulmonary:      Effort: Pulmonary effort is normal  No respiratory distress  Breath sounds: Normal breath sounds  Abdominal:      Palpations: Abdomen is soft  Tenderness: There is no abdominal tenderness  Genitourinary:     Comments: Vaginal bleeding  Musculoskeletal:      Cervical back: Neck supple  Skin:     General: Skin is warm and dry  Neurological:      Mental Status: She is alert           ED Medications  Medications   sodium chloride 0 9 % bolus 1,000 mL (0 mL Intravenous Stopped 5/30/22 2341)   acetaminophen (TYLENOL) tablet 975 mg (975 mg Oral Given 5/30/22 2351)       Diagnostic Studies  Results Reviewed     Procedure Component Value Units Date/Time    hCG, quantitative [[id number]]  (Abnormal) Collected: 05/30/22 2254    Lab Status: Final result Specimen: Blood from Arm, Right Updated: 05/31/22 0017     HCG, Quant 4,607 mIU/mL     Narrative:       Expected Ranges:     Approximate               Approximate HCG  Gestation age          Concentration ( mIU/mL)  _____________          ______________________   Rolo Kevin                      HCG values  0 2-1                       5-50  1-2                           2-3                         100-5000  3-4                         500-13443  4-5                         1000-67259  5-6                         [id number]  6-8                         [id number]  8-12                        59944-545603      CBC and differential [[id number]]  (Abnormal) Collected: 05/30/22 2254    Lab Status: Final result Specimen: Blood from Arm, Right Updated: 05/30/22 2316     WBC 14 74 Thousand/uL      RBC 3 90 Million/uL      Hemoglobin 11 5 g/dL      Hematocrit 34 1 %      MCV 87 fL      MCH 29 5 pg      MCHC 33 7 g/dL      RDW 12 2 %      MPV 9 7 fL      Platelets 982 Thousands/uL      nRBC 0 /100 WBCs      Neutrophils Relative 72 %      Immat GRANS % 1 %      Lymphocytes Relative 19 %      Monocytes Relative 6 %      Eosinophils Relative 1 %      Basophils Relative 1 %      Neutrophils Absolute 10 67 Thousands/µL      Immature Grans Absolute 0 17 Thousand/uL      Lymphocytes Absolute 2 80 Thousands/µL      Monocytes Absolute 0 87 Thousand/µL      Eosinophils Absolute 0 14 Thousand/µL      Basophils Absolute 0 09 Thousands/µL                  No orders to display         Procedures  ECG 12 Lead Documentation Only    Date/Time: 5/30/2022 11:00 PM  Performed by: Krystle Fischer Jose Miguel King MD  Authorized by: Fito Friend MD     ECG reviewed by me, the ED Provider: yes    Patient location:  ED  Previous ECG:     Previous ECG:  Compared to current    Similarity:  No change    Comparison to cardiac monitor: Yes    Interpretation:     Interpretation: abnormal    Rate:     ECG rate assessment: tachycardic    Rhythm:     Rhythm: sinus tachycardia    Ectopy:     Ectopy: none    QRS:     QRS axis:  Normal  Conduction:     Conduction: normal    ST segments:     ST segments:  Normal  T waves:     T waves: normal            ED Course                     MDM  Number of Diagnoses or Management Options  Threatened miscarriage in early pregnancy  Vaginal bleeding  Vasovagal episode  Diagnosis management comments: 59-year-old female presented to the emergency department for evaluation of vaginal bleeding  On arrival the patient was awake, alert, oriented and in no acute distress  On exam patient noted to have mild vaginal bleeding  She was observed to be passing small clots  The patient's hemoglobin was stable at 11 5 and hCG down trending  All the patient was being treated here in the emergency department she had a vasovagal episode  I was told by nursing that the patient's blood pressure and heart rate dropped and she became diaphoretic  I immediately went to the patient's room  The patient continued to be alert and oriented x3  A continued the fluid bolus  Repeat vitals were stable  The patient continued to be alert and oriented and reported that I she was feeling better  All diagnostic studies were discussed with the patient in detail with recommendation to follow up with her OBGYN  Return precautions were discussed  Patient agrees with the plan for discharge and feels comfortable to go home with proper f/u  Advised to return for worsening or additional problems  Diagnostic tests were reviewed and questions answered  Diagnosis, care plan and treatment options were discussed  The patient understands instructions and will follow up as directed  Disposition  Final diagnoses:   Threatened miscarriage in early pregnancy   Vaginal bleeding   Vasovagal episode     Time reflects when diagnosis was documented in both MDM as applicable and the Disposition within this note     Time User Action Codes Description Comment    5/31/2022 12:53 AM Tri Werner Add [O20 0] Threatened miscarriage in early pregnancy     5/31/2022 12:53 AM Tri Ferrisy Add [N93 9] Vaginal bleeding     5/31/2022 12:53 AM Tri Ferrisy Add [R55] Vasovagal episode       ED Disposition     ED Disposition   Discharge    Condition   Stable    Date/Time   Tue May 31, 2022 12:53 AM    Comment   [place] discharge to home/self care                 Follow-up Information     Follow up With Specialties Details Why [address] [facility] Emergency Department Emergency Medicine Go to  If symptoms worsen [address]  [address] Emergency Department, [address] [place] [address] 108          Discharge Medication List as of 5/31/2022 12:55 AM      CONTINUE these medications which have NOT CHANGED    Details   !! acetaminophen (TYLENOL) 500 mg tablet Take 500 mg by mouth every 6 (six) hours as needed for mild pain, Historical Med      !! acetaminophen (TYLENOL) 500 mg tablet Take 1 tablet (500 mg total) by mouth every 6 (six) hours as needed for mild pain, Starting Mon 5/16/2022, Normal      albuterol (PROVENTIL HFA,VENTOLIN HFA) 90 mcg/act inhaler Inhale 2 puffs every 6 (six) hours as needed for wheezing, Starting Tue 10/15/2019, Print      aspirin-acetaminophen-caffeine (EXCEDRIN MIGRAINE) 250-250-65 MG per tablet Take 1 tablet by mouth every 6 (six) hours as needed for headaches, Historical Med      escitalopram (LEXAPRO) 20 mg tablet Take 0 5 tablets (10 mg total) by mouth daily, Starting Tue 9/1/2020, Print      famotidine (PEPCID) 20 mg tablet Take 1 tablet (20 mg total) by mouth 2 (two) times a day, Starting Sat 3/13/2021, Normal      gabapentin (NEURONTIN) 300 mg capsule Take 200 mg by mouth daily at bedtime, Historical Med      ibuprofen (MOTRIN) 800 mg tablet TAKE 1 TABLET (800 MG TOTAL) BY MOUTH EVERY 8 (EIGHT) HOURS AS NEEDED FOR MILD PAIN, Starting Mon 1/17/2022, Normal      ipratropium (ATROVENT HFA) 17 mcg/act inhaler Inhale 2 puffs every 6 (six) hours as needed for wheezing, Starting Sat 3/13/2021, Normal      medroxyPROGESTERone (DEPO-PROVERA) 150 mg/mL injection Inject 150 mg into a muscle, Starting Fri 7/17/2020, Historical Med      methylPREDNISolone 4 MG tablet therapy pack Use as directed on package, Normal      traMADol (ULTRAM) 50 mg tablet TAKE 1 TABLET (50 MG TOTAL) BY MOUTH EVERY 8 (EIGHT) HOURS AS NEEDED FOR MODERATE PAIN, Starting Tue 4/12/2022, Normal       !! - Potential duplicate medications found  Please discuss with provider  No discharge procedures on file  PDMP Review       Value Time User    PDMP Reviewed  Yes 8/9/2021  1:53 PM Saima Murillo, [facility]           ED Provider  Attending physically available and evaluated [facility]  I managed the patient along with the ED Attending      Electronically Signed by         Sunni Shepard MD  05/31/22 5797

## 2022-05-31 NOTE — ED NOTES
Pt reports feeling light headed, diaphoretic, hypotensive, and bradycardic  Dr Ramirez and Dr Ludivina Beebe at bedside  Pt reports having increased lower abdominal pain prior to event       Santiago Alvarenga RN  05/30/22 [id number] JEREMY Marrero  05/30/22 2639

## 2022-06-29 ENCOUNTER — OFFICE VISIT (OUTPATIENT)
Dept: DENTISTRY | Facility: CLINIC | Age: 37
End: 2022-06-29

## 2022-06-29 VITALS — TEMPERATURE: 98.2 F

## 2022-06-29 DIAGNOSIS — Z01.20 ENCOUNTER FOR DENTAL EXAMINATION: Primary | ICD-10-CM

## 2022-06-29 PROCEDURE — D0274 BITEWINGS - 4 RADIOGRAPHIC IMAGES: HCPCS | Performed by: DENTIST

## 2022-06-29 PROCEDURE — D0120 PERIODIC ORAL EVALUATION - ESTABLISHED PATIENT: HCPCS | Performed by: DENTIST

## 2022-06-29 PROCEDURE — D0220 INTRAORAL - PERIAPICAL FIRST RADIOGRAPHIC IMAGE: HCPCS | Performed by: DENTIST

## 2022-06-29 PROCEDURE — D0230 INTRAORAL - PERIAPICAL EACH ADDITIONAL RADIOGRAPHIC IMAGE: HCPCS | Performed by: DENTIST

## 2022-06-29 PROCEDURE — D1330 ORAL HYGIENE INSTRUCTIONS: HCPCS | Performed by: DENTIST

## 2022-06-29 PROCEDURE — D1310 NUTRITIONAL COUNSELING FOR CONTROL OF DENTAL DISEASE: HCPCS | Performed by: DENTIST

## 2022-06-29 PROCEDURE — D1110 PROPHYLAXIS - ADULT: HCPCS | Performed by: DENTIST

## 2022-06-29 NOTE — PROGRESS NOTES
Patient presents for periodic exam and prophy  [facility], Jadwin, ASA II   EOE WNL  IOE shows good oral hygiene, oral cancer screening shows no soft tissue concerns  Clinical exam shows likely interproximal caries between 5/6 and 11/12, suspicious areas between other upper anterior teeth  Slight pain on percussion of #15 with RCT previously initiated January 2021, tooth #15 appears clinically intact  No swelling or sinus tracts noted, no sensitivity to palpation  4 BWs and 3 PAs (8, 15, 24) taken and interpreted showing incipient caries 7-M, 8-M, PARL #15, previously noted PARLs #24 and #25 not significantly changed since last PA 2 yrs ago  Interproximal caries noted 5-M, 6-D, 11-D, 12-M, 12-D  Discussed findings with patient  Recommend caries control first as patient experiencing no spontaneous pain from #15  Adult prophy completed with cavitron, hand instruments, floss  Reviewed oral hygiene instructions and nutritional guidelines      [place]: Benjamin Alvarez

## 2022-10-25 ENCOUNTER — OFFICE VISIT (OUTPATIENT)
Dept: FAMILY MEDICINE CLINIC | Facility: CLINIC | Age: 37
End: 2022-10-25
Payer: COMMERCIAL

## 2022-10-25 VITALS
RESPIRATION RATE: 18 BRPM | WEIGHT: 174.5 LBS | TEMPERATURE: 98.2 F | HEIGHT: 64 IN | SYSTOLIC BLOOD PRESSURE: 160 MMHG | HEART RATE: 100 BPM | BODY MASS INDEX: 29.79 KG/M2 | OXYGEN SATURATION: 98 % | DIASTOLIC BLOOD PRESSURE: 100 MMHG

## 2022-10-25 DIAGNOSIS — M76.31 ILIOTIBIAL BAND TENDINITIS OF RIGHT SIDE: Primary | ICD-10-CM

## 2022-10-25 DIAGNOSIS — M54.50 CHRONIC MIDLINE LOW BACK PAIN WITHOUT SCIATICA: ICD-10-CM

## 2022-10-25 DIAGNOSIS — G89.29 CHRONIC MIDLINE LOW BACK PAIN WITHOUT SCIATICA: ICD-10-CM

## 2022-10-25 PROCEDURE — 99213 OFFICE O/P EST LOW 20 MIN: CPT | Performed by: FAMILY MEDICINE

## 2022-10-25 RX ORDER — IBUPROFEN 800 MG/1
800 TABLET ORAL EVERY 8 HOURS PRN
Qty: 60 TABLET | Refills: 3 | Status: SHIPPED | OUTPATIENT
Start: 2022-10-25

## 2022-10-25 RX ORDER — METHYLPREDNISOLONE 4 MG/1
TABLET ORAL
Qty: 21 EACH | Refills: 0 | Status: SHIPPED | OUTPATIENT
Start: 2022-10-25

## 2022-10-25 RX ORDER — TRAMADOL HYDROCHLORIDE 50 MG/1
50 TABLET ORAL EVERY 8 HOURS PRN
Qty: 90 TABLET | Refills: 0 | Status: SHIPPED | OUTPATIENT
Start: 2022-10-25

## 2022-10-25 NOTE — PROGRESS NOTES
FAMILY PRACTICE OFFICE VISIT       NAME: Karrie Quintana  AGE: 40 y o  SEX: female       : 1985        MRN: [id number]    DATE: 10/26/2022  TIME: 6:12 AM    Assessment and Plan     Problem List Items Addressed This Visit        Musculoskeletal and Integument    Iliotibial band tendinitis of right side - Primary     Knee pain  Patient possibly with ITB inflammation  She was given prescription refill for a Medrol Dosepak  She was referred to Dr Portillo for physiatry consultation she was also given a refill of her tramadol medications         Relevant Medications    methylPREDNISolone 4 MG tablet therapy pack    traMADol (ULTRAM) 50 mg tablet    Other Relevant Orders    Ambulatory Referral to Physiatry       Other    Chronic midline low back pain without sciatica    Relevant Medications    ibuprofen (MOTRIN) 800 mg tablet              Chief Complaint     Chief Complaint   Patient presents with   • R leg pain      Getting worse        History of Present Illness     Patient with a recurrence of pain in her right knee area  She has been struggling with chronic intermittent episodes of pain over the past 5 years  She has had several emergency room visits for similar symptoms  She describes pain and significant pruritus along popliteal area extending to the lateral aspect of her right knee  She has difficulties extending her leg especially after sleeping at night  Patient has had treatments with steroids, narcotics, physical therapy, NSAIDs  Patient is unaware of any specific triggers but 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  When patient has exacerbations she has difficulties ambulating without significant limp due to pain      Review of Systems   Review of Systems   Constitutional: Negative  Musculoskeletal: Positive for arthralgias  Neurological: Negative          Active Problem List     Patient Active Problem List   Diagnosis   • Cellulitis of right lower extremity   • Mood disorder (HCC)   • Iliotibial band tendinitis of right side   • Acute midline low back pain without sciatica   • Chronic midline low back pain without sciatica   • Alopecia   • Annual physical exam   • Trigger middle finger of left hand   • Paresthesia   • Carpal tunnel syndrome, bilateral   • Anxiety   • Pain of left lower extremity       Past Medical History:  Past Medical History:   Diagnosis Date   • Asthma    • Chlamydia infection        Past Surgical History:  History reviewed  No pertinent surgical history  Family History:  Family History   Problem Relation Age of Onset   • No Known Problems Mother        Social History:  Social History     Socioeconomic History   • Marital status: /Civil Union     Spouse name: Not on file   • Number of children: Not on file   • Years of education: Not on file   • Highest education level: Not on file   Occupational History   • Not on file   Tobacco Use   • Smoking status: Never Smoker   • Smokeless tobacco: Never Used   Vaping Use   • Vaping Use: Never used   Substance and Sexual Activity   • Alcohol use: Not Currently     Comment: social ; Denied history of alcohol use   • Drug use: No   • Sexual activity: Not on file   Other Topics Concern   • Not on file   Social History Narrative   • Not on file     Social Determinants of Health     Financial Resource Strain: Not on file   Food Insecurity: Not on file   Transportation Needs: Not on file   Physical Activity: Not on file   Stress: Not on file   Social Connections: Not on file   Intimate Partner Violence: Not on file   Housing Stability: Not on file       Objective     Vitals:    10/25/22 1603   BP: 160/100   Pulse: 100   Resp: 18   Temp: 98 2 °F (36 8 °C)   SpO2: 98%     Wt Readings from Last 3 Encounters:   10/25/22 79 2 kg (174 lb 8 oz)   11/04/21 68 kg (150 lb)   10/15/21 61 2 kg (135 lb)       Physical Exam  Constitutional:       General: She is not in acute distress       Appearance: Normal appearance  She is not ill-appearing  Musculoskeletal:         General: Tenderness present  Right lower leg: No edema  Left lower leg: No edema  Comments: Patient with tenderness along lateral hamstring tendon extending to lateral right knee  There is slight redness so should with this area which I believe is from excoriations from scratching  Patient feels area is pruritic  Patient is able to fully extend her leg but does have discomfort upon doing so  Patient ambulates with significant limping due to knee pain   Neurological:      Mental Status: She is alert           Pertinent Laboratory/Diagnostic Studies:  Lab Results   Component Value Date    GLUCOSE 88 08/28/2015    BUN 12 10/15/2021    CREATININE 0 91 10/15/2021    CALCIUM 9 0 10/15/2021     08/28/2015    K 3 5 10/15/2021    CO2 25 10/15/2021     (H) 10/15/2021     Lab Results   Component Value Date    ALT 29 10/15/2021    AST 24 10/15/2021    ALKPHOS 71 10/15/2021    BILITOT 0 43 08/28/2015       Lab Results   Component Value Date    WBC 14 74 (H) 05/30/2022    HGB 11 5 05/30/2022    HCT 34 1 (L) 05/30/2022    MCV 87 05/30/2022     05/30/2022       No results found for: TSH    No results found for: CHOL  Lab Results   Component Value Date    TRIG 162 (H) 01/30/2020     Lab Results   Component Value Date    HDL 49 01/30/2020     Lab Results   Component Value Date    LDLCALC 121 (H) 01/30/2020     No results found for: HGBA1C    Results for orders placed or performed during the hospital encounter of 05/30/22   CBC and differential   Result Value Ref Range    WBC 14 74 (H) 4 31 - 10 16 Thousand/uL    RBC 3 90 3 81 - 5 12 Million/uL    Hemoglobin 11 5 11 5 - 15 4 g/dL    Hematocrit 34 1 (L) 34 8 - 46 1 %    MCV 87 82 - 98 fL    MCH 29 5 26 8 - 34 3 pg    MCHC 33 7 31 4 - 37 4 g/dL    RDW 12 2 11 6 - 15 1 %    MPV 9 7 8 9 - 12 7 fL    Platelets 466 691 - 021 Thousands/uL    nRBC 0 /100 WBCs    Neutrophils Relative 72 43 - 75 %    Immat GRANS % 1 0 - 2 %    Lymphocytes Relative 19 14 - 44 %    Monocytes Relative 6 4 - 12 %    Eosinophils Relative 1 0 - 6 %    Basophils Relative 1 0 - 1 %    Neutrophils Absolute 10 67 (H) 1 85 - 7 62 Thousands/µL    Immature Grans Absolute 0 17 0 00 - 0 20 Thousand/uL    Lymphocytes Absolute 2 80 0 60 - 4 47 Thousands/µL    Monocytes Absolute 0 87 0 17 - 1 22 Thousand/µL    Eosinophils Absolute 0 14 0 00 - 0 61 Thousand/µL    Basophils Absolute 0 09 0 00 - 0 10 Thousands/µL   hCG, quantitative   Result Value Ref Range    HCG, Quant 4,607 (H) <=6 mIU/mL   ECG 12 lead   Result Value Ref Range    Ventricular Rate 104 BPM    Atrial Rate 104 BPM    WI Interval 124 ms    QRSD Interval 98 ms    QT Interval 322 ms    QTC Interval 423 ms    P Axis 84 degrees    QRS Axis 70 degrees    T Wave Axis 69 degrees       Orders Placed This Encounter   Procedures   • Ambulatory Referral to Physiatry       ALLERGIES:  No Known Allergies    Current Medications     Current Outpatient Medications   Medication Sig Dispense Refill   • acetaminophen (TYLENOL) 500 mg tablet Take 1 tablet (500 mg total) by mouth every 6 (six) hours as needed for mild pain 30 tablet 0   • albuterol (PROVENTIL HFA,VENTOLIN HFA) 90 mcg/act inhaler Inhale 2 puffs every 6 (six) hours as needed for wheezing 1 Inhaler 0   • ibuprofen (MOTRIN) 800 mg tablet Take 1 tablet (800 mg total) by mouth every 8 (eight) hours as needed for mild pain 60 tablet 3   • medroxyPROGESTERone (DEPO-PROVERA) 150 mg/mL injection Inject 150 mg into a muscle     • methylPREDNISolone 4 MG tablet therapy pack Use as directed on package 21 each 0   • traMADol (ULTRAM) 50 mg tablet Take 1 tablet (50 mg total) by mouth every 8 (eight) hours as needed for moderate pain 90 tablet 0   • acetaminophen (TYLENOL) 500 mg tablet Take 500 mg by mouth every 6 (six) hours as needed for mild pain (Patient not taking: No sig reported)     • aspirin-acetaminophen-caffeine (EXCEDRIN MIGRAINE) 250-250-65 MG per tablet Take 1 tablet by mouth every 6 (six) hours as needed for headaches (Patient not taking: No sig reported)     • escitalopram (LEXAPRO) 20 mg tablet Take 0 5 tablets (10 mg total) by mouth daily (Patient not taking: No sig reported) 90 tablet 1   • famotidine (PEPCID) 20 mg tablet Take 1 tablet (20 mg total) by mouth 2 (two) times a day (Patient not taking: No sig reported) 30 tablet 0   • gabapentin (NEURONTIN) 300 mg capsule Take 200 mg by mouth daily at bedtime (Patient not taking: No sig reported)     • ipratropium (ATROVENT HFA) 17 mcg/act inhaler Inhale 2 puffs every 6 (six) hours as needed for wheezing (Patient not taking: No sig reported) 1 Inhaler 0   • methylPREDNISolone 4 MG tablet therapy pack Use as directed on package (Patient not taking: No sig reported) 21 tablet 0     No current facility-administered medications for this visit           Health Maintenance     Health Maintenance   Topic Date Due   • Hepatitis C Screening  Never done   • COVID-19 Vaccine (1) Never done   • Pneumococcal Vaccine: Pediatrics (0 to 5 Years) and At-Risk Patients (6 to 59 Years) (1 - PCV) Never done   • Annual Physical  01/30/2021   • Influenza Vaccine (1) 09/01/2022   • Cervical Cancer Screening  06/14/2023   • Depression Screening  10/25/2023   • BMI: Adult  10/25/2023   • DTaP,Tdap,and Td Vaccines (3 - Td or Tdap) 01/20/2025   • HIV Screening  Completed   • HIB Vaccine  Aged Out   • Hepatitis B Vaccine  Aged Out   • IPV Vaccine  Aged Out   • Hepatitis A Vaccine  Aged Out   • Meningococcal ACWY Vaccine  Aged Out   • HPV Vaccine  Aged Out     Immunization History   Administered Date(s) Administered   • INFLUENZA 10/28/2014   • Tdap 11/11/2014, 09/34/8311       Tracy Jackson

## 2022-10-26 NOTE — ASSESSMENT & PLAN NOTE
Knee pain  Patient possibly with ITB inflammation  She was given prescription refill for a Medrol Dosepak    She was referred to Dr Portillo for physiatry consultation she was also given a refill of her tramadol medications

## 2022-10-27 ENCOUNTER — TELEPHONE (OUTPATIENT)
Dept: FAMILY MEDICINE CLINIC | Facility: CLINIC | Age: 37
End: 2022-10-27

## 2022-10-27 NOTE — TELEPHONE ENCOUNTER
Patient called requesting a work note for today and tomorrow as she is still resting her knee that she was seen about yesterday  Is this okay? Please advise and I will let her know

## 2022-12-20 DIAGNOSIS — M76.31 ILIOTIBIAL BAND TENDINITIS OF RIGHT SIDE: ICD-10-CM

## 2022-12-21 RX ORDER — TRAMADOL HYDROCHLORIDE 50 MG/1
50 TABLET ORAL EVERY 8 HOURS PRN
Qty: 90 TABLET | Refills: 0 | Status: SHIPPED | OUTPATIENT
Start: 2022-12-21

## 2023-01-19 ENCOUNTER — APPOINTMENT (EMERGENCY)
Dept: RADIOLOGY | Facility: HOSPITAL | Age: 38
End: 2023-01-19

## 2023-01-19 ENCOUNTER — HOSPITAL ENCOUNTER (EMERGENCY)
Facility: HOSPITAL | Age: 38
Discharge: HOME | End: 2023-01-19
Attending: EMERGENCY MEDICINE

## 2023-01-19 VITALS
TEMPERATURE: 97.9 F | SYSTOLIC BLOOD PRESSURE: 134 MMHG | DIASTOLIC BLOOD PRESSURE: 96 MMHG | HEART RATE: 79 BPM | OXYGEN SATURATION: 100 % | RESPIRATION RATE: 20 BRPM

## 2023-01-19 DIAGNOSIS — R05.9 COUGH: Primary | ICD-10-CM

## 2023-01-19 DIAGNOSIS — B34.9 VIRAL SYNDROME: ICD-10-CM

## 2023-01-19 RX ORDER — FLUTICASONE PROPIONATE 50 MCG
1 SPRAY, SUSPENSION (ML) NASAL DAILY
Qty: 16 G | Refills: 0 | Status: SHIPPED | OUTPATIENT
Start: 2023-01-19

## 2023-01-19 RX ORDER — ACETAMINOPHEN 325 MG/1
975 TABLET ORAL ONCE
Status: COMPLETED | OUTPATIENT
Start: 2023-01-19 | End: 2023-01-19

## 2023-01-19 RX ORDER — ALBUTEROL SULFATE 90 UG/1
2 AEROSOL, METERED RESPIRATORY (INHALATION) ONCE
Status: COMPLETED | OUTPATIENT
Start: 2023-01-19 | End: 2023-01-19

## 2023-01-19 RX ORDER — KETOROLAC TROMETHAMINE 30 MG/ML
15 INJECTION, SOLUTION INTRAMUSCULAR; INTRAVENOUS ONCE
Status: COMPLETED | OUTPATIENT
Start: 2023-01-19 | End: 2023-01-19

## 2023-01-19 RX ORDER — NAPROXEN 500 MG/1
500 TABLET ORAL 2 TIMES DAILY WITH MEALS
Qty: 30 TABLET | Refills: 0 | Status: SHIPPED | OUTPATIENT
Start: 2023-01-19

## 2023-01-19 RX ADMIN — KETOROLAC TROMETHAMINE 15 MG: 30 INJECTION, SOLUTION INTRAMUSCULAR; INTRAVENOUS at 19:24

## 2023-01-19 RX ADMIN — ALBUTEROL SULFATE 2 PUFF: 90 AEROSOL, METERED RESPIRATORY (INHALATION) at 19:24

## 2023-01-19 RX ADMIN — ACETAMINOPHEN 975 MG: 325 TABLET, FILM COATED ORAL at 19:24

## 2023-01-20 LAB
ATRIAL RATE: 65 BPM
P AXIS: 31 DEGREES
PR INTERVAL: 120 MS
QRS AXIS: 39 DEGREES
QRSD INTERVAL: 114 MS
QT INTERVAL: 384 MS
QTC INTERVAL: 399 MS
T WAVE AXIS: 66 DEGREES
VENTRICULAR RATE: 65 BPM

## 2023-01-20 NOTE — DISCHARGE INSTRUCTIONS
Please take tylenol 650 mg every 6 hours for fevers  You can also take naproxen two times per day for body aches  You may use inhaler every 3-4 hours as needed for shortness of breath or wheezing  Please use flonase daily

## 2023-01-20 NOTE — ED PROVIDER NOTES
History  Chief Complaint   Patient presents with   • Chest Pain     Pt c/o CP, SOB, fever, chills, cough, headache, and neck yesterday, pt reports CP has worsened today     HPI     66-year-old female with past medical history of asthma who presents for evaluation of viral symptoms  Patient states she has been having symptoms for the past 3 days  She states she has had fevers and chills  She has had a dry cough  She has pain in the center of her chest when she is coughing  She also states she has had some chest tightness and shortness of breath  She states she does have a history of asthma in the past and tried using her inhaler with mild improvement in symptoms  Patient did have 1 episode of vomiting after coughing yesterday  Denies abdominal pain or diarrhea  Denies leg pain or leg swelling  Patient states her kids are sick with similar symptoms  Patient has been taking Motrin for pain and fevers  Prior to Admission Medications   Prescriptions Last Dose Informant Patient Reported?  Taking?   acetaminophen (TYLENOL) 500 mg tablet   Yes No   Sig: Take 500 mg by mouth every 6 (six) hours as needed for mild pain   Patient not taking: No sig reported   acetaminophen (TYLENOL) 500 mg tablet   No No   Sig: Take 1 tablet (500 mg total) by mouth every 6 (six) hours as needed for mild pain   albuterol (PROVENTIL HFA,VENTOLIN HFA) 90 mcg/act inhaler   No No   Sig: Inhale 2 puffs every 6 (six) hours as needed for wheezing   aspirin-acetaminophen-caffeine (EXCEDRIN MIGRAINE) 250-250-65 MG per tablet   Yes No   Sig: Take 1 tablet by mouth every 6 (six) hours as needed for headaches   Patient not taking: No sig reported   escitalopram (LEXAPRO) 20 mg tablet   No No   Sig: Take 0 5 tablets (10 mg total) by mouth daily   Patient not taking: No sig reported   famotidine (PEPCID) 20 mg tablet   No No   Sig: Take 1 tablet (20 mg total) by mouth 2 (two) times a day   Patient not taking: No sig reported   gabapentin (NEURONTIN) 300 mg capsule   Yes No   Sig: Take 200 mg by mouth daily at bedtime   Patient not taking: No sig reported   ibuprofen (MOTRIN) 800 mg tablet   No No   Sig: Take 1 tablet (800 mg total) by mouth every 8 (eight) hours as needed for mild pain   ipratropium (ATROVENT HFA) 17 mcg/act inhaler   No No   Sig: Inhale 2 puffs every 6 (six) hours as needed for wheezing   Patient not taking: No sig reported   medroxyPROGESTERone (DEPO-PROVERA) 150 mg/mL injection   Yes No   Sig: Inject 150 mg into a muscle   methylPREDNISolone 4 MG tablet therapy pack   No No   Sig: Use as directed on package   Patient not taking: No sig reported   methylPREDNISolone 4 MG tablet therapy pack   No No   Sig: Use as directed on package   traMADol (ULTRAM) 50 mg tablet   No No   Sig: TAKE 1 TABLET (50 MG TOTAL) BY MOUTH EVERY 8 (EIGHT) HOURS AS NEEDED FOR MODERATE PAIN      Facility-Administered Medications: None       Past Medical History:   Diagnosis Date   • Asthma    • Chlamydia infection        History reviewed  No pertinent surgical history  Family History   Problem Relation Age of Onset   • No Known Problems Mother      I have reviewed and agree with the history as documented  E-Cigarette/Vaping   • E-Cigarette Use Never User      E-Cigarette/Vaping Substances   • Nicotine No    • THC No      Social History     Tobacco Use   • Smoking status: Never   • Smokeless tobacco: Never   Vaping Use   • Vaping Use: Never used   Substance Use Topics   • Alcohol use: Yes     Comment: social ; Denied history of alcohol use   • Drug use: No        Review of Systems   Constitutional: Positive for chills and fever  Negative for appetite change  HENT: Negative for congestion, rhinorrhea and sore throat  Respiratory: Positive for cough and shortness of breath  Cardiovascular: Positive for chest pain  Gastrointestinal: Positive for vomiting  Negative for abdominal pain, diarrhea and nausea     Genitourinary: Negative for dysuria, frequency, hematuria and urgency  Musculoskeletal: Positive for myalgias  Negative for arthralgias  Skin: Negative for rash  Neurological: Negative for dizziness, weakness, light-headedness, numbness and headaches  All other systems reviewed and are negative  Physical Exam  ED Triage Vitals   Temperature Pulse Respirations Blood Pressure SpO2   01/19/23 1838 01/19/23 1838 01/19/23 1838 01/19/23 1841 01/19/23 1838   97 9 °F (36 6 °C) 79 20 134/96 100 %      Temp Source Heart Rate Source Patient Position - Orthostatic VS BP Location FiO2 (%)   01/19/23 1838 01/19/23 1838 01/19/23 1841 01/19/23 1841 --   Oral Monitor Sitting Left arm       Pain Score       --                    Orthostatic Vital Signs  Vitals:    01/19/23 1838 01/19/23 1841   BP:  134/96   Pulse: 79    Patient Position - Orthostatic VS:  Sitting       Physical Exam  Vitals and nursing note reviewed  Constitutional:       General: She is not in acute distress  Appearance: Normal appearance  She is well-developed and normal weight  She is not ill-appearing, toxic-appearing or diaphoretic  HENT:      Head: Normocephalic and atraumatic  Right Ear: External ear normal       Left Ear: External ear normal       Nose: Nose normal       Mouth/Throat:      Mouth: Mucous membranes are moist       Pharynx: Oropharynx is clear  Eyes:      Extraocular Movements: Extraocular movements intact  Conjunctiva/sclera: Conjunctivae normal    Cardiovascular:      Rate and Rhythm: Normal rate and regular rhythm  Pulses: Normal pulses  Heart sounds: Normal heart sounds  No murmur heard  No friction rub  No gallop  Pulmonary:      Effort: Pulmonary effort is normal  No respiratory distress  Breath sounds: Normal breath sounds  No decreased breath sounds, wheezing, rhonchi or rales  Abdominal:      General: There is no distension  Palpations: Abdomen is soft  Tenderness: There is no abdominal tenderness   There is no guarding or rebound  Musculoskeletal:         General: No tenderness  Cervical back: Neck supple  Right lower leg: No tenderness  No edema  Left lower leg: No tenderness  No edema  Skin:     General: Skin is warm and dry  Coloration: Skin is not pale  Findings: No erythema or rash  Neurological:      General: No focal deficit present  Mental Status: She is alert and oriented to person, place, and time  Cranial Nerves: No cranial nerve deficit  Sensory: No sensory deficit  Motor: No weakness  Psychiatric:         Mood and Affect: Mood normal          Behavior: Behavior normal          ED Medications  Medications   ketorolac (TORADOL) injection 15 mg (15 mg Intravenous Given 1/19/23 1924)   acetaminophen (TYLENOL) tablet 975 mg (975 mg Oral Given 1/19/23 1924)   albuterol (PROVENTIL HFA,VENTOLIN HFA) inhaler 2 puff (2 puffs Inhalation Given 1/19/23 1924)       Diagnostic Studies  Results Reviewed     None                 XR chest 2 views   Final Result by Nathan Hays MD (01/20 2176)      No acute cardiopulmonary disease  Workstation performed: [id number]               Procedures  Procedures      ED Course                             SBIRT 22yo+    Flowsheet Row Most Recent Value   SBIRT (25 yo +)    In order to provide better care to our patients, we are screening all of our patients for alcohol and drug use  Would it be okay to ask you these screening questions? No Filed at: 01/19/2023 1912                [facility]     75-year-old female with past medical history of asthma who presents for evaluation of viral symptoms for three days  Patient is well appearing, vitals normal    Will check CXR to evaluate for pneumonia  Offered viral testing but patient declines  Will give toradol and tylenol for body aches  Will give albuterol inhaler for wheezing  Reviewed and interpreted CXR, no acute cardiopulmonary disease   Will prescribe flonase for post nasal drip/congestion and naproxen for myalgias  Will have patient follow up with her PCP  Discussed with patent strict return precautions  Patient expressed understanding and was agreeable for discharge  Disposition  Final diagnoses:   Cough   Viral syndrome     Time reflects when diagnosis was documented in both MDM as applicable and the Disposition within this note     Time User Action Codes Description Comment    1/19/2023  7:56 PM Genna Ores Add [R05 9] Cough     1/19/2023  7:57 PM Genna Ores Add [B34 9] Viral syndrome       ED Disposition     ED Disposition   Discharge    Condition   Stable    Date/Time   Thu Jan 19, 2023  7:56 PM    Comment   [place] discharge to home/self care                 Follow-up Information     Follow up With Specialties Details Why Contact Dion Palmer MD Family Medicine Schedule an appointment as soon as possible for a visit in 2 days  [address]  [phone]            Discharge Medication List as of 1/19/2023  8:10 PM      START taking these medications    Details   fluticasone (FLONASE) 50 mcg/act nasal spray 1 spray into each nostril daily, Starting Thu 1/19/2023, Normal      naproxen (Naprosyn) 500 mg tablet Take 1 tablet (500 mg total) by mouth 2 (two) times a day with meals, Starting Thu 1/19/2023, Normal         CONTINUE these medications which have NOT CHANGED    Details   !! acetaminophen (TYLENOL) 500 mg tablet Take 500 mg by mouth every 6 (six) hours as needed for mild pain, Historical Med      !! acetaminophen (TYLENOL) 500 mg tablet Take 1 tablet (500 mg total) by mouth every 6 (six) hours as needed for mild pain, Starting Mon 5/16/2022, Normal      albuterol (PROVENTIL HFA,VENTOLIN HFA) 90 mcg/act inhaler Inhale 2 puffs every 6 (six) hours as needed for wheezing, Starting Tue 10/15/2019, Print      aspirin-acetaminophen-caffeine (EXCEDRIN MIGRAINE) 250-250-65 MG per tablet Take 1 tablet by mouth every 6 (six) hours as needed for headaches, Historical Med      escitalopram (LEXAPRO) 20 mg tablet Take 0 5 tablets (10 mg total) by mouth daily, Starting Tue 9/1/2020, Print      famotidine (PEPCID) 20 mg tablet Take 1 tablet (20 mg total) by mouth 2 (two) times a day, Starting Sat 3/13/2021, Normal      gabapentin (NEURONTIN) 300 mg capsule Take 200 mg by mouth daily at bedtime, Historical Med      ibuprofen (MOTRIN) 800 mg tablet Take 1 tablet (800 mg total) by mouth every 8 (eight) hours as needed for mild pain, Starting Tue 10/25/2022, Normal      ipratropium (ATROVENT HFA) 17 mcg/act inhaler Inhale 2 puffs every 6 (six) hours as needed for wheezing, Starting Sat 3/13/2021, Normal      medroxyPROGESTERone (DEPO-PROVERA) 150 mg/mL injection Inject 150 mg into a muscle, Starting Fri 7/17/2020, Historical Med      !! methylPREDNISolone 4 MG tablet therapy pack Use as directed on package, Normal      !! methylPREDNISolone 4 MG tablet therapy pack Use as directed on package, Normal      traMADol (ULTRAM) 50 mg tablet TAKE 1 TABLET (50 MG TOTAL) BY MOUTH EVERY 8 (EIGHT) HOURS AS NEEDED FOR MODERATE PAIN, Starting Wed 12/21/2022, Normal       !! - Potential duplicate medications found  Please discuss with provider  No discharge procedures on file  PDMP Review       Value Time User    PDMP Reviewed  Yes 8/9/2021  1:53 PM Saima Pulliam, [facility]           ED Provider  Attending physically available and evaluated [facility]  I managed the patient along with the ED Attending      Electronically Signed by         Maty Collins MD  01/22/23 2052

## 2023-01-21 NOTE — ED ATTENDING ATTESTATION
1/19/2023  ILazarus DO, saw and evaluated the patient  I have discussed the patient with the resident/non-physician practitioner and agree with the resident's/non-physician practitioner's findings, Plan of Care, and MDM as documented in the resident's/non-physician practitioner's note, except where noted  All available labs and Radiology studies were reviewed  I was present for key portions of any procedure(s) performed by the resident/non-physician practitioner and I was immediately available to provide assistance  At this point I agree with the current assessment done in the Emergency Department  I have conducted an independent evaluation of this patient a history and physical is as follows:    70-year-old female presents with viral symptoms  Patient states she having symptoms for the past 3 days  Has had fevers and chills as well as a dry cough  Also has discomfort in the center of her chest when she is coughing  Has a history of asthma and states she did use her inhaler with mild improvement in symptoms  Kids are sick with similar symptoms  On exam-no acute distress, heart regular, no respiratory distress, appears nontoxic  Plan- suspect viral illness, will do chest x-ray, albuterol inhaler, Tylenol and Toradol      ED Course         Critical Care Time  Procedures

## 2023-02-15 DIAGNOSIS — M76.31 ILIOTIBIAL BAND TENDINITIS OF RIGHT SIDE: ICD-10-CM

## 2023-02-16 RX ORDER — TRAMADOL HYDROCHLORIDE 50 MG/1
50 TABLET ORAL EVERY 8 HOURS PRN
Qty: 90 TABLET | Refills: 0 | Status: SHIPPED | OUTPATIENT
Start: 2023-02-16

## 2023-04-06 PROBLEM — J30.9 ALLERGIC RHINITIS: Status: ACTIVE | Noted: 2023-04-06

## 2023-06-13 ENCOUNTER — HOSPITAL ENCOUNTER (EMERGENCY)
Facility: HOSPITAL | Age: 38
Discharge: HOME | End: 2023-06-14
Attending: EMERGENCY MEDICINE
Payer: COMMERCIAL

## 2023-06-13 ENCOUNTER — APPOINTMENT (EMERGENCY)
Dept: RADIOLOGY | Facility: HOSPITAL | Age: 38
End: 2023-06-13
Payer: COMMERCIAL

## 2023-06-13 VITALS
OXYGEN SATURATION: 98 % | SYSTOLIC BLOOD PRESSURE: 131 MMHG | RESPIRATION RATE: 18 BRPM | HEART RATE: 68 BPM | DIASTOLIC BLOOD PRESSURE: 82 MMHG | TEMPERATURE: 97.7 F

## 2023-06-13 DIAGNOSIS — D64.9 ANEMIA: ICD-10-CM

## 2023-06-13 DIAGNOSIS — Z34.90 PREGNANT: ICD-10-CM

## 2023-06-13 DIAGNOSIS — R10.9 ABDOMINAL PAIN: Primary | ICD-10-CM

## 2023-06-13 LAB
ALBUMIN SERPL BCP-MCNC: 2.8 G/DL (ref 3.5–5)
ALP SERPL-CCNC: 61 U/L (ref 46–116)
ALT SERPL W P-5'-P-CCNC: 15 U/L (ref 12–78)
ANION GAP SERPL CALCULATED.3IONS-SCNC: 3 MMOL/L (ref 4–13)
AST SERPL W P-5'-P-CCNC: 18 U/L (ref 5–45)
BASOPHILS # BLD AUTO: 0.05 THOUSANDS/ÂΜL (ref 0–0.1)
BASOPHILS NFR BLD AUTO: 1 % (ref 0–1)
BILIRUB SERPL-MCNC: 0.21 MG/DL (ref 0.2–1)
BILIRUB UR QL STRIP: NEGATIVE
BUN SERPL-MCNC: 9 MG/DL (ref 5–25)
CALCIUM ALBUM COR SERPL-MCNC: 9.3 MG/DL (ref 8.3–10.1)
CALCIUM SERPL-MCNC: 8.3 MG/DL (ref 8.3–10.1)
CHLORIDE SERPL-SCNC: 109 MMOL/L (ref 96–108)
CLARITY UR: CLEAR
CO2 SERPL-SCNC: 24 MMOL/L (ref 21–32)
COLOR UR: YELLOW
CREAT SERPL-MCNC: 0.96 MG/DL (ref 0.6–1.3)
EOSINOPHIL # BLD AUTO: 0.26 THOUSAND/ÂΜL (ref 0–0.61)
EOSINOPHIL NFR BLD AUTO: 3 % (ref 0–6)
ERYTHROCYTE [DISTWIDTH] IN BLOOD BY AUTOMATED COUNT: 16.8 % (ref 11.6–15.1)
GFR SERPL CREATININE-BSD FRML MDRD: 75 ML/MIN/1.73SQ M
GLUCOSE SERPL-MCNC: 88 MG/DL (ref 65–140)
GLUCOSE UR STRIP-MCNC: NEGATIVE MG/DL
HCT VFR BLD AUTO: 31.5 % (ref 34.8–46.1)
HGB BLD-MCNC: 9.9 G/DL (ref 11.5–15.4)
HGB UR QL STRIP.AUTO: NEGATIVE
IMM GRANULOCYTES # BLD AUTO: 0.06 THOUSAND/UL (ref 0–0.2)
IMM GRANULOCYTES NFR BLD AUTO: 1 % (ref 0–2)
KETONES UR STRIP-MCNC: NEGATIVE MG/DL
LEUKOCYTE ESTERASE UR QL STRIP: NEGATIVE
LYMPHOCYTES # BLD AUTO: 2.33 THOUSANDS/ÂΜL (ref 0.6–4.47)
LYMPHOCYTES NFR BLD AUTO: 23 % (ref 14–44)
MCH RBC QN AUTO: 25.4 PG (ref 26.8–34.3)
MCHC RBC AUTO-ENTMCNC: 31.4 G/DL (ref 31.4–37.4)
MCV RBC AUTO: 81 FL (ref 82–98)
MONOCYTES # BLD AUTO: 0.75 THOUSAND/ÂΜL (ref 0.17–1.22)
MONOCYTES NFR BLD AUTO: 8 % (ref 4–12)
NEUTROPHILS # BLD AUTO: 6.6 THOUSANDS/ÂΜL (ref 1.85–7.62)
NEUTS SEG NFR BLD AUTO: 64 % (ref 43–75)
NITRITE UR QL STRIP: NEGATIVE
NRBC BLD AUTO-RTO: 0 /100 WBCS
PH UR STRIP.AUTO: 6.5 [PH] (ref 4.5–8)
PLATELET # BLD AUTO: 216 THOUSANDS/UL (ref 149–390)
PMV BLD AUTO: 10.5 FL (ref 8.9–12.7)
POTASSIUM SERPL-SCNC: 3.6 MMOL/L (ref 3.5–5.3)
PROT SERPL-MCNC: 6.9 G/DL (ref 6.4–8.4)
PROT UR STRIP-MCNC: ABNORMAL MG/DL
RBC # BLD AUTO: 3.9 MILLION/UL (ref 3.81–5.12)
SODIUM SERPL-SCNC: 136 MMOL/L (ref 135–147)
SP GR UR STRIP.AUTO: >=1.03 (ref 1–1.03)
UROBILINOGEN UR QL STRIP.AUTO: 0.2 E.U./DL
WBC # BLD AUTO: 10.05 THOUSAND/UL (ref 4.31–10.16)

## 2023-06-13 PROCEDURE — 85025 COMPLETE CBC W/AUTO DIFF WBC: CPT

## 2023-06-13 PROCEDURE — 76705 ECHO EXAM OF ABDOMEN: CPT

## 2023-06-13 PROCEDURE — 87086 URINE CULTURE/COLONY COUNT: CPT

## 2023-06-13 PROCEDURE — 80053 COMPREHEN METABOLIC PANEL: CPT

## 2023-06-13 PROCEDURE — 99284 EMERGENCY DEPT VISIT MOD MDM: CPT

## 2023-06-13 PROCEDURE — 96374 THER/PROPH/DIAG INJ IV PUSH: CPT

## 2023-06-13 PROCEDURE — 36415 COLL VENOUS BLD VENIPUNCTURE: CPT

## 2023-06-13 RX ORDER — ACETAMINOPHEN 325 MG/1
650 TABLET ORAL ONCE
Status: COMPLETED | OUTPATIENT
Start: 2023-06-13 | End: 2023-06-13

## 2023-06-13 RX ORDER — ONDANSETRON 2 MG/ML
4 INJECTION INTRAMUSCULAR; INTRAVENOUS ONCE
Status: COMPLETED | OUTPATIENT
Start: 2023-06-13 | End: 2023-06-13

## 2023-06-13 RX ADMIN — ACETAMINOPHEN 650 MG: 325 TABLET ORAL at 22:03

## 2023-06-13 RX ADMIN — ONDANSETRON 4 MG: 2 INJECTION INTRAMUSCULAR; INTRAVENOUS at 22:07

## 2023-06-14 ENCOUNTER — APPOINTMENT (OUTPATIENT)
Dept: RADIOLOGY | Facility: HOSPITAL | Age: 38
End: 2023-06-14
Payer: COMMERCIAL

## 2023-06-14 PROCEDURE — 72148 MRI LUMBAR SPINE W/O DYE: CPT

## 2023-06-14 NOTE — ED ATTENDING ATTESTATION
6/13/2023  IKarin DO, saw and evaluated the patient  I have discussed the patient with the resident/non-physician practitioner and agree with the resident's/non-physician practitioner's findings, Plan of Care, and MDM as documented in the resident's/non-physician practitioner's note, except where noted  All available labs and Radiology studies were reviewed  I was present for key portions of any procedure(s) performed by the resident/non-physician practitioner and I was immediately available to provide assistance  At this point I agree with the current assessment done in the Emergency Department  I have conducted an independent evaluation of this patient a history and physical is as follows:    20-year-old female 20 weeks gestation presents for right lower quadrant abdominal pain  No vaginal bleeding  No dysuria  Tender in the right lower quadrant  Concern for appendicitis  Differential includes ovarian cysts, urinary tract infection normal pregnancy    Ultrasound if equivocal we will move to MRI    ED Course         Critical Care Time  Procedures

## 2023-06-14 NOTE — DISCHARGE INSTRUCTIONS
You were seen in the ED for abdominal pain in pregnancy  Return to the ED for any worsening symptoms or new symptoms  Follow up with your primary care doctor and OBGYN doctor as soon as possible  Take tylenol for your symptoms  Your MRI was negative for appendicitis  You were also anemic so follow up with your primary care doctor

## 2023-06-14 NOTE — ED PROVIDER NOTES
History  Chief Complaint   Patient presents with   • Abdominal Pain     Pt is 5months pregnant and c/o pain on RLQ that's persisted all day  Pt also c/o n/v and chills  Pt reports no other issues with pregnancy     59-year-old female patient with G3, , no past surgical history presenting with right lower quadrant abdominal pain onset last night  Patient states that pain is intermittent  Patient also states that she has nausea and has no appetite and had an episode of vomiting this morning  Patient is 20 weeks pregnant and has been following up with OB/GYN and has had normal pregnancy so far  Patient states that she has chills but denies fever, diarrhea, urinary symptoms, vaginal bleeding or discharge  Prior to Admission Medications   Prescriptions Last Dose Informant Patient Reported?  Taking?   acetaminophen (TYLENOL) 500 mg tablet   Yes No   Sig: Take 500 mg by mouth every 6 (six) hours as needed for mild pain   acetaminophen (TYLENOL) 500 mg tablet   No No   Sig: Take 1 tablet (500 mg total) by mouth every 6 (six) hours as needed for mild pain   Patient not taking: Reported on 2023   albuterol (PROVENTIL HFA,VENTOLIN HFA) 90 mcg/act inhaler   No No   Sig: Inhale 2 puffs every 6 (six) hours as needed for wheezing   aspirin-acetaminophen-caffeine (EXCEDRIN MIGRAINE) 250-250-65 MG per tablet   Yes No   Sig: Take 1 tablet by mouth every 6 (six) hours as needed for headaches   escitalopram (LEXAPRO) 20 mg tablet   No No   Sig: Take 0 5 tablets (10 mg total) by mouth daily   Patient not taking: Reported on 9/3/2020   famotidine (PEPCID) 20 mg tablet   No No   Sig: Take 1 tablet (20 mg total) by mouth 2 (two) times a day   Patient not taking: Reported on 2021   fluticasone (FLONASE) 50 mcg/act nasal spray   No No   Si spray into each nostril daily   Patient not taking: Reported on 2023   gabapentin (NEURONTIN) 300 mg capsule   Yes No   Sig: Take 200 mg by mouth daily at bedtime Patient not taking: Reported on 7/24/2021   ibuprofen (MOTRIN) 800 mg tablet   No No   Sig: Take 1 tablet (800 mg total) by mouth every 8 (eight) hours as needed for mild pain   ipratropium (ATROVENT HFA) 17 mcg/act inhaler   No No   Sig: Inhale 2 puffs every 6 (six) hours as needed for wheezing   Patient not taking: Reported on 11/4/2021   medroxyPROGESTERone (DEPO-PROVERA) 150 mg/mL injection   Yes No   Sig: Inject 150 mg into a muscle   methylPREDNISolone 4 MG tablet therapy pack   No No   Sig: Use as directed on package   methylPREDNISolone 4 MG tablet therapy pack   No No   Sig: Use as directed on package   naproxen (Naprosyn) 500 mg tablet   No No   Sig: Take 1 tablet (500 mg total) by mouth 2 (two) times a day with meals   Patient not taking: Reported on 4/6/2023   ondansetron (ZOFRAN-ODT) 4 mg disintegrating tablet   No No   Sig: Take 1 tablet (4 mg total) by mouth every 6 (six) hours as needed for nausea or vomiting   traMADol (ULTRAM) 50 mg tablet   No No   Sig: TAKE 1 TABLET (50 MG TOTAL) BY MOUTH EVERY 8 (EIGHT) HOURS AS NEEDED FOR MODERATE PAIN      Facility-Administered Medications: None       Past Medical History:   Diagnosis Date   • Asthma    • Chlamydia infection        History reviewed  No pertinent surgical history  Family History   Problem Relation Age of Onset   • No Known Problems Mother      I have reviewed and agree with the history as documented  E-Cigarette/Vaping   • E-Cigarette Use Never User      E-Cigarette/Vaping Substances   • Nicotine No    • THC No      Social History     Tobacco Use   • Smoking status: Never   • Smokeless tobacco: Never   Vaping Use   • Vaping Use: Never used   Substance Use Topics   • Alcohol use: Yes     Comment: social ; Denied history of alcohol use   • Drug use: No        Review of Systems   Constitutional: Positive for chills  Gastrointestinal: Positive for abdominal pain, nausea and vomiting     All other systems reviewed and are negative  Physical Exam  ED Triage Vitals [06/13/23 2138]   Temperature Pulse Respirations Blood Pressure SpO2   97 7 °F (36 5 °C) 68 18 131/82 98 %      Temp Source Heart Rate Source Patient Position - Orthostatic VS BP Location FiO2 (%)   Temporal Monitor -- -- --      Pain Score       5             Orthostatic Vital Signs  Vitals:    06/13/23 2138   BP: 131/82   Pulse: 68       Physical Exam  Vitals reviewed  Constitutional:       Appearance: Normal appearance  HENT:      Head: Normocephalic and atraumatic  Nose: Nose normal       Mouth/Throat:      Mouth: Mucous membranes are moist       Pharynx: Oropharynx is clear  Eyes:      Extraocular Movements: Extraocular movements intact  Conjunctiva/sclera: Conjunctivae normal    Cardiovascular:      Rate and Rhythm: Normal rate and regular rhythm  Pulses: Normal pulses  Heart sounds: Normal heart sounds  Pulmonary:      Effort: Pulmonary effort is normal       Breath sounds: Normal breath sounds  Abdominal:      General: Bowel sounds are normal       Palpations: Abdomen is soft  Tenderness: There is abdominal tenderness in the right lower quadrant  Comments: Gravid uterus   Musculoskeletal:         General: Normal range of motion  Cervical back: Normal range of motion  Skin:     General: Skin is warm and dry  Neurological:      General: No focal deficit present  Mental Status: She is alert and oriented to person, place, and time  Mental status is at baseline  ED Medications  Medications   ondansetron (ZOFRAN) injection 4 mg (4 mg Intravenous Given 6/13/23 2207)   acetaminophen (TYLENOL) tablet 650 mg (650 mg Oral Given 6/13/23 2203)       Diagnostic Studies  Results Reviewed     Procedure Component Value Units Date/Time    Urine culture [[id number]] Collected: 06/13/23 2335    Lab Status:  In process Specimen: Urine, Clean Catch Updated: 06/13/23 2342    Urine Macroscopic, POC [[id number]]  (Abnormal) Collected: 06/13/23 2335    Lab Status: Final result Specimen: Urine Updated: 06/13/23 2336     Color, UA Yellow     Clarity, UA Clear     pH, UA 6 5     Leukocytes, UA Negative     Nitrite, UA Negative     Protein, UA 30 (1+) mg/dl      Glucose, UA Negative mg/dl      Ketones, UA Negative mg/dl      Urobilinogen, UA 0 2 E U /dl      Bilirubin, UA Negative     Occult Blood, UA Negative     Specific Gravity, UA >=1 030    Narrative:      CLINITEK RESULT    Comprehensive metabolic panel [[id number]]  (Abnormal) Collected: 06/13/23 2208    Lab Status: Final result Specimen: Blood from Arm, Left Updated: 06/13/23 2239     Sodium 136 mmol/L      Potassium 3 6 mmol/L      Chloride 109 mmol/L      CO2 24 mmol/L      ANION GAP 3 mmol/L      BUN 9 mg/dL      Creatinine 0 96 mg/dL      Glucose 88 mg/dL      Calcium 8 3 mg/dL      Corrected Calcium 9 3 mg/dL      AST 18 U/L      ALT 15 U/L      Alkaline Phosphatase 61 U/L      Total Protein 6 9 g/dL      Albumin 2 8 g/dL      Total Bilirubin 0 21 mg/dL      eGFR 75 ml/min/1 73sq m     Narrative:      [place] guidelines for Chronic Kidney Disease (CKD):   •  Stage 1 with normal or high GFR (GFR > 90 mL/min/1 73 square meters)  •  Stage 2 Mild CKD (GFR = 60-89 mL/min/1 73 square meters)  •  Stage 3A Moderate CKD (GFR = 45-59 mL/min/1 73 square meters)  •  Stage 3B Moderate CKD (GFR = 30-44 mL/min/1 73 square meters)  •  Stage 4 Severe CKD (GFR = 15-29 mL/min/1 73 square meters)  •  Stage 5 End Stage CKD (GFR <15 mL/min/1 73 square meters)  Note: GFR calculation is accurate only with a steady state creatinine    CBC and differential [[id number]]  (Abnormal) Collected: 06/13/23 2208    Lab Status: Final result Specimen: Blood from Arm, Left Updated: 06/13/23 2219     WBC 10 05 Thousand/uL      RBC 3 90 Million/uL      Hemoglobin 9 9 g/dL      Hematocrit 31 5 %      MCV 81 fL      MCH 25 4 pg      MCHC 31 4 g/dL      RDW 16 8 %      MPV 10 5 fL Platelets 610 Thousands/uL      nRBC 0 /100 WBCs      Neutrophils Relative 64 %      Immat GRANS % 1 %      Lymphocytes Relative 23 %      Monocytes Relative 8 %      Eosinophils Relative 3 %      Basophils Relative 1 %      Neutrophils Absolute 6 60 Thousands/µL      Immature Grans Absolute 0 06 Thousand/uL      Lymphocytes Absolute 2 33 Thousands/µL      Monocytes Absolute 0 75 Thousand/µL      Eosinophils Absolute 0 26 Thousand/µL      Basophils Absolute 0 05 Thousands/µL                  MRI abdomen appendicitis wo contrast   Final Result by Mohsen Pérez MD (06/14 0136)         1  Partially visualized, normal appendix  2  Single intrauterine pregnancy  Workstation performed: [id number]         US appendix   Final Result by Jose Juan Jacobs MD (06/13 0131)      Although the appendix is not identified, there are no secondary sonographic findings to suggest acute appendicitis  Small ill-defined area of hyperechogenicity noted at the right lower quadrant anterior abdominal wall for which a small hernia cannot be excluded  The patient was tender to palpation in this region  Further evaluation with CT or MRI could be performed,    if clinically indicated        Workstation performed: [id number]               Procedures  POC Pelvic US    Date/Time: 6/13/2023 10:06 PM    Performed by: Aditi Parks MD  Authorized by: Aditi Parks MD    Patient location:  ED  Procedure details:     Exam Type:  Diagnostic    Indications: pregnant with abdominal pain      Assessment for: confirm intrauterine pregnancy      Technique:  Transabdominal obstetric (HCG+) exam    Views obtained: uterus (transverse and sagittal)      Image quality: diagnostic      Image availability:  Images available in PACS  Uterine findings:     Fetal heart rate (bpm):  140  Interpretation:     Ultrasound impressions: normal      Pregnancy findings: intrauterine pregnancy (IUP)            ED Course  ED Course as of 06/14/23 5046 [address] 2023   2304 US not visualized  Will order MRI, spoke with radiologist                             SBIRT 22yo+    Flowsheet Row Most Recent Value   Initial Alcohol Screen: US AUDIT-C     1  How often do you have a drink containing alcohol? 0 Filed at: 2023   3b  FEMALE Any Age, or MALE 65+: How often do you have 4 or more drinks on one occassion? 0 Filed at: 2023   Audit-C Score 0 Filed at: 2023   MARY: How many times in the past year have you    Used an illegal drug or used a prescription medication for non-medical reasons? Never Filed at: 2023                Medical Decision Making  41 y/o female patient A1 presenting with RLQ abd pain  Patient also has n/v and chills  20 weeks pregnant  No vaginal bleeding or discharge  Possible ddx includes appendicitis, uti, gastroenteritis  Labs show anemia  US appendix unable to visualize appendix  Decided to do MRI abdomen for appendix, which was negative  Bedside US shows fetus with movement with 140s HR  Stable for discharge with follow up with OB/GYN  Return precautions given  Abdominal pain:     Details: ddx includes appendicitis, gastroenteritis  MRI negative  Anemia:     Details: HGB drop to 10, discussed with patient  Amount and/or Complexity of Data Reviewed  Labs: ordered  Radiology: ordered  Discussion of management or test interpretation with external provider(s): Follow up with OBGYN    Risk  OTC drugs  Prescription drug management              Disposition  Final diagnoses:   Abdominal pain   Pregnant   Anemia     Time reflects when diagnosis was documented in both MDM as applicable and the Disposition within this note     Time User Action Codes Description Comment    2023  1:41 AM Westminster Castillo Add [R10 9] Abdominal pain     2023  1:41 AM Westminster Castillo Add [Z34 90] Pregnant     2023  1:44 AM Westminster Castillo Add [D64 9] Anemia       ED Disposition     ED Disposition   Discharge Condition   Stable    Date/Time   Wed Jun 14, 2023  1:41 AM    Comment   [place] discharge to home/self care                 Follow-up Information     Follow up With Specialties Details Why Contact Oneida Thomas MD Family Medicine Schedule an appointment as soon as possible for a visit   [address] [place]  [phone]            Discharge Medication List as of 6/14/2023  1:44 AM      CONTINUE these medications which have NOT CHANGED    Details   !! acetaminophen (TYLENOL) 500 mg tablet Take 500 mg by mouth every 6 (six) hours as needed for mild pain, Historical Med      !! acetaminophen (TYLENOL) 500 mg tablet Take 1 tablet (500 mg total) by mouth every 6 (six) hours as needed for mild pain, Starting Mon 5/16/2022, Normal      albuterol (PROVENTIL HFA,VENTOLIN HFA) 90 mcg/act inhaler Inhale 2 puffs every 6 (six) hours as needed for wheezing, Starting Tue 10/15/2019, Print      aspirin-acetaminophen-caffeine (EXCEDRIN MIGRAINE) 250-250-65 MG per tablet Take 1 tablet by mouth every 6 (six) hours as needed for headaches, Historical Med      escitalopram (LEXAPRO) 20 mg tablet Take 0 5 tablets (10 mg total) by mouth daily, Starting Tue 9/1/2020, Print      famotidine (PEPCID) 20 mg tablet Take 1 tablet (20 mg total) by mouth 2 (two) times a day, Starting Sat 3/13/2021, Normal      fluticasone (FLONASE) 50 mcg/act nasal spray 1 spray into each nostril daily, Starting u 1/19/2023, Normal      gabapentin (NEURONTIN) 300 mg capsule Take 200 mg by mouth daily at bedtime, Historical Med      ibuprofen (MOTRIN) 800 mg tablet Take 1 tablet (800 mg total) by mouth every 8 (eight) hours as needed for mild pain, Starting Tue 10/25/2022, Normal      ipratropium (ATROVENT HFA) 17 mcg/act inhaler Inhale 2 puffs every 6 (six) hours as needed for wheezing, Starting Sat 3/13/2021, Normal      medroxyPROGESTERone (DEPO-PROVERA) 150 mg/mL injection Inject 150 mg into a muscle, Starting Fri 7/17/2020, Historical Med      !! methylPREDNISolone 4 MG tablet therapy pack Use as directed on package, Normal      !! methylPREDNISolone 4 MG tablet therapy pack Use as directed on package, Normal      naproxen (Naprosyn) 500 mg tablet Take 1 tablet (500 mg total) by mouth 2 (two) times a day with meals, Starting Thu 1/19/2023, Normal      ondansetron (ZOFRAN-ODT) 4 mg disintegrating tablet Take 1 tablet (4 mg total) by mouth every 6 (six) hours as needed for nausea or vomiting, Starting Thu 4/6/2023, Normal      traMADol (ULTRAM) 50 mg tablet TAKE 1 TABLET (50 MG TOTAL) BY MOUTH EVERY 8 (EIGHT) HOURS AS NEEDED FOR MODERATE PAIN, Starting Thu 2/16/2023, Normal       !! - Potential duplicate medications found  Please discuss with provider  No discharge procedures on file  PDMP Review       Value Time User    PDMP Reviewed  Yes 8/9/2021  1:53 PM Saima Fontaine, [facility]           ED Provider  Attending physically available and evaluated [facility]  I managed the patient along with the ED Attending      Electronically Signed by         Severo Common, MD  06/14/23 9024

## 2023-06-15 LAB
BACTERIA UR CULT: ABNORMAL
BACTERIA UR CULT: ABNORMAL

## 2023-07-17 ENCOUNTER — VBI (OUTPATIENT)
Dept: ADMINISTRATIVE | Facility: OTHER | Age: 38
End: 2023-07-17

## 2023-07-26 ENCOUNTER — HOSPITAL ENCOUNTER (EMERGENCY)
Facility: HOSPITAL | Age: 38
Discharge: OTHER INSTITUTION | End: 2023-07-26
Attending: EMERGENCY MEDICINE | Admitting: EMERGENCY MEDICINE
Payer: COMMERCIAL

## 2023-07-26 ENCOUNTER — HOSPITAL ENCOUNTER (OUTPATIENT)
Facility: HOSPITAL | Age: 38
Setting detail: OBSERVATION
Discharge: HOME | End: 2023-07-28
Attending: OBSTETRICS & GYNECOLOGY | Admitting: OBSTETRICS & GYNECOLOGY
Payer: COMMERCIAL

## 2023-07-26 VITALS
DIASTOLIC BLOOD PRESSURE: 81 MMHG | SYSTOLIC BLOOD PRESSURE: 134 MMHG | TEMPERATURE: 97.9 F | RESPIRATION RATE: 18 BRPM | HEART RATE: 65 BPM | OXYGEN SATURATION: 98 %

## 2023-07-26 DIAGNOSIS — Z34.90 PREGNANT: Primary | ICD-10-CM

## 2023-07-26 DIAGNOSIS — Z3A.27 27 WEEKS GESTATION OF PREGNANCY: Primary | ICD-10-CM

## 2023-07-26 DIAGNOSIS — O47.9 UTERINE CONTRACTIONS DURING PREGNANCY: ICD-10-CM

## 2023-07-26 LAB
ALBUMIN SERPL BCP-MCNC: 2.8 G/DL (ref 3.5–5)
ALP SERPL-CCNC: 71 U/L (ref 46–116)
ALT SERPL W P-5'-P-CCNC: 16 U/L (ref 12–78)
ANION GAP SERPL CALCULATED.3IONS-SCNC: 4 MMOL/L
AST SERPL W P-5'-P-CCNC: 18 U/L (ref 5–45)
BASOPHILS # BLD AUTO: 0.06 THOUSANDS/ÂΜL (ref 0–0.1)
BASOPHILS NFR BLD AUTO: 1 % (ref 0–1)
BILIRUB SERPL-MCNC: 0.29 MG/DL (ref 0.2–1)
BILIRUB UR QL STRIP: NEGATIVE
BUN SERPL-MCNC: 6 MG/DL (ref 5–25)
CALCIUM ALBUM COR SERPL-MCNC: 9.4 MG/DL (ref 8.3–10.1)
CALCIUM SERPL-MCNC: 8.4 MG/DL (ref 8.3–10.1)
CHLORIDE SERPL-SCNC: 108 MMOL/L (ref 96–108)
CLARITY UR: CLEAR
CO2 SERPL-SCNC: 23 MMOL/L (ref 21–32)
COLOR UR: COLORLESS
CREAT SERPL-MCNC: 0.74 MG/DL (ref 0.6–1.3)
CREAT UR-MCNC: 27.1 MG/DL
EOSINOPHIL # BLD AUTO: 0.26 THOUSAND/ÂΜL (ref 0–0.61)
EOSINOPHIL NFR BLD AUTO: 3 % (ref 0–6)
ERYTHROCYTE [DISTWIDTH] IN BLOOD BY AUTOMATED COUNT: 16.9 % (ref 11.6–15.1)
GFR SERPL CREATININE-BSD FRML MDRD: 103 ML/MIN/1.73SQ M
GLUCOSE SERPL-MCNC: 78 MG/DL (ref 65–140)
GLUCOSE UR STRIP-MCNC: NEGATIVE MG/DL
HCT VFR BLD AUTO: 31.2 % (ref 34.8–46.1)
HGB BLD-MCNC: 10.1 G/DL (ref 11.5–15.4)
HGB UR QL STRIP.AUTO: NEGATIVE
IMM GRANULOCYTES # BLD AUTO: 0.08 THOUSAND/UL (ref 0–0.2)
IMM GRANULOCYTES NFR BLD AUTO: 1 % (ref 0–2)
KETONES UR STRIP-MCNC: NEGATIVE MG/DL
LEUKOCYTE ESTERASE UR QL STRIP: NEGATIVE
LYMPHOCYTES # BLD AUTO: 2.1 THOUSANDS/ÂΜL (ref 0.6–4.47)
LYMPHOCYTES NFR BLD AUTO: 22 % (ref 14–44)
MCH RBC QN AUTO: 26.5 PG (ref 26.8–34.3)
MCHC RBC AUTO-ENTMCNC: 32.4 G/DL (ref 31.4–37.4)
MCV RBC AUTO: 82 FL (ref 82–98)
MONOCYTES # BLD AUTO: 0.88 THOUSAND/ÂΜL (ref 0.17–1.22)
MONOCYTES NFR BLD AUTO: 9 % (ref 4–12)
NEUTROPHILS # BLD AUTO: 6.26 THOUSANDS/ÂΜL (ref 1.85–7.62)
NEUTS SEG NFR BLD AUTO: 64 % (ref 43–75)
NITRITE UR QL STRIP: NEGATIVE
NRBC BLD AUTO-RTO: 0 /100 WBCS
PH UR STRIP.AUTO: 7 [PH]
PLATELET # BLD AUTO: 223 THOUSANDS/UL (ref 149–390)
PMV BLD AUTO: 10 FL (ref 8.9–12.7)
POTASSIUM SERPL-SCNC: 3.7 MMOL/L (ref 3.5–5.3)
PROT SERPL-MCNC: 6.7 G/DL (ref 6.4–8.4)
PROT UR STRIP-MCNC: NEGATIVE MG/DL
PROT UR-MCNC: 7 MG/DL
PROT/CREAT UR: 0.26 MG/G{CREAT} (ref 0–0.1)
RBC # BLD AUTO: 3.81 MILLION/UL (ref 3.81–5.12)
SODIUM SERPL-SCNC: 135 MMOL/L (ref 135–147)
SP GR UR STRIP.AUTO: 1 (ref 1–1.03)
UROBILINOGEN UR STRIP-ACNC: <2 MG/DL
WBC # BLD AUTO: 9.64 THOUSAND/UL (ref 4.31–10.16)

## 2023-07-26 PROCEDURE — 85025 COMPLETE CBC W/AUTO DIFF WBC: CPT | Performed by: EMERGENCY MEDICINE

## 2023-07-26 PROCEDURE — 87086 URINE CULTURE/COLONY COUNT: CPT

## 2023-07-26 PROCEDURE — 36415 COLL VENOUS BLD VENIPUNCTURE: CPT | Performed by: EMERGENCY MEDICINE

## 2023-07-26 PROCEDURE — 81003 URINALYSIS AUTO W/O SCOPE: CPT

## 2023-07-26 PROCEDURE — 84156 ASSAY OF PROTEIN URINE: CPT

## 2023-07-26 PROCEDURE — 99222 1ST HOSP IP/OBS MODERATE 55: CPT | Performed by: OBSTETRICS & GYNECOLOGY

## 2023-07-26 PROCEDURE — 80053 COMPREHEN METABOLIC PANEL: CPT | Performed by: EMERGENCY MEDICINE

## 2023-07-26 PROCEDURE — 82570 ASSAY OF URINE CREATININE: CPT

## 2023-07-27 PROBLEM — Z3A.27 27 WEEKS GESTATION OF PREGNANCY: Status: ACTIVE | Noted: 2023-07-27

## 2023-07-27 PROBLEM — O47.03 PRETERM UTERINE CONTRACTIONS IN THIRD TRIMESTER, ANTEPARTUM: Status: ACTIVE | Noted: 2023-07-27

## 2023-07-27 PROBLEM — R03.0 ELEVATED BLOOD PRESSURE READING WITHOUT DIAGNOSIS OF HYPERTENSION: Status: ACTIVE | Noted: 2023-07-27

## 2023-07-27 PROBLEM — O99.019 ANEMIA AFFECTING PREGNANCY: Status: ACTIVE | Noted: 2023-07-27

## 2023-07-27 LAB
ABO GROUP BLD: NORMAL
ALBUMIN SERPL BCP-MCNC: 3.5 G/DL (ref 3.5–5)
ALP SERPL-CCNC: 68 U/L (ref 34–104)
ALT SERPL W P-5'-P-CCNC: 9 U/L (ref 7–52)
AMPHETAMINES SERPL QL SCN: NEGATIVE
ANION GAP SERPL CALCULATED.3IONS-SCNC: 7 MMOL/L
APTT PPP: 27 SECONDS (ref 23–37)
AST SERPL W P-5'-P-CCNC: 16 U/L (ref 13–39)
BARBITURATES UR QL: NEGATIVE
BASOPHILS # BLD AUTO: 0.03 THOUSANDS/ÂΜL (ref 0–0.1)
BASOPHILS # BLD AUTO: 0.04 THOUSANDS/ÂΜL (ref 0–0.1)
BASOPHILS NFR BLD AUTO: 0 % (ref 0–1)
BASOPHILS NFR BLD AUTO: 0 % (ref 0–1)
BENZODIAZ UR QL: NEGATIVE
BILIRUB SERPL-MCNC: 0.35 MG/DL (ref 0.2–1)
BILIRUB UR QL STRIP: NEGATIVE
BLD GP AB SCN SERPL QL: NEGATIVE
BUN SERPL-MCNC: 7 MG/DL (ref 5–25)
C TRACH DNA SPEC QL NAA+PROBE: NEGATIVE
CALCIUM SERPL-MCNC: 8.2 MG/DL (ref 8.4–10.2)
CHLORIDE SERPL-SCNC: 105 MMOL/L (ref 96–108)
CLARITY UR: CLEAR
CO2 SERPL-SCNC: 21 MMOL/L (ref 21–32)
COCAINE UR QL: NEGATIVE
COLOR UR: COLORLESS
CREAT SERPL-MCNC: 0.6 MG/DL (ref 0.6–1.3)
EOSINOPHIL # BLD AUTO: 0.1 THOUSAND/ÂΜL (ref 0–0.61)
EOSINOPHIL # BLD AUTO: 0.28 THOUSAND/ÂΜL (ref 0–0.61)
EOSINOPHIL NFR BLD AUTO: 1 % (ref 0–6)
EOSINOPHIL NFR BLD AUTO: 3 % (ref 0–6)
ERYTHROCYTE [DISTWIDTH] IN BLOOD BY AUTOMATED COUNT: 16.9 % (ref 11.6–15.1)
ERYTHROCYTE [DISTWIDTH] IN BLOOD BY AUTOMATED COUNT: 16.9 % (ref 11.6–15.1)
FERRITIN SERPL-MCNC: 11 NG/ML (ref 11–307)
FIBRINOGEN PPP-MCNC: 355 MG/DL (ref 207–520)
FIBRINOGEN PPP-MCNC: 412 MG/DL (ref 207–520)
GFR SERPL CREATININE-BSD FRML MDRD: 116 ML/MIN/1.73SQ M
GLUCOSE SERPL-MCNC: 78 MG/DL (ref 65–140)
GLUCOSE UR STRIP-MCNC: NEGATIVE MG/DL
HCT VFR BLD AUTO: 32.2 % (ref 34.8–46.1)
HCT VFR BLD AUTO: 33.7 % (ref 34.8–46.1)
HGB BLD-MCNC: 10.3 G/DL (ref 11.5–15.4)
HGB BLD-MCNC: 10.8 G/DL (ref 11.5–15.4)
HGB UR QL STRIP.AUTO: NEGATIVE
HOLD SPECIMEN: NORMAL
IMM GRANULOCYTES # BLD AUTO: 0.06 THOUSAND/UL (ref 0–0.2)
IMM GRANULOCYTES # BLD AUTO: 0.13 THOUSAND/UL (ref 0–0.2)
IMM GRANULOCYTES NFR BLD AUTO: 1 % (ref 0–2)
IMM GRANULOCYTES NFR BLD AUTO: 1 % (ref 0–2)
INR PPP: 1.02 (ref 0.84–1.19)
IRON SATN MFR SERPL: 5 % (ref 15–50)
IRON SERPL-MCNC: 32 UG/DL (ref 50–170)
KETONES UR STRIP-MCNC: NEGATIVE MG/DL
LEUKOCYTE ESTERASE UR QL STRIP: NEGATIVE
LYMPHOCYTES # BLD AUTO: 1.4 THOUSANDS/ÂΜL (ref 0.6–4.47)
LYMPHOCYTES # BLD AUTO: 2.09 THOUSANDS/ÂΜL (ref 0.6–4.47)
LYMPHOCYTES NFR BLD AUTO: 11 % (ref 14–44)
LYMPHOCYTES NFR BLD AUTO: 21 % (ref 14–44)
MCH RBC QN AUTO: 26.2 PG (ref 26.8–34.3)
MCH RBC QN AUTO: 26.5 PG (ref 26.8–34.3)
MCHC RBC AUTO-ENTMCNC: 32 G/DL (ref 31.4–37.4)
MCHC RBC AUTO-ENTMCNC: 32 G/DL (ref 31.4–37.4)
MCV RBC AUTO: 82 FL (ref 82–98)
MCV RBC AUTO: 83 FL (ref 82–98)
METHADONE UR QL: NEGATIVE
MONOCYTES # BLD AUTO: 0.27 THOUSAND/ÂΜL (ref 0.17–1.22)
MONOCYTES # BLD AUTO: 0.83 THOUSAND/ÂΜL (ref 0.17–1.22)
MONOCYTES NFR BLD AUTO: 2 % (ref 4–12)
MONOCYTES NFR BLD AUTO: 8 % (ref 4–12)
N GONORRHOEA DNA SPEC QL NAA+PROBE: NEGATIVE
NEUTROPHILS # BLD AUTO: 11.28 THOUSANDS/ÂΜL (ref 1.85–7.62)
NEUTROPHILS # BLD AUTO: 6.67 THOUSANDS/ÂΜL (ref 1.85–7.62)
NEUTS SEG NFR BLD AUTO: 67 % (ref 43–75)
NEUTS SEG NFR BLD AUTO: 85 % (ref 43–75)
NITRITE UR QL STRIP: NEGATIVE
NRBC BLD AUTO-RTO: 0 /100 WBCS
NRBC BLD AUTO-RTO: 0 /100 WBCS
OPIATES UR QL SCN: NEGATIVE
OXYCODONE+OXYMORPHONE UR QL SCN: NEGATIVE
PCP UR QL: NEGATIVE
PH UR STRIP.AUTO: 7 [PH]
PLATELET # BLD AUTO: 216 THOUSANDS/UL (ref 149–390)
PLATELET # BLD AUTO: 228 THOUSANDS/UL (ref 149–390)
PMV BLD AUTO: 10.2 FL (ref 8.9–12.7)
PMV BLD AUTO: 10.4 FL (ref 8.9–12.7)
POTASSIUM SERPL-SCNC: 3.7 MMOL/L (ref 3.5–5.3)
PROT SERPL-MCNC: 6.6 G/DL (ref 6.4–8.4)
PROT UR STRIP-MCNC: NEGATIVE MG/DL
PROTHROMBIN TIME: 13.6 SECONDS (ref 11.6–14.5)
RBC # BLD AUTO: 3.93 MILLION/UL (ref 3.81–5.12)
RBC # BLD AUTO: 4.07 MILLION/UL (ref 3.81–5.12)
RH BLD: POSITIVE
SODIUM SERPL-SCNC: 133 MMOL/L (ref 135–147)
SP GR UR STRIP.AUTO: 1 (ref 1–1.03)
SPECIMEN EXPIRATION DATE: NORMAL
THC UR QL: POSITIVE
TIBC SERPL-MCNC: 660 UG/DL (ref 250–450)
TREPONEMA PALLIDUM IGG+IGM AB [PRESENCE] IN SERUM OR PLASMA BY IMMUNOASSAY: NORMAL
UROBILINOGEN UR STRIP-ACNC: <2 MG/DL
WBC # BLD AUTO: 13.21 THOUSAND/UL (ref 4.31–10.16)
WBC # BLD AUTO: 9.97 THOUSAND/UL (ref 4.31–10.16)

## 2023-07-27 PROCEDURE — 87150 DNA/RNA AMPLIFIED PROBE: CPT | Performed by: OBSTETRICS & GYNECOLOGY

## 2023-07-27 PROCEDURE — 86901 BLOOD TYPING SEROLOGIC RH(D): CPT | Performed by: OBSTETRICS & GYNECOLOGY

## 2023-07-27 PROCEDURE — 87491 CHLMYD TRACH DNA AMP PROBE: CPT | Performed by: OBSTETRICS & GYNECOLOGY

## 2023-07-27 PROCEDURE — 85384 FIBRINOGEN ACTIVITY: CPT | Performed by: OBSTETRICS & GYNECOLOGY

## 2023-07-27 PROCEDURE — NC001 PR NO CHARGE: Performed by: OBSTETRICS & GYNECOLOGY

## 2023-07-27 PROCEDURE — 99215 OFFICE O/P EST HI 40 MIN: CPT

## 2023-07-27 PROCEDURE — 86850 RBC ANTIBODY SCREEN: CPT | Performed by: OBSTETRICS & GYNECOLOGY

## 2023-07-27 PROCEDURE — 87591 N.GONORRHOEAE DNA AMP PROB: CPT | Performed by: OBSTETRICS & GYNECOLOGY

## 2023-07-27 PROCEDURE — 76815 OB US LIMITED FETUS(S): CPT | Performed by: OBSTETRICS & GYNECOLOGY

## 2023-07-27 PROCEDURE — 80307 DRUG TEST PRSMV CHEM ANLYZR: CPT | Performed by: OBSTETRICS & GYNECOLOGY

## 2023-07-27 PROCEDURE — 85730 THROMBOPLASTIN TIME PARTIAL: CPT | Performed by: OBSTETRICS & GYNECOLOGY

## 2023-07-27 PROCEDURE — 85610 PROTHROMBIN TIME: CPT | Performed by: OBSTETRICS & GYNECOLOGY

## 2023-07-27 PROCEDURE — G0379 DIRECT REFER HOSPITAL OBSERV: HCPCS

## 2023-07-27 PROCEDURE — 83540 ASSAY OF IRON: CPT | Performed by: OBSTETRICS & GYNECOLOGY

## 2023-07-27 PROCEDURE — 85025 COMPLETE CBC W/AUTO DIFF WBC: CPT | Performed by: OBSTETRICS & GYNECOLOGY

## 2023-07-27 PROCEDURE — 83550 IRON BINDING TEST: CPT | Performed by: OBSTETRICS & GYNECOLOGY

## 2023-07-27 PROCEDURE — 82728 ASSAY OF FERRITIN: CPT | Performed by: OBSTETRICS & GYNECOLOGY

## 2023-07-27 PROCEDURE — 76817 TRANSVAGINAL US OBSTETRIC: CPT | Performed by: OBSTETRICS & GYNECOLOGY

## 2023-07-27 PROCEDURE — 86780 TREPONEMA PALLIDUM: CPT | Performed by: OBSTETRICS & GYNECOLOGY

## 2023-07-27 PROCEDURE — 86900 BLOOD TYPING SEROLOGIC ABO: CPT | Performed by: OBSTETRICS & GYNECOLOGY

## 2023-07-27 PROCEDURE — 99232 SBSQ HOSP IP/OBS MODERATE 35: CPT | Performed by: OBSTETRICS & GYNECOLOGY

## 2023-07-27 PROCEDURE — 81003 URINALYSIS AUTO W/O SCOPE: CPT | Performed by: OBSTETRICS & GYNECOLOGY

## 2023-07-27 PROCEDURE — 80053 COMPREHEN METABOLIC PANEL: CPT | Performed by: OBSTETRICS & GYNECOLOGY

## 2023-07-27 RX ORDER — SODIUM CHLORIDE, SODIUM LACTATE, POTASSIUM CHLORIDE, CALCIUM CHLORIDE 600; 310; 30; 20 MG/100ML; MG/100ML; MG/100ML; MG/100ML
50 INJECTION, SOLUTION INTRAVENOUS CONTINUOUS
Status: DISCONTINUED | OUTPATIENT
Start: 2023-07-27 | End: 2023-07-27

## 2023-07-27 RX ORDER — CALCIUM GLUCONATE 94 MG/ML
1 INJECTION, SOLUTION INTRAVENOUS ONCE AS NEEDED
Status: DISCONTINUED | OUTPATIENT
Start: 2023-07-27 | End: 2023-07-27

## 2023-07-27 RX ORDER — MAGNESIUM SULFATE HEPTAHYDRATE 40 MG/ML
2 INJECTION, SOLUTION INTRAVENOUS CONTINUOUS
Status: DISCONTINUED | OUTPATIENT
Start: 2023-07-27 | End: 2023-07-27

## 2023-07-27 RX ORDER — CALCIUM CARBONATE 500 MG/1
1000 TABLET, CHEWABLE ORAL DAILY PRN
Status: DISCONTINUED | OUTPATIENT
Start: 2023-07-27 | End: 2023-07-28 | Stop reason: HOSPADM

## 2023-07-27 RX ORDER — ONDANSETRON 2 MG/ML
4 INJECTION INTRAMUSCULAR; INTRAVENOUS EVERY 8 HOURS PRN
Status: DISCONTINUED | OUTPATIENT
Start: 2023-07-27 | End: 2023-07-28 | Stop reason: HOSPADM

## 2023-07-27 RX ORDER — BETAMETHASONE SODIUM PHOSPHATE AND BETAMETHASONE ACETATE 3; 3 MG/ML; MG/ML
12 INJECTION, SUSPENSION INTRA-ARTICULAR; INTRALESIONAL; INTRAMUSCULAR; SOFT TISSUE EVERY 24 HOURS
Status: COMPLETED | OUTPATIENT
Start: 2023-07-27 | End: 2023-07-28

## 2023-07-27 RX ORDER — MAGNESIUM SULFATE HEPTAHYDRATE 40 MG/ML
4 INJECTION, SOLUTION INTRAVENOUS ONCE
Status: COMPLETED | OUTPATIENT
Start: 2023-07-27 | End: 2023-07-27

## 2023-07-27 RX ADMIN — SODIUM CHLORIDE, SODIUM LACTATE, POTASSIUM CHLORIDE, AND CALCIUM CHLORIDE 1000 ML: .6; .31; .03; .02 INJECTION, SOLUTION INTRAVENOUS at 00:32

## 2023-07-27 RX ADMIN — SODIUM CHLORIDE 5 MILLION UNITS: 0.9 INJECTION, SOLUTION INTRAVENOUS at 01:20

## 2023-07-27 RX ADMIN — MAGNESIUM SULFATE IN WATER 2 G/HR: 20 INJECTION, SOLUTION INTRAVENOUS at 01:37

## 2023-07-27 RX ADMIN — MAGNESIUM SULFATE HEPTAHYDRATE 4 G: 40 INJECTION, SOLUTION INTRAVENOUS at 01:16

## 2023-07-27 RX ADMIN — SODIUM CHLORIDE 2.5 MILLION UNITS: 9 INJECTION, SOLUTION INTRAVENOUS at 05:33

## 2023-07-27 RX ADMIN — SODIUM CHLORIDE, SODIUM LACTATE, POTASSIUM CHLORIDE, AND CALCIUM CHLORIDE 50 ML/HR: .6; .31; .03; .02 INJECTION, SOLUTION INTRAVENOUS at 03:06

## 2023-07-27 RX ADMIN — BETAMETHASONE SODIUM PHOSPHATE AND BETAMETHASONE ACETATE 12 MG: 3; 3 INJECTION, SUSPENSION INTRA-ARTICULAR; INTRALESIONAL; INTRAMUSCULAR at 01:07

## 2023-07-27 NOTE — H&P
[address] [facility] 40 y.o. female MRN: [id number]  Unit/Bed#:  201-01 Encounter: [id number]      Assessment: Adam Sampson 40 y.o. [id number] at 27w1d admitted for observation in the setting of  contractions concerning for labor. [facility] Plan:     *  uterine contractions in third trimester, antepartum  Assessment & Plan   contractions concerning for labor   Admit for observation due to worsening pain and regular contractions   Continuous fetal monitoring   Microscopy is negative   UA is negative, UDS positive for THC   GC/CT pending   CL 4.21cm, cervix 150/-3     Recent Labs     23  0008   WBC 9.97   HGB 10.3*      FIBRINOGEN 355         Anemia affecting pregnancy  Assessment & Plan  Hgb 10.3 on admission   F/u iron panel     Elevated blood pressure reading without diagnosis of hypertension  Assessment & Plan  2 elevated blood pressures noted during admission   No symptoms of severe preeclampsia   Non severe blood pressures   P/C pending     Recent Labs     23  0008   HGB 10.3*      CREATININE 0.60   AST 16   ALT 9         27 weeks gestation of pregnancy  Assessment & Plan  NIPT low risk   Increased nuchal translucency   Continuous fetal monitoring   Betamethasone for fetal lung maturity -  Magnesium for fetal lung maturity   GBS collected   Penicillin for GBS prophylaxis           SUBJECTIVE:    Chief Complaint: contractions    HPI: Adam Sampson is a 40 y.o. [id number] with an TONA of 10/25/2023, Date entered prior to episode creation at 27w1d who is being admitted for observation in the setting of  contractions concerning for labor. Patient was transferred from [facility] after presenting with worsening abdominal pain. Patient reports contractions started yesterday afternoon. She thought she needed to have a bowel movement but her pain continued to worsen. She now reports regular contractions that are worsening in pain.  She had intercourse over 24 hours ago. She denies any abdominal trauma, vaginal bleeding, abnormal discharge, leaking fluid. She has a history of 3  . This is a long interval pregnancy. Placenta location is anterior. Pregnancy Complications/Comments:   History of  x 3  One  delivery at 39 weeks   AMA   Increased Nuchal Translucency   Insufficient Prenatal Care   NIPT low risk     Baby complications/comments:   EFW at 25 weeks   Fetus # 1 of 1   Breech presentation   Fetal growth appeared normal   Placenta Location = Anterior   No placenta previa   Placenta Grade = I     AMNIOTIC FLUID     Q1: 4.1      Q2: 4.4      Q3: 2.9      Q4: 2.9   DAISY Total = 14.3 cm   Amniotic Fluid: Normal     MEASUREMENTS  *Indicates Measurement Included In Average Gestational   Age     BPD              6.6 cm        26 weeks 5 days* (76%)   HC              24.8 cm        26 weeks 6 days* (71%)   AC              21.5 cm        25 weeks 6 days* (50%)   Femur            4.5 cm        25 weeks 1 day * (17%)   Tibia            4.0 cm        25 weeks 2 days  (28%)   Fibula           4.1 cm        24 weeks 4 days   Humerus          4.2 cm        25 weeks 1 day   (30%)   Radius           3.8 cm        27 weeks 6 days   Ulna             4.1 cm        26 weeks 5 days   Cerebellum       3.2 cm        28 weeks 4 days   CisternaMagna    0.7 cm   Lateral Ventricle  0.3 cm   Ear Length       2.1 cm   Foot             5.0 cm        26 weeks 4 days     HC/AC           1.16   FL/AC             21   FL/BPD            68   EFW (AC/FL/HC)   854 grams - 1 lbs 14 oz                 (43%)     THE AVERAGE GESTATIONAL AGE is 26 weeks 1 day +/- 14 days. Review of Systems   Constitutional: Negative for chills and fever. Eyes: Negative for visual disturbance. Respiratory: Negative for shortness of breath. Cardiovascular: Negative for chest pain. Gastrointestinal: Positive for abdominal pain. Negative for constipation, nausea and vomiting. Genitourinary: Positive for vaginal pain. Negative for vaginal bleeding. Musculoskeletal: Positive for back pain. Neurological: Negative. Hematological: Negative. OB History    Para Term  AB Living   5 3 2 1 1 3   SAB IAB Ectopic Multiple Live Births   1 0 0 0 3      # Outcome Date GA Lbr Florencio/2nd Weight Sex Delivery Anes PTL Lv   5 Current            4 SAB 22           3 Term     M Vag-Spont   DREA   2 Term     M Vag-Spont   DREA   1      F Vag-Spont   DREA      Obstetric Comments   Menarche - 15 yo       History reviewed. No pertinent surgical history. Social History     Tobacco Use   • Smoking status: Never   • Smokeless tobacco: Never   Substance Use Topics   • Alcohol use: Yes     Comment: social ; Denied history of alcohol use       No Known Allergies    Medications Prior to Admission   Medication   • acetaminophen (TYLENOL) 500 mg tablet   • ibuprofen (MOTRIN) 800 mg tablet   • acetaminophen (TYLENOL) 500 mg tablet   • albuterol (PROVENTIL HFA,VENTOLIN HFA) 90 mcg/act inhaler   • aspirin-acetaminophen-caffeine (EXCEDRIN MIGRAINE) 250-250-65 MG per tablet   • escitalopram (LEXAPRO) 20 mg tablet   • famotidine (PEPCID) 20 mg tablet   • fluticasone (FLONASE) 50 mcg/act nasal spray   • gabapentin (NEURONTIN) 300 mg capsule   • ipratropium (ATROVENT HFA) 17 mcg/act inhaler   • medroxyPROGESTERone (DEPO-PROVERA) 150 mg/mL injection   • methylPREDNISolone 4 MG tablet therapy pack   • methylPREDNISolone 4 MG tablet therapy pack   • naproxen (Naprosyn) 500 mg tablet   • ondansetron (ZOFRAN-ODT) 4 mg disintegrating tablet   • traMADol (ULTRAM) 50 mg tablet           OBJECTIVE:  Vitals:  Temp:  [97.8 °F (36.6 °C)-97.9 °F (36.6 °C)] 97.8 °F (36.6 °C)  HR:  [59-69] 59  Resp:  [18] 18  BP: (124-151)/(81-89) 130/85  There is no height or weight on file to calculate BMI.      Physical Exam:    Physical Exam  Constitutional:       Comments: Appears uncomfortable Genitourinary:      Genitourinary Comments: Normal external female genitalia   No lesions or skin changes noted on the vulva, perineum or perianal region   On speculum exam, there vaginal mucosa is well estrogenized. No abnormal discharge, lesions or bleeding   Cervix is visualized and parous. No abnormal discharge     Nitrazine negative   Pooling negative   Ferning negative   Microscopy negative     TVUS: 4.21cm    Cardiovascular:      Rate and Rhythm: Normal rate. Pulmonary:      Effort: Pulmonary effort is normal.   Abdominal:      Palpations: Abdomen is soft. Tenderness: There is no abdominal tenderness. There is no guarding or rebound. Comments: Gravid   Palpable strong contractions  LVP 5.04      Musculoskeletal:      Right lower leg: No edema. Left lower leg: No edema. Neurological:      Mental Status: She is alert.            SVE: Cervical Dilation: 1  Cervical Effacement: 50  Cervical Consistency: Soft  Fetal Station: -3  Presentation: Vertex  Method: Manual  OB Examiner: DeFruscio    FHT:  Baseline Rate: 120 bpm  Variability: Moderate 6-25 bpm  Accelerations: 10 x 10 (<32 weeks)  Decelerations: None  FHR Category: Category I    TOCO:   q3-5 minutes     Prenatal Labs: Reviewed      Blood type: O+  Antibody: negative  Group B strep: pending  HIV: negative  Hepatitis B: negative  RPR: non-reactive  Rubella: Immune  1 hour Glucose: 134  Platelets: 336  Hgb: 10.8      Dr. Андрей Sandoval aware    <2 [address], MD  OB/GYN PGY-4  7/27/2023  4:34 AM

## 2023-07-27 NOTE — PROGRESS NOTES
Progress Note - Obstetrics  [facility] 40 y.o. female MRN: [id number]  Unit/Bed#: -01 Encounter: [id number]  Admit date: 2023. Today's date: 23    Assessment/Plan     Ms. Dhaval Bassett is a 40 y.o. [id number] at [address] Day: 2, admitted with  contractions. By issue:    Anemia affecting pregnancy  Assessment & Plan  Hgb 10.3 on admission   F/u iron panel     Elevated blood pressure reading without diagnosis of hypertension  Assessment & Plan  2 elevated blood pressures noted during admission   No symptoms of severe preeclampsia   Non severe blood pressures   Systolic (17PIY), PASTORA:668 , Min:124 , ELU:135   Diastolic (39VOA), IZQ:08, Min:81, Max:89  P:C 0.26    Recent Labs     23  0008 23  0440   HGB 10.3* 10.8*    228   CREATININE 0.60  --    AST 16  --    ALT 9  --          27 weeks gestation of pregnancy  Assessment & Plan  NIPT low risk   Increased nuchal translucency   Continuous fetal monitoring   Betamethasone for fetal lung maturity -  Magnesium for fetal lung maturity   GBS collected   Penicillin for GBS prophylaxis     *  uterine contractions in third trimester, antepartum  Assessment & Plan   contractions concerning for labor   Admit for observation due to worsening pain and regular contractions   Continuous fetal monitoring   Microscopy is negative   UA is negative, UDS positive for THC   GC/CT pending   CL 4.21cm, cervix 1/50/-3   Contractions spaced this AM, declines recheck     Recent Labs     23  0440 23  0527   WBC 13.21*  --    HGB 10.8*  --      --    FIBRINOGEN  --  412                Subjective    Isidra reports that her contractions have spaced out this morning. She currently states that they are every 10 minutes and not as strong. She denies any vaginal bleeding or leakage of fluid at this time. She reports good fetal movement.            Objective      Patient Vitals for the past 24 hrs:   BP Temp Temp src Pulse SpO2   07/27/23 0509 125/87 97.5 °F (36.4 °C) Oral 67 100 %   07/27/23 0316 130/85 97.8 °F (36.6 °C) Oral 59 --   07/27/23 0314 -- -- -- -- 100 %   07/27/23 0009 133/89 -- -- 62 --   07/26/23 2353 138/85 -- -- 69 --   07/26/23 2338 135/86 -- -- 66 --   07/26/23 2322 124/82 -- -- 65 --   07/26/23 2309 130/85 -- -- 64 --   07/26/23 2252 130/81 -- -- 61 100 %       Physical Exam  Constitutional:       General: She is not in acute distress. Appearance: Normal appearance. HENT:      Head: Normocephalic and atraumatic. Cardiovascular:      Rate and Rhythm: Normal rate. Pulmonary:      Effort: Pulmonary effort is normal.   Abdominal:      Palpations: Abdomen is soft. Tenderness: There is no abdominal tenderness. Skin:     General: Skin is warm and dry. Neurological:      General: No focal deficit present. Mental Status: She is alert. Psychiatric:         Mood and Affect: Mood normal.         I/O       07/25 0701 07/26 0700 07/26 0701 07/27 0700    P. O.  240    I.V.  122.5    Total Intake  362.5    Net  +362.5                Invasive Devices     Peripheral Intravenous Line  Duration           Peripheral IV 07/26/23 Left Antecubital <1 day                Results from last 7 days   Lab Units 07/27/23  0440 07/27/23  0008 07/26/23  2115   WBC Thousand/uL 13.21* 9.97 9.64   NEUTROS ABS Thousands/µL 11.28* 6.67 6.26   HEMOGLOBIN g/dL 10.8* 10.3* 10.1*   MCV fL 83 82 82   PLATELETS Thousands/uL 228 216 223     Results from last 7 days   Lab Units 07/27/23  0008 07/26/23  2115   POTASSIUM mmol/L 3.7 3.7   CHLORIDE mmol/L 105 108   CO2 mmol/L 21 23   BUN mg/dL 7 6   CREATININE mg/dL 0.60 0.74   EGFR ml/min/1.73sq m 116 103     Results from last 7 days   Lab Units 07/27/23  0008 07/26/23  2115   AST U/L 16 18   ALT U/L 9 16     Results from last 7 days   Lab Units 07/27/23  0527 07/27/23  0440 07/27/23  0008 07/26/23  2115   PLATELETS Thousands/uL  --  228 216 223   INR   --   --  1.02  --    PROTIME seconds  --   --  13.6  --    PTT seconds  --   --  27  --    FIBRINOGEN mg/dL 412  --  355  --          Results from last 7 days   Lab Units 237   PROT/CREAT RATIO UR  0.26*                   Brief review of pertinent history:  Past Medical History:   Diagnosis Date   • Asthma    • Chlamydia infection      History reviewed. No pertinent surgical history.   OB History    Para Term  AB Living   5 3 2 1 1 3   SAB IAB Ectopic Multiple Live Births   1 0 0 0 3      # Outcome Date GA Lbr Florencio/2nd Weight Sex Delivery Anes PTL Lv   5 Current            4 SAB 22           3 Term     M Vag-Spont   DREA   2 Term     M Vag-Spont   DREA   1      F Vag-Spont   DREA      Obstetric Comments   Menarche - 15 yo       Fetal data:  Nonstress test: date 23 Time: 526 - 630  Baseline: 115  Variability: moderate  Accelerations: present, 10x10  Decelerations: absent  Contractions: absent  Assessment: reactive  Plan: continuous monitoring       MEDS:   Medication Administration - last 24 hours from 2023 0641 to 2023 0641       Date/Time Order Dose Route Action Action by     2023 0256 EDT lactated ringers bolus 1,000 mL 0 mL Intravenous Stopped Jacobo Iqbal RN     2023 0032 EDT lactated ringers bolus 1,000 mL 1,000 mL Intravenous New Bag Jacobo Iqbal, JEREMY     2023 9646 EDT lactated ringers infusion 50 mL/hr Intravenous Rate/Dose Change Jacobo Iqbal RN     2023 1705 EDT lactated ringers infusion 50 mL/hr Intravenous New Bag Jacobo Iqbal RN     2023 0220 EDT penicillin G (PFIZERPEN-G) in 0.9% sodium chloride 250 mL IVPB 5 Million Units 0 Million Units Intravenous [address], RN     2023 0120 EDT penicillin G (PFIZERPEN-G) in 0.9% sodium chloride 250 mL IVPB 5 Million Units 5 Million Units Intravenous Rebe[place]aside, JEREMY     2023 0533 EDT penicillin G (PFIZERPEN-G) in 0.9% sodium chloride 100 mL IVPB 2.5 Million Units 2.5 Million Units Intravenous New Bag Sherrel Needle, [place]     07/27/2023 0107 EDT betamethasone acetate-betamethasone sodium phosphate (CELESTONE) injection 12 mg 12 mg Intramuscular Given Sherrel Needle, RN     07/27/2023 0136 EDT magnesium sulfate 4 g/100 mL IVPB (premix) 4 g 0 g Intravenous Stopped Sherrel Needle, RN     07/27/2023 0116 EDT magnesium sulfate 4 g/100 mL IVPB (premix) 4 g 4 g Intravenous New Bag Sherrel Needle, RN     07/27/2023 0254 EDT magnesium sulfate 20 g/500 mL infusion (premix) 2 g/hr Intravenous Not Given Sherrel Needle, RN     07/27/2023 5125 EDT magnesium sulfate 20 g/500 mL infusion (premix) 2 g/hr Intravenous New Bag Sherrel Needle, RN        Current Facility-Administered Medications   Medication Dose Route Frequency   • betamethasone acetate-betamethasone sodium phosphate (CELESTONE) injection 12 mg  12 mg Intramuscular Q24H   • calcium carbonate (TUMS) chewable tablet 1,000 mg  1,000 mg Oral Daily PRN   • calcium gluconate 10 % injection 1 g  1 g Intravenous Once PRN   • lactated ringers infusion  50 mL/hr Intravenous Continuous   • magnesium sulfate 20 g/500 mL infusion (premix)  2 g/hr Intravenous Continuous   • ondansetron (ZOFRAN) injection 4 mg  4 mg Intravenous Q8H PRN   • penicillin G (PFIZERPEN-G) in 0.9% sodium chloride 100 mL IVPB 2.5 Million Units  2.5 Million Units Intravenous Q4H            MD RAN Cantu, PGY-3  7/27/2023  6:41 AM

## 2023-07-27 NOTE — ED ATTENDING ATTESTATION
2023  I, Yolande Carney MD, saw and evaluated the patient. I have discussed the patient with the resident/non-physician practitioner and agree with the resident's/non-physician practitioner's findings, Plan of Care, and MDM as documented in the resident's/non-physician practitioner's note, except where noted. All available labs and Radiology studies were reviewed. I was present for key portions of any procedure(s) performed by the resident/non-physician practitioner and I was immediately available to provide assistance. At this point I agree with the current assessment done in the Emergency Department. I have conducted an independent evaluation of this patient a history and physical is as follows: This is an evaluation of a 49-year-old female -1-1-3 who presents to the emergency department at approximately 27 weeks gestation complaining of contractions. Patient states that contractions began just prior to arrival.  Similar to previous symptoms of pregnancy. Has painful pressure in her lower abdomen and back. Symptoms occurring approximately every 10 minutes. Otherwise denies any gush of fluid bleeding or abnormal discharge. On arrival patient with mildly elevated blood pressure. She denies any headaches. No visual changes. No other abdominal discomfort. Does note that she has had mildly increased frequency of urination but denies any dysuria. No change in stools. No nausea no vomiting. No fevers no chills. No chest pain or shortness of breath. Notes that she had 1 previous delivery that was  otherwise no previous complications no C-sections. Majority of patient's care has been obtained at [facility].  On exam patient is nontoxic in no acute distress resting comfortably with intermittent discomfort. Vital signs with mild hypertension otherwise stable. HEENT is normocephalic and atraumatic with moist mucous membranes clear sclera and conjunctive.   Neck is supple forage motion. Heart is regular rate. Lungs are clear. Abdomen is gravid soft positive bowel sounds. Nontender. No edema. Intact pulses. Capillary for less than 2 seconds. Bedside ultrasound performed shows good fetal movement heart rates 120s to 130s. Assessment and plan concern for  labor. Discussed with OB/GYN at [facility]. IV established basic blood work. Monitor. Discussed transfer with patient who is in agreement.     ED Course         Critical Care Time  Procedures

## 2023-07-27 NOTE — PLAN OF CARE
Problem: ANTEPARTUM  Goal: Maintain pregnancy as long as maternal and/or fetal condition is stable  Description: INTERVENTIONS:  - Maternal surveillance  - Fetal surveillance  - Monitor uterine activity  - Medications as ordered  - Bedrest  Outcome: Progressing     Problem: PAIN - ADULT  Goal: Verbalizes/displays adequate comfort level or baseline comfort level  Description: Interventions:  - Encourage patient to monitor pain and request assistance  - Assess pain using appropriate pain scale  - Administer analgesics based on type and severity of pain and evaluate response  - Implement non-pharmacological measures as appropriate and evaluate response  - Consider cultural and social influences on pain and pain management  - Notify physician/advanced practitioner if interventions unsuccessful or patient reports new pain  Outcome: Progressing     Problem: INFECTION - ADULT  Goal: Absence or prevention of progression during hospitalization  Description: INTERVENTIONS:  - Assess and monitor for signs and symptoms of infection  - Monitor lab/diagnostic results  - Monitor all insertion sites, i.e. indwelling lines, tubes, and drains  - Monitor endotracheal if appropriate and nasal secretions for changes in amount and color  - [place] appropriate cooling/warming therapies per order  - Administer medications as ordered  - Instruct and encourage patient and family to use good hand hygiene technique  - Identify and instruct in appropriate isolation precautions for identified infection/condition  Outcome: Progressing  Goal: Absence of fever/infection during neutropenic period  Description: INTERVENTIONS:  - Monitor WBC    Outcome: Progressing     Problem: SAFETY ADULT  Goal: Patient will remain free of falls  Description: INTERVENTIONS:  - Educate patient/family on patient safety including physical limitations  - Instruct patient to call for assistance with activity   - Consult OT/PT to assist with strengthening/mobility   - Keep Call bell within reach  - Keep bed low and locked with side rails adjusted as appropriate  - Keep care items and personal belongings within reach  - Initiate and maintain comfort rounds  - Make Fall Risk Sign visible to staff  - Offer Toileting every  Hours, in advance of need  - Initiate/Maintain alarm  - Obtain necessary fall risk management equipment:   - Apply yellow socks and bracelet for high fall risk patients  - Consider moving patient to room near nurses station  Outcome: Progressing  Goal: Maintain or return to baseline ADL function  Description: INTERVENTIONS:  -  Assess patient's ability to carry out ADLs; assess patient's baseline for ADL function and identify physical deficits which impact ability to perform ADLs (bathing, care of mouth/teeth, toileting, grooming, dressing, etc.)  - Assess/evaluate cause of self-care deficits   - Assess range of motion  - Assess patient's mobility; develop plan if impaired  - Assess patient's need for assistive devices and provide as appropriate  - Encourage maximum independence but intervene and supervise when necessary  - Involve family in performance of ADLs  - Assess for home care needs following discharge   - Consider OT consult to assist with ADL evaluation and planning for discharge  - Provide patient education as appropriate  Outcome: Progressing  Goal: Maintains/Returns to pre admission functional level  Description: INTERVENTIONS:  - Perform BMAT or MOVE assessment daily.   - Set and communicate daily mobility goal to care team and patient/family/caregiver. - Collaborate with rehabilitation services on mobility goals if consulted  - Perform Range of Motion  times a day. - Reposition patient every  hours.   - Dangle patient  times a day  - Stand patient  times a day  - Ambulate patient times a day  - Out of bed to chair  times a day   - Out of bed for meals  times a day  - Out of bed for toileting  - Record patient progress and toleration of activity level   Outcome: Progressing     Problem: Knowledge Deficit  Goal: Patient/family/caregiver demonstrates understanding of disease process, treatment plan, medications, and discharge instructions  Description: Complete learning assessment and assess knowledge base.   Interventions:  - Provide teaching at level of understanding  - Provide teaching via preferred learning methods  Outcome: Progressing     Problem: DISCHARGE PLANNING  Goal: Discharge to home or other facility with appropriate resources  Description: INTERVENTIONS:  - Identify barriers to discharge w/patient and caregiver  - Arrange for needed discharge resources and transportation as appropriate  - Identify discharge learning needs (meds, wound care, etc.)  - Arrange for interpretive services to assist at discharge as needed  - Refer to Case Management Department for coordinating discharge planning if the patient needs post-hospital services based on physician/advanced practitioner order or complex needs related to functional status, cognitive ability, or social support system  Outcome: Progressing

## 2023-07-27 NOTE — ASSESSMENT & PLAN NOTE
contractions concerning for labor   Admit for observation due to worsening pain and regular contractions   Continuous fetal monitoring   Microscopy is negative   UA is negative, UDS positive for THC   GC/CT pending   CL 4.21cm, cervix 50/-3   Contractions spaced this AM, declines recheck     Recent Labs     23  0440 23  0527   WBC 13.21*  --    HGB 10.8*  --      --    FIBRINOGEN  --  412

## 2023-07-27 NOTE — EMTALA/ACUTE CARE TRANSFER
[address] [place] [address] [address]  Dept: [phone]      EMTALA TRANSFER CONSENT    NAME Ciaran Tabor                                         1985                              MRN [id number]    I have been informed of my rights regarding examination, treatment, and transfer   by Dr. Agustina Velazquez MD    Benefits: Specialized equipment and/or services available at the receiving facility (Include comment)________________________ (OB)    Risks: Potential for delay in receiving treatment, Potential deterioration of medical condition, Loss of IV, Increased discomfort during transfer, Possible worsening of condition or death during transfer      Transfer Request   I acknowledge that my medical condition has been evaluated and explained to me by the emergency department physician or other qualified medical person and/or my attending physician who has recommended and offered to me further medical examination and treatment. I understand the Hospital's obligation with respect to the treatment and stabilization of my emergency medical condition. I nevertheless request to be transferred. I release the Hospital, the doctor, and any other persons caring for me from all responsibility or liability for any injury or ill effects that may result from my transfer and agree to accept all responsibility for the consequences of my choice to transfer, rather than receive stabilizing treatment at the Hospital. I understand that because the transfer is my request, my insurance may not provide reimbursement for the services. The Hospital will assist and direct me and my family in how to make arrangements for transfer, but the hospital is not liable for any fees charged by the transport service.   In spite of this understanding, I refuse to consent to further medical examination and treatment which has been offered to me, and request transfer to [address] [place] [address] [address] Name, City & State : [facility]. I authorize the performance of emergency medical procedures and treatments upon me in both transit and upon arrival at the receiving facility. Additionally, I authorize the release of any and all medical records to the receiving facility and request they be transported with me, if possible. I authorize the performance of emergency medical procedures and treatments upon me in both transit and upon arrival at the receiving facility. Additionally, I authorize the release of any and all medical records to the receiving facility and request they be transported with me, if possible. I understand that the safest mode of transportation during a medical emergency is an ambulance and that the Hospital advocates the use of this mode of transport. Risks of traveling to the receiving facility by car, including absence of medical control, life sustaining equipment, such as oxygen, and medical personnel has been explained to me and I fully understand them. (SARITA CORRECT BOX BELOW)  [  ]  I consent to the stated transfer and to be transported by ambulance/helicopter. [  ]  I consent to the stated transfer, but refuse transportation by ambulance and accept full responsibility for my transportation by car. I understand the risks of non-ambulance transfers and I exonerate the Hospital and its staff from any deterioration in my condition that results from this refusal.    X___________________________________________    DATE  23  TIME________  Signature of patient or legally responsible individual signing on patient behalf           RELATIONSHIP TO PATIENT_________________________          Provider Certification    NAME Eilleen Gitelman                                         1985                              MRN [id number]    A medical screening exam was performed on the above named patient.   Based on the examination:    Condition Necessitating Transfer The primary encounter diagnosis was Pregnant. A diagnosis of Uterine contractions during pregnancy was also pertinent to this visit. Patient Condition: The patient has been stabilized such that within reasonable medical probability, no material deterioration of the patient condition or the condition of the unborn child(siri) is likely to result from the transfer    Reason for Transfer: Level of Care needed not available at this facility    Transfer Requirements: [address] [facility]   · Space available and qualified personnel available for treatment as acknowledged by    · Agreed to accept transfer and to provide appropriate medical treatment as acknowledged by       Dr. Андрей Sandoval  · Appropriate medical records of the examination and treatment of the patient are provided at the time of transfer   [address] _______  · Transfer will be performed by qualified personnel from    and appropriate transfer equipment as required, including the use of necessary and appropriate life support measures.     Provider Certification: I have examined the patient and explained the following risks and benefits of being transferred/refusing transfer to the patient/family:  General risk, such as traffic hazards, adverse weather conditions, rough terrain or turbulence, possible failure of equipment (including vehicle or aircraft), or consequences of actions of persons outside the control of the transport personnel, Unanticipated needs of medical equipment and personnel during transport, Risk of worsening condition, The possibility of a transport vehicle being unavailable      Based on these reasonable risks and benefits to the patient and/or the unborn child(siri), and based upon the information available at the time of the patient’s examination, I certify that the medical benefits reasonably to be expected from the provision of appropriate medical treatments at another medical facility outweigh the increasing risks, if any, to the individual’s medical condition, and in the case of labor to the unborn child, from effecting the transfer.     X____________________________________________ DATE 07/26/23        TIME_______      ORIGINAL - SEND TO MEDICAL RECORDS   COPY - SEND WITH PATIENT DURING TRANSFER

## 2023-07-27 NOTE — ED PROVIDER NOTES
History  Chief Complaint   Patient presents with   • Abdominal Pain Pregnant     Patient states that today she started with lower abd cramping. Is 26 weeks pregnant at this time but states the cramps are spread out. Primary OB is at [facility]. States this feels like contractions from previous pregnancies     ANG Lawrence is a 40 y.o. female  27w0d who presents to the emergency department with contractions that started 1 hour ago. She denies fevers, chest pain, shortness of breath, headache, vision changes, weakness, numbness, vaginal bleeding, or fluid leakage. She has felt normal fetal movement. Prior to Admission Medications   Prescriptions Last Dose Informant Patient Reported?  Taking?   acetaminophen (TYLENOL) 500 mg tablet   Yes No   Sig: Take 500 mg by mouth every 6 (six) hours as needed for mild pain   acetaminophen (TYLENOL) 500 mg tablet   No No   Sig: Take 1 tablet (500 mg total) by mouth every 6 (six) hours as needed for mild pain   albuterol (PROVENTIL HFA,VENTOLIN HFA) 90 mcg/act inhaler   No No   Sig: Inhale 2 puffs every 6 (six) hours as needed for wheezing   aspirin-acetaminophen-caffeine (EXCEDRIN MIGRAINE) 250-250-65 MG per tablet   Yes No   Sig: Take 1 tablet by mouth every 6 (six) hours as needed for headaches   escitalopram (LEXAPRO) 20 mg tablet   No No   Sig: Take 0.5 tablets (10 mg total) by mouth daily   Patient not taking: Reported on 9/3/2020   famotidine (PEPCID) 20 mg tablet   No No   Sig: Take 1 tablet (20 mg total) by mouth 2 (two) times a day   Patient not taking: Reported on 2021   fluticasone (FLONASE) 50 mcg/act nasal spray   No No   Si spray into each nostril daily   Patient not taking: Reported on 2023   gabapentin (NEURONTIN) 300 mg capsule   Yes No   Sig: Take 200 mg by mouth daily at bedtime   Patient not taking: Reported on 2021   ibuprofen (MOTRIN) 800 mg tablet   No No   Sig: Take 1 tablet (800 mg total) by mouth every 8 (eight) hours as needed for mild pain   ipratropium (ATROVENT HFA) 17 mcg/act inhaler   No No   Sig: Inhale 2 puffs every 6 (six) hours as needed for wheezing   Patient not taking: Reported on 11/4/2021   medroxyPROGESTERone (DEPO-PROVERA) 150 mg/mL injection   Yes No   Sig: Inject 150 mg into a muscle   methylPREDNISolone 4 MG tablet therapy pack   No No   Sig: Use as directed on package   methylPREDNISolone 4 MG tablet therapy pack   No No   Sig: Use as directed on package   naproxen (Naprosyn) 500 mg tablet   No No   Sig: Take 1 tablet (500 mg total) by mouth 2 (two) times a day with meals   Patient not taking: Reported on 4/6/2023   ondansetron (ZOFRAN-ODT) 4 mg disintegrating tablet   No No   Sig: Take 1 tablet (4 mg total) by mouth every 6 (six) hours as needed for nausea or vomiting   traMADol (ULTRAM) 50 mg tablet   No No   Sig: TAKE 1 TABLET (50 MG TOTAL) BY MOUTH EVERY 8 (EIGHT) HOURS AS NEEDED FOR MODERATE PAIN      Facility-Administered Medications: None       Past Medical History:   Diagnosis Date   • Asthma    • Chlamydia infection        History reviewed. No pertinent surgical history. Family History   Problem Relation Age of Onset   • No Known Problems Mother      I have reviewed and agree with the history as documented. E-Cigarette/Vaping   • E-Cigarette Use Never User      E-Cigarette/Vaping Substances   • Nicotine No    • THC Yes      Social History     Tobacco Use   • Smoking status: Never   • Smokeless tobacco: Never   Vaping Use   • Vaping Use: Never used   Substance Use Topics   • Alcohol use: Yes     Comment: social ; Denied history of alcohol use   • Drug use: No       Home medications:  Prior to Admission Medications   Prescriptions Last Dose Informant Patient Reported?  Taking?   acetaminophen (TYLENOL) 500 mg tablet   Yes No   Sig: Take 500 mg by mouth every 6 (six) hours as needed for mild pain   acetaminophen (TYLENOL) 500 mg tablet   No No   Sig: Take 1 tablet (500 mg total) by mouth every 6 (six) hours as needed for mild pain   albuterol (PROVENTIL HFA,VENTOLIN HFA) 90 mcg/act inhaler   No No   Sig: Inhale 2 puffs every 6 (six) hours as needed for wheezing   aspirin-acetaminophen-caffeine (EXCEDRIN MIGRAINE) 250-250-65 MG per tablet   Yes No   Sig: Take 1 tablet by mouth every 6 (six) hours as needed for headaches   escitalopram (LEXAPRO) 20 mg tablet   No No   Sig: Take 0.5 tablets (10 mg total) by mouth daily   Patient not taking: Reported on 9/3/2020   famotidine (PEPCID) 20 mg tablet   No No   Sig: Take 1 tablet (20 mg total) by mouth 2 (two) times a day   Patient not taking: Reported on 2021   fluticasone (FLONASE) 50 mcg/act nasal spray   No No   Si spray into each nostril daily   Patient not taking: Reported on 2023   gabapentin (NEURONTIN) 300 mg capsule   Yes No   Sig: Take 200 mg by mouth daily at bedtime   Patient not taking: Reported on 2021   ibuprofen (MOTRIN) 800 mg tablet   No No   Sig: Take 1 tablet (800 mg total) by mouth every 8 (eight) hours as needed for mild pain   ipratropium (ATROVENT HFA) 17 mcg/act inhaler   No No   Sig: Inhale 2 puffs every 6 (six) hours as needed for wheezing   Patient not taking: Reported on 2021   medroxyPROGESTERone (DEPO-PROVERA) 150 mg/mL injection   Yes No   Sig: Inject 150 mg into a muscle   methylPREDNISolone 4 MG tablet therapy pack   No No   Sig: Use as directed on package   methylPREDNISolone 4 MG tablet therapy pack   No No   Sig: Use as directed on package   naproxen (Naprosyn) 500 mg tablet   No No   Sig: Take 1 tablet (500 mg total) by mouth 2 (two) times a day with meals   Patient not taking: Reported on 2023   ondansetron (ZOFRAN-ODT) 4 mg disintegrating tablet   No No   Sig: Take 1 tablet (4 mg total) by mouth every 6 (six) hours as needed for nausea or vomiting   traMADol (ULTRAM) 50 mg tablet   No No   Sig: TAKE 1 TABLET (50 MG TOTAL) BY MOUTH EVERY 8 (EIGHT) HOURS AS NEEDED FOR MODERATE PAIN Facility-Administered Medications: None     Allergies:  No Known Allergies     Review of Systems   Constitutional: Negative for fever. Respiratory: Negative for shortness of breath. Cardiovascular: Negative for chest pain. Gastrointestinal: Negative for nausea and vomiting. Genitourinary: Positive for pelvic pain. Negative for dysuria, vaginal bleeding and vaginal discharge. Neurological: Negative for weakness, light-headedness, numbness and headaches. All other systems reviewed and are negative. Physical Exam  ED Triage Vitals   Temperature Pulse Respirations Blood Pressure SpO2   07/26/23 2020 07/26/23 2020 07/26/23 2020 07/26/23 2021 07/26/23 2020   97.9 °F (36.6 °C) 65 18 151/88 98 %      Temp Source Heart Rate Source Patient Position - Orthostatic VS BP Location FiO2 (%)   07/26/23 2020 07/26/23 2020 -- -- --   Temporal Monitor         Pain Score       07/26/23 2020       8             Orthostatic Vital Signs  Vitals:    07/26/23 2020 07/26/23 2021 07/26/23 2145   BP:  151/88 134/81   Pulse: 65         Physical Exam  Vitals and nursing note reviewed. Constitutional:       General: She is not in acute distress. Appearance: She is not toxic-appearing. HENT:      Head: Normocephalic. Mouth/Throat:      Mouth: Mucous membranes are moist.   Eyes:      Pupils: Pupils are equal, round, and reactive to light. Cardiovascular:      Rate and Rhythm: Normal rate and regular rhythm. Heart sounds: No murmur heard. Pulmonary:      Effort: Pulmonary effort is normal. No respiratory distress. Breath sounds: Normal breath sounds. No wheezing, rhonchi or rales. Abdominal:      General: There is no distension. Palpations: Abdomen is soft. Tenderness: There is abdominal tenderness in the suprapubic area. There is no right CVA tenderness, left CVA tenderness, guarding or rebound. Musculoskeletal:      Right lower leg: No edema. Left lower leg: No edema.    Skin: General: Skin is warm and dry. Neurological:      Mental Status: She is alert.          ED Medications  Medications - No data to display    Diagnostic Studies  Results Reviewed     Procedure Component Value Units Date/Time    Protein / creatinine ratio, urine [[id number]]  (Abnormal) Collected: 07/26/23 2117    Lab Status: Final result Specimen: Urine, Clean Catch Updated: 07/26/23 2227     Creatinine, Ur 27.1 mg/dL      Protein Urine Random 7 mg/dL      Prot/Creat Ratio, Ur 0.26    Comprehensive metabolic panel [[id number]]  (Abnormal) Collected: 07/26/23 2115    Lab Status: Final result Specimen: Blood from Arm, Left Updated: 07/26/23 2142     Sodium 135 mmol/L      Potassium 3.7 mmol/L      Chloride 108 mmol/L      CO2 23 mmol/L      ANION GAP 4 mmol/L      BUN 6 mg/dL      Creatinine 0.74 mg/dL      Glucose 78 mg/dL      Calcium 8.4 mg/dL      Corrected Calcium 9.4 mg/dL      AST 18 U/L      ALT 16 U/L      Alkaline Phosphatase 71 U/L      Total Protein 6.7 g/dL      Albumin 2.8 g/dL      Total Bilirubin 0.29 mg/dL      eGFR 103 ml/min/1.73sq m     Narrative:      [place] guidelines for Chronic Kidney Disease (CKD):   •  Stage 1 with normal or high GFR (GFR > 90 mL/min/1.73 square meters)  •  Stage 2 Mild CKD (GFR = 60-89 mL/min/1.73 square meters)  •  Stage 3A Moderate CKD (GFR = 45-59 mL/min/1.73 square meters)  •  Stage 3B Moderate CKD (GFR = 30-44 mL/min/1.73 square meters)  •  Stage 4 Severe CKD (GFR = 15-29 mL/min/1.73 square meters)  •  Stage 5 End Stage CKD (GFR <15 mL/min/1.73 square meters)  Note: GFR calculation is accurate only with a steady state creatinine    UA w Reflex to Microscopic w Reflex to Culture [[id number]] Collected: 07/26/23 2117    Lab Status: Final result Specimen: Urine, Clean Catch Updated: 07/26/23 2139     Color, UA Colorless     Clarity, UA Clear     Specific Gravity, UA 1.005     pH, UA 7.0     Leukocytes, UA Negative     Nitrite, UA Negative Protein, UA Negative mg/dl      Glucose, UA Negative mg/dl      Ketones, UA Negative mg/dl      Urobilinogen, UA <2.0 mg/dl      Bilirubin, UA Negative     Occult Blood, UA Negative     URINE COMMENT --    Urine culture [[id number]] Collected: 07/26/23 2117    Lab Status: In process Specimen: Urine, Clean Catch Updated: 07/26/23 2139    CBC and differential [[id number]]  (Abnormal) Collected: 07/26/23 2115    Lab Status: Final result Specimen: Blood from Arm, Left Updated: 07/26/23 2123     WBC 9.64 Thousand/uL      RBC 3.81 Million/uL      Hemoglobin 10.1 g/dL      Hematocrit 31.2 %      MCV 82 fL      MCH 26.5 pg      MCHC 32.4 g/dL      RDW 16.9 %      MPV 10.0 fL      Platelets 714 Thousands/uL      nRBC 0 /100 WBCs      Neutrophils Relative 64 %      Immat GRANS % 1 %      Lymphocytes Relative 22 %      Monocytes Relative 9 %      Eosinophils Relative 3 %      Basophils Relative 1 %      Neutrophils Absolute 6.26 Thousands/µL      Immature Grans Absolute 0.08 Thousand/uL      Lymphocytes Absolute 2.10 Thousands/µL      Monocytes Absolute 0.88 Thousand/µL      Eosinophils Absolute 0.26 Thousand/µL      Basophils Absolute 0.06 Thousands/µL                  No orders to display         Procedures  POC Pelvic US    Date/Time: 7/26/2023 9:00 PM    Performed by: Aliyah Finn MD  Authorized by: Aliyah Finn MD    Patient location:  ED  Other Assisting Provider: Yes (comment) (Dr. Lance Rueda)    Procedure details:     Exam Type:  Diagnostic    Indications: pregnant with abdominal pain      Assessment for: evaluate fetal viability      Technique:  Transabdominal obstetric (HCG+) exam    Views obtained: uterus (transverse and sagittal)      Image quality: diagnostic      Image availability:  Images available in PACS  Uterine findings:     Intrauterine pregnancy: identified      Single gestation: identified      Fetal heart rate: identified      Fetal heart rate (bpm): 126-140.   Interpretation: Ultrasound impressions: normal      Pregnancy findings: intrauterine pregnancy (IUP)            ED Course                                       MDM  Medical Decision Making  Amount and/or Complexity of Data Reviewed  Labs: ordered. Eilleen Gitelman is a 40 y.o. female  27w0d who presents to the emergency department with contractions. Workup including vital signs, physical exam, labs, urinalysis and US. US with vertex presentation, -140. Contractions 4-10 minutes apart, becoming less frequent. No vaginal bleeding or fluid leakage. Case discussed with OB, plan for transfer to [facility] for monitoring. Disposition  Final diagnoses:   Pregnant   Uterine contractions during pregnancy     Time reflects when diagnosis was documented in both MDM as applicable and the Disposition within this note     Time User Action Codes Description Comment    2023  9:34 PM Linnea Gamez Add [Z34.90] Pregnant     2023  9:35 PM Stefan Ramirez Add [O47.9] Uterine contractions during pregnancy       ED Disposition     ED Disposition   Transfer to Another Facility-In Network    Condition   --    Date/Time     9:35 PM    Comment   Eilleen Gitelman should be transferred out to [facility].            MD Documentation    Kiran Jorgensen Most Recent Value   Patient Condition The patient has been stabilized such that within reasonable medical probability, no material deterioration of the patient condition or the condition of the unborn child(siri) is likely to result from the transfer   Reason for Transfer Level of Care needed not available at this facility   Benefits of Transfer Specialized equipment and/or services available at the receiving facility (Include comment)________________________  [OB]   Risks of Transfer Potential for delay in receiving treatment, Potential deterioration of medical condition, Loss of IV, Increased discomfort during transfer, Possible worsening of condition or death during transfer   Accepting Physician Dr. Dania Mendoza Name, Daria Smith   Sending MD [place] Shown   Provider Certification General risk, such as traffic hazards, adverse weather conditions, rough terrain or turbulence, possible failure of equipment (including vehicle or aircraft), or consequences of actions of persons outside the control of the transport personnel, Unanticipated needs of medical equipment and personnel during transport, Risk of worsening condition, The possibility of a transport vehicle being unavailable      RN Documentation    [address] Name, Daria Smith      Follow-up Information    None         Discharge Medication List as of 7/26/2023 10:25 PM      CONTINUE these medications which have NOT CHANGED    Details   !! acetaminophen (TYLENOL) 500 mg tablet Take 500 mg by mouth every 6 (six) hours as needed for mild pain, Historical Med      !! acetaminophen (TYLENOL) 500 mg tablet Take 1 tablet (500 mg total) by mouth every 6 (six) hours as needed for mild pain, Starting Mon 5/16/2022, Normal      albuterol (PROVENTIL HFA,VENTOLIN HFA) 90 mcg/act inhaler Inhale 2 puffs every 6 (six) hours as needed for wheezing, Starting Tue 10/15/2019, Print      aspirin-acetaminophen-caffeine (EXCEDRIN MIGRAINE) 250-250-65 MG per tablet Take 1 tablet by mouth every 6 (six) hours as needed for headaches, Historical Med      escitalopram (LEXAPRO) 20 mg tablet Take 0.5 tablets (10 mg total) by mouth daily, Starting Tue 9/1/2020, Print      famotidine (PEPCID) 20 mg tablet Take 1 tablet (20 mg total) by mouth 2 (two) times a day, Starting Sat 3/13/2021, Normal      fluticasone (FLONASE) 50 mcg/act nasal spray 1 spray into each nostril daily, Starting Thu 1/19/2023, Normal      gabapentin (NEURONTIN) 300 mg capsule Take 200 mg by mouth daily at bedtime, Historical Med      ibuprofen (MOTRIN) 800 mg tablet Take 1 tablet (800 mg total) by mouth every 8 (eight) hours as needed for mild pain, Starting Tue 10/25/2022, Normal      ipratropium (ATROVENT HFA) 17 mcg/act inhaler Inhale 2 puffs every 6 (six) hours as needed for wheezing, Starting Sat 3/13/2021, Normal      medroxyPROGESTERone (DEPO-PROVERA) 150 mg/mL injection Inject 150 mg into a muscle, Starting Fri 7/17/2020, Historical Med      !! methylPREDNISolone 4 MG tablet therapy pack Use as directed on package, Normal      !! methylPREDNISolone 4 MG tablet therapy pack Use as directed on package, Normal      naproxen (Naprosyn) 500 mg tablet Take 1 tablet (500 mg total) by mouth 2 (two) times a day with meals, Starting Thu 1/19/2023, Normal      ondansetron (ZOFRAN-ODT) 4 mg disintegrating tablet Take 1 tablet (4 mg total) by mouth every 6 (six) hours as needed for nausea or vomiting, Starting Thu 4/6/2023, Normal      traMADol (ULTRAM) 50 mg tablet TAKE 1 TABLET (50 MG TOTAL) BY MOUTH EVERY 8 (EIGHT) HOURS AS NEEDED FOR MODERATE PAIN, Starting Thu 2/16/2023, Normal       !! - Potential duplicate medications found. Please discuss with provider. No discharge procedures on file. PDMP Review       Value Time User    PDMP Reviewed  Yes 8/9/2021  1:53 PM Saima Waterman, [address] [place] [address]           ED Provider  Attending physically available and evaluated [facility]. I managed the patient along with the ED Attending. Electronically Signed by    Portions of the record may have been created with voice recognition software. Occasional wrong word or "sound a like" substitutions may have occurred due to the inherent limitations of voice recognition software.   Read the chart carefully and recognize, using context, where substitutions have occurred     Renetta Mcallister MD  07/27/23 7501

## 2023-07-27 NOTE — CONSULTS
NICU Prenatal Consult   She Montano 40 y.o. female MRN: [id number]  Unit/Bed#: -01 Encounter: [id number]    Consulting Physician: Sheryl Caban MD      A NICU Prenatal Consult has been requested by the OB due to anticipated  delivery due to  contractions. She Montano is a 40 y.o. [id number], with an TONA of 10/25/2023 at [address] is expecting a baby boy and haven't decided on a name yet. Estimated fetal weight is 854 grams (as of 23). She intends to only formula feed. Along with Isidra , a significant other (FOB) was present for the consultation.       Prenatal Care:Yes, Most of her prenatal care was done at [address] [place] [address]:  Lab Results   Component Value Date/Time    ABO Grouping O 2023 12:08 AM    ABO Grouping O 2014 01:10 PM    Rh Factor Positive 2023 12:08 AM    Rh Factor Positive 2014 01:10 PM    Antibody Screen Negative 2014 01:10 PM    HEPATITIS B SURFACE ANTIGEN Non-Reactive (q 2014 01:10 PM    RPR Nonreactive 2014 01:10 PM    RUBELLA IGG QUANTITATION 162.1 2014 01:10 PM    HIV-1/2 AB-AG Non-Reactive (q 2014 01:10 PM     Unknown labs at this time: GBS pending    Pregnancy complications:   Patient Active Problem List   Diagnosis   • Cellulitis of right lower extremity   • Mood disorder (HCC)   • Iliotibial band tendinitis of right side   • Acute midline low back pain without sciatica   • Chronic midline low back pain without sciatica   • Alopecia   • Annual physical exam   • Trigger middle finger of left hand   • Paresthesia   • Carpal tunnel syndrome, bilateral   • Anxiety   • Pain of left lower extremity   • Allergic rhinitis   •  uterine contractions in third trimester, antepartum   • 27 weeks gestation of pregnancy   • Elevated blood pressure reading without diagnosis of hypertension   • Anemia affecting pregnancy     Maternal medical history:   Past Medical History:   Diagnosis Date   • Asthma    • Chlamydia infection      Medications at home:   Medications Prior to Admission   Medication   • acetaminophen (TYLENOL) 500 mg tablet   • ibuprofen (MOTRIN) 800 mg tablet   • acetaminophen (TYLENOL) 500 mg tablet   • albuterol (PROVENTIL HFA,VENTOLIN HFA) 90 mcg/act inhaler   • aspirin-acetaminophen-caffeine (EXCEDRIN MIGRAINE) 250-250-65 MG per tablet   • escitalopram (LEXAPRO) 20 mg tablet   • famotidine (PEPCID) 20 mg tablet   • fluticasone (FLONASE) 50 mcg/act nasal spray   • gabapentin (NEURONTIN) 300 mg capsule   • ipratropium (ATROVENT HFA) 17 mcg/act inhaler   • medroxyPROGESTERone (DEPO-PROVERA) 150 mg/mL injection   • methylPREDNISolone 4 MG tablet therapy pack   • methylPREDNISolone 4 MG tablet therapy pack   • naproxen (Naprosyn) 500 mg tablet   • ondansetron (ZOFRAN-ODT) 4 mg disintegrating tablet   • traMADol (ULTRAM) 50 mg tablet     Maternal social history:  Social History     Tobacco Use   • Smoking status: Never   • Smokeless tobacco: Never   Substance Use Topics   • Alcohol use: Yes     Comment: social ; Denied history of alcohol use     Maternal  medications:  steroids:  &   Tocolytics:  Magnesium    I spoke with Juliana Crumestefanía today regarding the upcoming birth of her baby boy. We discussed the baby's possible medical problems and the specialized care needed to address these problems. This discussion included, but was not limited to:      Attendance of physician/MONTANA/ nursing, and/or respiratory therapist at the delivery and delivery room management , Possible need for IV access, umbilical lines, and/or PICC lines, Possible need for prolonged parenteral nutrition, Potential need for transfusion of blood products, Risk of bronchopulmonary dysplasia, Risk of feedings problems and the potential need for IV fluids and gavage tube feeds, Risk of hypoglycemia requiring dextrose infusion, Risk of hypothermia and need for radiant warmer and/or isolette, Risk of immature cardiorespiratory control and need for monitoring and/or caffeine, Risk of intraventricular hemorrhage and possible need for brain ultrasound, Risk of jaundice requiring phototherapy, Risk of necrotizing enterocolitis and advantages of expressed breast milk and Risk of prolonged patent ductus arteriosus and possible need for pharmacologic or surgical treatment     All of Isidra's questions were answered to the best of my ability, and she was encouraged to contact us with any further questions. Thank you for including us in the care of [facility]. Please reach out to the on-call Neonatologist via [email or web address] for any further questions that may arise. I spent 40 minutes in consultation with [facility], of which 50% was in direct communication.     Zack Campos, [address] Medicine

## 2023-07-27 NOTE — PROCEDURES
Edgar, a [id number] at 27w1d with an TONA of 10/25/2023, Date entered prior to episode creation, was seen at [address] [place] [address] for the following procedure(s): $Procedure Type: US - Transvaginal, DAISY]         4 Quadrant DAISY  LVP (cm): 5 cm         Ultrasound Other  Fetal Presentation: Vertex  Cervical Length: 4.21  Funnel: No  Debris: No  Placenta Location: Anterior  Placenta Previa: No         Ultrasound Probe Disinfection    A transvaginal ultrasound was performed.    Prior to use, disinfection was performed with High Level Disinfection Process (Trophon)  Probe serial number [id number]   was used    Georgiana Bernal MD  07/27/23  4:22 AM

## 2023-07-27 NOTE — ASSESSMENT & PLAN NOTE
2 elevated blood pressures noted during admission   No symptoms of severe preeclampsia   Non severe blood pressures   Systolic (49ZRN), XZV:754 , Min:132 , FSY:986   Diastolic (99QSE), DVN:74, Min:75, Max:75  P:C 0.26    Recent Labs     07/27/23  0008 07/27/23  0440   HGB 10.3* 10.8*    228   CREATININE 0.60  --    AST 16  --    ALT 9  --

## 2023-07-27 NOTE — ASSESSMENT & PLAN NOTE
NIPT low risk   Increased nuchal translucency   Continuous fetal monitoring   Betamethasone for fetal lung maturity 7/27-7/28  GBS pending  Magnesium and Penicillin held 7/27 given there was no concern for imminent delivery

## 2023-07-27 NOTE — PLAN OF CARE
Problem: ANTEPARTUM  Goal: Maintain pregnancy as long as maternal and/or fetal condition is stable  Description: INTERVENTIONS:  - Maternal surveillance  - Fetal surveillance  - Monitor uterine activity  - Medications as ordered  - Bedrest  Outcome: Progressing     Problem: PAIN - ADULT  Goal: Verbalizes/displays adequate comfort level or baseline comfort level  Description: Interventions:  - Encourage patient to monitor pain and request assistance  - Assess pain using appropriate pain scale  - Administer analgesics based on type and severity of pain and evaluate response  - Implement non-pharmacological measures as appropriate and evaluate response  - Consider cultural and social influences on pain and pain management  - Notify physician/advanced practitioner if interventions unsuccessful or patient reports new pain  Outcome: Progressing     Problem: INFECTION - ADULT  Goal: Absence or prevention of progression during hospitalization  Description: INTERVENTIONS:  - Assess and monitor for signs and symptoms of infection  - Monitor lab/diagnostic results  - Monitor all insertion sites, i.e. indwelling lines, tubes, and drains  - Monitor endotracheal if appropriate and nasal secretions for changes in amount and color  - [place] appropriate cooling/warming therapies per order  - Administer medications as ordered  - Instruct and encourage patient and family to use good hand hygiene technique  - Identify and instruct in appropriate isolation precautions for identified infection/condition  Outcome: Progressing  Goal: Absence of fever/infection during neutropenic period  Description: INTERVENTIONS:  - Monitor WBC    Outcome: Progressing     Problem: SAFETY ADULT  Goal: Patient will remain free of falls  Description: INTERVENTIONS:  - Educate patient/family on patient safety including physical limitations  - Instruct patient to call for assistance with activity   - Consult OT/PT to assist with strengthening/mobility   - Keep Call bell within reach  - Keep bed low and locked with side rails adjusted as appropriate  - Keep care items and personal belongings within reach  - Initiate and maintain comfort rounds  - Apply yellow socks and bracelet for high fall risk patients  - Consider moving patient to room near nurses station  Outcome: Progressing  Goal: Maintain or return to baseline ADL function  Description: INTERVENTIONS:  -  Assess patient's ability to carry out ADLs; assess patient's baseline for ADL function and identify physical deficits which impact ability to perform ADLs (bathing, care of mouth/teeth, toileting, grooming, dressing, etc.)  - Assess/evaluate cause of self-care deficits   - Assess range of motion  - Assess patient's mobility; develop plan if impaired  - Assess patient's need for assistive devices and provide as appropriate  - Encourage maximum independence but intervene and supervise when necessary  - Involve family in performance of ADLs  - Assess for home care needs following discharge   - Consider OT consult to assist with ADL evaluation and planning for discharge  - Provide patient education as appropriate  Outcome: Progressing  Goal: Maintains/Returns to pre admission functional level  Description: INTERVENTIONS:  - Perform BMAT or MOVE assessment daily.   - Set and communicate daily mobility goal to care team and patient/family/caregiver. - Out of bed for toileting  - Record patient progress and toleration of activity level   Outcome: Progressing     Problem: Knowledge Deficit  Goal: Patient/family/caregiver demonstrates understanding of disease process, treatment plan, medications, and discharge instructions  Description: Complete learning assessment and assess knowledge base.   Interventions:  - Provide teaching at level of understanding  - Provide teaching via preferred learning methods  Outcome: Progressing     Problem: DISCHARGE PLANNING  Goal: Discharge to home or other facility with appropriate resources  Description: INTERVENTIONS:  - Identify barriers to discharge w/patient and caregiver  - Arrange for needed discharge resources and transportation as appropriate  - Identify discharge learning needs (meds, wound care, etc.)  - Arrange for interpretive services to assist at discharge as needed  - Refer to Case Management Department for coordinating discharge planning if the patient needs post-hospital services based on physician/advanced practitioner order or complex needs related to functional status, cognitive ability, or social support system  Outcome: Progressing

## 2023-07-28 VITALS
TEMPERATURE: 97.8 F | DIASTOLIC BLOOD PRESSURE: 83 MMHG | RESPIRATION RATE: 18 BRPM | SYSTOLIC BLOOD PRESSURE: 129 MMHG | OXYGEN SATURATION: 99 % | HEART RATE: 66 BPM

## 2023-07-28 LAB — GP B STREP DNA SPEC QL NAA+PROBE: NEGATIVE

## 2023-07-28 PROCEDURE — NC001 PR NO CHARGE: Performed by: OBSTETRICS & GYNECOLOGY

## 2023-07-28 PROCEDURE — 99238 HOSP IP/OBS DSCHRG MGMT 30/<: CPT | Performed by: OBSTETRICS & GYNECOLOGY

## 2023-07-28 RX ADMIN — BETAMETHASONE SODIUM PHOSPHATE AND BETAMETHASONE ACETATE 12 MG: 3; 3 INJECTION, SUSPENSION INTRA-ARTICULAR; INTRALESIONAL; INTRAMUSCULAR at 01:32

## 2023-07-28 NOTE — PLAN OF CARE
Problem: ANTEPARTUM  Goal: Maintain pregnancy as long as maternal and/or fetal condition is stable  Description: INTERVENTIONS:  - Maternal surveillance  - Fetal surveillance  - Monitor uterine activity  - Medications as ordered  - Bedrest  Outcome: Adequate for Discharge     Problem: PAIN - ADULT  Goal: Verbalizes/displays adequate comfort level or baseline comfort level  Description: Interventions:  - Encourage patient to monitor pain and request assistance  - Assess pain using appropriate pain scale  - Administer analgesics based on type and severity of pain and evaluate response  - Implement non-pharmacological measures as appropriate and evaluate response  - Consider cultural and social influences on pain and pain management  - Notify physician/advanced practitioner if interventions unsuccessful or patient reports new pain  Outcome: Adequate for Discharge     Problem: INFECTION - ADULT  Goal: Absence or prevention of progression during hospitalization  Description: INTERVENTIONS:  - Assess and monitor for signs and symptoms of infection  - Monitor lab/diagnostic results  - Monitor all insertion sites, i.e. indwelling lines, tubes, and drains  - Monitor endotracheal if appropriate and nasal secretions for changes in amount and color  - [place] appropriate cooling/warming therapies per order  - Administer medications as ordered  - Instruct and encourage patient and family to use good hand hygiene technique  - Identify and instruct in appropriate isolation precautions for identified infection/condition  Outcome: Adequate for Discharge  Goal: Absence of fever/infection during neutropenic period  Description: INTERVENTIONS:  - Monitor WBC    Outcome: Adequate for Discharge     Problem: SAFETY ADULT  Goal: Patient will remain free of falls  Description: INTERVENTIONS:  - Educate patient/family on patient safety including physical limitations  - Instruct patient to call for assistance with activity   - Consult OT/PT to assist with strengthening/mobility   - Keep Call bell within reach  - Keep bed low and locked with side rails adjusted as appropriate  - Keep care items and personal belongings within reach  - Initiate and maintain comfort rounds  - Apply yellow socks and bracelet for high fall risk patients  - Consider moving patient to room near nurses station  Outcome: Adequate for Discharge  Goal: Maintain or return to baseline ADL function  Description: INTERVENTIONS:  -  Assess patient's ability to carry out ADLs; assess patient's baseline for ADL function and identify physical deficits which impact ability to perform ADLs (bathing, care of mouth/teeth, toileting, grooming, dressing, etc.)  - Assess/evaluate cause of self-care deficits   - Assess range of motion  - Assess patient's mobility; develop plan if impaired  - Assess patient's need for assistive devices and provide as appropriate  - Encourage maximum independence but intervene and supervise when necessary  - Involve family in performance of ADLs  - Assess for home care needs following discharge   - Consider OT consult to assist with ADL evaluation and planning for discharge  - Provide patient education as appropriate  Outcome: Adequate for Discharge  Goal: Maintains/Returns to pre admission functional level  Description: INTERVENTIONS:  - Perform BMAT or MOVE assessment daily.   - Set and communicate daily mobility goal to care team and patient/family/caregiver. - Out of bed for toileting  - Record patient progress and toleration of activity level   Outcome: Adequate for Discharge     Problem: Knowledge Deficit  Goal: Patient/family/caregiver demonstrates understanding of disease process, treatment plan, medications, and discharge instructions  Description: Complete learning assessment and assess knowledge base.   Interventions:  - Provide teaching at level of understanding  - Provide teaching via preferred learning methods  Outcome: Adequate for Discharge Problem: DISCHARGE PLANNING  Goal: Discharge to home or other facility with appropriate resources  Description: INTERVENTIONS:  - Identify barriers to discharge w/patient and caregiver  - Arrange for needed discharge resources and transportation as appropriate  - Identify discharge learning needs (meds, wound care, etc.)  - Arrange for interpretive services to assist at discharge as needed  - Refer to Case Management Department for coordinating discharge planning if the patient needs post-hospital services based on physician/advanced practitioner order or complex needs related to functional status, cognitive ability, or social support system  Outcome: Adequate for Discharge

## 2023-07-28 NOTE — PLAN OF CARE
Problem: ANTEPARTUM  Goal: Maintain pregnancy as long as maternal and/or fetal condition is stable  Description: INTERVENTIONS:  - Maternal surveillance  - Fetal surveillance  - Monitor uterine activity  - Medications as ordered  - Bedrest  Outcome: Progressing     Problem: PAIN - ADULT  Goal: Verbalizes/displays adequate comfort level or baseline comfort level  Description: Interventions:  - Encourage patient to monitor pain and request assistance  - Assess pain using appropriate pain scale  - Administer analgesics based on type and severity of pain and evaluate response  - Implement non-pharmacological measures as appropriate and evaluate response  - Consider cultural and social influences on pain and pain management  - Notify physician/advanced practitioner if interventions unsuccessful or patient reports new pain  Outcome: Progressing     Problem: INFECTION - ADULT  Goal: Absence or prevention of progression during hospitalization  Description: INTERVENTIONS:  - Assess and monitor for signs and symptoms of infection  - Monitor lab/diagnostic results  - Monitor all insertion sites, i.e. indwelling lines, tubes, and drains  - Monitor endotracheal if appropriate and nasal secretions for changes in amount and color  - [place] appropriate cooling/warming therapies per order  - Administer medications as ordered  - Instruct and encourage patient and family to use good hand hygiene technique  - Identify and instruct in appropriate isolation precautions for identified infection/condition  Outcome: Progressing  Goal: Absence of fever/infection during neutropenic period  Description: INTERVENTIONS:  - Monitor WBC    Outcome: Progressing     Problem: SAFETY ADULT  Goal: Patient will remain free of falls  Description: INTERVENTIONS:  - Educate patient/family on patient safety including physical limitations  - Instruct patient to call for assistance with activity   - Consult OT/PT to assist with strengthening/mobility   - Keep Call bell within reach  - Keep bed low and locked with side rails adjusted as appropriate  - Keep care items and personal belongings within reach  - Initiate and maintain comfort rounds  - Apply yellow socks and bracelet for high fall risk patients  - Consider moving patient to room near nurses station  Outcome: Progressing  Goal: Maintain or return to baseline ADL function  Description: INTERVENTIONS:  -  Assess patient's ability to carry out ADLs; assess patient's baseline for ADL function and identify physical deficits which impact ability to perform ADLs (bathing, care of mouth/teeth, toileting, grooming, dressing, etc.)  - Assess/evaluate cause of self-care deficits   - Assess range of motion  - Assess patient's mobility; develop plan if impaired  - Assess patient's need for assistive devices and provide as appropriate  - Encourage maximum independence but intervene and supervise when necessary  - Involve family in performance of ADLs  - Assess for home care needs following discharge   - Consider OT consult to assist with ADL evaluation and planning for discharge  - Provide patient education as appropriate  Outcome: Progressing  Goal: Maintains/Returns to pre admission functional level  Description: INTERVENTIONS:  - Perform BMAT or MOVE assessment daily.   - Set and communicate daily mobility goal to care team and patient/family/caregiver. - Out of bed for toileting  - Record patient progress and toleration of activity level   Outcome: Progressing     Problem: Knowledge Deficit  Goal: Patient/family/caregiver demonstrates understanding of disease process, treatment plan, medications, and discharge instructions  Description: Complete learning assessment and assess knowledge base.   Interventions:  - Provide teaching at level of understanding  - Provide teaching via preferred learning methods  Outcome: Progressing     Problem: DISCHARGE PLANNING  Goal: Discharge to home or other facility with appropriate resources  Description: INTERVENTIONS:  - Identify barriers to discharge w/patient and caregiver  - Arrange for needed discharge resources and transportation as appropriate  - Identify discharge learning needs (meds, wound care, etc.)  - Arrange for interpretive services to assist at discharge as needed  - Refer to Case Management Department for coordinating discharge planning if the patient needs post-hospital services based on physician/advanced practitioner order or complex needs related to functional status, cognitive ability, or social support system  Outcome: Progressing

## 2023-07-28 NOTE — DISCHARGE SUMMARY
Obstetrics Discharge Summary  [facility] 40 y.o. female MRN: [id number]  Unit/Bed#: -01 Encounter: [id number]    Admission Date: 2023     Discharge Date: 23    Admitting Attending: Андрей Sandoval MD   Discharging Attending: Tim Coles MD     Admitting Diagnoses:   Pregnancy at 27 weeks of gestation  Blood pressure without the diagnosis of hypertension  Anemia affecting pregnancy   uterine contractions in the third trimester    Discharge Diagnoses:   Pregnancy at 27 weeks of gestation  Blood pressure without the diagnosis of hypertension  Anemia affecting pregnancy   uterine contractions in the third trimester    Hospital course: [facility] is now a 40 y.o. [id number] who was initially admitted at 27w1d for  contractions. On arrival she was found to be 1/50/-3. Her cervical length was found to be normal at 4.21 cm. Her UA was negative for signs of infection and coagulation factors were all within normal limits. She was given betamethasone for fetal lung maturity on  and . She was initially started on penicillin for GBS unknown status in the setting of premature contractions and magnesium for fetal neuro protection however those were discontinued as there was no concern for imminent delivery. On hospital day #2 her contractions had decreased in frequency and strength and she felt comfortable with discharge. Patient was instructed to follow up with her OBGYN as an outpatient and was given appropriate precautions for which to call her obstetric provider or return to the hospital.    Complications: none apparent    Condition at discharge: good     Provisions for Follow-Up Care:  Please see after visit summary for information related to follow-up care and any pertinent home health orders. Disposition: Home    Planned Readmission: no    Discharge Medications:   Please see AVS for a complete list of discharge medications.     Discharge instructions :   Please see AVS for complete discharge instructions.     Julian Delgado MD  Obstetrics & Gynecology PGY-3  7/28/2023  12:18 PM

## 2023-07-28 NOTE — PROGRESS NOTES
Progress Note - Obstetrics  [facility] 40 y.o. female MRN: [id number]  Unit/Bed#: -01 Encounter: [id number]  Admit date: 2023. Today's date: 23    Assessment/Plan     Ms. Jessica Styles is a 40 y.o. [id number] at [address] [facility] Day: 3, admitted with  contractions. By issue:    Anemia affecting pregnancy  Assessment & Plan  Hgb 10.3 on admission   F/u iron panel     Elevated blood pressure reading without diagnosis of hypertension  Assessment & Plan  2 elevated blood pressures noted during admission   No symptoms of severe preeclampsia   Non severe blood pressures   Systolic (33XSF), VBS:054 , Min:132 , LQT:473   Diastolic (92SCD), WTV:54, Min:75, Max:75  P:C 0.26    Recent Labs     23  0008 23  0440   HGB 10.3* 10.8*    228   CREATININE 0.60  --    AST 16  --    ALT 9  --          27 weeks gestation of pregnancy  Assessment & Plan  NIPT low risk   Increased nuchal translucency   Continuous fetal monitoring   Betamethasone for fetal lung maturity -  GBS pending  Magnesium and Penicillin held  given there was no concern for imminent delivery    *  uterine contractions in third trimester, antepartum  Assessment & Plan   contractions concerning for labor   Admit for observation due to worsening pain and regular contractions   Continuous fetal monitoring   Microscopy is negative   UA is negative, UDS positive for THC   GC/CT pending   CL 4.21cm, cervix 1/50/-3   Contractions spaced this AM, declines recheck     Recent Labs     23  0440 23  0527   WBC 13.21*  --    HGB 10.8*  --      --    FIBRINOGEN  --  412                Subjective    Isidra reports that she feels much better this morning. She states her contractions have spaced out significantly. She continues to deny any vaginal bleeding or leakage of fluid. She is tolerating p.o. without issue.   She endorses good fetal movement          Objective      Patient Vitals for the past 24 hrs:   BP Temp Temp src Pulse Resp SpO2   07/28/23 0132 132/75 97.9 °F (36.6 °C) Oral 57 16 99 %   07/27/23 1603 124/81 97.7 °F (36.5 °C) Oral 68 18 100 %   07/27/23 0829 137/87 97.9 °F (36.6 °C) Oral 63 17 99 %       Physical Exam  Constitutional:       General: She is not in acute distress. Appearance: Normal appearance. Cardiovascular:      Rate and Rhythm: Normal rate. Pulmonary:      Effort: Pulmonary effort is normal.   Abdominal:      Palpations: Abdomen is soft. Tenderness: There is no abdominal tenderness. Comments: Gravid   Skin:     General: Skin is warm and dry. Neurological:      General: No focal deficit present. Mental Status: She is alert. Psychiatric:         Mood and Affect: Mood normal.         I/O       07/26 0701 07/27 0700 07/27 0701 07/28 0700    P. O. 240     I.V. 122.5 400    Total Intake 362.5 400    Net +362.5 +400                Invasive Devices     Peripheral Intravenous Line  Duration           Peripheral IV 07/26/23 Left Antecubital 1 day                Results from last 7 days   Lab Units 07/27/23  0440 07/27/23  0008 07/26/23 2115   WBC Thousand/uL 13.21* 9.97 9.64   NEUTROS ABS Thousands/µL 11.28* 6.67 6.26   HEMOGLOBIN g/dL 10.8* 10.3* 10.1*   MCV fL 83 82 82   PLATELETS Thousands/uL 228 216 223     Results from last 7 days   Lab Units 07/27/23  0008 07/26/23 2115   POTASSIUM mmol/L 3.7 3.7   CHLORIDE mmol/L 105 108   CO2 mmol/L 21 23   BUN mg/dL 7 6   CREATININE mg/dL 0.60 0.74   EGFR ml/min/1.73sq m 116 103     Results from last 7 days   Lab Units 07/27/23  0008 07/26/23 2115   AST U/L 16 18   ALT U/L 9 16     Results from last 7 days   Lab Units 07/27/23  0527 07/27/23  0440 07/27/23  0008 07/26/23  2115   PLATELETS Thousands/uL  --  228 216 223   INR   --   --  1.02  --    PROTIME seconds  --   --  13.6  --    PTT seconds  --   --  27  --    FIBRINOGEN mg/dL 412  --  355  --          Results from last 7 days   Lab Units 07/26/23  8700   PROT/CREAT RATIO UR  0.26*                   Brief review of pertinent history:  Past Medical History:   Diagnosis Date   • Asthma    • Chlamydia infection      History reviewed. No pertinent surgical history.   OB History    Para Term  AB Living   5 3 2 1 1 3   SAB IAB Ectopic Multiple Live Births   1 0 0 0 3      # Outcome Date GA Lbr Florencio/2nd Weight Sex Delivery Anes PTL Lv   5 Current            4 SAB 22           3 Term     M Vag-Spont   DREA   2 Term     M Vag-Spont   DREA   1      F Vag-Spont   DREA      Obstetric Comments   Menarche - 15 yo       Fetal data:  Nonstress test: date 23 Time: 0542 - 0604  Baseline: 120  Variability: moderate  Accelerations: present, 10x10  Decelerations: absent  Contractions: absent  Assessment: reactive  Plan: continue TID and PRN      MEDS:   Medication Administration - last 24 hours from 2023 0656 to 2023 6506       Date/Time Order Dose Route Action Action by     2023 0735 EDT lactated ringers infusion 0 mL/hr Intravenous Stopped Ana Rossi RN     2023 0132 EDT betamethasone acetate-betamethasone sodium phosphate (CELESTONE) injection 12 mg 12 mg Intramuscular Given Kylie Fuentes RN     2023 1180 EDT magnesium sulfate 20 g/500 mL infusion (premix) 0 g/hr Intravenous Stopped Ana Rossi RN        Current Facility-Administered Medications   Medication Dose Route Frequency   • calcium carbonate (TUMS) chewable tablet 1,000 mg  1,000 mg Oral Daily PRN   • ondansetron (ZOFRAN) injection 4 mg  4 mg Intravenous Q8H PRN            Vanessa Travis MD  OBGYN, PGY-3  2023  6:56 AM

## 2023-07-29 ENCOUNTER — HOSPITAL ENCOUNTER (OUTPATIENT)
Facility: HOSPITAL | Age: 38
Setting detail: OBSERVATION
Discharge: HOME | End: 2023-07-31
Attending: OBSTETRICS & GYNECOLOGY | Admitting: OBSTETRICS & GYNECOLOGY
Payer: COMMERCIAL

## 2023-07-29 DIAGNOSIS — K64.9 HEMORRHOIDS, UNSPECIFIED HEMORRHOID TYPE: Primary | ICD-10-CM

## 2023-07-29 DIAGNOSIS — R10.30 LOWER ABDOMINAL PAIN: ICD-10-CM

## 2023-07-29 LAB — BACTERIA UR CULT: NORMAL

## 2023-07-29 RX ORDER — SODIUM CHLORIDE, SODIUM LACTATE, POTASSIUM CHLORIDE, CALCIUM CHLORIDE 600; 310; 30; 20 MG/100ML; MG/100ML; MG/100ML; MG/100ML
125 INJECTION, SOLUTION INTRAVENOUS CONTINUOUS
Status: DISCONTINUED | OUTPATIENT
Start: 2023-07-29 | End: 2023-07-30

## 2023-07-29 RX ADMIN — SODIUM CHLORIDE, SODIUM LACTATE, POTASSIUM CHLORIDE, AND CALCIUM CHLORIDE 125 ML/HR: .6; .31; .03; .02 INJECTION, SOLUTION INTRAVENOUS at 23:59

## 2023-07-29 RX ADMIN — SODIUM CHLORIDE, SODIUM LACTATE, POTASSIUM CHLORIDE, AND CALCIUM CHLORIDE 950 ML/HR: .6; .31; .03; .02 INJECTION, SOLUTION INTRAVENOUS at 23:18

## 2023-07-30 ENCOUNTER — APPOINTMENT (OUTPATIENT)
Dept: ULTRASOUND IMAGING | Facility: HOSPITAL | Age: 38
End: 2023-07-30
Payer: COMMERCIAL

## 2023-07-30 PROBLEM — R10.30 LOWER ABDOMINAL PAIN: Status: ACTIVE | Noted: 2023-07-30

## 2023-07-30 LAB
ABO GROUP BLD: NORMAL
ALBUMIN SERPL BCP-MCNC: 3.6 G/DL (ref 3.5–5)
ALP SERPL-CCNC: 62 U/L (ref 34–104)
ALT SERPL W P-5'-P-CCNC: 10 U/L (ref 7–52)
ANION GAP SERPL CALCULATED.3IONS-SCNC: 9 MMOL/L
APTT PPP: 24 SECONDS (ref 23–37)
AST SERPL W P-5'-P-CCNC: 17 U/L (ref 13–39)
BASOPHILS # BLD AUTO: 0.03 THOUSANDS/ÂΜL (ref 0–0.1)
BASOPHILS NFR BLD AUTO: 0 % (ref 0–1)
BILIRUB SERPL-MCNC: 0.35 MG/DL (ref 0.2–1)
BILIRUB UR QL STRIP: NEGATIVE
BLD GP AB SCN SERPL QL: NEGATIVE
BUN SERPL-MCNC: 20 MG/DL (ref 5–25)
CALCIUM SERPL-MCNC: 8.8 MG/DL (ref 8.4–10.2)
CHLORIDE SERPL-SCNC: 104 MMOL/L (ref 96–108)
CLARITY UR: CLEAR
CO2 SERPL-SCNC: 19 MMOL/L (ref 21–32)
COLOR UR: NORMAL
CREAT SERPL-MCNC: 1.04 MG/DL (ref 0.6–1.3)
EOSINOPHIL # BLD AUTO: 0.03 THOUSAND/ÂΜL (ref 0–0.61)
EOSINOPHIL NFR BLD AUTO: 0 % (ref 0–6)
ERYTHROCYTE [DISTWIDTH] IN BLOOD BY AUTOMATED COUNT: 17 % (ref 11.6–15.1)
FIBRINOGEN PPP-MCNC: 332 MG/DL (ref 207–520)
GFR SERPL CREATININE-BSD FRML MDRD: 68 ML/MIN/1.73SQ M
GLUCOSE SERPL-MCNC: 89 MG/DL (ref 65–140)
GLUCOSE UR STRIP-MCNC: NEGATIVE MG/DL
HCT VFR BLD AUTO: 30.9 % (ref 34.8–46.1)
HGB BLD-MCNC: 9.9 G/DL (ref 11.5–15.4)
HGB UR QL STRIP.AUTO: NEGATIVE
IMM GRANULOCYTES # BLD AUTO: 0.23 THOUSAND/UL (ref 0–0.2)
IMM GRANULOCYTES NFR BLD AUTO: 2 % (ref 0–2)
INR PPP: 1.05 (ref 0.84–1.19)
KETONES UR STRIP-MCNC: NEGATIVE MG/DL
LEUKOCYTE ESTERASE UR QL STRIP: NEGATIVE
LIPASE SERPL-CCNC: 17 U/L (ref 11–82)
LYMPHOCYTES # BLD AUTO: 2.15 THOUSANDS/ÂΜL (ref 0.6–4.47)
LYMPHOCYTES NFR BLD AUTO: 15 % (ref 14–44)
MCH RBC QN AUTO: 26.8 PG (ref 26.8–34.3)
MCHC RBC AUTO-ENTMCNC: 32 G/DL (ref 31.4–37.4)
MCV RBC AUTO: 84 FL (ref 82–98)
MONOCYTES # BLD AUTO: 1.29 THOUSAND/ÂΜL (ref 0.17–1.22)
MONOCYTES NFR BLD AUTO: 9 % (ref 4–12)
NEUTROPHILS # BLD AUTO: 10.5 THOUSANDS/ÂΜL (ref 1.85–7.62)
NEUTS SEG NFR BLD AUTO: 74 % (ref 43–75)
NITRITE UR QL STRIP: NEGATIVE
NRBC BLD AUTO-RTO: 0 /100 WBCS
PH UR STRIP.AUTO: 6 [PH]
PLATELET # BLD AUTO: 265 THOUSANDS/UL (ref 149–390)
PMV BLD AUTO: 11.1 FL (ref 8.9–12.7)
POTASSIUM SERPL-SCNC: 3.8 MMOL/L (ref 3.5–5.3)
PROT SERPL-MCNC: 6.7 G/DL (ref 6.4–8.4)
PROT UR STRIP-MCNC: NEGATIVE MG/DL
PROTHROMBIN TIME: 13.9 SECONDS (ref 11.6–14.5)
RBC # BLD AUTO: 3.69 MILLION/UL (ref 3.81–5.12)
RH BLD: POSITIVE
SODIUM SERPL-SCNC: 132 MMOL/L (ref 135–147)
SP GR UR STRIP.AUTO: 1.01 (ref 1–1.03)
SPECIMEN EXPIRATION DATE: NORMAL
UROBILINOGEN UR STRIP-ACNC: <2 MG/DL
WBC # BLD AUTO: 14.23 THOUSAND/UL (ref 4.31–10.16)

## 2023-07-30 PROCEDURE — 85025 COMPLETE CBC W/AUTO DIFF WBC: CPT | Performed by: OBSTETRICS & GYNECOLOGY

## 2023-07-30 PROCEDURE — 85384 FIBRINOGEN ACTIVITY: CPT | Performed by: OBSTETRICS & GYNECOLOGY

## 2023-07-30 PROCEDURE — 86900 BLOOD TYPING SEROLOGIC ABO: CPT | Performed by: OBSTETRICS & GYNECOLOGY

## 2023-07-30 PROCEDURE — 80053 COMPREHEN METABOLIC PANEL: CPT | Performed by: OBSTETRICS & GYNECOLOGY

## 2023-07-30 PROCEDURE — 81003 URINALYSIS AUTO W/O SCOPE: CPT | Performed by: OBSTETRICS & GYNECOLOGY

## 2023-07-30 PROCEDURE — NC001 PR NO CHARGE: Performed by: OBSTETRICS & GYNECOLOGY

## 2023-07-30 PROCEDURE — 86901 BLOOD TYPING SEROLOGIC RH(D): CPT | Performed by: OBSTETRICS & GYNECOLOGY

## 2023-07-30 PROCEDURE — 85730 THROMBOPLASTIN TIME PARTIAL: CPT | Performed by: OBSTETRICS & GYNECOLOGY

## 2023-07-30 PROCEDURE — 83690 ASSAY OF LIPASE: CPT | Performed by: OBSTETRICS & GYNECOLOGY

## 2023-07-30 PROCEDURE — 76775 US EXAM ABDO BACK WALL LIM: CPT

## 2023-07-30 PROCEDURE — 86850 RBC ANTIBODY SCREEN: CPT | Performed by: OBSTETRICS & GYNECOLOGY

## 2023-07-30 PROCEDURE — 85610 PROTHROMBIN TIME: CPT | Performed by: OBSTETRICS & GYNECOLOGY

## 2023-07-30 PROCEDURE — 99214 OFFICE O/P EST MOD 30 MIN: CPT

## 2023-07-30 RX ORDER — DIAPER,BRIEF,INFANT-TODD,DISP
EACH MISCELLANEOUS 4 TIMES DAILY PRN
Status: DISCONTINUED | OUTPATIENT
Start: 2023-07-30 | End: 2023-07-31 | Stop reason: HOSPADM

## 2023-07-30 RX ORDER — PHENAZOPYRIDINE HYDROCHLORIDE 100 MG/1
100 TABLET, FILM COATED ORAL
Status: DISCONTINUED | OUTPATIENT
Start: 2023-07-30 | End: 2023-07-31 | Stop reason: HOSPADM

## 2023-07-30 RX ORDER — ALBUTEROL SULFATE 90 UG/1
2 AEROSOL, METERED RESPIRATORY (INHALATION) EVERY 6 HOURS PRN
Status: DISCONTINUED | OUTPATIENT
Start: 2023-07-30 | End: 2023-07-31 | Stop reason: HOSPADM

## 2023-07-30 RX ORDER — CALCIUM CARBONATE 500 MG/1
1000 TABLET, CHEWABLE ORAL DAILY PRN
Status: DISCONTINUED | OUTPATIENT
Start: 2023-07-30 | End: 2023-07-31 | Stop reason: HOSPADM

## 2023-07-30 RX ORDER — LIDOCAINE 50 MG/G
1 PATCH TOPICAL DAILY
Status: DISCONTINUED | OUTPATIENT
Start: 2023-07-30 | End: 2023-07-31 | Stop reason: HOSPADM

## 2023-07-30 RX ORDER — CYCLOBENZAPRINE HCL 10 MG
10 TABLET ORAL 3 TIMES DAILY
Status: DISCONTINUED | OUTPATIENT
Start: 2023-07-30 | End: 2023-07-31 | Stop reason: HOSPADM

## 2023-07-30 RX ORDER — ACETAMINOPHEN 325 MG/1
975 TABLET ORAL EVERY 8 HOURS PRN
Status: DISCONTINUED | OUTPATIENT
Start: 2023-07-30 | End: 2023-07-31 | Stop reason: HOSPADM

## 2023-07-30 RX ORDER — LANOLIN ALCOHOL/MO/W.PET/CERES
3 CREAM (GRAM) TOPICAL
Status: DISCONTINUED | OUTPATIENT
Start: 2023-07-30 | End: 2023-07-31 | Stop reason: HOSPADM

## 2023-07-30 RX ORDER — ONDANSETRON 2 MG/ML
4 INJECTION INTRAMUSCULAR; INTRAVENOUS EVERY 8 HOURS PRN
Status: DISCONTINUED | OUTPATIENT
Start: 2023-07-30 | End: 2023-07-31 | Stop reason: HOSPADM

## 2023-07-30 RX ORDER — TAMSULOSIN HYDROCHLORIDE 0.4 MG/1
0.4 CAPSULE ORAL
Status: DISCONTINUED | OUTPATIENT
Start: 2023-07-30 | End: 2023-07-31 | Stop reason: HOSPADM

## 2023-07-30 RX ORDER — SIMETHICONE 80 MG
80 TABLET,CHEWABLE ORAL 4 TIMES DAILY PRN
Status: DISCONTINUED | OUTPATIENT
Start: 2023-07-30 | End: 2023-07-31 | Stop reason: HOSPADM

## 2023-07-30 RX ORDER — ESCITALOPRAM OXALATE 10 MG/1
10 TABLET ORAL DAILY
Status: DISCONTINUED | OUTPATIENT
Start: 2023-07-30 | End: 2023-07-31 | Stop reason: HOSPADM

## 2023-07-30 RX ORDER — HYDROXYZINE HYDROCHLORIDE 25 MG/1
50 TABLET, FILM COATED ORAL
Status: DISCONTINUED | OUTPATIENT
Start: 2023-07-30 | End: 2023-07-31 | Stop reason: HOSPADM

## 2023-07-30 RX ADMIN — CYCLOBENZAPRINE 10 MG: 10 TABLET, FILM COATED ORAL at 21:00

## 2023-07-30 RX ADMIN — SODIUM CHLORIDE, SODIUM LACTATE, POTASSIUM CHLORIDE, AND CALCIUM CHLORIDE 125 ML/HR: .6; .31; .03; .02 INJECTION, SOLUTION INTRAVENOUS at 07:48

## 2023-07-30 RX ADMIN — ONDANSETRON 4 MG: 2 INJECTION INTRAMUSCULAR; INTRAVENOUS at 22:20

## 2023-07-30 RX ADMIN — PHENAZOPYRIDINE 100 MG: 100 TABLET ORAL at 12:32

## 2023-07-30 RX ADMIN — PHENAZOPYRIDINE 100 MG: 100 TABLET ORAL at 17:00

## 2023-07-30 RX ADMIN — CYCLOBENZAPRINE 10 MG: 10 TABLET, FILM COATED ORAL at 03:09

## 2023-07-30 RX ADMIN — HYDROCORTISONE: 1 CREAM TOPICAL at 09:26

## 2023-07-30 RX ADMIN — CYCLOBENZAPRINE 10 MG: 10 TABLET, FILM COATED ORAL at 09:25

## 2023-07-30 RX ADMIN — CYCLOBENZAPRINE 10 MG: 10 TABLET, FILM COATED ORAL at 16:20

## 2023-07-30 RX ADMIN — PHENAZOPYRIDINE 100 MG: 100 TABLET ORAL at 03:09

## 2023-07-30 RX ADMIN — ESCITALOPRAM OXALATE 10 MG: 10 TABLET ORAL at 09:25

## 2023-07-30 RX ADMIN — WITCH HAZEL 1 PAD: 500 SOLUTION RECTAL; TOPICAL at 09:26

## 2023-07-30 RX ADMIN — TAMSULOSIN HYDROCHLORIDE 0.4 MG: 0.4 CAPSULE ORAL at 17:00

## 2023-07-30 RX ADMIN — PHENAZOPYRIDINE 100 MG: 100 TABLET ORAL at 08:00

## 2023-07-30 RX ADMIN — Medication: at 09:26

## 2023-07-30 RX ADMIN — MORPHINE SULFATE 2 MG: 2 INJECTION, SOLUTION INTRAMUSCULAR; INTRAVENOUS at 01:23

## 2023-07-30 NOTE — ASSESSMENT & PLAN NOTE
Systolic (99TLT), MUS:557 , Min:115 , PPY:522   Diastolic (50ECN), QUR:16, Min:66, Max:81    Elevated blood pressure during prior admission  Normotensive

## 2023-07-30 NOTE — ASSESSMENT & PLAN NOTE
labor workup negative  Low clinical suspicion for placental abruption - coags WNL, fibrinogen 332  Hx of nephrolithiasis, but no CVA tenderness or hematuria  Lower abdominal pain- resolved  UA negative  Will trial flexeril/pyridium for bladder spasms.  Plan for bladder scan and PVR  US Kidney/bladder with PVR- 0.27mm stone at lower pole of kidney, bladder distended post void- Tylenol, tamsulosin, flexeril, lidocaine patch, pyridium

## 2023-07-30 NOTE — PLAN OF CARE
Problem: PAIN - ADULT  Goal: Verbalizes/displays adequate comfort level or baseline comfort level  Description: Interventions:  - Encourage patient to monitor pain and request assistance  - Assess pain using appropriate pain scale  - Administer analgesics based on type and severity of pain and evaluate response  - Implement non-pharmacological measures as appropriate and evaluate response  - Consider cultural and social influences on pain and pain management  - Notify physician/advanced practitioner if interventions unsuccessful or patient reports new pain  Outcome: Progressing     Problem: INFECTION - ADULT  Goal: Absence or prevention of progression during hospitalization  Description: INTERVENTIONS:  - Assess and monitor for signs and symptoms of infection  - Monitor lab/diagnostic results  - Monitor all insertion sites, i.e. indwelling lines, tubes, and drains  - Monitor endotracheal if appropriate and nasal secretions for changes in amount and color  - [place] appropriate cooling/warming therapies per order  - Administer medications as ordered  - Instruct and encourage patient and family to use good hand hygiene technique  - Identify and instruct in appropriate isolation precautions for identified infection/condition  Outcome: Progressing  Goal: Absence of fever/infection during neutropenic period  Description: INTERVENTIONS:  - Monitor WBC    Outcome: Progressing     Problem: SAFETY ADULT  Goal: Patient will remain free of falls  Description: INTERVENTIONS:  - Educate patient/family on patient safety including physical limitations  - Instruct patient to call for assistance with activity   - Consult OT/PT to assist with strengthening/mobility   - Keep Call bell within reach  - Keep bed low and locked with side rails adjusted as appropriate  - Keep care items and personal belongings within reach  - Initiate and maintain comfort rounds  - Make Fall Risk Sign visible to staff  - Apply yellow socks and bracelet for high fall risk patients  - Consider moving patient to room near nurses station  Outcome: Progressing  Goal: Maintain or return to baseline ADL function  Description: INTERVENTIONS:  -  Assess patient's ability to carry out ADLs; assess patient's baseline for ADL function and identify physical deficits which impact ability to perform ADLs (bathing, care of mouth/teeth, toileting, grooming, dressing, etc.)  - Assess/evaluate cause of self-care deficits   - Assess range of motion  - Assess patient's mobility; develop plan if impaired  - Assess patient's need for assistive devices and provide as appropriate  - Encourage maximum independence but intervene and supervise when necessary  - Involve family in performance of ADLs  - Assess for home care needs following discharge   - Consider OT consult to assist with ADL evaluation and planning for discharge  - Provide patient education as appropriate  Outcome: Progressing  Goal: Maintains/Returns to pre admission functional level  Description: INTERVENTIONS:  - Perform BMAT or MOVE assessment daily.   - Set and communicate daily mobility goal to care team and patient/family/caregiver.    - Collaborate with rehabilitation services on mobility goals if consulted  - Out of bed for toileting  - Record patient progress and toleration of activity level   Outcome: Progressing     Problem: DISCHARGE PLANNING  Goal: Discharge to home or other facility with appropriate resources  Description: INTERVENTIONS:  - Identify barriers to discharge w/patient and caregiver  - Arrange for needed discharge resources and transportation as appropriate  - Identify discharge learning needs (meds, wound care, etc.)  - Arrange for interpretive services to assist at discharge as needed  - Refer to Case Management Department for coordinating discharge planning if the patient needs post-hospital services based on physician/advanced practitioner order or complex needs related to functional status, cognitive ability, or social support system  Outcome: Progressing     Problem: Knowledge Deficit  Goal: Patient/family/caregiver demonstrates understanding of disease process, treatment plan, medications, and discharge instructions  Description: Complete learning assessment and assess knowledge base.   Interventions:  - Provide teaching at level of understanding  - Provide teaching via preferred learning methods  Outcome: Progressing

## 2023-07-30 NOTE — H&P
[address] [facility] 40 y.o. female MRN: [id number]  Unit/Bed#: LD TRIAGE  Encounter: [id number]    Assessment: 40 y.o. [id number] at 27w4d admitted for abdominal pain     Plan:   * Lower abdominal pain  Assessment & Plan   labor workup negative  Low clinical suspicion for placental abruption - coags WNL, fibrinogen 332  Hx of nephrolithiasis, but no CVA tenderness or hematuria  Lower abdominal pain associated with voiding, suspect etiology bladder spasm/incomplete emptying. Minimal improvement with IV morphine. UA negative  Will trial flexeril/pyridium for bladder spasms. Plan for bladder scan and PVR  US Kidney/bladder with PVR  If continued bladder spasm/discomfort, consider menjivar catheter placement for bladder rest  Consider CTAP IV contrast to r/o intraabdominal pathology if no other obvious source. Elevated blood pressure reading without diagnosis of hypertension  Assessment & Plan  Systolic (12RCM), JBU:024 , Min:121 , VKX:651   Diastolic (20IPV), XWC:85, Min:75, Max:81    Elevated blood pressure during prior admission  Normotensive         27 weeks gestation of pregnancy  Assessment & Plan  S/p betamethasone -  Low clinical concern for  labor or placental abruption at this time. Previous  labor workup negative. Continuous fetal monitoring     uterine contractions in third trimester, antepartum  Assessment & Plan  SVE unchanged on multiple rechecks /-3, no contractions on monitor  Cervical length 3.8cm       D/w Dr. Lina Beebe      Chief Complaint: abdominal pain    HPI: Danii Lawrence is a 40 y.o. [id number] with an TONA of 10/25/2023, Date entered prior to episode creation at 27w4d who is being admitted for abdominal pain. She was previously admitted - with abdominal pain and  contractions. Patient received betamethasone course - as well as magnesium for neuroprotection.  Her contractions spaced out, her pain improved and she was discharged home. Patient re-presents with worsening lower abdominal pain which she states is worse than before. She states it is constant and does not wax and wane like her previous abdominal pain. She states her pain is worse after voiding. She is voiding more frequently and does not feel like she is completely emptying her bladder. Denies vaginal bleeding, leaking fluid, decreased fetal movement. Patient Active Problem List   Diagnosis   • Cellulitis of right lower extremity   • Mood disorder (HCC)   • Iliotibial band tendinitis of right side   • Acute midline low back pain without sciatica   • Chronic midline low back pain without sciatica   • Alopecia   • Annual physical exam   • Trigger middle finger of left hand   • Paresthesia   • Carpal tunnel syndrome, bilateral   • Anxiety   • Pain of left lower extremity   • Allergic rhinitis   •  uterine contractions in third trimester, antepartum   • 27 weeks gestation of pregnancy   • Elevated blood pressure reading without diagnosis of hypertension   • Anemia affecting pregnancy       Baby complications/comments: none    Review of Systems   Constitutional: Negative for chills and fever. Respiratory: Negative for shortness of breath. Cardiovascular: Negative for chest pain. Gastrointestinal: Positive for abdominal pain. Negative for constipation, diarrhea, nausea and vomiting. Genitourinary: Negative for dysuria, hematuria, vaginal bleeding, vaginal discharge and vaginal pain. Neurological: Negative for headaches.        OB History    Para Term  AB Living   5 3 2 1 1 3   SAB IAB Ectopic Multiple Live Births   1 0 0 0 3      # Outcome Date GA Lbr Florencio/2nd Weight Sex Delivery Anes PTL Lv   5 Current            4 SAB 22           3 Term     M Vag-Spont   DREA   2 Term     M Vag-Spont   DREA   1      F Vag-Spont   DREA      Obstetric Comments   Menarche - 15 yo       Past Medical History:   Diagnosis Date   • Asthma • Chlamydia infection        No past surgical history on file. Social History     Tobacco Use   • Smoking status: Never   • Smokeless tobacco: Never   Substance Use Topics   • Alcohol use: Yes     Comment: social ; Denied history of alcohol use       No Known Allergies    Medications Prior to Admission   Medication   • acetaminophen (TYLENOL) 500 mg tablet   • albuterol (PROVENTIL HFA,VENTOLIN HFA) 90 mcg/act inhaler   • escitalopram (LEXAPRO) 20 mg tablet   • famotidine (PEPCID) 20 mg tablet   • fluticasone (FLONASE) 50 mcg/act nasal spray   • gabapentin (NEURONTIN) 300 mg capsule   • ipratropium (ATROVENT HFA) 17 mcg/act inhaler   • ondansetron (ZOFRAN-ODT) 4 mg disintegrating tablet       Objective:  Temp:  [97.8 °F (36.6 °C)] 97.8 °F (36.6 °C)  HR:  [67] 67  Resp:  [20] 20  BP: (121)/(75) 121/75  Body mass index is 24.13 kg/m². Physical Exam:  Physical Exam  Constitutional:       General: She is in acute distress. Appearance: She is well-developed. She is not ill-appearing, toxic-appearing or diaphoretic. Genitourinary:      Vulva normal.      No vaginal discharge. HENT:      Head: Normocephalic and atraumatic. Eyes:      General: No scleral icterus. Conjunctiva/sclera: Conjunctivae normal.   Cardiovascular:      Rate and Rhythm: Normal rate. Pulmonary:      Effort: Pulmonary effort is normal.   Abdominal:      General: There is no distension. Palpations: Abdomen is soft. Tenderness: There is abdominal tenderness. There is no guarding or rebound. Comments: No tenderness to palpation upper abdomen. No fundal tenderness. Focal tenderness to deep palpation left of midline. Same area of tenderness noted on TAUS at bladder/uterus interface. No RLQ tenderness, rebound or guarding. Musculoskeletal:         General: No tenderness. Right lower leg: No edema. Left lower leg: No edema. Neurological:      General: No focal deficit present.       Mental Status: She is alert and oriented to person, place, and time. Skin:     General: Skin is warm and dry. Psychiatric:         Mood and Affect: Mood normal.         Behavior: Behavior normal.   Vitals reviewed.           SVE: 1/50/-3, unchanged on multiple rechecks       FHT:  Baseline Rate: 130 bpm  Variability: Moderate 6-25 bpm  Accelerations: 10 x 10 (<32 weeks), At variable times  Decelerations: None  FHR Category: Category I    TOCO:   Contraction Frequency (minutes): irregular  Contraction Duration (seconds): 50-70  Contraction Quality: Mild    Ultrasound:  - TAUS: Anterior placenta, LVP 0.81 cm, cephalic  - TVUS: Cervical length 3.88 cm, no funneling, debris or dynamic changes                Lab Results   Component Value Date    WBC 14.23 (H) 07/30/2023    HGB 9.9 (L) 07/30/2023    HCT 30.9 (L) 07/30/2023     07/30/2023     Lab Results   Component Value Date     08/28/2015    K 3.8 07/30/2023     07/30/2023    CO2 19 (L) 07/30/2023    BUN 20 07/30/2023    CREATININE 1.04 07/30/2023    GLUCOSE 88 08/28/2015    AST 17 07/30/2023    ALT 10 07/30/2023     Fibrinogen   Date Value Ref Range Status   07/30/2023 332 207 - 520 mg/dL Final         Prenatal Labs: Reviewed      Blood type: O+  Antibody: negative  Group B strep: pending  HIV: negative  Hepatitis B: negative  RPR: non-reactive  Rubella: Immune  1 hour Glucose: 134        <2 Midnights  OBSERVATION    Signature/Title: Norma Saucedo MD  Date: 7/30/2023  Time: 3:22 AM

## 2023-07-30 NOTE — PLAN OF CARE
Problem: PAIN - ADULT  Goal: Verbalizes/displays adequate comfort level or baseline comfort level  Description: Interventions:  - Encourage patient to monitor pain and request assistance  - Assess pain using appropriate pain scale  - Administer analgesics based on type and severity of pain and evaluate response  - Implement non-pharmacological measures as appropriate and evaluate response  - Consider cultural and social influences on pain and pain management  - Notify physician/advanced practitioner if interventions unsuccessful or patient reports new pain  Outcome: Progressing     Problem: INFECTION - ADULT  Goal: Absence or prevention of progression during hospitalization  Description: INTERVENTIONS:  - Assess and monitor for signs and symptoms of infection  - Monitor lab/diagnostic results  - Monitor all insertion sites, i.e. indwelling lines, tubes, and drains  - Monitor endotracheal if appropriate and nasal secretions for changes in amount and color  - [place] appropriate cooling/warming therapies per order  - Administer medications as ordered  - Instruct and encourage patient and family to use good hand hygiene technique  - Identify and instruct in appropriate isolation precautions for identified infection/condition  Outcome: Progressing  Goal: Absence of fever/infection during neutropenic period  Description: INTERVENTIONS:  - Monitor WBC    Outcome: Progressing     Problem: SAFETY ADULT  Goal: Patient will remain free of falls  Description: INTERVENTIONS:  - Educate patient/family on patient safety including physical limitations  - Instruct patient to call for assistance with activity   - Consult OT/PT to assist with strengthening/mobility   - Keep Call bell within reach  - Keep bed low and locked with side rails adjusted as appropriate  - Keep care items and personal belongings within reach  - Initiate and maintain comfort rounds  - Make Fall Risk Sign visible to staff  - Offer Toileting every  Hours, in advance of need  - Initiate/Maintain alarm  - Obtain necessary fall risk management equipment:   - Apply yellow socks and bracelet for high fall risk patients  - Consider moving patient to room near nurses station  Outcome: Progressing  Goal: Maintain or return to baseline ADL function  Description: INTERVENTIONS:  -  Assess patient's ability to carry out ADLs; assess patient's baseline for ADL function and identify physical deficits which impact ability to perform ADLs (bathing, care of mouth/teeth, toileting, grooming, dressing, etc.)  - Assess/evaluate cause of self-care deficits   - Assess range of motion  - Assess patient's mobility; develop plan if impaired  - Assess patient's need for assistive devices and provide as appropriate  - Encourage maximum independence but intervene and supervise when necessary  - Involve family in performance of ADLs  - Assess for home care needs following discharge   - Consider OT consult to assist with ADL evaluation and planning for discharge  - Provide patient education as appropriate  Outcome: Progressing  Goal: Maintains/Returns to pre admission functional level  Description: INTERVENTIONS:  - Perform BMAT or MOVE assessment daily.   - Set and communicate daily mobility goal to care team and patient/family/caregiver. - Collaborate with rehabilitation services on mobility goals if consulted  - Perform Range of Motion  times a day. - Reposition patient every  hours.   - Dangle patient  times a day  - Stand patient  times a day  - Ambulate patient  times a day  - Out of bed to chair  times a day   - Out of bed for meals  times a day  - Out of bed for toileting  - Record patient progress and toleration of activity level   Outcome: Progressing     Problem: DISCHARGE PLANNING  Goal: Discharge to home or other facility with appropriate resources  Description: INTERVENTIONS:  - Identify barriers to discharge w/patient and caregiver  - Arrange for needed discharge resources and transportation as appropriate  - Identify discharge learning needs (meds, wound care, etc.)  - Arrange for interpretive services to assist at discharge as needed  - Refer to Case Management Department for coordinating discharge planning if the patient needs post-hospital services based on physician/advanced practitioner order or complex needs related to functional status, cognitive ability, or social support system  Outcome: Progressing     Problem: Knowledge Deficit  Goal: Patient/family/caregiver demonstrates understanding of disease process, treatment plan, medications, and discharge instructions  Description: Complete learning assessment and assess knowledge base.   Interventions:  - Provide teaching at level of understanding  - Provide teaching via preferred learning methods  Outcome: Progressing

## 2023-07-30 NOTE — ASSESSMENT & PLAN NOTE
S/p betamethasone -  Low clinical concern for  labor or placental abruption at this time. Previous  labor workup negative.    NST TID

## 2023-07-31 VITALS
TEMPERATURE: 98 F | SYSTOLIC BLOOD PRESSURE: 140 MMHG | WEIGHT: 145 LBS | DIASTOLIC BLOOD PRESSURE: 75 MMHG | HEIGHT: 65 IN | OXYGEN SATURATION: 98 % | BODY MASS INDEX: 24.16 KG/M2 | HEART RATE: 64 BPM | RESPIRATION RATE: 20 BRPM

## 2023-07-31 DIAGNOSIS — K64.9 HEMORRHOIDS, UNSPECIFIED HEMORRHOID TYPE: ICD-10-CM

## 2023-07-31 LAB
ALBUMIN SERPL BCP-MCNC: 3.2 G/DL (ref 3.5–5)
ALP SERPL-CCNC: 57 U/L (ref 34–104)
ALT SERPL W P-5'-P-CCNC: 9 U/L (ref 7–52)
ANION GAP SERPL CALCULATED.3IONS-SCNC: 7 MMOL/L
AST SERPL W P-5'-P-CCNC: 15 U/L (ref 13–39)
BASOPHILS # BLD AUTO: 0.01 THOUSANDS/ÂΜL (ref 0–0.1)
BASOPHILS NFR BLD AUTO: 0 % (ref 0–1)
BILIRUB SERPL-MCNC: 0.39 MG/DL (ref 0.2–1)
BUN SERPL-MCNC: 12 MG/DL (ref 5–25)
CALCIUM ALBUM COR SERPL-MCNC: 8.9 MG/DL (ref 8.3–10.1)
CALCIUM SERPL-MCNC: 8.3 MG/DL (ref 8.4–10.2)
CHLORIDE SERPL-SCNC: 106 MMOL/L (ref 96–108)
CO2 SERPL-SCNC: 21 MMOL/L (ref 21–32)
CREAT SERPL-MCNC: 0.65 MG/DL (ref 0.6–1.3)
EOSINOPHIL # BLD AUTO: 0.12 THOUSAND/ÂΜL (ref 0–0.61)
EOSINOPHIL NFR BLD AUTO: 1 % (ref 0–6)
ERYTHROCYTE [DISTWIDTH] IN BLOOD BY AUTOMATED COUNT: 16.9 % (ref 11.6–15.1)
GFR SERPL CREATININE-BSD FRML MDRD: 113 ML/MIN/1.73SQ M
GLUCOSE SERPL-MCNC: 78 MG/DL (ref 65–140)
HCT VFR BLD AUTO: 29.3 % (ref 34.8–46.1)
HGB BLD-MCNC: 9.4 G/DL (ref 11.5–15.4)
IMM GRANULOCYTES # BLD AUTO: 0.11 THOUSAND/UL (ref 0–0.2)
IMM GRANULOCYTES NFR BLD AUTO: 1 % (ref 0–2)
LYMPHOCYTES # BLD AUTO: 1.9 THOUSANDS/ÂΜL (ref 0.6–4.47)
LYMPHOCYTES NFR BLD AUTO: 21 % (ref 14–44)
MCH RBC QN AUTO: 26.7 PG (ref 26.8–34.3)
MCHC RBC AUTO-ENTMCNC: 32.1 G/DL (ref 31.4–37.4)
MCV RBC AUTO: 83 FL (ref 82–98)
MONOCYTES # BLD AUTO: 0.88 THOUSAND/ÂΜL (ref 0.17–1.22)
MONOCYTES NFR BLD AUTO: 10 % (ref 4–12)
NEUTROPHILS # BLD AUTO: 6.02 THOUSANDS/ÂΜL (ref 1.85–7.62)
NEUTS SEG NFR BLD AUTO: 67 % (ref 43–75)
NRBC BLD AUTO-RTO: 0 /100 WBCS
PLATELET # BLD AUTO: 199 THOUSANDS/UL (ref 149–390)
PMV BLD AUTO: 10 FL (ref 8.9–12.7)
POTASSIUM SERPL-SCNC: 3.6 MMOL/L (ref 3.5–5.3)
PROT SERPL-MCNC: 6 G/DL (ref 6.4–8.4)
RBC # BLD AUTO: 3.52 MILLION/UL (ref 3.81–5.12)
SODIUM SERPL-SCNC: 134 MMOL/L (ref 135–147)
WBC # BLD AUTO: 9.04 THOUSAND/UL (ref 4.31–10.16)

## 2023-07-31 PROCEDURE — 99238 HOSP IP/OBS DSCHRG MGMT 30/<: CPT | Performed by: STUDENT IN AN ORGANIZED HEALTH CARE EDUCATION/TRAINING PROGRAM

## 2023-07-31 PROCEDURE — 85025 COMPLETE CBC W/AUTO DIFF WBC: CPT

## 2023-07-31 PROCEDURE — NC001 PR NO CHARGE: Performed by: STUDENT IN AN ORGANIZED HEALTH CARE EDUCATION/TRAINING PROGRAM

## 2023-07-31 PROCEDURE — 80053 COMPREHEN METABOLIC PANEL: CPT

## 2023-07-31 RX ORDER — NIFEDIPINE 60 MG/1
TABLET, FILM COATED, EXTENDED RELEASE ORAL
Qty: 5 TABLET | Refills: 0 | Status: SHIPPED | OUTPATIENT
Start: 2023-07-31

## 2023-07-31 RX ORDER — DIAPER,BRIEF,INFANT-TODD,DISP
EACH MISCELLANEOUS 4 TIMES DAILY PRN
Qty: 30 G | Refills: 0 | Status: SHIPPED | OUTPATIENT
Start: 2023-07-31

## 2023-07-31 RX ORDER — CYCLOBENZAPRINE HCL 10 MG
10 TABLET ORAL 3 TIMES DAILY PRN
Qty: 5 TABLET | Refills: 0 | Status: SHIPPED | OUTPATIENT
Start: 2023-07-31

## 2023-07-31 RX ADMIN — Medication: at 08:37

## 2023-07-31 RX ADMIN — ACETAMINOPHEN 975 MG: 325 TABLET, FILM COATED ORAL at 08:39

## 2023-07-31 RX ADMIN — LIDOCAINE 5% 1 PATCH: 700 PATCH TOPICAL at 09:11

## 2023-07-31 RX ADMIN — CYCLOBENZAPRINE 10 MG: 10 TABLET, FILM COATED ORAL at 09:11

## 2023-07-31 RX ADMIN — ESCITALOPRAM OXALATE 10 MG: 10 TABLET ORAL at 09:10

## 2023-07-31 RX ADMIN — HYDROCORTISONE 1 APPLICATION: 1 CREAM TOPICAL at 08:38

## 2023-07-31 RX ADMIN — HYDROXYZINE HYDROCHLORIDE 50 MG: 25 TABLET, FILM COATED ORAL at 03:18

## 2023-07-31 RX ADMIN — WITCH HAZEL 1 PAD: 500 SOLUTION RECTAL; TOPICAL at 08:37

## 2023-07-31 RX ADMIN — PHENAZOPYRIDINE 100 MG: 100 TABLET ORAL at 08:36

## 2023-07-31 NOTE — PLAN OF CARE
Problem: PAIN - ADULT  Goal: Verbalizes/displays adequate comfort level or baseline comfort level  Description: Interventions:  - Encourage patient to monitor pain and request assistance  - Assess pain using appropriate pain scale  - Administer analgesics based on type and severity of pain and evaluate response  - Implement non-pharmacological measures as appropriate and evaluate response  - Consider cultural and social influences on pain and pain management  - Notify physician/advanced practitioner if interventions unsuccessful or patient reports new pain  Outcome: Progressing     Problem: INFECTION - ADULT  Goal: Absence or prevention of progression during hospitalization  Description: INTERVENTIONS:  - Assess and monitor for signs and symptoms of infection  - Monitor lab/diagnostic results  - Monitor all insertion sites, i.e. indwelling lines, tubes, and drains  - Monitor endotracheal if appropriate and nasal secretions for changes in amount and color  - [place] appropriate cooling/warming therapies per order  - Administer medications as ordered  - Instruct and encourage patient and family to use good hand hygiene technique  - Identify and instruct in appropriate isolation precautions for identified infection/condition  Outcome: Progressing  Goal: Absence of fever/infection during neutropenic period  Description: INTERVENTIONS:  - Monitor WBC    Outcome: Progressing     Problem: SAFETY ADULT  Goal: Patient will remain free of falls  Description: INTERVENTIONS:  - Educate patient/family on patient safety including physical limitations  - Instruct patient to call for assistance with activity   - Consult OT/PT to assist with strengthening/mobility   - Keep Call bell within reach  - Keep bed low and locked with side rails adjusted as appropriate  - Keep care items and personal belongings within reach  - Initiate and maintain comfort rounds  - Apply yellow socks and bracelet for high fall risk patients  - Consider moving patient to room near nurses station  Outcome: Progressing  Goal: Maintain or return to baseline ADL function  Description: INTERVENTIONS:  -  Assess patient's ability to carry out ADLs; assess patient's baseline for ADL function and identify physical deficits which impact ability to perform ADLs (bathing, care of mouth/teeth, toileting, grooming, dressing, etc.)  - Assess/evaluate cause of self-care deficits   - Assess range of motion  - Assess patient's mobility; develop plan if impaired  - Assess patient's need for assistive devices and provide as appropriate  - Encourage maximum independence but intervene and supervise when necessary  - Involve family in performance of ADLs  - Assess for home care needs following discharge   - Consider OT consult to assist with ADL evaluation and planning for discharge  - Provide patient education as appropriate  Outcome: Progressing  Goal: Maintains/Returns to pre admission functional level  Description: INTERVENTIONS:  - Perform BMAT or MOVE assessment daily.   - Set and communicate daily mobility goal to care team and patient/family/caregiver.    - Out of bed for toileting  - Record patient progress and toleration of activity level   Outcome: Progressing     Problem: DISCHARGE PLANNING  Goal: Discharge to home or other facility with appropriate resources  Description: INTERVENTIONS:  - Identify barriers to discharge w/patient and caregiver  - Arrange for needed discharge resources and transportation as appropriate  - Identify discharge learning needs (meds, wound care, etc.)  - Arrange for interpretive services to assist at discharge as needed  - Refer to Case Management Department for coordinating discharge planning if the patient needs post-hospital services based on physician/advanced practitioner order or complex needs related to functional status, cognitive ability, or social support system  Outcome: Progressing     Problem: Knowledge Deficit  Goal: Patient/family/caregiver demonstrates understanding of disease process, treatment plan, medications, and discharge instructions  Description: Complete learning assessment and assess knowledge base.   Interventions:  - Provide teaching at level of understanding  - Provide teaching via preferred learning methods  Outcome: Progressing

## 2023-07-31 NOTE — PROGRESS NOTES
Antepartum Progress Note - OB/GYN   [facility] 40 y.o. female MRN: [id number]  Unit/Bed#:  327-01 Encounter: [id number]    Assessment/Plan:  Hemorrhoid  Assessment & Plan  Witch hazel, dermoplast, sitz baths  - Will send nifedipine cream on d/c    Elevated blood pressure reading without diagnosis of hypertension  Assessment & Plan  Systolic (12BXZ), RRX:954 , Min:115 , JDW:299   Diastolic (11ZLC), OKF:85, Min:66, Max:81    Elevated blood pressure during prior admission  Normotensive    27 weeks gestation of pregnancy  Assessment & Plan  S/p betamethasone -  Low clinical concern for  labor or placental abruption at this time. Previous  labor workup negative. NST TID     uterine contractions in third trimester, antepartum  Assessment & Plan  SVE unchanged on multiple rechecks /3, no contractions on monitor  Cervical length 3.8cm     * Lower abdominal pain  Assessment & Plan   labor workup negative  Low clinical suspicion for placental abruption - coags WNL, fibrinogen 332  Hx of nephrolithiasis, but no CVA tenderness or hematuria  Lower abdominal pain- resolved  UA negative  Will trial flexeril/pyridium for bladder spasms. Plan for bladder scan and PVR  US Kidney/bladder with PVR- 0.27mm stone at lower pole of kidney, bladder distended post void- Tylenol, tamsulosin, flexeril, lidocaine patch, pyridium        Subjective/Objective   Chief Complaint:   Chief Complaint   Patient presents with   • Abdominal Pain       Subjective:   [facility] is a [phone] at 27w5d who presentes with lower abdominal pain. Her abdominal pain has resolved. She is reporting rectal pain that is worse with bowel movement. She has witch hazel pad on. She is voiding, and denies pain post-void. She is passing gas and has had bowel movement. She is tolerating PO. She denies contractions, LOF, vaginal bleeding, and endorses good fetal movement.      Objective:    Vitals:  Vitals:    23 0830 07/30/23 1233 07/30/23 1900 07/31/23 0000   BP: 128/81 116/70 115/66 121/67   BP Location:   Right arm Right arm   Pulse: 64 57 55 62   Resp: 20 20 18 18   Temp: 98.4 °F (36.9 °C) 98.5 °F (36.9 °C) 98.1 °F (36.7 °C) 98.1 °F (36.7 °C)   TempSrc: Oral Oral Oral Oral   SpO2:  98% 98% 98%   Weight:       Height:           Physical Exam:    General:  NAD, AAOx3    Heart:  non-tachycardic   Chest:  non-labored breathing, symmetric chest rise   Abdomen:  soft, non-distended, gravid, non-tender to palpation   Extremity:   warm and well-perfused, calves non-tender to palpation    Neuro:  grossly normal   Skin:  no rash noted    NST:  Baseline 135bpm  Moderate variability  15x15 accels  No decelerations    [place]: no contractions    Lab, Imaging and other studies: I have personally reviewed pertinent reports.       Lab Results   Component Value Date    WBC 9.04 07/31/2023    HGB 9.4 (L) 07/31/2023    HCT 29.3 (L) 07/31/2023    MCV 83 07/31/2023     07/31/2023         Deepak Aranda MD  07/31/23

## 2023-07-31 NOTE — DISCHARGE SUMMARY
Obstetrics/Gynecology Discharge Summary   Margie Culver MRN: [id number]  Unit/Bed#: -01 Encounter: [id number]      Admission Date: 2023     Discharge Date: 23    Attending: Lillie Rausch    Principal Diagnosis: Abdominal pain affecting pregnancy, antepartum [O26.899, R10.9]  27 weeks gestation of pregnancy [Z3A.27]    Procedures:   [facility] course: Patient is a [phone] [id number] at 27w1d admitted for abdominal.  workup was negative. There was also low suspicion for placental abruption, with normal coagulation factors, and normal fibrinogen. Her lower abdominal pain was associated with voiding, and a bladder etiology was suspected. A bladder scan and post-void residual, as well as kidney and bladder US were ordered. Later that evening, patient was complaining of hemorrhoidal pain, and witch hazel pads were ordered. Continuous fetal heart tracing was reactive, and she was transitioned to NST TID. On hospital day #2, patient reported that her abdominal pain had resolved. Her kidney/bladder US was significant for a 0.27mm stone at the lower pole of her kidney, and bladder distended post void. She was voiding without pain, passing gas, tolerating PO, having bowel movement. NSTs reactive. Stable for discharge at this time.      Lab Results:   Lab Results   Component Value Date    WBC 9.04 2023    HGB 9.4 (L) 2023    HCT 29.3 (L) 2023    MCV 83 2023     2023     Lab Results   Component Value Date    GLUCOSE 88 2015    CALCIUM 8.3 (L) 2023     2015    K 3.6 2023    CO2 21 2023     2023    BUN 12 2023    CREATININE 0.65 2023     No results found for: "POCGLU"  Lab Results   Component Value Date    PTT 24 2023     Lab Results   Component Value Date    INR 1.05 2023    INR 1.02 2023    PROTIME 13.9 2023    PROTIME 13.6        Complications: none apparent    Condition at discharge: stable     Discharge instructions/Information to patient and family:   See After Visit Summary for information provided to patient and family. Provisions for Follow-Up Care:  See After Visit Summary for information related to follow-up care and any pertinent home health orders. Disposition: See After Visit Summary for discharge disposition information. Planned Readmission: No    Discharge Medications: For a complete list of the patient's medications, please refer to her med rec.     Pedro Westbrook MD  Obstetrics & Gynecology, PGY2  8/1/2023, 2:31 PM

## 2023-07-31 NOTE — PROGRESS NOTES
Assessed patient due to mild cramping. NST reactive with minimal uterine irritability on toco.  Patient reports "mild" cramping but overall appears comfortable and still wishes to go home today. SVE unchanged from prior. Reiterated return precautions, and patient will return of cramping becomes persistent or increasing painful. Discussed w/ chief resident Dr. Kenzie Nance.     Cassi Borges MD  OBGYN Resident  07/31/23  12:12 PM

## 2023-07-31 NOTE — PLAN OF CARE
Problem: PAIN - ADULT  Goal: Verbalizes/displays adequate comfort level or baseline comfort level  Description: Interventions:  - Encourage patient to monitor pain and request assistance  - Assess pain using appropriate pain scale  - Administer analgesics based on type and severity of pain and evaluate response  - Implement non-pharmacological measures as appropriate and evaluate response  - Consider cultural and social influences on pain and pain management  - Notify physician/advanced practitioner if interventions unsuccessful or patient reports new pain  Outcome: Progressing     Problem: INFECTION - ADULT  Goal: Absence or prevention of progression during hospitalization  Description: INTERVENTIONS:  - Assess and monitor for signs and symptoms of infection  - Monitor lab/diagnostic results  - Monitor all insertion sites, i.e. indwelling lines, tubes, and drains  - Monitor endotracheal if appropriate and nasal secretions for changes in amount and color  - [place] appropriate cooling/warming therapies per order  - Administer medications as ordered  - Instruct and encourage patient and family to use good hand hygiene technique  - Identify and instruct in appropriate isolation precautions for identified infection/condition  Outcome: Progressing  Goal: Absence of fever/infection during neutropenic period  Description: INTERVENTIONS:  - Monitor WBC    Outcome: Progressing     Problem: SAFETY ADULT  Goal: Patient will remain free of falls  Description: INTERVENTIONS:  - Educate patient/family on patient safety including physical limitations  - Instruct patient to call for assistance with activity   - Consult OT/PT to assist with strengthening/mobility   - Keep Call bell within reach  - Keep bed low and locked with side rails adjusted as appropriate  - Keep care items and personal belongings within reach  - Initiate and maintain comfort rounds  - Make Fall Risk Sign visible to staff  - Apply yellow socks and bracelet for high fall risk patients  - Consider moving patient to room near nurses station  Outcome: Progressing  Goal: Maintain or return to baseline ADL function  Description: INTERVENTIONS:  -  Assess patient's ability to carry out ADLs; assess patient's baseline for ADL function and identify physical deficits which impact ability to perform ADLs (bathing, care of mouth/teeth, toileting, grooming, dressing, etc.)  - Assess/evaluate cause of self-care deficits   - Assess range of motion  - Assess patient's mobility; develop plan if impaired  - Assess patient's need for assistive devices and provide as appropriate  - Encourage maximum independence but intervene and supervise when necessary  - Involve family in performance of ADLs  - Assess for home care needs following discharge   - Consider OT consult to assist with ADL evaluation and planning for discharge  - Provide patient education as appropriate  Outcome: Progressing  Goal: Maintains/Returns to pre admission functional level  Description: INTERVENTIONS:  - Perform BMAT or MOVE assessment daily.   - Set and communicate daily mobility goal to care team and patient/family/caregiver.    - Collaborate with rehabilitation services on mobility goals if consulted  - Out of bed for toileting  - Record patient progress and toleration of activity level   Outcome: Progressing     Problem: DISCHARGE PLANNING  Goal: Discharge to home or other facility with appropriate resources  Description: INTERVENTIONS:  - Identify barriers to discharge w/patient and caregiver  - Arrange for needed discharge resources and transportation as appropriate  - Identify discharge learning needs (meds, wound care, etc.)  - Arrange for interpretive services to assist at discharge as needed  - Refer to Case Management Department for coordinating discharge planning if the patient needs post-hospital services based on physician/advanced practitioner order or complex needs related to functional status, cognitive ability, or social support system  Outcome: Progressing     Problem: Knowledge Deficit  Goal: Patient/family/caregiver demonstrates understanding of disease process, treatment plan, medications, and discharge instructions  Description: Complete learning assessment and assess knowledge base.   Interventions:  - Provide teaching at level of understanding  - Provide teaching via preferred learning methods  Outcome: Progressing

## 2023-07-31 NOTE — PLAN OF CARE
Problem: PAIN - ADULT  Goal: Verbalizes/displays adequate comfort level or baseline comfort level  Description: Interventions:  - Encourage patient to monitor pain and request assistance  - Assess pain using appropriate pain scale  - Administer analgesics based on type and severity of pain and evaluate response  - Implement non-pharmacological measures as appropriate and evaluate response  - Consider cultural and social influences on pain and pain management  - Notify physician/advanced practitioner if interventions unsuccessful or patient reports new pain  7/31/2023 1255 by Judtih Carvalho RN  Outcome: Adequate for Discharge  7/31/2023 0916 by Judith Carvalho RN  Outcome: Progressing     Problem: INFECTION - ADULT  Goal: Absence or prevention of progression during hospitalization  Description: INTERVENTIONS:  - Assess and monitor for signs and symptoms of infection  - Monitor lab/diagnostic results  - Monitor all insertion sites, i.e. indwelling lines, tubes, and drains  - Monitor endotracheal if appropriate and nasal secretions for changes in amount and color  - [place] appropriate cooling/warming therapies per order  - Administer medications as ordered  - Instruct and encourage patient and family to use good hand hygiene technique  - Identify and instruct in appropriate isolation precautions for identified infection/condition  7/31/2023 1255 by Judith Carvalho RN  Outcome: Adequate for Discharge  7/31/2023 0916 by Judith Carvalho RN  Outcome: Progressing  Goal: Absence of fever/infection during neutropenic period  Description: INTERVENTIONS:  - Monitor WBC    7/31/2023 1255 by Judith Carvalho RN  Outcome: Adequate for Discharge  7/31/2023 0916 by Judith Carvalho RN  Outcome: Progressing     Problem: SAFETY ADULT  Goal: Patient will remain free of falls  Description: INTERVENTIONS:  - Educate patient/family on patient safety including physical limitations  - Instruct patient to call for assistance with activity   - Consult OT/PT to assist with strengthening/mobility   - Keep Call bell within reach  - Keep bed low and locked with side rails adjusted as appropriate  - Keep care items and personal belongings within reach  - Initiate and maintain comfort rounds  - Make Fall Risk Sign visible to staff  - Apply yellow socks and bracelet for high fall risk patients  - Consider moving patient to room near nurses station  7/31/2023 1255 by Shi Westbrook RN  Outcome: Adequate for Discharge  7/31/2023 0916 by Shi Westbrook RN  Outcome: Progressing  Goal: Maintain or return to baseline ADL function  Description: INTERVENTIONS:  -  Assess patient's ability to carry out ADLs; assess patient's baseline for ADL function and identify physical deficits which impact ability to perform ADLs (bathing, care of mouth/teeth, toileting, grooming, dressing, etc.)  - Assess/evaluate cause of self-care deficits   - Assess range of motion  - Assess patient's mobility; develop plan if impaired  - Assess patient's need for assistive devices and provide as appropriate  - Encourage maximum independence but intervene and supervise when necessary  - Involve family in performance of ADLs  - Assess for home care needs following discharge   - Consider OT consult to assist with ADL evaluation and planning for discharge  - Provide patient education as appropriate  7/31/2023 1255 by Shi Westbrook RN  Outcome: Adequate for Discharge  7/31/2023 0916 by Shi Westbrook RN  Outcome: Progressing  Goal: Maintains/Returns to pre admission functional level  Description: INTERVENTIONS:  - Perform BMAT or MOVE assessment daily.   - Set and communicate daily mobility goal to care team and patient/family/caregiver.    - Collaborate with rehabilitation services on mobility goals if consulted  - Out of bed for toileting  - Record patient progress and toleration of activity level   7/31/2023 1255 by Nadine Rose RN  Outcome: Adequate for Discharge  7/31/2023 3460 by Nadine Rose RN  Outcome: Progressing     Problem: DISCHARGE PLANNING  Goal: Discharge to home or other facility with appropriate resources  Description: INTERVENTIONS:  - Identify barriers to discharge w/patient and caregiver  - Arrange for needed discharge resources and transportation as appropriate  - Identify discharge learning needs (meds, wound care, etc.)  - Arrange for interpretive services to assist at discharge as needed  - Refer to Case Management Department for coordinating discharge planning if the patient needs post-hospital services based on physician/advanced practitioner order or complex needs related to functional status, cognitive ability, or social support system  7/31/2023 1255 by Nadine Rose RN  Outcome: Adequate for Discharge  7/31/2023 0916 by Nadine Rose RN  Outcome: Progressing     Problem: Knowledge Deficit  Goal: Patient/family/caregiver demonstrates understanding of disease process, treatment plan, medications, and discharge instructions  Description: Complete learning assessment and assess knowledge base.   Interventions:  - Provide teaching at level of understanding  - Provide teaching via preferred learning methods  7/31/2023 1255 by Nadine Rose RN  Outcome: Adequate for Discharge  7/31/2023 0916 by Nadine Rose RN  Outcome: Progressing

## 2023-07-31 NOTE — UTILIZATION REVIEW
Initial Clinical Review    Admission: Date/Time/Statement:   Admission Orders (From admission, onward)     Ordered        23 0324  Place in Observation  Once                      Orders Placed This Encounter   Procedures   • Place in Observation     Standing Status:   Standing     Number of Occurrences:   1     Order Specific Question:   Level of Care     Answer:   Med Surg [16]     ED Arrival Information     Expected   2023     Arrival   2023 22:25    Acuity   -            Means of arrival   Walk-In    Escorted by   Self    Service   OB/GYN    Admission type   Elective            Arrival complaint   25 WKS PREG/Contractions            Chief Complaint   Patient presents with   • Abdominal Pain       Initial Presentation: 40 y.o. female 32w3d HX nephrolithiasis Observation admission due to  abdominal pain, concern for kidney stone . Recent admission - with abdominal pain and  contractions. Patient received betamethasone course - as well as magnesium for neuroprotection. Her contractions spaced out, her pain improved and she was discharged home. Patient re-presents with worsening lower abdominal pain which she states is worse than before. She states it is constant and does not wax and wane like her previous abdominal pain. She states her pain is worse after voiding. She is voiding more frequently and does not feel like she is completely emptying her bladder. EXAM US Kidney/bladder with PVR- 0.27mm stone at lower pole of kidney, bladder distended post void; no CVA tenderness or hematuria. SVE 1/50/-3, no contractions on monitor   trial flexeril/pyridium for bladder spasms. Plan for bladder scan and PVR, continuous fetal monitoring, repeat cervical exam;     Date:    Day 2:    workup neg, low suspicion for abruption. Concern for kidney stone w bladder distended post void. trial flexeril/pyridium for bladder spasms; monitor symptoms . Her abdominal pain has resolved. She is reporting rectal pain that is worse with bowel movement  ED Triage Vitals   Temperature Pulse Respirations Blood Pressure SpO2   07/29/23 2240 07/29/23 2240 07/29/23 2240 07/29/23 2240 07/30/23 0445   97.8 °F (36.6 °C) 67 20 121/75 100 %      Temp Source Heart Rate Source Patient Position - Orthostatic VS BP Location FiO2 (%)   07/29/23 2240 07/29/23 2240 07/30/23 0445 07/30/23 0445 --   Oral Monitor Sitting Right arm       Pain Score       07/29/23 2240       6          Wt Readings from Last 1 Encounters:   07/29/23 65.8 kg (145 lb)     Additional Vital Signs:   Date/Time Temp Pulse Resp BP SpO2 O2 Device Cardiac (WDL) Patient Position - Orthostatic VS   07/31/23 0841 98 °F (36.7 °C) 64 20 140/75 -- -- -- --   07/31/23 0839 -- -- -- -- -- -- -- --   Comment rows:   OBSERV: according to the patient the baby is active. denies s/s of labor, just has some rectal discomfort.  at 07/31/23 0839   07/31/23 0000 98.1 °F (36.7 °C) 62 18 121/67 98 % -- -- Lying   07/30/23 2200 -- -- -- -- -- None (Room air) WDL --   07/30/23 1900 98.1 °F (36.7 °C) 55 18 115/66 98 % None (Room air) -- Lying   07/30/23 1233 98.5 °F (36.9 °C) 57 20 116/70 98 % -- -- --   07/30/23 0830 98.4 °F (36.9 °C) 64 20 128/81 -- -- -- --   07/30/23 0800 -- -- -- -- -- None (Room air) WDL --   07/30/23 0445 97.5 °F (36.4 °C) 55 18 130/81 100 % None (Room air) WDL Sitting   07/30/23 0053 -- -- -- -- -- -- -- --   Comment rows:   OBSERV: dr Shannen Slaughter at bedside at 07/30/23 0053   07/29/23 2240 97.8 °F (36.6 °C) 67 20 121/75 -- -- -- --     Weights (last 14 days)    Date/Time Weight Height   07/29/23 2240 65.8 kg (145 lb) 5' 5" (1.651 m)       Pertinent Labs/Diagnostic Test Results:   US kidney and bladder    (Results Pending)         Results from last 7 days   Lab Units 07/31/23  0554 07/30/23  0126 07/27/23  0440 07/27/23  0008 07/26/23  2115   WBC Thousand/uL 9.04 14.23* 13.21* 9.97 9.64   HEMOGLOBIN g/dL 9.4* 9.9* 10.8* 10.3* 10.1*   HEMATOCRIT % 29.3* 30.9* 33.7* 32.2* 31.2*   PLATELETS Thousands/uL 199 265 228 216 223   NEUTROS ABS Thousands/µL 6.02 10.50* 11.28* 6.67 6.26         Results from last 7 days   Lab Units 07/31/23  0554 07/30/23  0126 07/27/23  0008 07/26/23 2115   SODIUM mmol/L 134* 132* 133* 135   POTASSIUM mmol/L 3.6 3.8 3.7 3.7   CHLORIDE mmol/L 106 104 105 108   CO2 mmol/L 21 19* 21 23   ANION GAP mmol/L 7 9 7 4   BUN mg/dL 12 20 7 6   CREATININE mg/dL 0.65 1.04 0.60 0.74   EGFR ml/min/1.73sq m 113 68 116 103   CALCIUM mg/dL 8.3* 8.8 8.2* 8.4     Results from last 7 days   Lab Units 07/31/23  0554 07/30/23 0126 07/27/23  0008 07/26/23 2115   AST U/L 15 17 16 18   ALT U/L 9 10 9 16   ALK PHOS U/L 57 62 68 71   TOTAL PROTEIN g/dL 6.0* 6.7 6.6 6.7   ALBUMIN g/dL 3.2* 3.6 3.5 2.8*   TOTAL BILIRUBIN mg/dL 0.39 0.35 0.35 0.29         Results from last 7 days   Lab Units 07/31/23  0554 07/30/23  0126 07/27/23 0008 07/26/23 2115   GLUCOSE RANDOM mg/dL 78 89 78 78             No results found for: "BETA-HYDROXYBUTYRATE"                           Results from last 7 days   Lab Units 07/30/23  0126 07/27/23  0008   PROTIME seconds 13.9 13.6   INR  1.05 1.02   PTT seconds 24 27                             Results from last 7 days   Lab Units 07/27/23  0440   FERRITIN ng/mL 11   IRON SATURATION % 5*   IRON ug/dL 32*   TIBC ug/dL 660*                 Results from last 7 days   Lab Units 07/30/23  0126   LIPASE u/L 17                 Results from last 7 days   Lab Units 07/30/23  0125 07/27/23  0014 07/26/23 2117   CLARITY UA  Clear Clear Clear   COLOR UA  Light Yellow Colorless Colorless   SPEC GRAV UA  1.014 1.005 1.005   PH UA  6.0 7.0 7.0   GLUCOSE UA mg/dl Negative Negative Negative   KETONES UA mg/dl Negative Negative Negative   BLOOD UA  Negative Negative Negative   PROTEIN UA mg/dl Negative Negative Negative   NITRITE UA  Negative Negative Negative   BILIRUBIN UA  Negative Negative Negative   UROBILINOGEN UA (BE) mg/dl <2.0 <2.0 <2.0 LEUKOCYTES UA  Negative Negative Negative   CREATININE UR mg/dL  --   --  27.1   PROTEIN UR mg/dL  --   --  7   PROT/CREAT RATIO UR   --   --  0.26*             Results from last 7 days   Lab Units 07/27/23  0008   AMPH/METH  Negative   BARBITURATE UR  Negative   BENZODIAZEPINE UR  Negative   COCAINE UR  Negative   METHADONE URINE  Negative   OPIATE UR  Negative   PCP UR  Negative   THC UR  Positive*                     Results from last 7 days   Lab Units 07/26/23  2117   URINE CULTURE  80,000-89,000 cfu/ml                   ED Treatment:   Medication Administration from 07/29/2023 2225 to 07/31/2023 1003       Date/Time Order Dose Route Action     07/30/2023 0748 EDT lactated ringers infusion 125 mL/hr Intravenous New Bag     07/29/2023 2359 EDT lactated ringers infusion 125 mL/hr Intravenous New Bag     07/29/2023 2318 EDT lactated ringers infusion 950 mL/hr Intravenous New Bag     07/30/2023 0123 EDT morphine injection 2 mg 2 mg Intravenous Given     07/31/2023 0911 EDT cyclobenzaprine (FLEXERIL) tablet 10 mg 10 mg Oral Given     07/30/2023 2100 EDT cyclobenzaprine (FLEXERIL) tablet 10 mg 10 mg Oral Given     07/30/2023 1620 EDT cyclobenzaprine (FLEXERIL) tablet 10 mg 10 mg Oral Given     07/30/2023 0925 EDT cyclobenzaprine (FLEXERIL) tablet 10 mg 10 mg Oral Given     07/30/2023 0309 EDT cyclobenzaprine (FLEXERIL) tablet 10 mg 10 mg Oral Given     07/31/2023 0836 EDT phenazopyridine (PYRIDIUM) tablet 100 mg 100 mg Oral Given     07/30/2023 1700 EDT phenazopyridine (PYRIDIUM) tablet 100 mg 100 mg Oral Given     07/30/2023 1232 EDT phenazopyridine (PYRIDIUM) tablet 100 mg 100 mg Oral Given     07/30/2023 0800 EDT phenazopyridine (PYRIDIUM) tablet 100 mg 100 mg Oral Given     07/30/2023 0309 EDT phenazopyridine (PYRIDIUM) tablet 100 mg 100 mg Oral Given     07/31/2023 0910 EDT escitalopram (LEXAPRO) tablet 10 mg 10 mg Oral Given     07/30/2023 0925 EDT escitalopram (LEXAPRO) tablet 10 mg 10 mg Oral Given 2023 2781 EDT acetaminophen (TYLENOL) tablet 975 mg 975 mg Oral Given     2023 2220 EDT ondansetron (ZOFRAN) injection 4 mg 4 mg Intravenous Given     2023 0837 EDT benzocaine-menthol-lanolin-aloe (DERMOPLAST) 20-0.5 % topical spray -- Topical Given     2023 0926 EDT benzocaine-menthol-lanolin-aloe (DERMOPLAST) 20-0.5 % topical spray -- Topical Given     2023 0838 EDT hydrocortisone 1 % cream 1 Application Topical Given     2023 0926 EDT hydrocortisone 1 % cream -- Topical Given     2023 0837 EDT witch hazel-glycerin (TUCKS) topical pad 1 Pad 1 Pad Topical Given     2023 0926 EDT witch hazel-glycerin (TUCKS) topical pad 1 Pad 1 Pad Topical Given     2023 1700 EDT tamsulosin (FLOMAX) capsule 0.4 mg 0.4 mg Oral Given     2023 0911 EDT lidocaine (LIDODERM) 5 % patch 1 patch 1 patch Topical Medication Applied     2023 1300 EDT lidocaine (LIDODERM) 5 % patch 1 patch 0 patch Topical Hold     2023 0318 EDT hydrOXYzine HCL (ATARAX) tablet 50 mg 50 mg Oral Given        Past Medical History:   Diagnosis Date   • Asthma    • Chlamydia infection      Present on Admission:  •  uterine contractions in third trimester, antepartum  • Elevated blood pressure reading without diagnosis of hypertension  • Lower abdominal pain      Admitting Diagnosis: Abdominal pain affecting pregnancy, antepartum [O26.899, R10.9]  27 weeks gestation of pregnancy [Z3A.27]  Age/Sex: 40 y.o. female  Admission Orders:      Scheduled Medications:  cyclobenzaprine, 10 mg, Oral, TID  escitalopram, 10 mg, Oral, Daily  hydrOXYzine HCL, 50 mg, Oral, HS  lidocaine, 1 patch, Topical, Daily  melatonin, 3 mg, Oral, HS  phenazopyridine, 100 mg, Oral, TID With Meals  tamsulosin, 0.4 mg, Oral, Daily With Dinner      Continuous IV Infusions:     PRN Meds:  acetaminophen, 975 mg, Oral, Q8H PRN  albuterol, 2 puff, Inhalation, Q6H PRN  benzocaine-menthol-lanolin-aloe, , Topical, 4x Daily PRN  calcium carbonate, 1,000 mg, Oral, Daily PRN  hydrocortisone, , Topical, 4x Daily PRN  ondansetron, 4 mg, Intravenous, Q8H PRN  simethicone, 80 mg, Oral, 4x Daily PRN  witch hazel-glycerin, 1 Pad, Topical, Q4H PRN            Network Utilization Review Department  ATTENTION: Please call with any questions or concerns to [phone] and carefully listen to the prompts so that you are directed to the right person. All voicemails are confidential.  Tino Tinajero all requests for admission clinical reviews, approved or denied determinations and any other requests to dedicated fax number below belonging to the campus where the patient is receiving treatment.  List of dedicated fax numbers for the Facilities:  [place] DENIALS (Administrative/Medical Necessity) [phone] [address] [facility] (Maternity/NICU/Pediatrics) [phone]   [address] [place] [address] Drive [phone]   [facility] [address] [facility] [phone]   [address] [place] [address] [id number] [place] [address] [phone]   [address] [facility] [address] [facility]  [address] [phone]

## 2023-07-31 NOTE — PROGRESS NOTES
Called by nursing to assess patient. She is uncomfortable secondary to a hemorrhoidal pain. She is doing witch hazel tucks pads, dermoplast and the waffle cushion pillow. She feels nauseous and is unable to eat her dinner. Will give IV Zofran now before she attempts to eat again. She may come off continuous monitoring and transition to NST TID. She will get Atarax and Melatonin to help her sleep when she is ready for bed. Will discuss with Dr. Ivania Iqbal. Nasreen Washburn MD  PGY-IV, OB/GYN  7/30/2023, 10:21 PM

## 2023-08-17 ENCOUNTER — OFFICE VISIT (OUTPATIENT)
Dept: FAMILY MEDICINE CLINIC | Facility: CLINIC | Age: 38
End: 2023-08-17
Payer: COMMERCIAL

## 2023-08-17 VITALS
SYSTOLIC BLOOD PRESSURE: 138 MMHG | OXYGEN SATURATION: 99 % | DIASTOLIC BLOOD PRESSURE: 90 MMHG | HEART RATE: 76 BPM | RESPIRATION RATE: 18 BRPM | HEIGHT: 65 IN | BODY MASS INDEX: 23.69 KG/M2 | WEIGHT: 142.2 LBS | TEMPERATURE: 98 F

## 2023-08-17 DIAGNOSIS — M65.30 TRIGGER FINGER, UNSPECIFIED FINGER, UNSPECIFIED LATERALITY: ICD-10-CM

## 2023-08-17 DIAGNOSIS — K21.9 GASTROESOPHAGEAL REFLUX DISEASE WITHOUT ESOPHAGITIS: ICD-10-CM

## 2023-08-17 DIAGNOSIS — M54.31 SCIATICA OF RIGHT SIDE: Primary | ICD-10-CM

## 2023-08-17 DIAGNOSIS — R05.9 COUGH: ICD-10-CM

## 2023-08-17 PROCEDURE — 99214 OFFICE O/P EST MOD 30 MIN: CPT | Performed by: FAMILY MEDICINE

## 2023-08-17 RX ORDER — FAMOTIDINE 20 MG/1
20 TABLET, FILM COATED ORAL 2 TIMES DAILY
Qty: 30 TABLET | Refills: 0 | Status: SHIPPED | OUTPATIENT
Start: 2023-08-17

## 2023-08-17 NOTE — ASSESSMENT & PLAN NOTE
Worsening with pregnancy. Tums and Pepto not helping enough. Recommend Pepcid twice daily. Avoid triggers, education provided. Follow up if not improved.

## 2023-08-17 NOTE — ASSESSMENT & PLAN NOTE
Taking Tylenol, using heat and ice without adequate relief. Pregnant so medication options are limited. Previously had sciatica with last pregnancy, responded well to PT. Will refer to PT again now and follow up if not improved.

## 2023-08-17 NOTE — PROGRESS NOTES
Name: Wolf Cruz      : 1985      MRN: [id number]  Encounter Provider: Josh Hess DO  Encounter Date: 2023   Encounter department: [address] [place] [address],[address]    Assessment & Plan     Sciatica of right side  Assessment & Plan:  Taking Tylenol, using heat and ice without adequate relief. Pregnant so medication options are limited. Previously had sciatica with last pregnancy, responded well to PT. Will refer to PT again now and follow up if not improved. Orders:  -     Ambulatory Referral to Physical Therapy; Future     Trigger finger, unspecified finger, unspecified laterality  Assessment & Plan:  Referred to hand surgery for evaluation and treatment. Orders:  -     Ambulatory Referral to Hand Surgery; Future    Gastroesophageal reflux disease without esophagitis  Assessment & Plan:  Worsening with pregnancy. Tums and Pepto not helping enough. Recommend Pepcid twice daily. Avoid triggers, education provided. Follow up if not improved. -     famotidine (PEPCID) 20 mg tablet; Take 1 tablet (20 mg total) by mouth 2 (two) times a day       Subjective      HPI   Patient is 7 months pregnant, here for sciatica and GERD. Has carpal tunnel, works as hairdresser, sometimes gets finger contractions/trigger finger. Sciatica bothering her for the last month. Has been using heat, ice, Tylenol. Also having reflux, was using Tums, not working well. Pepto bismol did not help much either. Carpal tunnel and trigger fingers intermittently. Would like referral to hand surgery.     Review of Systems as in HPI    Current Outpatient Medications on File Prior to Visit   Medication Sig   • acetaminophen (TYLENOL) 500 mg tablet Take 500 mg by mouth every 6 (six) hours as needed for mild pain   • albuterol (PROVENTIL HFA,VENTOLIN HFA) 90 mcg/act inhaler Inhale 2 puffs every 6 (six) hours as needed for wheezing   • hydrocortisone 1 % cream Apply topically 4 (four) times a day as needed for irritation   • NIFEdipine ER (ADALAT CC) 60 MG 24 hr tablet APPLY 1 APPLICATION TOPICALLY EVERY 4 (FOUR) HOURS AS NEEDED FOR DISCOMFORT OR PAIN APPLY A SMALL AMOUNT TO ANAL FISSURE   • witch hazel-glycerin (TUCKS) topical pad Apply 1 Pad topically every 4 (four) hours as needed for irritation   • cyclobenzaprine (FLEXERIL) 10 mg tablet Take 1 tablet (10 mg total) by mouth 3 (three) times a day as needed for muscle spasms (Patient not taking: Reported on 8/17/2023)   • escitalopram (LEXAPRO) 20 mg tablet Take 0.5 tablets (10 mg total) by mouth daily (Patient not taking: Reported on 9/3/2020)   • fluticasone (FLONASE) 50 mcg/act nasal spray 1 spray into each nostril daily (Patient not taking: Reported on 4/6/2023)   • gabapentin (NEURONTIN) 300 mg capsule Take 200 mg by mouth daily at bedtime (Patient not taking: Reported on 7/24/2021)   • ipratropium (ATROVENT HFA) 17 mcg/act inhaler Inhale 2 puffs every 6 (six) hours as needed for wheezing (Patient not taking: Reported on 11/4/2021)   • ondansetron (ZOFRAN-ODT) 4 mg disintegrating tablet Take 1 tablet (4 mg total) by mouth every 6 (six) hours as needed for nausea or vomiting (Patient not taking: Reported on 7/29/2023)   • [DISCONTINUED] famotidine (PEPCID) 20 mg tablet Take 1 tablet (20 mg total) by mouth 2 (two) times a day (Patient not taking: Reported on 7/24/2021)       Objective     /90 (BP Location: Left arm, Patient Position: Sitting, Cuff Size: Standard)   Pulse 76   Temp 98 °F (36.7 °C) (Temporal)   Resp 18   Ht 5' 5" (1.651 m)   Wt 64.5 kg (142 lb 3.2 oz)   LMP 03/29/2023 (Exact Date)   SpO2 99%   BMI 23.66 kg/m²     Physical Exam  Vitals reviewed. Constitutional:       Appearance: Normal appearance. Cardiovascular:      Rate and Rhythm: Normal rate. Pulmonary:      Effort: Pulmonary effort is normal.   Abdominal:      Comments: Gravid   Musculoskeletal:         General: No swelling or deformity. Normal range of motion.       Comments: No current trigger finger but gets stuck frequently   Skin:     General: Skin is warm and dry. Capillary Refill: Capillary refill takes less than 2 seconds. Neurological:      Mental Status: She is alert.    Psychiatric:         Mood and Affect: Mood normal.         Behavior: Behavior normal.       Shade Moritz,

## 2023-08-18 ENCOUNTER — TELEPHONE (OUTPATIENT)
Age: 38
End: 2023-08-18
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
## 2023-08-30 DIAGNOSIS — R05.9 COUGH: ICD-10-CM

## 2023-08-30 RX ORDER — FAMOTIDINE 20 MG/1
20 TABLET, FILM COATED ORAL 2 TIMES DAILY
Qty: 180 TABLET | Refills: 1 | Status: SHIPPED | OUTPATIENT
Start: 2023-08-30

## 2023-10-10 ENCOUNTER — OFFICE VISIT (OUTPATIENT)
Dept: FAMILY MEDICINE CLINIC | Facility: CLINIC | Age: 38
End: 2023-10-10
Payer: COMMERCIAL

## 2023-10-10 VITALS
BODY MASS INDEX: 23.82 KG/M2 | WEIGHT: 143 LBS | SYSTOLIC BLOOD PRESSURE: 156 MMHG | OXYGEN SATURATION: 97 % | RESPIRATION RATE: 16 BRPM | HEART RATE: 56 BPM | HEIGHT: 65 IN | DIASTOLIC BLOOD PRESSURE: 90 MMHG | TEMPERATURE: 98.4 F

## 2023-10-10 DIAGNOSIS — M54.31 RIGHT SIDED SCIATICA: ICD-10-CM

## 2023-10-10 DIAGNOSIS — I10 HYPERTENSION, UNSPECIFIED TYPE: Primary | ICD-10-CM

## 2023-10-10 PROCEDURE — 99214 OFFICE O/P EST MOD 30 MIN: CPT | Performed by: NURSE PRACTITIONER

## 2023-10-10 RX ORDER — FERROUS SULFATE 325(65) MG
1 TABLET ORAL
COMMUNITY
Start: 2023-09-29

## 2023-10-10 RX ORDER — LORATADINE 10 MG
TABLET ORAL
COMMUNITY
Start: 2023-09-29

## 2023-10-10 RX ORDER — METHYLPREDNISOLONE 4 MG/1
TABLET ORAL
Qty: 21 EACH | Refills: 0 | Status: SHIPPED | OUTPATIENT
Start: 2023-10-10

## 2023-10-10 RX ORDER — METHOCARBAMOL 500 MG/1
TABLET, FILM COATED ORAL
COMMUNITY
Start: 2023-10-06 | End: 2023-10-10 | Stop reason: ALTCHOICE

## 2023-10-10 RX ORDER — AMLODIPINE BESYLATE 5 MG/1
5 TABLET ORAL DAILY
Qty: 30 TABLET | Refills: 2 | Status: SHIPPED | OUTPATIENT
Start: 2023-10-10

## 2023-10-10 RX ORDER — BLOOD-GLUCOSE METER
EACH MISCELLANEOUS
COMMUNITY
Start: 2023-09-29

## 2023-10-10 RX ORDER — METHOCARBAMOL 750 MG/1
750 TABLET, FILM COATED ORAL EVERY 8 HOURS PRN
Qty: 60 TABLET | Refills: 0 | Status: SHIPPED | OUTPATIENT
Start: 2023-10-10

## 2023-10-10 NOTE — PROGRESS NOTES
FAMILY PRACTICE OFFICE VISIT       NAME: Juanjo Beach  AGE: 45 y.o. SEX: female       : 1985        MRN: [id number]    Assessment and Plan   1. Hypertension, unspecified type  6 days postpartum. Hypertension started during end of pregnancy, as per patient report, 2 weeks prior to delivery. She is not breastfeeding. Has pounding headache, with blood pressures in office today 160/90, 160/90, 156/90. She also has significant sciatica pain, and has been taking ibuprofen. Pain and NSAIDS likely contributing to hypertension. Heart rate 52 and 56. Typically, would choose to place her on labetalol, but with lower heart rate at baseline, will avoid beta blocker. Start Amlodipine 5 mg daily. Follow up in 2 weeks with Dr. Edmundo Downing. She also has OB follow up on 10/12/23. Go to ER for worsening, headaches, chest pain, vision changes, shortness of breath, edema. We discussed antihypertensive medication, is likely short term and BP expected to return to baseline in about 6 weeks. Therefore, will need to keep close eye on BP.   -     amLODIPine (NORVASC) 5 mg tablet; Take 1 tablet (5 mg total) by mouth daily    2. Right sided sciatica  Tylenol as needed. Start medrol dose pack. Methocarbamol every 8 hours as needed. PT. No ibuprofen when taking medrol dose pack. Call for worsening or persisting pain. Follow up with Dr. Edmundo Downing in 2 weeks. -     methylPREDNISolone 4 MG tablet therapy pack; Use as directed on package  -     methocarbamol (Robaxin-750) 750 mg tablet; Take 1 tablet (750 mg total) by mouth every 8 (eight) hours as needed for muscle spasms  -     Ambulatory Referral to Physical Therapy; Future                   Chief Complaint     Chief Complaint   Patient presents with   • Leg Pain     Pt is here for rt sided leg and back pain since pregnancy       History of Present Illness     Juanjo Beach is a 45year old female presenting today for right sciatica and BP check.      She delivered a 4 pounds 9 arthur baby boy, 37 weeks, induction for IUGR, 6 days ago. She had gestational diabetes and gestational hypertension. She was not taking any antihypertensive medications. She had an epidural.   Reports blood pressures have been high. 150's/80-90's. She has a pounding headache. Hands feel hot. Yesterday, she was flushed, this has resolved today. She has long history of low back pain, in the past had BATSHEVA. She did PT in the past which worked well for about one year, until this pregnancy. During pregnancy she developed right sided sciatica. She was using, heat, ice, Tylenol. She is here today for persistent right sciatica pain. Pain is in right buttocks radiates down posterior thigh to knee. She also has pain radiating up toward thoracic back on the right. No numbness, tingling, weakness, loss of bowel or bladder control. Taking ibuprofen and Tylenol. Not sleeping well, can fall sleep, but every time she moves pain is so bad, she can't go back to sleep. Daytime is better when moving. But certain movements cause intense pain. Feels like a squeezing pain. She is not breastfeeding. Review of Systems   Review of Systems   Constitutional: Negative. Musculoskeletal: Positive for back pain. Neurological: Positive for headaches. Negative for weakness and numbness. I have reviewed the patient's medical history in detail; there are no changes to the history as noted in the electronic medical record. Objective     Vitals:    10/10/23 1019 10/10/23 1041 10/10/23 1051   BP: 160/90 160/90 156/90   Pulse: (!) 52  56   Resp: 16     Temp: 98.4 °F (36.9 °C)     TempSrc: Temporal     SpO2: 97%     Weight: 64.9 kg (143 lb)     Height: 5' 5" (1.651 m)       Wt Readings from Last 3 Encounters:   10/10/23 64.9 kg (143 lb)   08/17/23 64.5 kg (142 lb 3.2 oz)   07/29/23 65.8 kg (145 lb)     Physical Exam  Vitals and nursing note reviewed.    Constitutional: General: She is not in acute distress. Appearance: Normal appearance. She is not ill-appearing. Cardiovascular:      Rate and Rhythm: Normal rate and regular rhythm. Heart sounds: No murmur heard. Pulmonary:      Effort: Pulmonary effort is normal.      Breath sounds: Normal breath sounds. Musculoskeletal:      Lumbar back: Tenderness (mid lumbar spine, right SI joint, and right buttocks) present. No swelling or spasms. Negative right straight leg raise test and negative left straight leg raise test.      Right lower leg: No edema. Left lower leg: No edema. Neurological:      Mental Status: She is alert. Comments: Bilateral knee flexion strength 5/5. Bilateral knee extension strength 5/5. Bilateral hip flexion strength 5/5. Bilateral plantar flexion strength 5/5. Bilateral dorsiflexion strength 5/5. Bilateral lower extremity sensation intact. Patellar reflexes bilaterally:+1/3     Psychiatric:         Mood and Affect: Mood normal.         Depression Screening and Follow-up Plan: Patient was screened for depression during today's encounter. They screened negative with a PHQ-2 score of 0.         ALLERGIES:  No Known Allergies    Current Medications     Current Outpatient Medications   Medication Sig Dispense Refill   • acetaminophen (TYLENOL) 500 mg tablet Take 500 mg by mouth every 6 (six) hours as needed for mild pain     • albuterol (PROVENTIL HFA,VENTOLIN HFA) 90 mcg/act inhaler Inhale 2 puffs every 6 (six) hours as needed for wheezing 1 Inhaler 0   • amLODIPine (NORVASC) 5 mg tablet Take 1 tablet (5 mg total) by mouth daily 30 tablet 2   • [facility] Vitamin C 500 MG tablet      • escitalopram (LEXAPRO) 20 mg tablet Take 0.5 tablets (10 mg total) by mouth daily 90 tablet 1   • famotidine (PEPCID) 20 mg tablet TAKE 1 TABLET BY MOUTH TWICE A  tablet 1   • ferrous sulfate 325 (65 Fe) mg tablet Take 1 tablet by mouth daily with breakfast     • fluticasone (FLONASE) 50 mcg/act nasal spray 1 spray into each nostril daily 16 g 0   • hydrocortisone 1 % cream Apply topically 4 (four) times a day as needed for irritation 30 g 0   • methocarbamol (Robaxin-750) 750 mg tablet Take 1 tablet (750 mg total) by mouth every 8 (eight) hours as needed for muscle spasms 60 tablet 0   • methylPREDNISolone 4 MG tablet therapy pack Use as directed on package 21 each 0   • OneTouch Verio test strip USE TO TEST BLOOD SUGAR 4-5 TIMES DAILY       No current facility-administered medications for this visit.          Health Maintenance     Health Maintenance   Topic Date Due   • Hepatitis C Screening  Never done   • COVID-19 Vaccine (1) Never done   • Annual Physical  01/30/2021   • Cervical Cancer Screening  06/14/2023   • Influenza Vaccine (1) 09/01/2023   • Depression Screening  10/10/2024   • BMI: Adult  10/10/2024   • DTaP,Tdap,and Td Vaccines (4 - Td or Tdap) 09/25/2033   • HIV Screening  Completed   • Pneumococcal Vaccine: Pediatrics (0 to 5 Years) and At-Risk Patients (6 to 59 Years)  Aged Out   • HIB Vaccine  Aged Out   • IPV Vaccine  Aged Out   • Hepatitis A Vaccine  Aged Out   • Meningococcal ACWY Vaccine  Aged Out   • HPV Vaccine  Aged [employer] History   Administered Date(s) Administered   • INFLUENZA 10/28/2014   • Tdap 11/11/2014, 01/20/2015, 09/25/2023       ROSHNI Hassan

## 2023-11-01 ENCOUNTER — OFFICE VISIT (OUTPATIENT)
Dept: FAMILY MEDICINE CLINIC | Facility: CLINIC | Age: 38
End: 2023-11-01

## 2023-11-01 VITALS
OXYGEN SATURATION: 98 % | WEIGHT: 147 LBS | HEART RATE: 65 BPM | TEMPERATURE: 98.7 F | DIASTOLIC BLOOD PRESSURE: 90 MMHG | HEIGHT: 65 IN | BODY MASS INDEX: 24.49 KG/M2 | SYSTOLIC BLOOD PRESSURE: 130 MMHG | RESPIRATION RATE: 16 BRPM

## 2023-11-01 DIAGNOSIS — M54.31 SCIATICA OF RIGHT SIDE: ICD-10-CM

## 2023-11-01 DIAGNOSIS — I10 HYPERTENSION, UNSPECIFIED TYPE: ICD-10-CM

## 2023-11-01 DIAGNOSIS — M76.31 ILIOTIBIAL BAND TENDINITIS OF RIGHT SIDE: Primary | ICD-10-CM

## 2023-11-01 DIAGNOSIS — M54.31 RIGHT SIDED SCIATICA: ICD-10-CM

## 2023-11-01 RX ORDER — METHYLPREDNISOLONE 4 MG/1
TABLET ORAL
Qty: 21 EACH | Refills: 0 | Status: SHIPPED | OUTPATIENT
Start: 2023-11-01

## 2023-11-01 RX ORDER — AMLODIPINE BESYLATE 5 MG/1
5 TABLET ORAL DAILY
Qty: 90 TABLET | Refills: 1 | Status: SHIPPED | OUTPATIENT
Start: 2023-11-01

## 2023-11-01 RX ORDER — DIPHENHYDRAMINE HCL 25 MG
25 CAPSULE ORAL EVERY 6 HOURS PRN
COMMUNITY
Start: 2023-10-23 | End: 2023-11-02

## 2023-11-01 RX ORDER — METOCLOPRAMIDE 5 MG/1
5 TABLET ORAL EVERY 6 HOURS PRN
COMMUNITY
Start: 2023-10-23 | End: 2023-11-02

## 2023-11-01 RX ORDER — BUTALBITAL, ACETAMINOPHEN AND CAFFEINE 50; 325; 40 MG/1; MG/1; MG/1
1 TABLET ORAL EVERY 6 HOURS PRN
COMMUNITY
Start: 2023-10-23 | End: 2023-11-02

## 2023-11-01 NOTE — ASSESSMENT & PLAN NOTE
Left posterior knee and right posterior thigh pain, tenderness. She has had several previous similar episodes relieved with steroids. Has been evaluated several times previously with ultrasound, negative for DVT. If she does not improve with steroids will proceed with ultrasound but she is low risk.

## 2023-11-01 NOTE — ASSESSMENT & PLAN NOTE
Not improved following delivery of her son three weeks ago. Recommend PT and she is agreeable. Referral placed. She has previously had injections which were helpful. Recommended she have them again.

## 2023-11-01 NOTE — PROGRESS NOTES
Name: Hudson Angelo      : 1985      MRN: [id number]  Encounter Provider: Shari Luna DO  Encounter Date: 2023   Encounter department: [address] [place] [address]     1. Iliotibial band tendinitis of right side  Assessment & Plan:  Left posterior knee and right posterior thigh pain, tenderness. She has had several previous similar episodes relieved with steroids. Has been evaluated several times previously with ultrasound, negative for DVT. If she does not improve with steroids will proceed with ultrasound but she is low risk. 2. Right sided sciatica  -     methylPREDNISolone 4 MG tablet therapy pack; Use as directed on package    3. Sciatica of right side  Assessment & Plan:  Not improved following delivery of her son three weeks ago. Recommend PT and she is agreeable. Referral placed. She has previously had injections which were helpful. Recommended she have them again. Subjective      HPI  Recently had baby 3 weeks ago. Here today for a lump behind her knee. Still having sciatic nerve pain. Having pain behind the right knee, behind the left thigh, and in the left low back. Feels warm, extremely tender to touch, no overlying skin changes. Has had similar previous issues in the same places. Has been evaluated for Lyme as well. Was last treated for the same about a year ago and was referred to physiatry at that time but did not establish. 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 Limping a bit due to pain.      Review of Systems    Current Outpatient Medications on File Prior to Visit   Medication Sig   • acetaminophen (TYLENOL) 500 mg tablet Take 500 mg by mouth every 6 (six) hours as needed for mild pain   • amLODIPine (NORVASC) 5 mg tablet Take 1 tablet (5 mg total) by mouth daily   • [facility] Vitamin C 500 MG tablet    • ferrous sulfate 325 (65 Fe) mg tablet Take 1 tablet by mouth daily with breakfast   • albuterol (PROVENTIL HFA,VENTOLIN HFA) 90 mcg/act inhaler Inhale 2 puffs every 6 (six) hours as needed for wheezing (Patient not taking: Reported on 11/1/2023)   • butalbital-acetaminophen-caffeine (FIORICET,ESGIC) -40 mg per tablet Take 1 tablet by mouth every 6 (six) hours as needed (Patient not taking: Reported on 11/1/2023)   • diphenhydrAMINE (BENADRYL) 25 mg capsule Take 25 mg by mouth every 6 (six) hours as needed (Patient not taking: Reported on 11/1/2023)   • escitalopram (LEXAPRO) 20 mg tablet Take 0.5 tablets (10 mg total) by mouth daily (Patient not taking: Reported on 11/1/2023)   • famotidine (PEPCID) 20 mg tablet TAKE 1 TABLET BY MOUTH TWICE A DAY (Patient not taking: Reported on 11/1/2023)   • fluticasone (FLONASE) 50 mcg/act nasal spray 1 spray into each nostril daily (Patient not taking: Reported on 11/1/2023)   • hydrocortisone 1 % cream Apply topically 4 (four) times a day as needed for irritation (Patient not taking: Reported on 11/1/2023)   • methocarbamol (Robaxin-750) 750 mg tablet Take 1 tablet (750 mg total) by mouth every 8 (eight) hours as needed for muscle spasms (Patient not taking: Reported on 11/1/2023)   • metoclopramide (REGLAN) 5 mg tablet Take 5 mg by mouth every 6 (six) hours as needed (Patient not taking: Reported on 11/1/2023)   • OneTouch Verio test strip USE TO TEST BLOOD SUGAR 4-5 TIMES DAILY (Patient not taking: Reported on 11/1/2023)   • [DISCONTINUED] methylPREDNISolone 4 MG tablet therapy pack Use as directed on package (Patient not taking: Reported on 11/1/2023)       Objective     /90   Pulse 65   Temp 98.7 °F (37.1 °C) (Temporal)   Resp 16   Ht 5' 5" (1.651 m)   Wt 66.7 kg (147 lb)   LMP 10/28/2023 (Exact Date)   SpO2 98%   BMI 24.46 kg/m²     Physical Exam  Vitals reviewed. Constitutional:       Appearance: Normal appearance. Cardiovascular:      Rate and Rhythm: Normal rate. Pulmonary:      Effort: Pulmonary effort is normal.   Abdominal:      General: Abdomen is flat.    Musculoskeletal:      Comments: Tenderness without swelling or skin changes behind the left knee and back of the right thigh. Skin:     General: Skin is warm and dry. Capillary Refill: Capillary refill takes less than 2 seconds. Neurological:      Mental Status: She is alert.    Psychiatric:         Mood and Affect: Mood normal.         Behavior: Behavior normal.       Remus Hoit, DO

## 2024-02-22 ENCOUNTER — OFFICE VISIT (OUTPATIENT)
Dept: FAMILY MEDICINE CLINIC | Facility: CLINIC | Age: 39
End: 2024-02-22
Payer: COMMERCIAL

## 2024-02-22 VITALS
TEMPERATURE: 98.2 F | OXYGEN SATURATION: 99 % | BODY MASS INDEX: 23.41 KG/M2 | WEIGHT: 140.5 LBS | DIASTOLIC BLOOD PRESSURE: 80 MMHG | RESPIRATION RATE: 18 BRPM | HEIGHT: 65 IN | SYSTOLIC BLOOD PRESSURE: 110 MMHG | HEART RATE: 73 BPM

## 2024-02-22 DIAGNOSIS — M76.31 ILIOTIBIAL BAND TENDINITIS OF RIGHT SIDE: Primary | ICD-10-CM

## 2024-02-22 PROCEDURE — 99213 OFFICE O/P EST LOW 20 MIN: CPT | Performed by: FAMILY MEDICINE

## 2024-02-22 RX ORDER — PREDNISONE 20 MG/1
TABLET ORAL
Qty: 11 TABLET | Refills: 0 | Status: SHIPPED | OUTPATIENT
Start: 2024-02-22

## 2024-02-22 RX ORDER — OXYCODONE HYDROCHLORIDE 5 MG/1
5 TABLET ORAL EVERY 8 HOURS PRN
Qty: 30 TABLET | Refills: 0 | Status: SHIPPED | OUTPATIENT
Start: 2024-02-22

## 2024-02-22 NOTE — PROGRESS NOTES
FAMILY PRACTICE OFFICE VISIT       NAME: Isidra Chamberlain  AGE: 38 y.o. SEX: female       : 1985        MRN: [id number]    DATE: 2024  TIME: 12:26 PM    Assessment and Plan     Problem List Items Addressed This Visit       Iliotibial band tendinitis of right side - Primary     ITB syndrome.  Patient was given prescription for prednisone tapering dose consisting of 40 mg x 3 days, 20 mg x 3 days, 10 mg x 4 days.  She was also provided oxycodone to use every 8 hours as needed.  She was given referral to sports medicine specialist for further evaluation         Relevant Medications    predniSONE 20 mg tablet    oxyCODONE (Roxicodone) 5 immediate release tablet    Other Relevant Orders    Ambulatory Referral to Sports Medicine           Chief Complaint     Chief Complaint   Patient presents with    lump behind Rt knee      Getting worse and big        History of Present Illness     Patient in the office with recurrence of ITB syndrome.  Patient has had chronic intermittent flareups with this condition.  She has had extensive evaluations with ultrasounds and cortisone injections.  She does not identify any specific triggers.  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  She has significant discomfort that alters her ability to ambulate due to pain.        Review of Systems   Review of Systems   Constitutional: Negative.    Musculoskeletal:  Positive for myalgias.       Active Problem List     Patient Active Problem List   Diagnosis    Cellulitis of right lower extremity    Mood disorder (HCC)    Iliotibial band tendinitis of right side    Acute midline low back pain without sciatica    Chronic midline low back pain without sciatica    Alopecia    Trigger finger    Paresthesia    Carpal tunnel syndrome, bilateral    Anxiety    Pain of left lower extremity    Allergic rhinitis     uterine contractions in third trimester, antepartum    Elevated blood  pressure reading without diagnosis of hypertension    Anemia affecting pregnancy    Lower abdominal pain    Hemorrhoid    Sciatica of right side    Gastroesophageal reflux disease without esophagitis       Past Medical History:  Past Medical History:   Diagnosis Date    Asthma     Chlamydia infection        Past Surgical History:  History reviewed. No pertinent surgical history.    Family History:  Family History   Problem Relation Age of Onset    No Known Problems Mother        Social History:  Social History     Socioeconomic History    Marital status: /Civil Union     Spouse name: Not on file    Number of children: Not on file    Years of education: Not on file    Highest education level: Not on file   Occupational History    Not on file   Tobacco Use    Smoking status: Never    Smokeless tobacco: Never   Vaping Use    Vaping status: Never Used   Substance and Sexual Activity    Alcohol use: Not Currently     Comment: social ; Denied history of alcohol use    Drug use: No    Sexual activity: Not on file   Other Topics Concern    Not on file   Social History Narrative    Not on file     Social Determinants of Health     Financial Resource Strain: High Risk (10/2/2023)    Received from [facility]    Overall Financial Resource Strain (CARDIA)     Difficulty of Paying Living Expenses: Hard   Food Insecurity: Food Insecurity Present (10/2/2023)    Received from [facility]    Hunger Vital Sign     Worried About Running Out of Food in the Last Year: Often true     Ran Out of Food in the Last Year: Sometimes true   Transportation Needs: No Transportation Needs (10/2/2023)    Received from [facility]    PRAPARE - Transportation     Lack of Transportation (Medical): No     Lack of Transportation (Non-Medical): No   Physical Activity: Unknown (9/25/2023)    Received from [facility]    Exercise Vital Sign     Days of Exercise per Week: 5 days      Minutes of Exercise per Session: Not on file   Stress: Stress Concern Present (9/25/2023)    Received from [facility]    [employer] [employer] - Occupational Stress Questionnaire     Feeling of Stress : Very much   Social Connections: Unknown (9/25/2023)    Received from [facility]    Social Connection and Isolation Panel [NHANES]     Frequency of Communication with Friends and Family: Twice a week     Frequency of Social Gatherings with Friends and Family: Not on file     Attends Restoration Services: Not on file     Active Member of Clubs or Organizations: Not on file     Attends Club or Organization Meetings: Not on file     Marital Status: Not on file   Intimate Partner Violence: Not At Risk (10/2/2023)    Received from [facility]    Humiliation, Afraid, Rape, and Kick questionnaire     Fear of Current or Ex-Partner: No     Emotionally Abused: No     Physically Abused: No     Sexually Abused: No   Housing Stability: High Risk (10/2/2023)    Received from [facility]    Housing Stability Vital Sign     Unable to Pay for Housing in the Last Year: Yes     Number of Places Lived in the Last Year: 1     Unstable Housing in the Last Year: No       Objective     Vitals:    02/22/24 1138   BP: 110/80   Pulse: 73   Resp: 18   Temp: 98.2 °F (36.8 °C)   SpO2: 99%     Wt Readings from Last 3 Encounters:   02/22/24 63.7 kg (140 lb 8 oz)   11/01/23 66.7 kg (147 lb)   10/10/23 64.9 kg (143 lb)       Physical Exam  Constitutional:       General: She is not in acute distress.     Appearance: Normal appearance. She is not ill-appearing.   Musculoskeletal:         General: Swelling and tenderness present.      Right lower leg: Edema present.      Comments: Patient with significant swelling of ITB and insertion near knee.  There is tenderness to palpation and some warmth but no redness.   Skin:     Findings: No rash.   Neurological:      Mental  "Status: She is alert. Mental status is at baseline.   Psychiatric:         Mood and Affect: Mood normal.         Behavior: Behavior normal.         Thought Content: Thought content normal.         Judgment: Judgment normal.         Pertinent Laboratory/Diagnostic Studies:  Lab Results   Component Value Date    GLUCOSE 88 08/28/2015    BUN 11 10/23/2023    CREATININE 0.85 10/23/2023    CALCIUM 9.4 10/23/2023     08/28/2015    K 3.9 10/23/2023    CO2 27 10/23/2023     10/23/2023     Lab Results   Component Value Date    ALT 27 10/23/2023    AST 34 10/23/2023    ALKPHOS 79 10/23/2023    BILITOT 0.43 08/28/2015       Lab Results   Component Value Date    WBC 9.04 07/31/2023    HGB 9.4 (L) 07/31/2023    HCT 29.3 (L) 07/31/2023    MCV 83 07/31/2023     07/31/2023       No results found for: \"TSH\"    No results found for: \"CHOL\"  Lab Results   Component Value Date    TRIG 162 (H) 01/30/2020     Lab Results   Component Value Date    HDL 49 01/30/2020     Lab Results   Component Value Date    LDLCALC 121 (H) 01/30/2020     No results found for: \"HGBA1C\"    Results for orders placed or performed during the hospital encounter of 07/29/23   CBC and differential   Result Value Ref Range    WBC 14.23 (H) 4.31 - 10.16 Thousand/uL    RBC 3.69 (L) 3.81 - 5.12 Million/uL    Hemoglobin 9.9 (L) 11.5 - 15.4 g/dL    Hematocrit 30.9 (L) 34.8 - 46.1 %    MCV 84 82 - 98 fL    MCH 26.8 26.8 - 34.3 pg    MCHC 32.0 31.4 - 37.4 g/dL    RDW 17.0 (H) 11.6 - 15.1 %    MPV 11.1 8.9 - 12.7 fL    Platelets 265 149 - 390 Thousands/uL    nRBC 0 /100 WBCs    Neutrophils Relative 74 43 - 75 %    Immat GRANS % 2 0 - 2 %    Lymphocytes Relative 15 14 - 44 %    Monocytes Relative 9 4 - 12 %    Eosinophils Relative 0 0 - 6 %    Basophils Relative 0 0 - 1 %    Neutrophils Absolute 10.50 (H) 1.85 - 7.62 Thousands/µL    Immature Grans Absolute 0.23 (H) 0.00 - 0.20 Thousand/uL    Lymphocytes Absolute 2.15 0.60 - 4.47 Thousands/µL    Monocytes " Absolute 1.29 (H) 0.17 - 1.22 Thousand/µL    Eosinophils Absolute 0.03 0.00 - 0.61 Thousand/µL    Basophils Absolute 0.03 0.00 - 0.10 Thousands/µL   Comprehensive metabolic panel   Result Value Ref Range    Sodium 132 (L) 135 - 147 mmol/L    Potassium 3.8 3.5 - 5.3 mmol/L    Chloride 104 96 - 108 mmol/L    CO2 19 (L) 21 - 32 mmol/L    ANION GAP 9 mmol/L    BUN 20 5 - 25 mg/dL    Creatinine 1.04 0.60 - 1.30 mg/dL    Glucose 89 65 - 140 mg/dL    Calcium 8.8 8.4 - 10.2 mg/dL    AST 17 13 - 39 U/L    ALT 10 7 - 52 U/L    Alkaline Phosphatase 62 34 - 104 U/L    Total Protein 6.7 6.4 - 8.4 g/dL    Albumin 3.6 3.5 - 5.0 g/dL    Total Bilirubin 0.35 0.20 - 1.00 mg/dL    eGFR 68 ml/min/1.73sq m   Lipase   Result Value Ref Range    Lipase 17 11 - 82 u/L   Fibrinogen   Result Value Ref Range    Fibrinogen 332 207 - 520 mg/dL   APTT   Result Value Ref Range    PTT 24 23 - 37 seconds   Protime-INR   Result Value Ref Range    Protime 13.9 11.6 - 14.5 seconds    INR 1.05 0.84 - 1.19   UA w Reflex to Microscopic w Reflex to Culture    Specimen: Urine, Clean Catch   Result Value Ref Range    Color, UA Light Yellow     Clarity, UA Clear     Specific Gravity, UA 1.014 1.003 - 1.030    pH, UA 6.0 4.5, 5.0, 5.5, 6.0, 6.5, 7.0, 7.5, 8.0    Leukocytes, UA Negative Negative    Nitrite, UA Negative Negative    Protein, UA Negative Negative mg/dl    Glucose, UA Negative Negative mg/dl    Ketones, UA Negative Negative mg/dl    Urobilinogen, UA <2.0 <2.0 mg/dl mg/dl    Bilirubin, UA Negative Negative    Occult Blood, UA Negative Negative   CBC and differential   Result Value Ref Range    WBC 9.04 4.31 - 10.16 Thousand/uL    RBC 3.52 (L) 3.81 - 5.12 Million/uL    Hemoglobin 9.4 (L) 11.5 - 15.4 g/dL    Hematocrit 29.3 (L) 34.8 - 46.1 %    MCV 83 82 - 98 fL    MCH 26.7 (L) 26.8 - 34.3 pg    MCHC 32.1 31.4 - 37.4 g/dL    RDW 16.9 (H) 11.6 - 15.1 %    MPV 10.0 8.9 - 12.7 fL    Platelets 199 149 - 390 Thousands/uL    nRBC 0 /100 WBCs    Neutrophils  Relative 67 43 - 75 %    Immat GRANS % 1 0 - 2 %    Lymphocytes Relative 21 14 - 44 %    Monocytes Relative 10 4 - 12 %    Eosinophils Relative 1 0 - 6 %    Basophils Relative 0 0 - 1 %    Neutrophils Absolute 6.02 1.85 - 7.62 Thousands/µL    Immature Grans Absolute 0.11 0.00 - 0.20 Thousand/uL    Lymphocytes Absolute 1.90 0.60 - 4.47 Thousands/µL    Monocytes Absolute 0.88 0.17 - 1.22 Thousand/µL    Eosinophils Absolute 0.12 0.00 - 0.61 Thousand/µL    Basophils Absolute 0.01 0.00 - 0.10 Thousands/µL   Comprehensive metabolic panel   Result Value Ref Range    Sodium 134 (L) 135 - 147 mmol/L    Potassium 3.6 3.5 - 5.3 mmol/L    Chloride 106 96 - 108 mmol/L    CO2 21 21 - 32 mmol/L    ANION GAP 7 mmol/L    BUN 12 5 - 25 mg/dL    Creatinine 0.65 0.60 - 1.30 mg/dL    Glucose 78 65 - 140 mg/dL    Calcium 8.3 (L) 8.4 - 10.2 mg/dL    Corrected Calcium 8.9 8.3 - 10.1 mg/dL    AST 15 13 - 39 U/L    ALT 9 7 - 52 U/L    Alkaline Phosphatase 57 34 - 104 U/L    Total Protein 6.0 (L) 6.4 - 8.4 g/dL    Albumin 3.2 (L) 3.5 - 5.0 g/dL    Total Bilirubin 0.39 0.20 - 1.00 mg/dL    eGFR 113 ml/min/1.73sq m   Type and screen   Result Value Ref Range    ABO Grouping O     Rh Factor Positive     Antibody Screen Negative     Specimen Expiration Date [id number]        Orders Placed This Encounter   Procedures    Ambulatory Referral to Sports Medicine       ALLERGIES:  No Known Allergies    Current Medications     Current Outpatient Medications   Medication Sig Dispense Refill    acetaminophen (TYLENOL) 500 mg tablet Take 500 mg by mouth every 6 (six) hours as needed for mild pain      oxyCODONE (Roxicodone) 5 immediate release tablet Take 1 tablet (5 mg total) by mouth every 8 (eight) hours as needed for moderate pain Max Daily Amount: 15 mg 30 tablet 0    predniSONE 20 mg tablet 2 tabs daily X 3 days, 1 tab daily X 3 days, 1/2 tab daily X 4 days 11 tablet 0    albuterol (PROVENTIL HFA,VENTOLIN HFA) 90 mcg/act inhaler Inhale 2 puffs every 6  (six) hours as needed for wheezing (Patient not taking: Reported on 11/1/2023) 1 Inhaler 0    amLODIPine (NORVASC) 5 mg tablet TAKE 1 TABLET (5 MG TOTAL) BY MOUTH DAILY. (Patient not taking: Reported on 2/22/2024) 90 tablet 1    [facility] Vitamin C 500 MG tablet  (Patient not taking: Reported on 2/22/2024)      diphenhydrAMINE (BENADRYL) 25 mg capsule Take 25 mg by mouth every 6 (six) hours as needed (Patient not taking: Reported on 11/1/2023)      escitalopram (LEXAPRO) 20 mg tablet Take 0.5 tablets (10 mg total) by mouth daily (Patient not taking: Reported on 11/1/2023) 90 tablet 1    famotidine (PEPCID) 20 mg tablet TAKE 1 TABLET BY MOUTH TWICE A DAY (Patient not taking: Reported on 11/1/2023) 180 tablet 1    ferrous sulfate 325 (65 Fe) mg tablet Take 1 tablet by mouth daily with breakfast (Patient not taking: Reported on 2/22/2024)      fluticasone (FLONASE) 50 mcg/act nasal spray 1 spray into each nostril daily (Patient not taking: Reported on 11/1/2023) 16 g 0    hydrocortisone 1 % cream Apply topically 4 (four) times a day as needed for irritation (Patient not taking: Reported on 11/1/2023) 30 g 0    methocarbamol (Robaxin-750) 750 mg tablet Take 1 tablet (750 mg total) by mouth every 8 (eight) hours as needed for muscle spasms (Patient not taking: Reported on 11/1/2023) 60 tablet 0    methylPREDNISolone 4 MG tablet therapy pack Use as directed on package (Patient not taking: Reported on 2/22/2024) 21 each 0    OneTouch Verio test strip USE TO TEST BLOOD SUGAR 4-5 TIMES DAILY (Patient not taking: Reported on 11/1/2023)       No current facility-administered medications for this visit.         Health Maintenance     Health Maintenance   Topic Date Due    Hepatitis C Screening  Never done    Annual Physical  01/30/2021    Cervical Cancer Screening  06/14/2023    COVID-19 Vaccine (1 - 2023-24 season) Never done    Influenza Vaccine (1) 06/30/2024 (Originally 9/1/2023)    Depression Screening  10/10/2024     DTaP,Tdap,and Td Vaccines (4 - Td or Tdap) 09/25/2033    Zoster Vaccine (1 of 2) 09/04/2035    HIV Screening  Completed    Pneumococcal Vaccine: Pediatrics (0 to 5 Years) and At-Risk Patients (6 to 64 Years)  Aged Out    HIB Vaccine  Aged Out    IPV Vaccine  Aged Out    Hepatitis A Vaccine  Aged Out    Meningococcal ACWY Vaccine  Aged Out    HPV Vaccine  Aged Out     Immunization History   Administered Date(s) Administered    INFLUENZA 10/28/2014    Tdap 11/11/2014, 01/20/2015, 09/25/2023       Edgar Cardenas MD

## 2024-02-22 NOTE — ASSESSMENT & PLAN NOTE
ITB syndrome.  Patient was given prescription for prednisone tapering dose consisting of 40 mg x 3 days, 20 mg x 3 days, 10 mg x 4 days.  She was also provided oxycodone to use every 8 hours as needed.  She was given referral to sports medicine specialist for further evaluation

## 2024-02-24 ENCOUNTER — HOSPITAL ENCOUNTER (EMERGENCY)
Facility: HOSPITAL | Age: 39
Discharge: HOME | End: 2024-02-24
Attending: EMERGENCY MEDICINE | Admitting: EMERGENCY MEDICINE
Payer: COMMERCIAL

## 2024-02-24 VITALS
RESPIRATION RATE: 20 BRPM | HEART RATE: 73 BPM | OXYGEN SATURATION: 98 % | TEMPERATURE: 99.1 F | SYSTOLIC BLOOD PRESSURE: 158 MMHG | DIASTOLIC BLOOD PRESSURE: 84 MMHG

## 2024-02-24 DIAGNOSIS — J06.9 URI (UPPER RESPIRATORY INFECTION): ICD-10-CM

## 2024-02-24 DIAGNOSIS — J45.909 ASTHMA: ICD-10-CM

## 2024-02-24 DIAGNOSIS — B34.9 VIRAL SYNDROME: Primary | ICD-10-CM

## 2024-02-24 LAB
ATRIAL RATE: 50 BPM
FLUAV RNA RESP QL NAA+PROBE: NEGATIVE
FLUBV RNA RESP QL NAA+PROBE: POSITIVE
P AXIS: 59 DEGREES
PR INTERVAL: 130 MS
QRS AXIS: 46 DEGREES
QRSD INTERVAL: 108 MS
QT INTERVAL: 412 MS
QTC INTERVAL: 375 MS
RSV RNA RESP QL NAA+PROBE: NEGATIVE
SARS-COV-2 RNA RESP QL NAA+PROBE: NEGATIVE
T WAVE AXIS: 68 DEGREES
VENTRICULAR RATE: 50 BPM

## 2024-02-24 PROCEDURE — 99284 EMERGENCY DEPT VISIT MOD MDM: CPT | Performed by: EMERGENCY MEDICINE

## 2024-02-24 PROCEDURE — 93005 ELECTROCARDIOGRAM TRACING: CPT

## 2024-02-24 PROCEDURE — 93010 ELECTROCARDIOGRAM REPORT: CPT | Performed by: INTERNAL MEDICINE

## 2024-02-24 PROCEDURE — 0241U HB NFCT DS VIR RESP RNA 4 TRGT: CPT

## 2024-02-24 PROCEDURE — 99283 EMERGENCY DEPT VISIT LOW MDM: CPT

## 2024-02-24 RX ORDER — ALBUTEROL SULFATE 90 UG/1
2 AEROSOL, METERED RESPIRATORY (INHALATION) ONCE
Status: COMPLETED | OUTPATIENT
Start: 2024-02-24 | End: 2024-02-24

## 2024-02-24 RX ORDER — ALBUTEROL SULFATE 90 UG/1
2 AEROSOL, METERED RESPIRATORY (INHALATION) EVERY 6 HOURS PRN
Qty: 18 G | Refills: 0 | Status: SHIPPED | OUTPATIENT
Start: 2024-02-24

## 2024-02-24 RX ADMIN — ALBUTEROL SULFATE 2 PUFF: 90 AEROSOL, METERED RESPIRATORY (INHALATION) at 15:27

## 2024-02-24 NOTE — ED ATTENDING ATTESTATION
Final Diagnoses:     1. Viral syndrome    2. Asthma    3. URI (upper respiratory infection)      ED Course as of 02/24/24 1557   Sat Feb 24, 2024   1500 Old son was viral syndrome  Multiple sick cotnacts with strep / flu  Mom has body aches, cough, nasal congestion.     Feels like asthma chest tightness like previous episodes.  No fevers     1500 RR22  Afebrile     1500 AP   Viral syndrome  EKG given age.        I, Renny Sotelo MD, saw and evaluated the patient. All available labs and X-rays were ordered by me or the resident / non-physician and have been reviewed by myself. I discussed the patient with the resident / non-physician and agree with the resident's / non-physician practitioner's findings and plan as documented in the resident's / non-physician practicitioner's note, except where noted.   At this point, I agree with the current assessment done in the ED.   I was present during key portions of all procedures performed unless otherwise stated.     HPI:  NURSING TRIAGE:    This is a 38 y.o. female presenting for evaluation of viral syndrome  Son is diagnosed with strep thoat a few days ago describing viral symptoms.  Patient, starting yesterdya ,started to have cough congestion fevers, chills.   No vomiting  No rahes  Hx of asthma as a kid  Feels chest tightness wit hcoughing  No recent travel  No major medical problems or daily meds  No smoking  No day care    Her other son is also here as a patient, with viral symptoms. Chief Complaint   Patient presents with    Flu Symptoms     Pt presents ambulatory with c/o fever, chills vomiting, recent exposure to strep.      PHYSICAL: ASSESSMENT + PLAN:   General: VSS, NAD, awake, alert. Well-nourished, well-developed. Appears stated age.   Speaking normally in full sentences.   Head: Normocephalic, atraumatic, nontender.  Eyes: PERRL, EOM-I. No diplopia.   No hyphema.   No subconjunctival hemorrhages.  Symmetrical lids.   ENT: Atraumatic external nose and  ears.    MMM  No malocclusion. No stridor. Normal phonation. No drooling. Normal swallowing.   Neck: Symmetric, trachea midline. No JVD.  CV: RRR. +S1/S2  No murmurs or gallops  Peripheral pulses +2 throughout. No chest wall tenderness.   Lungs:   Unlabored No retractions  CTAB, lungs sounds equal bilateral.   No tachypnea.   Abd: +BS, soft, NT/ND.   MSK:   FROM   Back:   No rashes  Skin: Dry, intact.   Neuro: AAOx3, GCS 15, CN II-XII grossly intact.   Motor grossly intact.  Psychiatric/Behavioral: Appropriate mood and affect   Exam: deferred      Vitals:    02/24/24 1429   BP: 158/84   Pulse: 73   Resp: 20   Temp: 99.1 °F (37.3 °C)   TempSrc: Oral   SpO2: 98%    Viral syndrome: Patient's story / exam fits with viral syndrome  - Offered viral testing  - Discussed Tylenol/NSAIDs here + q6h at home.   - Discussed that it will have to run its course and low yield of antibiotics.   - RTER precautions discussed orally.   - COuld have component of bronchospastic coughing, trial albuterol  - EKG given age.      There are no obvious limitations to social determinants of care.   Nursing note reviewed.   Vitals reviewed.   Orders placed by myself and/or advanced practitioner / resident.    Previous chart was reviewed  No language barrier.   History obtained from patient.    There are no limitations to the history obtained:     Past Medical: Past Surgical:    has a past medical history of Asthma and Chlamydia infection.  has no past surgical history on file.   Social: Cardiac (Echo/Cath)   Social History     Substance and Sexual Activity   Alcohol Use Not Currently    Comment: social ; Denied history of alcohol use     Social History     Tobacco Use   Smoking Status Never   Smokeless Tobacco Never     Social History     Substance and Sexual Activity   Drug Use No    No results found for this or any previous visit.    No results found for this or any previous visit.    No results found for this or any previous visit.      Labs: Imaging:   Labs Reviewed - No data to display No orders to display      Medications: Code Status:   Medications   albuterol (PROVENTIL HFA,VENTOLIN HFA) inhaler 2 puff (2 puffs Inhalation Given 2/24/24 1527)    Code Status: Prior  Advance Directive and Living Will:      Power of :    POLST:       Orders Placed This Encounter   Procedures    FLU/RSV/COVID - if FLU/RSV clinically relevant    ECG 12 lead    ECG 12 lead     Time reflects when diagnosis was documented in both MDM as applicable and the Disposition within this note       Time User Action Codes Description Comment    2/24/2024  3:25 PM Bruno Lucas [B34.9] Viral syndrome     2/24/2024  3:25 PM Bruno Lucas [J45.909] Asthma     2/24/2024  3:26 PM Bruno Lucas [J06.9] URI (upper respiratory infection)           ED Disposition       None          Follow-up Information       Follow up With Specialties Details Why Contact Info Additional Information    Edgar Cardenas MD Family Medicine   [address] [place] [id number] [phone]       [facility] Emergency Department Emergency Medicine Go to  If symptoms worsen, As needed [address] [place] [id number] [phone] [facility], [address] [place] [address], [id number]   [phone]          Current Discharge Medication List        CONTINUE these medications which have CHANGED    Details   albuterol (PROVENTIL HFA,VENTOLIN HFA) 90 mcg/act inhaler Inhale 2 puffs every 6 (six) hours as needed for wheezing  Qty: 18 g, Refills: 0    Comments: Substitution to a formulary equivalent within the same pharmaceutical class is authorized.  Associated Diagnoses: URI (upper respiratory infection)           CONTINUE these medications which have NOT CHANGED    Details   acetaminophen (TYLENOL) 500 mg tablet Take 500 mg by mouth every 6 (six) hours as needed for mild pain      amLODIPine (NORVASC)  5 mg tablet TAKE 1 TABLET (5 MG TOTAL) BY MOUTH DAILY.  Qty: 90 tablet, Refills: 1    Associated Diagnoses: Hypertension, unspecified type      [employer] Vitamin C 500 MG tablet       diphenhydrAMINE (BENADRYL) 25 mg capsule Take 25 mg by mouth every 6 (six) hours as needed      escitalopram (LEXAPRO) 20 mg tablet Take 0.5 tablets (10 mg total) by mouth daily  Qty: 90 tablet, Refills: 1    Associated Diagnoses: Anxiety      famotidine (PEPCID) 20 mg tablet TAKE 1 TABLET BY MOUTH TWICE A DAY  Qty: 180 tablet, Refills: 1    Associated Diagnoses: Cough      ferrous sulfate 325 (65 Fe) mg tablet Take 1 tablet by mouth daily with breakfast      fluticasone (FLONASE) 50 mcg/act nasal spray 1 spray into each nostril daily  Qty: 16 g, Refills: 0    Associated Diagnoses: Cough      hydrocortisone 1 % cream Apply topically 4 (four) times a day as needed for irritation  Qty: 30 g, Refills: 0    Associated Diagnoses: Hemorrhoids, unspecified hemorrhoid type      methocarbamol (Robaxin-750) 750 mg tablet Take 1 tablet (750 mg total) by mouth every 8 (eight) hours as needed for muscle spasms  Qty: 60 tablet, Refills: 0    Associated Diagnoses: Right sided sciatica      methylPREDNISolone 4 MG tablet therapy pack Use as directed on package  Qty: 21 each, Refills: 0    Associated Diagnoses: Right sided sciatica      OneTouch Verio test strip USE TO TEST BLOOD SUGAR 4-5 TIMES DAILY      oxyCODONE (Roxicodone) 5 immediate release tablet Take 1 tablet (5 mg total) by mouth every 8 (eight) hours as needed for moderate pain Max Daily Amount: 15 mg  Qty: 30 tablet, Refills: 0    Associated Diagnoses: Iliotibial band tendinitis of right side      predniSONE 20 mg tablet 2 tabs daily X 3 days, 1 tab daily X 3 days, 1/2 tab daily X 4 days  Qty: 11 tablet, Refills: 0    Associated Diagnoses: Iliotibial band tendinitis of right side           No discharge procedures on file.  Prior to Admission Medications   Prescriptions Last Dose Informant  Patient Reported? Taking?   [employer] Vitamin C 500 MG tablet   Yes No   Patient not taking: Reported on 2024   OneTouch Verio test strip   Yes No   Sig: USE TO TEST BLOOD SUGAR 4-5 TIMES DAILY   Patient not taking: Reported on 2023   acetaminophen (TYLENOL) 500 mg tablet  Self Yes No   Sig: Take 500 mg by mouth every 6 (six) hours as needed for mild pain   albuterol (PROVENTIL HFA,VENTOLIN HFA) 90 mcg/act inhaler  Self No No   Sig: Inhale 2 puffs every 6 (six) hours as needed for wheezing   Patient not taking: Reported on 2023   albuterol (PROVENTIL HFA,VENTOLIN HFA) 90 mcg/act inhaler   No Yes   Sig: Inhale 2 puffs every 6 (six) hours as needed for wheezing   amLODIPine (NORVASC) 5 mg tablet   No No   Sig: TAKE 1 TABLET (5 MG TOTAL) BY MOUTH DAILY.   Patient not taking: Reported on 2024   diphenhydrAMINE (BENADRYL) 25 mg capsule   Yes No   Sig: Take 25 mg by mouth every 6 (six) hours as needed   Patient not taking: Reported on 2023   escitalopram (LEXAPRO) 20 mg tablet  Self No No   Sig: Take 0.5 tablets (10 mg total) by mouth daily   Patient not taking: Reported on 2023   famotidine (PEPCID) 20 mg tablet   No No   Sig: TAKE 1 TABLET BY MOUTH TWICE A DAY   Patient not taking: Reported on 2023   ferrous sulfate 325 (65 Fe) mg tablet   Yes No   Sig: Take 1 tablet by mouth daily with breakfast   Patient not taking: Reported on 2024   fluticasone (FLONASE) 50 mcg/act nasal spray  Self No No   Si spray into each nostril daily   Patient not taking: Reported on 2023   hydrocortisone 1 % cream  Self No No   Sig: Apply topically 4 (four) times a day as needed for irritation   Patient not taking: Reported on 2023   methocarbamol (Robaxin-750) 750 mg tablet   No No   Sig: Take 1 tablet (750 mg total) by mouth every 8 (eight) hours as needed for muscle spasms   Patient not taking: Reported on 2023   methylPREDNISolone 4 MG tablet therapy pack   No No   Sig: Use as directed  "on package   Patient not taking: Reported on 2024   oxyCODONE (Roxicodone) 5 immediate release tablet   No No   Sig: Take 1 tablet (5 mg total) by mouth every 8 (eight) hours as needed for moderate pain Max Daily Amount: 15 mg   predniSONE 20 mg tablet   No No   Si tabs daily X 3 days, 1 tab daily X 3 days, 1/2 tab daily X 4 days      Facility-Administered Medications: None                        Portions of the record may have been created with voice recognition software. Occasional wrong word or \"sound a like\" substitutions may have occurred due to the inherent limitations of voice recognition software. Read the chart carefully and recognize, using context, where substitutions have occurred.    Electronically signed by:  Renny Sotelo  "

## 2024-02-24 NOTE — DISCHARGE INSTRUCTIONS
You are being discharged with a viral test pending.  For your symptoms, please take inhaler as needed.  Use motrin/tylenol as needed for pain.  If you are very short of breath or spiking high fevers then please come back to the emergency department.

## 2024-02-24 NOTE — Clinical Note
Isidra Chamberlain was seen and treated in our emergency department on 2/24/2024.                Diagnosis:     Isidra  .    She may return on this date: 02/27/2024         If you have any questions or concerns, please don't hesitate to call.      Bruno Lucas MD    ______________________________           _______________          _______________  Hospital Representative                              Date                                Time

## 2024-02-25 NOTE — ED PROVIDER NOTES
History  Chief Complaint   Patient presents with    Flu Symptoms     Pt presents ambulatory with c/o fever, chills vomiting, recent exposure to strep.     This is a 38-year-old female with history of asthma who is presenting today due to concern for cough, congestion, fevers as well as some chest tightness and mild shortness of breath.  Patient reports that her shortness of breath is intermittent, she is denying any history of blood clot, recent surgeries travel or immobilization, and no history of malignancy.  She does feel like she is having some asthmatic symptoms however notes that she does not even have asthma inhalers at home as she has had very few asthma exacerbations as an adult.  She does note that her older son was diagnosed earlier this week with influenza and strep throat.  Patient herself has the body aches and fevers but not severe sore throat.  She is denying any recent travel.        Prior to Admission Medications   Prescriptions Last Dose Informant Patient Reported? Taking?   [facility] Vitamin C 500 MG tablet   Yes No   Patient not taking: Reported on 2/22/2024   OneTouch Verio test strip   Yes No   Sig: USE TO TEST BLOOD SUGAR 4-5 TIMES DAILY   Patient not taking: Reported on 11/1/2023   acetaminophen (TYLENOL) 500 mg tablet  Self Yes No   Sig: Take 500 mg by mouth every 6 (six) hours as needed for mild pain   albuterol (PROVENTIL HFA,VENTOLIN HFA) 90 mcg/act inhaler  Self No No   Sig: Inhale 2 puffs every 6 (six) hours as needed for wheezing   Patient not taking: Reported on 11/1/2023   albuterol (PROVENTIL HFA,VENTOLIN HFA) 90 mcg/act inhaler   No Yes   Sig: Inhale 2 puffs every 6 (six) hours as needed for wheezing   amLODIPine (NORVASC) 5 mg tablet   No No   Sig: TAKE 1 TABLET (5 MG TOTAL) BY MOUTH DAILY.   Patient not taking: Reported on 2/22/2024   diphenhydrAMINE (BENADRYL) 25 mg capsule   Yes No   Sig: Take 25 mg by mouth every 6 (six) hours as needed   Patient not taking: Reported on 11/1/2023    escitalopram (LEXAPRO) 20 mg tablet  Self No No   Sig: Take 0.5 tablets (10 mg total) by mouth daily   Patient not taking: Reported on 2023   famotidine (PEPCID) 20 mg tablet   No No   Sig: TAKE 1 TABLET BY MOUTH TWICE A DAY   Patient not taking: Reported on 2023   ferrous sulfate 325 (65 Fe) mg tablet   Yes No   Sig: Take 1 tablet by mouth daily with breakfast   Patient not taking: Reported on 2024   fluticasone (FLONASE) 50 mcg/act nasal spray  Self No No   Si spray into each nostril daily   Patient not taking: Reported on 2023   hydrocortisone 1 % cream  Self No No   Sig: Apply topically 4 (four) times a day as needed for irritation   Patient not taking: Reported on 2023   methocarbamol (Robaxin-750) 750 mg tablet   No No   Sig: Take 1 tablet (750 mg total) by mouth every 8 (eight) hours as needed for muscle spasms   Patient not taking: Reported on 2023   methylPREDNISolone 4 MG tablet therapy pack   No No   Sig: Use as directed on package   Patient not taking: Reported on 2024   oxyCODONE (Roxicodone) 5 immediate release tablet   No No   Sig: Take 1 tablet (5 mg total) by mouth every 8 (eight) hours as needed for moderate pain Max Daily Amount: 15 mg   predniSONE 20 mg tablet   No No   Si tabs daily X 3 days, 1 tab daily X 3 days, 1/2 tab daily X 4 days      Facility-Administered Medications: None       Past Medical History:   Diagnosis Date    Asthma     Chlamydia infection        History reviewed. No pertinent surgical history.    Family History   Problem Relation Age of Onset    No Known Problems Mother      I have reviewed and agree with the history as documented.    E-Cigarette/Vaping    E-Cigarette Use Never User      E-Cigarette/Vaping Substances    Nicotine No     THC Yes     CBD No     Flavoring No     Other No     Unknown No      Social History     Tobacco Use    Smoking status: Never    Smokeless tobacco: Never   Vaping Use    Vaping status: Never Used    Substance Use Topics    Alcohol use: Not Currently     Comment: social ; Denied history of alcohol use    Drug use: No        Review of Systems   Constitutional:  Positive for fatigue and fever. Negative for chills.   HENT:  Positive for congestion and sore throat. Negative for ear pain.    Eyes:  Negative for pain and visual disturbance.   Respiratory:  Positive for cough, chest tightness and shortness of breath.    Cardiovascular:  Negative for chest pain and palpitations.   Gastrointestinal:  Negative for abdominal pain and vomiting.   Genitourinary:  Negative for dysuria and hematuria.   Musculoskeletal:  Positive for arthralgias and myalgias. Negative for back pain.   Skin:  Negative for color change and rash.   Neurological:  Negative for seizures and syncope.   All other systems reviewed and are negative.      Physical Exam  ED Triage Vitals [02/24/24 1429]   Temperature Pulse Respirations Blood Pressure SpO2   99.1 °F (37.3 °C) 73 20 158/84 98 %      Temp Source Heart Rate Source Patient Position - Orthostatic VS BP Location FiO2 (%)   Oral Monitor -- -- --      Pain Score       --             Orthostatic Vital Signs  Vitals:    02/24/24 1429   BP: 158/84   Pulse: 73       Physical Exam  Vitals and nursing note reviewed.   Constitutional:       General: She is not in acute distress.     Appearance: She is well-developed. She is not diaphoretic.   HENT:      Head: Normocephalic and atraumatic.      Right Ear: External ear normal.      Left Ear: External ear normal.      Nose: Nose normal. No congestion or rhinorrhea.      Mouth/Throat:      Mouth: Mucous membranes are moist.      Pharynx: Oropharynx is clear. No oropharyngeal exudate or posterior oropharyngeal erythema.   Eyes:      General: No scleral icterus.     Extraocular Movements: Extraocular movements intact.      Conjunctiva/sclera: Conjunctivae normal.      Pupils: Pupils are equal, round, and reactive to light.   Cardiovascular:      Rate and  Rhythm: Normal rate and regular rhythm.      Pulses: Normal pulses.      Heart sounds: Normal heart sounds. No murmur heard.  Pulmonary:      Effort: Pulmonary effort is normal. No respiratory distress.      Breath sounds: Normal breath sounds. No wheezing or rhonchi.   Abdominal:      General: Abdomen is flat. There is no distension.      Palpations: Abdomen is soft.      Tenderness: There is no abdominal tenderness. There is no guarding.   Musculoskeletal:         General: No swelling.      Cervical back: Neck supple. No rigidity.      Right lower leg: No edema.      Left lower leg: No edema.   Lymphadenopathy:      Cervical: No cervical adenopathy.   Skin:     General: Skin is warm and dry.      Capillary Refill: Capillary refill takes less than 2 seconds.      Coloration: Skin is not jaundiced.      Findings: No rash.   Neurological:      General: No focal deficit present.      Mental Status: She is alert and oriented to person, place, and time. Mental status is at baseline.   Psychiatric:         Mood and Affect: Mood normal.         Behavior: Behavior normal.         ED Medications  Medications   albuterol (PROVENTIL HFA,VENTOLIN HFA) inhaler 2 puff (2 puffs Inhalation Given 2/24/24 1527)       Diagnostic Studies  Results Reviewed       Procedure Component Value Units Date/Time    FLU/RSV/COVID - if FLU/RSV clinically relevant [[id number]]  (Abnormal) Collected: 02/24/24 1527    Lab Status: Final result Specimen: Nares from Nose Updated: 02/24/24 1724     SARS-CoV-2 Negative     INFLUENZA A PCR Negative     INFLUENZA B PCR Positive     RSV PCR Negative    Narrative:      FOR PEDIATRIC PATIENTS - copy/paste COVID Guidelines URL to browser: [email or web address]    SARS-CoV-2 assay is a Nucleic Acid Amplification assay intended for the  qualitative detection of nucleic acid from SARS-CoV-2 in nasopharyngeal  swabs. Results are for the presumptive identification  of SARS-CoV-2 RNA.    Positive results are indicative of infection with SARS-CoV-2, the virus  causing COVID-19, but do not rule out bacterial infection or co-infection  with other viruses. Laboratories within the [place] and its  territories are required to report all positive results to the appropriate  public health authorities. Negative results do not preclude SARS-CoV-2  infection and should not be used as the sole basis for treatment or other  patient management decisions. Negative results must be combined with  clinical observations, patient history, and epidemiological information.  This test has not been FDA cleared or approved.    This test has been authorized by FDA under an Emergency Use Authorization  (EUA). This test is only authorized for the duration of time the  declaration that circumstances exist justifying the authorization of the  emergency use of an in vitro diagnostic tests for detection of SARS-CoV-2  virus and/or diagnosis of COVID-19 infection under section 564(b)(1) of  the Act, 21 U.S.C. 360bbb-3(b)(1), unless the authorization is terminated  or revoked sooner. The test has been validated but independent review by FDA  and CLIA is pending.    Test performed using [employer] GeneXpert: This RT-PCR assay targets N2,  a region unique to SARS-CoV-2. A conserved region in the E-gene was chosen  for pan-Sarbecovirus detection which includes SARS-CoV-2.    According to CMS-2020-01-R, this platform meets the definition of high-throughput technology.                   No orders to display         Procedures  Procedures    ECG interpreted by me.  Date: 2/24/2024.  Time: 1525.  Rate: 50 bpm.  Axis: Normal.  Rhythm: Regular.  Intervals are within normal limits.  Axis is normal.  Interpretation: Mild sinus arrhythmia, otherwise sinus bradycardia with possible incomplete right bundle branch block.  This is a borderline abnormal ECG similar to prior from 1/19/2023.   ED Course                              SBIRT 20yo+      Flowsheet Row Most Recent Value   Initial Alcohol Screen: US AUDIT-C     1. How often do you have a drink containing alcohol? 0 Filed at: 02/24/2024 1431   2. How many drinks containing alcohol do you have on a typical day you are drinking?  0 Filed at: 02/24/2024 1431   3a. Male UNDER 65: How often do you have five or more drinks on one occasion? 0 Filed at: 02/24/2024 1431   3b. FEMALE Any Age, or MALE 65+: How often do you have 4 or more drinks on one occassion? 0 Filed at: 02/24/2024 1431   Audit-C Score 0 Filed at: 02/24/2024 1431                  Medical Decision Making  Medical complexity: This patient is presenting with likely viral illness, suspect flu as there is a positive home contact.  Do not suspect strep throat as patient's examination is fairly benign otherwise.  Patient symptomatically may be having an asthma exacerbation, does not have rescue inhaler at home.  Will treat symptomatically with albuterol inhaler here in the emergency department.  Will screen with ECG for any sign of arrhythmia, myocarditis/pericarditis, or ischemia is less likely causes of her shortness of breath and chest tightness.    Reassessment/disposition: Patient has stable examination with stable vital signs, ECG more or less similar to prior.  No signs of ischemia, myocarditis, pericarditis, or arrhythmia.  Therefore will advise supportive care instructions at home, patient will return to the emergency department if she has severe shortness of breath or worsening chest pain.  Otherwise will follow-up with her PCP.  Prescription sent for home inhalers.    Risk  Prescription drug management.          Disposition  Final diagnoses:   Viral syndrome   Asthma     Time reflects when diagnosis was documented in both MDM as applicable and the Disposition within this note       Time User Action Codes Description Comment    2/24/2024  3:25 PM Bruno Lucas Add [B34.9] Viral syndrome      2/24/2024  3:25 PM Bruno Lucas Add [J45.909] Asthma     2/24/2024  3:26 PM Bruno Lucas Add [J06.9] URI (upper respiratory infection)           ED Disposition       ED Disposition   Discharge    Condition   --    Date/Time   Sat Feb 24, 2024 1640    Comment   Isidra Chamberlain discharge to home/self care.                   Follow-up Information       Follow up With Specialties Details Why Contact Info Additional Information    Edgar Cardenas MD Family Medicine   [address] [place] [id number] [phone]       [facility] Emergency Department Emergency Medicine Go to  If symptoms worsen, As needed [address] [facility] [id number] [phone] Formerly [facility] Emergency Department, [address] [place] [address], [id number] [phone]            Discharge Medication List as of 2/24/2024  3:37 PM        CONTINUE these medications which have CHANGED    Details   albuterol (PROVENTIL HFA,VENTOLIN HFA) 90 mcg/act inhaler Inhale 2 puffs every 6 (six) hours as needed for wheezing, Starting Sat 2/24/2024, Normal           CONTINUE these medications which have NOT CHANGED    Details   acetaminophen (TYLENOL) 500 mg tablet Take 500 mg by mouth every 6 (six) hours as needed for mild pain, Historical Med      amLODIPine (NORVASC) 5 mg tablet TAKE 1 TABLET (5 MG TOTAL) BY MOUTH DAILY., Starting Wed 11/1/2023, Normal      [facility] Vitamin C 500 MG tablet Starting Fri 9/29/2023, Historical Med      diphenhydrAMINE (BENADRYL) 25 mg capsule Take 25 mg by mouth every 6 (six) hours as needed, Starting Mon 10/23/2023, Until Thu 11/2/2023 at 2359, Historical Med      escitalopram (LEXAPRO) 20 mg tablet Take 0.5 tablets (10 mg total) by mouth daily, Starting Tue 9/1/2020, Print      famotidine (PEPCID) 20 mg tablet TAKE 1 TABLET BY MOUTH TWICE A DAY, Starting Wed 8/30/2023, Normal      ferrous sulfate 325 (65 Fe) mg tablet Take 1 tablet by mouth daily with breakfast,  Starting Fri 9/29/2023, Historical Med      fluticasone (FLONASE) 50 mcg/act nasal spray 1 spray into each nostril daily, Starting Thu 1/19/2023, Normal      hydrocortisone 1 % cream Apply topically 4 (four) times a day as needed for irritation, Starting Mon 7/31/2023, Normal      methocarbamol (Robaxin-750) 750 mg tablet Take 1 tablet (750 mg total) by mouth every 8 (eight) hours as needed for muscle spasms, Starting Tue 10/10/2023, Normal      methylPREDNISolone 4 MG tablet therapy pack Use as directed on package, Normal      OneTouch Verio test strip USE TO TEST BLOOD SUGAR 4-5 TIMES DAILY, Historical Med      oxyCODONE (Roxicodone) 5 immediate release tablet Take 1 tablet (5 mg total) by mouth every 8 (eight) hours as needed for moderate pain Max Daily Amount: 15 mg, Starting Thu 2/22/2024, Normal      predniSONE 20 mg tablet 2 tabs daily X 3 days, 1 tab daily X 3 days, 1/2 tab daily X 4 days, Normal           No discharge procedures on file.    PDMP Review         Value Time User    PDMP Reviewed  Yes 8/9/2021  1:53 PM ROSHNI Childers             ED Provider  Attending physically available and evaluated Isidra Chamberlain. I managed the patient along with the ED Attending.    Electronically Signed by           Bruno Lucas MD  02/24/24 8178

## 2024-07-05 ENCOUNTER — HOSPITAL ENCOUNTER (INPATIENT)
Facility: HOSPITAL | Age: 39
LOS: 8 days | Discharge: HOME | DRG: 560 | End: 2024-07-13
Attending: OBSTETRICS & GYNECOLOGY | Admitting: OBSTETRICS & GYNECOLOGY
Payer: COMMERCIAL

## 2024-07-05 DIAGNOSIS — Z3A.27 27 WEEKS GESTATION OF PREGNANCY: ICD-10-CM

## 2024-07-05 DIAGNOSIS — O14.93 PRE-ECLAMPSIA IN THIRD TRIMESTER: ICD-10-CM

## 2024-07-05 DIAGNOSIS — R00.1 BRADYCARDIA: ICD-10-CM

## 2024-07-05 DIAGNOSIS — O46.90 VAGINAL BLEEDING DURING PREGNANCY: ICD-10-CM

## 2024-07-05 PROBLEM — Z30.2 REQUEST FOR STERILIZATION: Status: ACTIVE | Noted: 2024-07-05

## 2024-07-05 PROBLEM — O45.92 PLACENTAL ABRUPTION, SECOND TRIMESTER: Status: ACTIVE | Noted: 2024-07-05

## 2024-07-05 PROBLEM — O99.519 ASTHMA DURING PREGNANCY: Status: ACTIVE | Noted: 2024-07-05

## 2024-07-05 PROBLEM — R03.0 ELEVATED BLOOD PRESSURE READING WITHOUT DIAGNOSIS OF HYPERTENSION: Status: RESOLVED | Noted: 2023-07-27 | Resolved: 2024-07-05

## 2024-07-05 PROBLEM — K21.9 GASTROESOPHAGEAL REFLUX DISEASE WITHOUT ESOPHAGITIS: Status: RESOLVED | Noted: 2023-08-17 | Resolved: 2024-07-05

## 2024-07-05 PROBLEM — Z86.32 HISTORY OF DIET CONTROLLED GESTATIONAL DIABETES MELLITUS (GDM): Status: ACTIVE | Noted: 2024-07-05

## 2024-07-05 PROBLEM — O47.00 PRETERM UTERINE CONTRACTIONS: Status: ACTIVE | Noted: 2024-07-05

## 2024-07-05 PROBLEM — N93.9 VAGINAL BLEEDING: Status: ACTIVE | Noted: 2024-07-05

## 2024-07-05 PROBLEM — R10.30 LOWER ABDOMINAL PAIN: Status: RESOLVED | Noted: 2023-07-30 | Resolved: 2024-07-05

## 2024-07-05 PROBLEM — O13.2 GESTATIONAL HYPERTENSION, SECOND TRIMESTER: Status: ACTIVE | Noted: 2024-07-05

## 2024-07-05 PROBLEM — O14.90 PREECLAMPSIA: Status: ACTIVE | Noted: 2024-07-05

## 2024-07-05 PROBLEM — F12.90 MARIJUANA USE: Status: ACTIVE | Noted: 2024-07-05

## 2024-07-05 PROBLEM — M79.605 PAIN OF LEFT LOWER EXTREMITY: Status: RESOLVED | Noted: 2021-11-04 | Resolved: 2024-07-05

## 2024-07-05 PROBLEM — L03.115 CELLULITIS OF RIGHT LOWER EXTREMITY: Status: RESOLVED | Noted: 2018-03-06 | Resolved: 2024-07-05

## 2024-07-05 PROBLEM — N93.9 VAGINAL BLEEDING: Chronic | Status: ACTIVE | Noted: 2024-07-05

## 2024-07-05 PROBLEM — O47.03 PRETERM UTERINE CONTRACTIONS IN THIRD TRIMESTER, ANTEPARTUM: Status: RESOLVED | Noted: 2023-07-27 | Resolved: 2024-07-05

## 2024-07-05 PROBLEM — O99.012 ANEMIA AFFECTING PREGNANCY IN SECOND TRIMESTER: Status: ACTIVE | Noted: 2024-07-05

## 2024-07-05 PROBLEM — Z87.51 HISTORY OF PRETERM DELIVERY: Status: ACTIVE | Noted: 2024-07-05

## 2024-07-05 PROBLEM — O46.92 SECOND TRIMESTER BLEEDING: Status: ACTIVE | Noted: 2024-07-05

## 2024-07-05 PROBLEM — O09.299 HX OF PREECLAMPSIA, PRIOR PREGNANCY, CURRENTLY PREGNANT: Status: RESOLVED | Noted: 2024-07-05 | Resolved: 2024-07-05

## 2024-07-05 PROBLEM — O47.00 PRETERM UTERINE CONTRACTIONS: Chronic | Status: ACTIVE | Noted: 2024-07-05

## 2024-07-05 PROBLEM — O99.019 ANEMIA AFFECTING PREGNANCY: Status: RESOLVED | Noted: 2023-07-27 | Resolved: 2024-07-05

## 2024-07-05 PROBLEM — J45.909 ASTHMA DURING PREGNANCY: Status: ACTIVE | Noted: 2024-07-05

## 2024-07-05 PROBLEM — O09.299 HX OF PREECLAMPSIA, PRIOR PREGNANCY, CURRENTLY PREGNANT: Status: ACTIVE | Noted: 2024-07-05

## 2024-07-05 PROBLEM — R20.2 PARESTHESIA: Status: RESOLVED | Noted: 2020-06-24 | Resolved: 2024-07-05

## 2024-07-05 LAB
ABO GROUP BLD BPU: NORMAL
ABO GROUP BLD: NORMAL
ALBUMIN SERPL BCG-MCNC: 3.2 G/DL (ref 3.5–5)
ALBUMIN SERPL BCG-MCNC: 3.6 G/DL (ref 3.5–5)
ALBUMIN SERPL BCG-MCNC: 3.7 G/DL (ref 3.5–5)
ALBUMIN SERPL BCG-MCNC: 3.8 G/DL (ref 3.5–5)
ALBUMIN SERPL BCG-MCNC: 3.9 G/DL (ref 3.5–5)
ALP SERPL-CCNC: 62 U/L (ref 34–104)
ALP SERPL-CCNC: 64 U/L (ref 34–104)
ALP SERPL-CCNC: 66 U/L (ref 34–104)
ALP SERPL-CCNC: 67 U/L (ref 34–104)
ALP SERPL-CCNC: 71 U/L (ref 34–104)
ALT SERPL W P-5'-P-CCNC: 7 U/L (ref 7–52)
ALT SERPL W P-5'-P-CCNC: 8 U/L (ref 7–52)
ALT SERPL W P-5'-P-CCNC: 8 U/L (ref 7–52)
ALT SERPL W P-5'-P-CCNC: 9 U/L (ref 7–52)
ALT SERPL W P-5'-P-CCNC: 9 U/L (ref 7–52)
AMPHETAMINES SERPL QL SCN: POSITIVE
ANION GAP SERPL CALCULATED.3IONS-SCNC: 13 MMOL/L (ref 4–13)
ANION GAP SERPL CALCULATED.3IONS-SCNC: 14 MMOL/L (ref 4–13)
ANION GAP SERPL CALCULATED.3IONS-SCNC: 15 MMOL/L (ref 4–13)
ANION GAP SERPL CALCULATED.3IONS-SCNC: 6 MMOL/L (ref 4–13)
ANION GAP SERPL CALCULATED.3IONS-SCNC: 8 MMOL/L (ref 4–13)
APTT PPP: 24 SECONDS (ref 23–37)
APTT PPP: 24 SECONDS (ref 23–37)
APTT PPP: 26 SECONDS (ref 23–37)
AST SERPL W P-5'-P-CCNC: 15 U/L (ref 13–39)
AST SERPL W P-5'-P-CCNC: 17 U/L (ref 13–39)
AST SERPL W P-5'-P-CCNC: 18 U/L (ref 13–39)
AST SERPL W P-5'-P-CCNC: 19 U/L (ref 13–39)
AST SERPL W P-5'-P-CCNC: 21 U/L (ref 13–39)
BACTERIA UR QL AUTO: ABNORMAL /HPF
BARBITURATES UR QL: NEGATIVE
BASOPHILS # BLD AUTO: 0.01 THOUSANDS/ÂΜL (ref 0–0.1)
BASOPHILS # BLD AUTO: 0.01 THOUSANDS/ÂΜL (ref 0–0.1)
BASOPHILS # BLD AUTO: 0.02 THOUSANDS/ÂΜL (ref 0–0.1)
BASOPHILS # BLD MANUAL: 0 THOUSAND/UL (ref 0–0.1)
BASOPHILS NFR BLD AUTO: 0 % (ref 0–1)
BASOPHILS NFR MAR MANUAL: 0 % (ref 0–1)
BENZODIAZ UR QL: NEGATIVE
BILIRUB SERPL-MCNC: 0.29 MG/DL (ref 0.2–1)
BILIRUB SERPL-MCNC: 0.49 MG/DL (ref 0.2–1)
BILIRUB SERPL-MCNC: 0.49 MG/DL (ref 0.2–1)
BILIRUB SERPL-MCNC: 0.53 MG/DL (ref 0.2–1)
BILIRUB SERPL-MCNC: 0.54 MG/DL (ref 0.2–1)
BILIRUB UR QL STRIP: NEGATIVE
BLD GP AB SCN SERPL QL: NEGATIVE
BPU ID: NORMAL
BUN SERPL-MCNC: 7 MG/DL (ref 5–25)
BUN SERPL-MCNC: 7 MG/DL (ref 5–25)
BUN SERPL-MCNC: 8 MG/DL (ref 5–25)
BUN SERPL-MCNC: 8 MG/DL (ref 5–25)
BUN SERPL-MCNC: 9 MG/DL (ref 5–25)
C TRACH DNA SPEC QL NAA+PROBE: NEGATIVE
CALCIUM ALBUM COR SERPL-MCNC: 8.6 MG/DL (ref 8.3–10.1)
CALCIUM SERPL-MCNC: 6.6 MG/DL (ref 8.4–10.2)
CALCIUM SERPL-MCNC: 6.7 MG/DL (ref 8.4–10.2)
CALCIUM SERPL-MCNC: 7.3 MG/DL (ref 8.4–10.2)
CALCIUM SERPL-MCNC: 7.4 MG/DL (ref 8.4–10.2)
CALCIUM SERPL-MCNC: 8 MG/DL (ref 8.4–10.2)
CHLORIDE SERPL-SCNC: 102 MMOL/L (ref 96–108)
CHLORIDE SERPL-SCNC: 104 MMOL/L (ref 96–108)
CHLORIDE SERPL-SCNC: 105 MMOL/L (ref 96–108)
CHLORIDE SERPL-SCNC: 99 MMOL/L (ref 96–108)
CHLORIDE SERPL-SCNC: 99 MMOL/L (ref 96–108)
CLARITY UR: ABNORMAL
CO2 SERPL-SCNC: 16 MMOL/L (ref 21–32)
CO2 SERPL-SCNC: 16 MMOL/L (ref 21–32)
CO2 SERPL-SCNC: 20 MMOL/L (ref 21–32)
CO2 SERPL-SCNC: 21 MMOL/L (ref 21–32)
CO2 SERPL-SCNC: 21 MMOL/L (ref 21–32)
COCAINE UR QL: NEGATIVE
COLOR UR: ABNORMAL
CREAT SERPL-MCNC: 0.62 MG/DL (ref 0.6–1.3)
CREAT SERPL-MCNC: 0.65 MG/DL (ref 0.6–1.3)
CREAT SERPL-MCNC: 0.67 MG/DL (ref 0.6–1.3)
CREAT SERPL-MCNC: 0.7 MG/DL (ref 0.6–1.3)
CREAT SERPL-MCNC: 0.85 MG/DL (ref 0.6–1.3)
CREAT UR-MCNC: 25 MG/DL
CREAT UR-MCNC: 9.3 MG/DL
EOSINOPHIL # BLD AUTO: 0.01 THOUSAND/ÂΜL (ref 0–0.61)
EOSINOPHIL # BLD MANUAL: 0 THOUSAND/UL (ref 0–0.4)
EOSINOPHIL NFR BLD AUTO: 0 % (ref 0–6)
EOSINOPHIL NFR BLD MANUAL: 0 % (ref 0–6)
ERYTHROCYTE [DISTWIDTH] IN BLOOD BY AUTOMATED COUNT: 13.1 % (ref 11.6–15.1)
ERYTHROCYTE [DISTWIDTH] IN BLOOD BY AUTOMATED COUNT: 13.2 % (ref 11.6–15.1)
ERYTHROCYTE [DISTWIDTH] IN BLOOD BY AUTOMATED COUNT: 13.3 % (ref 11.6–15.1)
ERYTHROCYTE [DISTWIDTH] IN BLOOD BY AUTOMATED COUNT: 13.5 % (ref 11.6–15.1)
FENTANYL UR QL SCN: NEGATIVE
FIBRINOGEN PPP-MCNC: 278 MG/DL (ref 207–520)
FIBRINOGEN PPP-MCNC: 302 MG/DL (ref 207–520)
FIBRINOGEN PPP-MCNC: 347 MG/DL (ref 207–520)
FIBRINOGEN PPP-MCNC: 361 MG/DL (ref 207–520)
FIBRINOGEN PPP-MCNC: 377 MG/DL (ref 207–520)
FIBRONECTIN FETAL VAG QL: POSITIVE
GFR SERPL CREATININE-BSD FRML MDRD: 110 ML/MIN/1.73SQ M
GFR SERPL CREATININE-BSD FRML MDRD: 111 ML/MIN/1.73SQ M
GFR SERPL CREATININE-BSD FRML MDRD: 112 ML/MIN/1.73SQ M
GFR SERPL CREATININE-BSD FRML MDRD: 114 ML/MIN/1.73SQ M
GFR SERPL CREATININE-BSD FRML MDRD: 87 ML/MIN/1.73SQ M
GLUCOSE P FAST SERPL-MCNC: 119 MG/DL (ref 65–99)
GLUCOSE P FAST SERPL-MCNC: 130 MG/DL (ref 65–99)
GLUCOSE SERPL-MCNC: 104 MG/DL (ref 65–140)
GLUCOSE SERPL-MCNC: 110 MG/DL (ref 65–140)
GLUCOSE SERPL-MCNC: 119 MG/DL (ref 65–140)
GLUCOSE SERPL-MCNC: 119 MG/DL (ref 65–140)
GLUCOSE SERPL-MCNC: 130 MG/DL (ref 65–140)
GLUCOSE SERPL-MCNC: 99 MG/DL (ref 65–140)
GLUCOSE UR STRIP-MCNC: NEGATIVE MG/DL
HBV SURFACE AB SER-ACNC: >500 MIU/ML
HBV SURFACE AG SER QL: NORMAL
HCT VFR BLD AUTO: 31 % (ref 34.8–46.1)
HCT VFR BLD AUTO: 31.4 % (ref 34.8–46.1)
HCT VFR BLD AUTO: 31.7 % (ref 34.8–46.1)
HCT VFR BLD AUTO: 32.7 % (ref 34.8–46.1)
HCT VFR BLD AUTO: 33.8 % (ref 34.8–46.1)
HCT VFR BLD AUTO: 34.3 % (ref 34.8–46.1)
HCV AB SER QL: NORMAL
HGB BLD-MCNC: 10 G/DL (ref 11.5–15.4)
HGB BLD-MCNC: 10.5 G/DL (ref 11.5–15.4)
HGB BLD-MCNC: 10.7 G/DL (ref 11.5–15.4)
HGB BLD-MCNC: 11.1 G/DL (ref 11.5–15.4)
HGB BLD-MCNC: 11.1 G/DL (ref 11.5–15.4)
HGB BLD-MCNC: 11.4 G/DL (ref 11.5–15.4)
HGB UR QL STRIP.AUTO: ABNORMAL
HIV 1+2 AB+HIV1 P24 AG SERPL QL IA: NORMAL
HIV 2 AB SERPL QL IA: NORMAL
HIV1 AB SERPL QL IA: NORMAL
HIV1 P24 AG SERPL QL IA: NORMAL
HYDROCODONE UR QL SCN: NEGATIVE
IMM GRANULOCYTES # BLD AUTO: 0.09 THOUSAND/UL (ref 0–0.2)
IMM GRANULOCYTES # BLD AUTO: 0.11 THOUSAND/UL (ref 0–0.2)
IMM GRANULOCYTES # BLD AUTO: 0.14 THOUSAND/UL (ref 0–0.2)
IMM GRANULOCYTES NFR BLD AUTO: 1 % (ref 0–2)
INR PPP: 0.95 (ref 0.84–1.19)
INR PPP: 0.96 (ref 0.84–1.19)
INR PPP: 0.98 (ref 0.84–1.19)
INR PPP: 0.98 (ref 0.84–1.19)
INR PPP: 1.05 (ref 0.84–1.19)
KETONES UR STRIP-MCNC: NEGATIVE MG/DL
LEUKOCYTE ESTERASE UR QL STRIP: ABNORMAL
LYMPHOCYTES # BLD AUTO: 1.01 THOUSANDS/ÂΜL (ref 0.6–4.47)
LYMPHOCYTES # BLD AUTO: 1.06 THOUSANDS/ÂΜL (ref 0.6–4.47)
LYMPHOCYTES # BLD AUTO: 1.13 THOUSAND/UL (ref 0.6–4.47)
LYMPHOCYTES # BLD AUTO: 1.19 THOUSANDS/ÂΜL (ref 0.6–4.47)
LYMPHOCYTES # BLD AUTO: 10 % (ref 14–44)
LYMPHOCYTES NFR BLD AUTO: 8 % (ref 14–44)
LYMPHOCYTES NFR BLD AUTO: 8 % (ref 14–44)
LYMPHOCYTES NFR BLD AUTO: 9 % (ref 14–44)
MAGNESIUM SERPL-MCNC: 6.8 MG/DL (ref 1.9–2.7)
MCH RBC QN AUTO: 29.7 PG (ref 26.8–34.3)
MCH RBC QN AUTO: 29.8 PG (ref 26.8–34.3)
MCH RBC QN AUTO: 30.1 PG (ref 26.8–34.3)
MCH RBC QN AUTO: 30.1 PG (ref 26.8–34.3)
MCH RBC QN AUTO: 30.3 PG (ref 26.8–34.3)
MCH RBC QN AUTO: 30.4 PG (ref 26.8–34.3)
MCHC RBC AUTO-ENTMCNC: 32.3 G/DL (ref 31.4–37.4)
MCHC RBC AUTO-ENTMCNC: 32.4 G/DL (ref 31.4–37.4)
MCHC RBC AUTO-ENTMCNC: 33.4 G/DL (ref 31.4–37.4)
MCHC RBC AUTO-ENTMCNC: 33.7 G/DL (ref 31.4–37.4)
MCHC RBC AUTO-ENTMCNC: 33.8 G/DL (ref 31.4–37.4)
MCHC RBC AUTO-ENTMCNC: 33.9 G/DL (ref 31.4–37.4)
MCV RBC AUTO: 89 FL (ref 82–98)
MCV RBC AUTO: 90 FL (ref 82–98)
MCV RBC AUTO: 92 FL (ref 82–98)
MCV RBC AUTO: 92 FL (ref 82–98)
METHADONE UR QL: NEGATIVE
MONOCYTES # BLD AUTO: 0.11 THOUSAND/UL (ref 0–1.22)
MONOCYTES # BLD AUTO: 0.27 THOUSAND/ÂΜL (ref 0.17–1.22)
MONOCYTES # BLD AUTO: 0.58 THOUSAND/ÂΜL (ref 0.17–1.22)
MONOCYTES # BLD AUTO: 0.8 THOUSAND/ÂΜL (ref 0.17–1.22)
MONOCYTES NFR BLD AUTO: 2 % (ref 4–12)
MONOCYTES NFR BLD AUTO: 4 % (ref 4–12)
MONOCYTES NFR BLD AUTO: 6 % (ref 4–12)
MONOCYTES NFR BLD: 1 % (ref 4–12)
MUCOUS THREADS UR QL AUTO: ABNORMAL
N GONORRHOEA DNA SPEC QL NAA+PROBE: NEGATIVE
NEUTROPHILS # BLD AUTO: 10.85 THOUSANDS/ÂΜL (ref 1.85–7.62)
NEUTROPHILS # BLD AUTO: 11.72 THOUSANDS/ÂΜL (ref 1.85–7.62)
NEUTROPHILS # BLD AUTO: 11.88 THOUSANDS/ÂΜL (ref 1.85–7.62)
NEUTROPHILS # BLD MANUAL: 10.04 THOUSAND/UL (ref 1.85–7.62)
NEUTS BAND NFR BLD MANUAL: 1 % (ref 0–8)
NEUTS SEG NFR BLD AUTO: 84 % (ref 43–75)
NEUTS SEG NFR BLD AUTO: 87 % (ref 43–75)
NEUTS SEG NFR BLD AUTO: 88 % (ref 43–75)
NEUTS SEG NFR BLD AUTO: 89 % (ref 43–75)
NITRITE UR QL STRIP: NEGATIVE
NON-SQ EPI CELLS URNS QL MICRO: ABNORMAL /HPF
NRBC BLD AUTO-RTO: 0 /100 WBCS
OPIATES UR QL SCN: NEGATIVE
OXYCODONE+OXYMORPHONE UR QL SCN: NEGATIVE
PCP UR QL: NEGATIVE
PH UR STRIP.AUTO: 7 [PH]
PLATELET # BLD AUTO: 160 THOUSANDS/UL (ref 149–390)
PLATELET # BLD AUTO: 171 THOUSANDS/UL (ref 149–390)
PLATELET # BLD AUTO: 175 THOUSANDS/UL (ref 149–390)
PLATELET # BLD AUTO: 190 THOUSANDS/UL (ref 149–390)
PLATELET # BLD AUTO: 193 THOUSANDS/UL (ref 149–390)
PLATELET # BLD AUTO: 196 THOUSANDS/UL (ref 149–390)
PLATELET BLD QL SMEAR: ADEQUATE
PMV BLD AUTO: 10 FL (ref 8.9–12.7)
PMV BLD AUTO: 10.1 FL (ref 8.9–12.7)
PMV BLD AUTO: 10.2 FL (ref 8.9–12.7)
PMV BLD AUTO: 10.4 FL (ref 8.9–12.7)
POTASSIUM SERPL-SCNC: 3.5 MMOL/L (ref 3.5–5.3)
POTASSIUM SERPL-SCNC: 3.7 MMOL/L (ref 3.5–5.3)
POTASSIUM SERPL-SCNC: 3.9 MMOL/L (ref 3.5–5.3)
POTASSIUM SERPL-SCNC: 4 MMOL/L (ref 3.5–5.3)
POTASSIUM SERPL-SCNC: 4.3 MMOL/L (ref 3.5–5.3)
PROT SERPL-MCNC: 5.8 G/DL (ref 6.4–8.4)
PROT SERPL-MCNC: 6.6 G/DL (ref 6.4–8.4)
PROT SERPL-MCNC: 7 G/DL (ref 6.4–8.4)
PROT SERPL-MCNC: 7 G/DL (ref 6.4–8.4)
PROT SERPL-MCNC: 7.1 G/DL (ref 6.4–8.4)
PROT UR STRIP-MCNC: ABNORMAL MG/DL
PROT UR-MCNC: 245 MG/DL
PROT UR-MCNC: 6 MG/DL
PROT/CREAT UR: 0.65 MG/G{CREAT} (ref 0–0.1)
PROT/CREAT UR: 9.8 MG/G{CREAT} (ref 0–0.1)
PROTHROMBIN TIME: 13.2 SECONDS (ref 11.6–14.5)
PROTHROMBIN TIME: 13.4 SECONDS (ref 11.6–14.5)
PROTHROMBIN TIME: 13.5 SECONDS (ref 11.6–14.5)
PROTHROMBIN TIME: 13.6 SECONDS (ref 11.6–14.5)
PROTHROMBIN TIME: 14.3 SECONDS (ref 11.6–14.5)
RBC # BLD AUTO: 3.36 MILLION/UL (ref 3.81–5.12)
RBC # BLD AUTO: 3.49 MILLION/UL (ref 3.81–5.12)
RBC # BLD AUTO: 3.53 MILLION/UL (ref 3.81–5.12)
RBC # BLD AUTO: 3.69 MILLION/UL (ref 3.81–5.12)
RBC # BLD AUTO: 3.74 MILLION/UL (ref 3.81–5.12)
RBC # BLD AUTO: 3.75 MILLION/UL (ref 3.81–5.12)
RBC #/AREA URNS AUTO: ABNORMAL /HPF
RBC MORPH BLD: NORMAL
RH BLD: POSITIVE
RUBV IGG SERPL IA-ACNC: 44.9 IU/ML
SODIUM SERPL-SCNC: 129 MMOL/L (ref 135–147)
SODIUM SERPL-SCNC: 130 MMOL/L (ref 135–147)
SODIUM SERPL-SCNC: 132 MMOL/L (ref 135–147)
SODIUM SERPL-SCNC: 133 MMOL/L (ref 135–147)
SODIUM SERPL-SCNC: 135 MMOL/L (ref 135–147)
SP GR UR STRIP.AUTO: 1.01 (ref 1–1.03)
SPECIMEN EXPIRATION DATE: NORMAL
THC UR QL: POSITIVE
TREPONEMA PALLIDUM IGG+IGM AB [PRESENCE] IN SERUM OR PLASMA BY IMMUNOASSAY: NORMAL
UNIT DISPENSE STATUS: NORMAL
UNIT PRODUCT CODE: NORMAL
UNIT PRODUCT VOLUME: 280 ML
UNIT RH: NORMAL
UROBILINOGEN UR STRIP-ACNC: <2 MG/DL
VZV IGG SER QL IA: ABNORMAL
WBC # BLD AUTO: 11.28 THOUSAND/UL (ref 4.31–10.16)
WBC # BLD AUTO: 12.24 THOUSAND/UL (ref 4.31–10.16)
WBC # BLD AUTO: 12.29 THOUSAND/UL (ref 4.31–10.16)
WBC # BLD AUTO: 13.68 THOUSAND/UL (ref 4.31–10.16)
WBC # BLD AUTO: 13.85 THOUSAND/UL (ref 4.31–10.16)
WBC # BLD AUTO: 8.67 THOUSAND/UL (ref 4.31–10.16)
WBC #/AREA URNS AUTO: ABNORMAL /HPF

## 2024-07-05 PROCEDURE — 85610 PROTHROMBIN TIME: CPT

## 2024-07-05 PROCEDURE — 86787 VARICELLA-ZOSTER ANTIBODY: CPT

## 2024-07-05 PROCEDURE — 99254 IP/OBS CNSLTJ NEW/EST MOD 60: CPT | Performed by: OBSTETRICS & GYNECOLOGY

## 2024-07-05 PROCEDURE — 82731 ASSAY OF FETAL FIBRONECTIN: CPT

## 2024-07-05 PROCEDURE — 87086 URINE CULTURE/COLONY COUNT: CPT

## 2024-07-05 PROCEDURE — 76816 OB US FOLLOW-UP PER FETUS: CPT | Performed by: OBSTETRICS & GYNECOLOGY

## 2024-07-05 PROCEDURE — NC001 PR NO CHARGE: Performed by: STUDENT IN AN ORGANIZED HEALTH CARE EDUCATION/TRAINING PROGRAM

## 2024-07-05 PROCEDURE — 76815 OB US LIMITED FETUS(S): CPT

## 2024-07-05 PROCEDURE — 84156 ASSAY OF PROTEIN URINE: CPT

## 2024-07-05 PROCEDURE — 80053 COMPREHEN METABOLIC PANEL: CPT

## 2024-07-05 PROCEDURE — NC001 PR NO CHARGE: Performed by: OBSTETRICS & GYNECOLOGY

## 2024-07-05 PROCEDURE — P9017 PLASMA 1 DONOR FRZ W/IN 8 HR: HCPCS

## 2024-07-05 PROCEDURE — 86923 COMPATIBILITY TEST ELECTRIC: CPT

## 2024-07-05 PROCEDURE — 85730 THROMBOPLASTIN TIME PARTIAL: CPT

## 2024-07-05 PROCEDURE — 87340 HEPATITIS B SURFACE AG IA: CPT

## 2024-07-05 PROCEDURE — 82570 ASSAY OF URINE CREATININE: CPT

## 2024-07-05 PROCEDURE — 76817 TRANSVAGINAL US OBSTETRIC: CPT | Performed by: OBSTETRICS & GYNECOLOGY

## 2024-07-05 PROCEDURE — 86850 RBC ANTIBODY SCREEN: CPT

## 2024-07-05 PROCEDURE — 30233M1 TRANSFUSION OF NONAUTOLOGOUS PLASMA CRYOPRECIPITATE INTO PERIPHERAL VEIN, PERCUTANEOUS APPROACH: ICD-10-PCS | Performed by: STUDENT IN AN ORGANIZED HEALTH CARE EDUCATION/TRAINING PROGRAM

## 2024-07-05 PROCEDURE — 59025 FETAL NON-STRESS TEST: CPT | Performed by: OBSTETRICS & GYNECOLOGY

## 2024-07-05 PROCEDURE — 86780 TREPONEMA PALLIDUM: CPT

## 2024-07-05 PROCEDURE — 87591 N.GONORRHOEAE DNA AMP PROB: CPT

## 2024-07-05 PROCEDURE — 86803 HEPATITIS C AB TEST: CPT

## 2024-07-05 PROCEDURE — P9012 CRYOPRECIPITATE EACH UNIT: HCPCS

## 2024-07-05 PROCEDURE — 80307 DRUG TEST PRSMV CHEM ANLYZR: CPT

## 2024-07-05 PROCEDURE — 4A1HXCZ MONITORING OF PRODUCTS OF CONCEPTION, CARDIAC RATE, EXTERNAL APPROACH: ICD-10-PCS | Performed by: STUDENT IN AN ORGANIZED HEALTH CARE EDUCATION/TRAINING PROGRAM

## 2024-07-05 PROCEDURE — 85007 BL SMEAR W/DIFF WBC COUNT: CPT

## 2024-07-05 PROCEDURE — 85384 FIBRINOGEN ACTIVITY: CPT

## 2024-07-05 PROCEDURE — 83735 ASSAY OF MAGNESIUM: CPT

## 2024-07-05 PROCEDURE — P9016 RBC LEUKOCYTES REDUCED: HCPCS

## 2024-07-05 PROCEDURE — 85027 COMPLETE CBC AUTOMATED: CPT

## 2024-07-05 PROCEDURE — 76821 MIDDLE CEREBRAL ARTERY ECHO: CPT | Performed by: OBSTETRICS & GYNECOLOGY

## 2024-07-05 PROCEDURE — 86762 RUBELLA ANTIBODY: CPT

## 2024-07-05 PROCEDURE — 86706 HEP B SURFACE ANTIBODY: CPT

## 2024-07-05 PROCEDURE — 81001 URINALYSIS AUTO W/SCOPE: CPT

## 2024-07-05 PROCEDURE — 85025 COMPLETE CBC W/AUTO DIFF WBC: CPT

## 2024-07-05 PROCEDURE — 99204 OFFICE O/P NEW MOD 45 MIN: CPT

## 2024-07-05 PROCEDURE — 82948 REAGENT STRIP/BLOOD GLUCOSE: CPT

## 2024-07-05 PROCEDURE — 30233N1 TRANSFUSION OF NONAUTOLOGOUS RED BLOOD CELLS INTO PERIPHERAL VEIN, PERCUTANEOUS APPROACH: ICD-10-PCS | Performed by: STUDENT IN AN ORGANIZED HEALTH CARE EDUCATION/TRAINING PROGRAM

## 2024-07-05 PROCEDURE — 86900 BLOOD TYPING SEROLOGIC ABO: CPT

## 2024-07-05 PROCEDURE — 86901 BLOOD TYPING SEROLOGIC RH(D): CPT

## 2024-07-05 PROCEDURE — 76817 TRANSVAGINAL US OBSTETRIC: CPT

## 2024-07-05 PROCEDURE — 87389 HIV-1 AG W/HIV-1&-2 AB AG IA: CPT

## 2024-07-05 PROCEDURE — 87491 CHLMYD TRACH DNA AMP PROBE: CPT

## 2024-07-05 RX ORDER — MAGNESIUM SULFATE HEPTAHYDRATE 40 MG/ML
2 INJECTION, SOLUTION INTRAVENOUS ONCE
Status: COMPLETED | OUTPATIENT
Start: 2024-07-05 | End: 2024-07-05

## 2024-07-05 RX ORDER — MAGNESIUM SULFATE HEPTAHYDRATE 40 MG/ML
4 INJECTION, SOLUTION INTRAVENOUS ONCE
Status: COMPLETED | OUTPATIENT
Start: 2024-07-05 | End: 2024-07-05

## 2024-07-05 RX ORDER — CALCIUM GLUCONATE 94 MG/ML
1 INJECTION, SOLUTION INTRAVENOUS ONCE AS NEEDED
Status: DISCONTINUED | OUTPATIENT
Start: 2024-07-05 | End: 2024-07-11

## 2024-07-05 RX ORDER — SODIUM CHLORIDE, SODIUM LACTATE, POTASSIUM CHLORIDE, CALCIUM CHLORIDE 600; 310; 30; 20 MG/100ML; MG/100ML; MG/100ML; MG/100ML
125 INJECTION, SOLUTION INTRAVENOUS CONTINUOUS
Status: DISCONTINUED | OUTPATIENT
Start: 2024-07-05 | End: 2024-07-06

## 2024-07-05 RX ORDER — BETAMETHASONE SODIUM PHOSPHATE AND BETAMETHASONE ACETATE 3; 3 MG/ML; MG/ML
12 INJECTION, SUSPENSION INTRA-ARTICULAR; INTRALESIONAL; INTRAMUSCULAR; SOFT TISSUE EVERY 24 HOURS
Status: COMPLETED | OUTPATIENT
Start: 2024-07-05 | End: 2024-07-06

## 2024-07-05 RX ORDER — ONDANSETRON 2 MG/ML
4 INJECTION INTRAMUSCULAR; INTRAVENOUS EVERY 6 HOURS PRN
Status: DISCONTINUED | OUTPATIENT
Start: 2024-07-05 | End: 2024-07-11

## 2024-07-05 RX ORDER — ACETAMINOPHEN 325 MG/1
975 TABLET ORAL EVERY 8 HOURS PRN
Status: DISCONTINUED | OUTPATIENT
Start: 2024-07-05 | End: 2024-07-11

## 2024-07-05 RX ORDER — CALCIUM CARBONATE 500 MG/1
1000 TABLET, CHEWABLE ORAL 3 TIMES DAILY PRN
Status: DISCONTINUED | OUTPATIENT
Start: 2024-07-05 | End: 2024-07-11

## 2024-07-05 RX ORDER — MAGNESIUM SULFATE HEPTAHYDRATE 40 MG/ML
2 INJECTION, SOLUTION INTRAVENOUS CONTINUOUS
Status: DISCONTINUED | OUTPATIENT
Start: 2024-07-05 | End: 2024-07-05

## 2024-07-05 RX ORDER — MAGNESIUM SULFATE HEPTAHYDRATE 40 MG/ML
INJECTION, SOLUTION INTRAVENOUS
Status: COMPLETED
Start: 2024-07-05 | End: 2024-07-05

## 2024-07-05 RX ORDER — MAGNESIUM SULFATE HEPTAHYDRATE 40 MG/ML
2 INJECTION, SOLUTION INTRAVENOUS CONTINUOUS
Status: DISCONTINUED | OUTPATIENT
Start: 2024-07-05 | End: 2024-07-06

## 2024-07-05 RX ORDER — ALBUTEROL SULFATE 90 UG/1
2 AEROSOL, METERED RESPIRATORY (INHALATION) EVERY 4 HOURS PRN
Status: DISCONTINUED | OUTPATIENT
Start: 2024-07-05 | End: 2024-07-13 | Stop reason: HOSPADM

## 2024-07-05 RX ADMIN — ACETAMINOPHEN 975 MG: 325 TABLET, FILM COATED ORAL at 23:51

## 2024-07-05 RX ADMIN — MAGNESIUM SULFATE HEPTAHYDRATE 2 G: 40 INJECTION, SOLUTION INTRAVENOUS at 03:11

## 2024-07-05 RX ADMIN — Medication 2.5 MILLION UNITS: at 19:36

## 2024-07-05 RX ADMIN — SODIUM CHLORIDE, SODIUM LACTATE, POTASSIUM CHLORIDE, AND CALCIUM CHLORIDE 125 ML/HR: .6; .31; .03; .02 INJECTION, SOLUTION INTRAVENOUS at 11:26

## 2024-07-05 RX ADMIN — MAGNESIUM SULFATE HEPTAHYDRATE 2 G/HR: 40 INJECTION, SOLUTION INTRAVENOUS at 13:19

## 2024-07-05 RX ADMIN — SODIUM CHLORIDE, SODIUM LACTATE, POTASSIUM CHLORIDE, AND CALCIUM CHLORIDE 125 ML/HR: .6; .31; .03; .02 INJECTION, SOLUTION INTRAVENOUS at 02:45

## 2024-07-05 RX ADMIN — Medication 2.5 MILLION UNITS: at 15:35

## 2024-07-05 RX ADMIN — BETAMETHASONE SODIUM PHOSPHATE AND BETAMETHASONE ACETATE 12 MG: 3; 3 INJECTION, SUSPENSION INTRA-ARTICULAR; INTRALESIONAL; INTRAMUSCULAR at 03:10

## 2024-07-05 RX ADMIN — Medication 2.5 MILLION UNITS: at 07:44

## 2024-07-05 RX ADMIN — ACETAMINOPHEN 975 MG: 325 TABLET, FILM COATED ORAL at 14:32

## 2024-07-05 RX ADMIN — MAGNESIUM SULFATE HEPTAHYDRATE 4 G: 40 INJECTION, SOLUTION INTRAVENOUS at 02:52

## 2024-07-05 RX ADMIN — SODIUM CHLORIDE 50 ML/HR: 0.9 INJECTION, SOLUTION INTRAVENOUS at 21:00

## 2024-07-05 RX ADMIN — MAGNESIUM SULFATE HEPTAHYDRATE 2 G/HR: 40 INJECTION, SOLUTION INTRAVENOUS at 16:41

## 2024-07-05 RX ADMIN — MAGNESIUM SULFATE HEPTAHYDRATE 2 G/HR: 40 INJECTION, SOLUTION INTRAVENOUS at 03:21

## 2024-07-05 RX ADMIN — Medication 2.5 MILLION UNITS: at 23:55

## 2024-07-05 RX ADMIN — PENICILLIN G POTASSIUM 5 MILLION UNITS: 20000000 INJECTION, POWDER, FOR SOLUTION INTRAVENOUS at 03:08

## 2024-07-05 RX ADMIN — Medication 2.5 MILLION UNITS: at 11:26

## 2024-07-05 NOTE — PLAN OF CARE
Problem: ANTEPARTUM  Goal: Maintain pregnancy as long as maternal and/or fetal condition is stable  Description: INTERVENTIONS:  - Maternal surveillance  - Fetal surveillance  - Monitor uterine activity  - Medications as ordered  - Bedrest  Outcome: Progressing     Problem: PAIN - ADULT  Goal: Verbalizes/displays adequate comfort level or baseline comfort level  Description: Interventions:  - Encourage patient to monitor pain and request assistance  - Assess pain using appropriate pain scale  - Administer analgesics based on type and severity of pain and evaluate response  - Implement non-pharmacological measures as appropriate and evaluate response  - Consider cultural and social influences on pain and pain management  - Notify physician/advanced practitioner if interventions unsuccessful or patient reports new pain  Outcome: Progressing     Problem: INFECTION - ADULT  Goal: Absence or prevention of progression during hospitalization  Description: INTERVENTIONS:  - Assess and monitor for signs and symptoms of infection  - Monitor lab/diagnostic results  - Monitor all insertion sites, i.e. indwelling lines, tubes, and drains  - Monitor endotracheal if appropriate and nasal secretions for changes in amount and color  - Leland appropriate cooling/warming therapies per order  - Administer medications as ordered  - Instruct and encourage patient and family to use good hand hygiene technique  - Identify and instruct in appropriate isolation precautions for identified infection/condition  Outcome: Progressing  Goal: Absence of fever/infection during neutropenic period  Description: INTERVENTIONS:  - Monitor WBC    Outcome: Progressing     Problem: SAFETY ADULT  Goal: Patient will remain free of falls  Description: INTERVENTIONS:  - Educate patient/family on patient safety including physical limitations  - Instruct patient to call for assistance with activity   - Consult OT/PT to assist with strengthening/mobility   -  Keep Call bell within reach  - Keep bed low and locked with side rails adjusted as appropriate  - Keep care items and personal belongings within reach  - Initiate and maintain comfort rounds  - Make Fall Risk Sign visible to staff  - Apply yellow socks and bracelet for high fall risk patients  - Consider moving patient to room near nurses station  Outcome: Progressing  Goal: Maintain or return to baseline ADL function  Description: INTERVENTIONS:  -  Assess patient's ability to carry out ADLs; assess patient's baseline for ADL function and identify physical deficits which impact ability to perform ADLs (bathing, care of mouth/teeth, toileting, grooming, dressing, etc.)  - Assess/evaluate cause of self-care deficits   - Assess range of motion  - Assess patient's mobility; develop plan if impaired  - Assess patient's need for assistive devices and provide as appropriate  - Encourage maximum independence but intervene and supervise when necessary  - Involve family in performance of ADLs  - Assess for home care needs following discharge   - Consider OT consult to assist with ADL evaluation and planning for discharge  - Provide patient education as appropriate  Outcome: Progressing  Goal: Maintains/Returns to pre admission functional level  Description: INTERVENTIONS:  - Perform AM-PAC 6 Click Basic Mobility/ Daily Activity assessment daily.  - Set and communicate daily mobility goal to care team and patient/family/caregiver.   - Collaborate with rehabilitation services on mobility goals if consulted  - Out of bed for toileting  - Record patient progress and toleration of activity level   Outcome: Progressing     Problem: Knowledge Deficit  Goal: Patient/family/caregiver demonstrates understanding of disease process, treatment plan, medications, and discharge instructions  Description: Complete learning assessment and assess knowledge base.  Interventions:  - Provide teaching at level of understanding  - Provide teaching  via preferred learning methods  Outcome: Progressing     Problem: DISCHARGE PLANNING  Goal: Discharge to home or other facility with appropriate resources  Description: INTERVENTIONS:  - Identify barriers to discharge w/patient and caregiver  - Arrange for needed discharge resources and transportation as appropriate  - Identify discharge learning needs (meds, wound care, etc.)  - Arrange for interpretive services to assist at discharge as needed  - Refer to Case Management Department for coordinating discharge planning if the patient needs post-hospital services based on physician/advanced practitioner order or complex needs related to functional status, cognitive ability, or social support system  Outcome: Progressing

## 2024-07-05 NOTE — ASSESSMENT & PLAN NOTE
Lochia WNL   Recovering well   Appropriate bowel and bladder function   Pain well controlled   Tolerating diet   Ambulating without issues   No lower extremity tenderness  GBS pos, adequate treatment   Rh pos

## 2024-07-05 NOTE — ASSESSMENT & PLAN NOTE
Patient presented w/ heavy vaginal bleeding s/p intercourse w/ no known placenta/vasa previa  Reported passing several large clots PTA  Brought in by EMS and per their report was diaphoretic, bradycardic, & hypotensive en route and started on IVF  - These findings were not present on arrival to L&D triage  Speculum exam with large clot (~500 mL) and slow persistent trickle from the cervical os with no lacerations/injuries noted    Suspect evolving placental abruption and/or  labor as an etiology for her bleeding  High clinical concern for evolving coagulopathy in the setting of low-normal fibrinogen and ongoing bleeding  Pelvic imaging 24 with 9.8 x 4.4 x 10.8cm organized blood clot consistent with suspected placental abruption given acute onset of bleeding and abdominal pain without other source of vaginal bleeding  Fetal presentation breech    S/p 1u FFP, 1u pRBCs, 1u cryo  S/p NICU consultation  Betamethasone for fetal lung maturation -  Magnesium for fetal neuroprotection  PCN for GBS prophylaxis given unknown GBS status and  gestation  Q4h coagulations studies, CBC  Low threshold for classical  section in the event of maternal compromise with heavy vaginal bleeding, intractable abdominal pain, or need for additional blood products; or concern for fetal compromise with derangements in fetal heart tracing

## 2024-07-05 NOTE — CONSULTS
Consultation - Maternal Fetal Medicine   Isidra Chamberlain 38 y.o. female MRN: [id number]  Unit/Bed#: LD TRIAGE 3- Encounter: [id number]  Admit date: 2024.  Today's date: 24    Assessment & Plan   Ms. Chamberlain is a 38 y.o. year-old  at 27w1d, Hospital Day: 1, admitted for heavy vaginal bleeding concerning for possible placental abruption and/or  labor in the setting of  contractions and cervical dilation.  By issue:    * Vaginal bleeding  Assessment & Plan  Patient presents w/ heavy vaginal bleeding s/p intercourse w/ no known placenta/vasa previa  Reports passing several large clots PTA  Brought in by EMS and per their report was diaphoretic, bradycardic, & hypotensive en route and started on IVF  - These findings were not present on arrival to L&D triage  Speculum exam with large clot (~500 mL) and slow persistent trickle from the cervical os with no lacerations/injuries noted    Vitals notable for hypertension (no severe BP) and mild tachycardia (100s)  Hgb 10.0 on admission (down from 11.5 in May 2024)  Fibrinogen 287  PTT 26  PT/INR 13.2/0.95    Suspect evolving placental abruption and/or  labor as an etiology for her bleeding  High clinical concern for evolving coagulopathy in the setting of low-normal fibrinogen and ongoing bleeding    Repeat exam recommended in approximately 30 min to trend bleeding for consideration of possible delivery if ongoing due to high clinical concern of evolving placental abruption and coagulopathy  Transfuse 1U FFP and 1U pRBCs for suspected evolving coagulopathy with ongoing bleeding  Repeat labs (CBC, coags, fibrinogen) 4 hr after 1st set (0615)  Betamethasone for fetal lung maturation  -   Magnesium for fetal neuroprotection  PCN for GBS prophylaxis given unknown GBS status and  gestation  Do not recommend tocolysis in the setting of suspected placental abrutpion  Neonatology consulted  Running QBL ongoing with pad counts  ordered     uterine contractions  Assessment & Plan  [employer] with contractions every 2-3 min which patient started feeling while in L&D triage.  NST reactive  SVE 1.5/30/-3  CL 3.9 cm    Betamethasone for fetal lung maturation  -   Magnesium for fetal neuroprotection  PCN for GBS prophylaxis given unknown GBS status and  gestation  Do not recommend tocolysis in the setting of suspected placental abrutpion  Neonatology consulted  F/u GC/CT & FFN    Marijuana use  Assessment & Plan  Patient reports marijuana use (smokes) w/ no other substance use this pregnancy.    UDS pending    Request for sterilization  Assessment & Plan  Patient requests permanent sterilization  MA-31 signed 2024    History of  delivery  Assessment & Plan  H/o spontaneous PTD at 36w in  with 3 subsequent term SVDs.    Anemia affecting pregnancy in second trimester  Assessment & Plan  Patient recently prescribed oral iron but hadn't started taking it yet.  Hgb 10.0 on admission (down from 11.5 in May 2024)  Fibrinogen 287  PTT 26  PT/INR 13.2/0.95    History of diet controlled gestational diabetes mellitus (GDM)  Assessment & Plan  H/o A1GDM in most recent pregnancy per patient with no use of medications.  Unknown GDM status this pregnancy (no GTT completed to date).    Gestational hypertension, second trimester  Assessment & Plan  Patient reports elevated BP in the office at approximately 26w for which her OBGYN recommended she go to the hospital, but she could not go and was not reassessed at that time.  Elevated BP in L&D triage, thereby meeting criteria for GHTN in the setting of reported previously elevated BP this pregnancy after 20w.  Prescribed Amlodipine in prior pregnancy for gHTN (most recent pregnancy) but didn't end up taking it she she ended up delivering.    Monitor vitals this admission / pregnancy  Follow up preeclampsia labs  Monitor for signs/symptoms of evolving preeclampsia    Asthma during  "pregnancy  Assessment & Plan  Rarely needs Albuterol this pregnancy (last use was \"months ago\").    Albuterol prn    27 weeks gestation of pregnancy  Assessment & Plan  Dated by US (unclear if 1st or 2nd trimester as per records in Care Everywhere) w/ documented TONA of 10/3/24, consistent w/ patient reported TONA.  NST reactive  [employer] with contractions every 2-3 min  CL 3.9 cm  TVUS w/ no placenta/vasa previa  TAUS w/ anterior placenta & sariah breech presentation  Delivery consent signed 24  Patient presents w/ heavy vaginal bleeding & subsequently developed contractions in L&D triage    Prenatal labs collected & pending  FEN: NPO,  mL/hr  DVT ppx: SCDs  Monitoring: continuous           Chief Complaint   Patient presents with    Vaginal Bleeding       Physician Requesting Consult: Francisco J Godoy MD    Reason for Consult / Principal Problem: vaginal bleeding    Subspeciality: Perinatology    HPI:  Isidra Chamberlain is a 38 y.o.  with an TONA of 10/3/24 (by US though unclear if 1st trimester or 2nd trimester as per records in Care Everywhere) at 27w1d, Hospital Day: 1, admitted for heavy vaginal bleeding concerning for possible placental abruption and/or  labor in the setting of  contractions and cervical dilation.    Her current pregnancy is significant for:  Asthma  Elevated BP at approximately 26w with no formal diagnosis of HTN per patient    Her obstetrical history is significant for:  1 prior spontaneous  delivery at 36w and 3 subsequent term SVDs  1 prior 1st trimester SAB  1 prior 1st trimester TAB (done surgically, presumably suction D&C)  H/o GHTN, FGR, & A1GDM in prior pregnancy (most recent pregnancy)    Today, she presents with heavy vaginal bleeding.  She was in her usual state of health today, though notes she was outside for a while in the heat today.  She ate and drank her usual amount despite this.  She then had intercourse with her  and subsequently had " "onset of heavy vaginal bleeding and immediately presented for evaluation.  On arrival with EMS, a large amount of blood is noted on her pads/sheets.  She felt well initially on arrival and then started to feel regular uterine contractions every 2-3 min.  She denies leaking fluid or decreased fetal movement.    She reports receiving her prenatal care at [facility] this pregnancy, but all that is documented is a triage visit which states she is dated by an US (unclear if 1st or 2nd trimester) with an TONA of 10/3/24 which corresponds with the TONA provided by the patient and her  who is with her.  She endorses lightheadedness, and per EMS, she had bradycardia en route to jarad bpm in the 40s.  Patient denies any h/o bradycardia or cardiac pathology.  She only takes daily PNV and no other medications this pregnancy.  She was treated for a vaginal yeast infection several weeks ago and completed treatment.  She reports last week, she was seen in the office and told to go to the hospital for elevated BP but didn't due to childcare issues.  We reviewed that based on elevated BP here with h/o elevated BP 1w ago, she now meets criteria for GHTN.  She denies headache, vision changes, chest pain, SOB, RUQ/epigastric pain, or increased swelling.  She endorses asthma with rare albuterol use (last use \"months ago\").  She endorses anemia this pregnancy and was prescribed oral iron but hasn't started taking it yet.  She reports h/o A1GDM in her last pregnancy but never required medications for this.  She has not completed prenatal labs or GTT this pregnancy.    We reviewed that her bleeding,  contractions, and cervical dilation are highly concerning for  labor and/or placental abruption.  We reviewed that delivery may be indicated pending labs and progression including bleeding, maternal status, & fetal status.  She is amenable to delivery including classical  if indicated.  Delivery consent was signed.  Blood " transfusion consent was signed, and she is accepting of all blood products as indicated.  She expressed desire for permanent sterilization, and MA-31 was signed.  We reviewed recommendation for interventions in preparation for possible  delivery including Betamethasone, Magnesium, Penicillin, & Neonatology consult.      Review of Systems   Constitutional:  Negative for chills and fever.   Eyes:  Negative for visual disturbance.   Respiratory:  Negative for cough and shortness of breath.    Cardiovascular:  Negative for chest pain and leg swelling.   Gastrointestinal:  Negative for abdominal pain, diarrhea, nausea and vomiting.   Genitourinary:  Positive for pelvic pain and vaginal bleeding. Negative for dysuria, frequency, hematuria, urgency and vaginal discharge.   Neurological:  Positive for light-headedness. Negative for dizziness and headaches.       Historical Information   OB History    Para Term  AB Living   7 4 3 1 2 4   SAB IAB Ectopic Multiple Live Births   1 1 0 0 4      # Outcome Date GA Lbr Florencio/2nd Weight Sex Type Anes PTL Lv   7 Current            6 Term 10/05/23 37w1d   M Vag-Spont   DREA   5 SAB 22           4 Term     M Vag-Spont   DREA   3 Term     M Vag-Spont   DREA   2      F Vag-Spont   DREA   1 IAB      TAB         Birth Comments: 1st trimester, suction D&C      Obstetric Comments   Menarche - 13 yo     Gynecologic history: Patient's last menstrual period was 2024.     Past Medical History:   Diagnosis Date    Anemia affecting pregnancy 2023    Asthma     Carpal tunnel syndrome, bilateral     Chlamydia infection     Elevated blood pressure reading without diagnosis of hypertension 2023    Gastroesophageal reflux disease without esophagitis 2023     Past Surgical History:   Procedure Laterality Date    NO PAST SURGERIES       Social History   Social History     Substance and Sexual Activity   Alcohol Use Not Currently    Comment:  social ; Denied history of alcohol use     Social History     Substance and Sexual Activity   Drug Use No     Social History     Tobacco Use   Smoking Status Never   Smokeless Tobacco Never     Family History: non-contributory    Meds/Allergies   Prior to Admission Medications   Prescriptions Last Dose Informant Patient Reported? Taking?   [employer] Vitamin C 500 MG tablet   Yes No   Patient not taking: Reported on 2024   OneTouch Verio test strip   Yes No   Sig: USE TO TEST BLOOD SUGAR 4-5 TIMES DAILY   Patient not taking: Reported on 2023   acetaminophen (TYLENOL) 500 mg tablet  Self Yes No   Sig: Take 500 mg by mouth every 6 (six) hours as needed for mild pain   albuterol (PROVENTIL HFA,VENTOLIN HFA) 90 mcg/act inhaler   No No   Sig: Inhale 2 puffs every 6 (six) hours as needed for wheezing   amLODIPine (NORVASC) 5 mg tablet   No No   Sig: TAKE 1 TABLET (5 MG TOTAL) BY MOUTH DAILY.   Patient not taking: Reported on 2024   diphenhydrAMINE (BENADRYL) 25 mg capsule   Yes No   Sig: Take 25 mg by mouth every 6 (six) hours as needed   Patient not taking: Reported on 2023   escitalopram (LEXAPRO) 20 mg tablet  Self No No   Sig: Take 0.5 tablets (10 mg total) by mouth daily   Patient not taking: Reported on 2023   famotidine (PEPCID) 20 mg tablet   No No   Sig: TAKE 1 TABLET BY MOUTH TWICE A DAY   Patient not taking: Reported on 2023   ferrous sulfate 325 (65 Fe) mg tablet   Yes No   Sig: Take 1 tablet by mouth daily with breakfast   Patient not taking: Reported on 2024   fluticasone (FLONASE) 50 mcg/act nasal spray  Self No No   Si spray into each nostril daily   Patient not taking: Reported on 2023   hydrocortisone 1 % cream  Self No No   Sig: Apply topically 4 (four) times a day as needed for irritation   Patient not taking: Reported on 2023   methocarbamol (Robaxin-750) 750 mg tablet   No No   Sig: Take 1 tablet (750 mg total) by mouth every 8 (eight) hours as needed for  muscle spasms   Patient not taking: Reported on 2023   methylPREDNISolone 4 MG tablet therapy pack   No No   Sig: Use as directed on package   Patient not taking: Reported on 2024   oxyCODONE (Roxicodone) 5 immediate release tablet   No No   Sig: Take 1 tablet (5 mg total) by mouth every 8 (eight) hours as needed for moderate pain Max Daily Amount: 15 mg   predniSONE 20 mg tablet   No No   Si tabs daily X 3 days, 1 tab daily X 3 days, 1/2 tab daily X 4 days      Facility-Administered Medications: None     Medication Administration - last 24 hours from 2024 to 2024         Date/Time Order Dose Route Action Action by     2024 0245 EDT lactated ringers infusion 125 mL/hr Intravenous Flip Chandler RN     2024 0308 EDT penicillin G (PFIZERPEN-G) in 0.9% sodium chloride 250 mL IVPB 5 Million Units 5 Million Units Intravenous Flip Chandler RN     2024 0310 EDT betamethasone acetate-betamethasone sodium phosphate (CELESTONE) injection 12 mg 12 mg Intramuscular Given Deja Chandler RN     2024 0311 EDT magnesium sulfate 4 g/100 mL IVPB (premix) 4 g 0 g Intravenous Stopped Deja Chandler RN     2024 0252 EDT magnesium sulfate 4 g/100 mL IVPB (premix) 4 g 4 g Intravenous Flip Chandler RN     2024 0321 EDT magnesium sulfate 2 g/50 mL IVPB (premix) 2 g 0 g Intravenous Stopped Deja Chandler RN     2024 0311 EDT magnesium sulfate 2 g/50 mL IVPB (premix) 2 g 2 g Intravenous Flip Chandler RN     2024 0321 EDT magnesium sulfate 20 g/500 mL infusion (premix) 2 g/hr Intravenous Flip Chandler RN          Current Facility-Administered Medications   Medication Dose Route Frequency    acetaminophen (TYLENOL) tablet 975 mg  975 mg Oral Q8H PRN    albuterol (PROVENTIL HFA,VENTOLIN HFA) inhaler 2 puff  2 puff Inhalation Q4H PRN    betamethasone acetate-betamethasone sodium phosphate (CELESTONE)  injection 12 mg  12 mg Intramuscular Q24H    calcium carbonate (TUMS) chewable tablet 1,000 mg  1,000 mg Oral TID PRN    calcium gluconate 10 % injection 1 g  1 g Intravenous Once PRN    lactated ringers infusion  125 mL/hr Intravenous Continuous    magnesium sulfate 20 g/500 mL infusion (premix)  2 g/hr Intravenous Continuous    ondansetron (ZOFRAN) injection 4 mg  4 mg Intravenous Q6H PRN    penicillin G (PFIZERPEN-G) in 0.9% sodium chloride 250 mL IVPB 5 Million Units  5 Million Units Intravenous Once    Followed by    penicillin G (PFIZERPEN-G) in 0.9% sodium chloride 100 mL IVPB 2.5 Million Units  2.5 Million Units Intravenous Q4H    prenatal multivitamin tablet 1 tablet  1 tablet Oral Daily     No Known Allergies    Objective    Patient Vitals for the past 24 hrs:   BP Pulse   07/05/24 0321 134/84 86   07/05/24 0300 139/88 --     Vitals:  Blood pressure 134/84, pulse 86, last menstrual period 01/25/2024, unknown if currently breastfeeding.There is no height or weight on file to calculate BMI.    Physical Exam  Constitutional:       General: She is not in acute distress.     Appearance: Normal appearance. She is not ill-appearing.   HENT:      Mouth/Throat:      Mouth: Mucous membranes are moist.   Eyes:      Extraocular Movements: Extraocular movements intact.   Cardiovascular:      Rate and Rhythm: Tachycardia present.      Heart sounds: Normal heart sounds.   Pulmonary:      Effort: Pulmonary effort is normal.   Abdominal:      General: There is no distension.      Palpations: Abdomen is soft.      Tenderness: There is no abdominal tenderness. There is no guarding.   Musculoskeletal:         General: No swelling.      Right lower leg: No edema.      Left lower leg: No edema.   Skin:     General: Skin is warm and dry.      Coloration: Skin is not jaundiced or pale.   Neurological:      General: No focal deficit present.      Mental Status: She is alert.   Psychiatric:         Mood and Affect: Mood normal.          Behavior: Behavior normal.         Thought Content: Thought content normal.         Judgment: Judgment normal.     SVE: 1.5/30/-3    Speculum exam:  Normal external female genitalia  Cervix fully visualized and not visibly dilated  Large clot approximately 500 mL evacuated from vagina  Cervix with slow but persistent trickle noted  No lacerations or injuries noted  No pooling, abnormal discharge, or lesions noted    Microscopy:     Infection:   - no clue cells    - no hyphae   - no trichomonads present    Membrane status   - no ferning   - positive Nitrazine (likely false negative in s/o large blood)   - no pooling     Transabdominal US:  - Q1 3.8 cm  - Q2 4.2 cm  - Q3 4.8 cm  - Q4 4.6 cm  - Total DAISY 17.4 cm  - Presentation: sariah breech  - Placenta: anterior     Transvaginal US:  - CL 3.9 cm  - No placenta previa / vasa previa    Prenatal Labs: pending, collected on admission    Results from last 7 days   Lab Units 07/05/24  0215   WBC Thousand/uL 8.67   HEMOGLOBIN g/dL 10.0*   MCV fL 92   PLATELETS Thousands/uL 175         Results from last 7 days   Lab Units 07/05/24  0215   POTASSIUM mmol/L 3.9   CHLORIDE mmol/L 105   CO2 mmol/L 21   BUN mg/dL 9   CREATININE mg/dL 0.85   EGFR ml/min/1.73sq m 87     Results from last 7 days   Lab Units 07/05/24  0215   AST U/L 15   ALT U/L 7   ALK PHOS U/L 62     Results from last 7 days   Lab Units 07/05/24  0215   PLATELETS Thousands/uL 175   INR  0.95   PROTIME seconds 13.2   PTT seconds 26   FIBRINOGEN mg/dL 278     Results from last 7 days   Lab Units 07/05/24  0253   POC GLUCOSE mg/dl 99                       Fetal data:  Nonstress test: date 07/05/24 Time: 0244 - ongoing  Baseline:  135 bpm  Variability: moderate  Accelerations: present, 10x10  Decelerations: absent  Contractions: present (every 2-3 min)  Assessment: reactive  Plan:  continuous    MFM ultrasound report key findings:   None on file    Imaging, EKG, Pathology, and Other Studies:  I have personally  reviewed pertinent reports.          Judith Christine MD  7/5/2024  3:34 AM

## 2024-07-05 NOTE — PROGRESS NOTES
Vitals:    07/05/24 1901   BP:    Pulse:    Resp:    Temp: 98.2 °F (36.8 °C)   SpO2:      Patient seen and examined at bedside for change of shift  Dr. Villanueva present during conversation  Reviewed current treatment course including transfusion of blood products (1u FFP, 1u pRBCs, 1u cryo) with now category 1 FHT and minimal bleeding  Pelvic imaging today with 9.8 x 4.4 x 10.8cm organized blood clot consistent with suspected placental abruption given acute onset of bleeding and abdominal pain without other source of vaginal bleeding  Fetal presentation breech  We discussed current plan for continuous fetal monitoring and pad counts given current stable presentation with low threshold for classical section should concern for maternal compromise arise with heavy vaginal bleeding, intractable abdominal pain, or need for additional blood products; or concern for fetal compromise with derangements in fetal heart tracing  We discussed in the event of continued stability without concern of acute bleeding or worsening presentation, will continue to plan for second dose of betamethasone overnight around 0300 - long term for delivery around 32 weeks gestation should all bleeding and concerns resolve  We did discuss that there is high likelihood that she will require delivery in the next 24h  Isidra and her partner, Lam, expressed understanding regarding treatment and delivery plan  Plan for continuous monitoring, pad counts, and maintained NPO status      Cosme Christine MD  OB/GYN PGY-4

## 2024-07-05 NOTE — PROCEDURES
Isidra Chamberlain, a  at Unknown with an TONA of Not found., was seen at [facility] for the following procedure(s): $Procedure Type: DAISY, US - Transvaginal]         4 Quadrant DAISY  DAISY Q1 (cm): 3.8 cm  DAISY Q2 (cm): 4.2 cm  DAISY Q3 (cm): 4.8 cm  DAISY Q4 (cm): 4.6 cm  DAISY TOTAL (cm): 17.4 cm         Ultrasound Other  Fetal Presentation: Breech  Cervical Length: 3.9                       Ultrasound Probe Disinfection    A transvaginal ultrasound was performed.   Prior to use, disinfection was performed with High Level Disinfection Process (Trophon)    Michelle Cisneros DO  24  3:20 AM

## 2024-07-05 NOTE — ASSESSMENT & PLAN NOTE
Patient reports elevated BP in the office at approximately 26w for which her OBGYN recommended she go to the hospital, but she could not go and was not reassessed at that time.  Elevated BP in L&D triage, thereby meeting criteria for GHTN in the setting of reported previously elevated BP this pregnancy after 20w.  Prescribed Amlodipine in prior pregnancy for gHTN (most recent pregnancy) but didn't end up taking it she she ended up delivering.    Monitor vitals this admission / pregnancy  Follow up preeclampsia labs  Monitor for signs/symptoms of evolving preeclampsia

## 2024-07-05 NOTE — PROGRESS NOTES
Assessed patient at bedside to determine if delivery is indicated for the degree of vaginal bleeding she is having.  Patient feels the same as she did at last exam, still uncomfortable with contractions every several minutes but able to tolerate them.  She reports ongoing vaginal bleeding and is unsure of how it compares to that at last exam.  Per nursing, the bleeding on her pads has been slightly less than before.  Running QBL is 290 mL in total.  Speculum exam shows less blood in the vaginal vault than at last exam which was easily cleared away to reveal several small drips of blood from the vagina without a constant trickle as there was previously.  SVE remains unchanged.  FHT remains reactive with contractions q2-3 min on toco, same as before.  1U pRBCs now hanging, and patient is s/p 1U FFP.  Repeat labs being drawn now.    Plan:  Continue pad counts & running QBL  Continue to monitor vitals & symptoms  Follow up repeat labs (being drawn now)  Complete repeat CBC +/- coags 4 hr s/p completion of 1U of pRBCs  Remain NPO due to high concern for need for iatrogenic   Repeat pelvic exam around 7:30 am (in approximately 1 hr) to determine if ongoing expectant management is appropriate vs. if delivery is indicated for ongoing bleeding        Judith Christine MD  OBGYN Resident  07/05/24  6:18 AM

## 2024-07-05 NOTE — PLAN OF CARE
Problem: ANTEPARTUM  Goal: Maintain pregnancy as long as maternal and/or fetal condition is stable  Description: INTERVENTIONS:  - Maternal surveillance  - Fetal surveillance  - Monitor uterine activity  - Medications as ordered  - Bedrest  Outcome: Progressing     Problem: PAIN - ADULT  Goal: Verbalizes/displays adequate comfort level or baseline comfort level  Description: Interventions:  - Encourage patient to monitor pain and request assistance  - Assess pain using appropriate pain scale  - Administer analgesics based on type and severity of pain and evaluate response  - Implement non-pharmacological measures as appropriate and evaluate response  - Consider cultural and social influences on pain and pain management  - Notify physician/advanced practitioner if interventions unsuccessful or patient reports new pain  Outcome: Progressing     Problem: INFECTION - ADULT  Goal: Absence or prevention of progression during hospitalization  Description: INTERVENTIONS:  - Assess and monitor for signs and symptoms of infection  - Monitor lab/diagnostic results  - Monitor all insertion sites, i.e. indwelling lines, tubes, and drains  - Monitor endotracheal if appropriate and nasal secretions for changes in amount and color  - [place] appropriate cooling/warming therapies per order  - Administer medications as ordered  - Instruct and encourage patient and family to use good hand hygiene technique  - Identify and instruct in appropriate isolation precautions for identified infection/condition  Outcome: Progressing  Goal: Absence of fever/infection during neutropenic period  Description: INTERVENTIONS:  - Monitor WBC    Outcome: Progressing     Problem: SAFETY ADULT  Goal: Patient will remain free of falls  Description: INTERVENTIONS:  - Educate patient/family on patient safety including physical limitations  - Instruct patient to call for assistance with activity   - Consult OT/PT to assist with strengthening/mobility   -  Keep Call bell within reach  - Keep bed low and locked with side rails adjusted as appropriate  - Keep care items and personal belongings within reach  - Initiate and maintain comfort rounds  - Make Fall Risk Sign visible to staff  - Offer Toileting every 2 Hours, in advance of need  - Apply yellow socks and bracelet for high fall risk patients  - Consider moving patient to room near nurses station  Outcome: Progressing  Goal: Maintain or return to baseline ADL function  Description: INTERVENTIONS:  -  Assess patient's ability to carry out ADLs; assess patient's baseline for ADL function and identify physical deficits which impact ability to perform ADLs (bathing, care of mouth/teeth, toileting, grooming, dressing, etc.)  - Assess/evaluate cause of self-care deficits   - Assess range of motion  - Assess patient's mobility; develop plan if impaired  - Assess patient's need for assistive devices and provide as appropriate  - Encourage maximum independence but intervene and supervise when necessary  - Involve family in performance of ADLs  - Assess for home care needs following discharge   - Consider OT consult to assist with ADL evaluation and planning for discharge  - Provide patient education as appropriate  Outcome: Progressing  Goal: Maintains/Returns to pre admission functional level  Description: INTERVENTIONS:  - Perform AM-PAC 6 Click Basic Mobility/ Daily Activity assessment daily.  - Set and communicate daily mobility goal to care team and patient/family/caregiver.   - Collaborate with rehabilitation services on mobility goals if consulted  - Out of bed for toileting  - Record patient progress and toleration of activity level   Outcome: Progressing     Problem: Knowledge Deficit  Goal: Patient/family/caregiver demonstrates understanding of disease process, treatment plan, medications, and discharge instructions  Description: Complete learning assessment and assess knowledge base.  Interventions:  - Provide  teaching at level of understanding  - Provide teaching via preferred learning methods  Outcome: Progressing     Problem: DISCHARGE PLANNING  Goal: Discharge to home or other facility with appropriate resources  Description: INTERVENTIONS:  - Identify barriers to discharge w/patient and caregiver  - Arrange for needed discharge resources and transportation as appropriate  - Identify discharge learning needs (meds, wound care, etc.)  - Arrange for interpretive services to assist at discharge as needed  - Refer to Case Management Department for coordinating discharge planning if the patient needs post-hospital services based on physician/advanced practitioner order or complex needs related to functional status, cognitive ability, or social support system  Outcome: Progressing

## 2024-07-05 NOTE — ASSESSMENT & PLAN NOTE
H/o A1GDM in most recent pregnancy per patient with no use of medications.  Unknown GDM status this pregnancy (no GTT completed to date).

## 2024-07-05 NOTE — QUICK NOTE
Patient with vaginal bleeding and concern for potential  labor/placenta abruption re-evaluation. Repeat speculum exam showed moderate amount of pooled blood in the vaginal vault, that was cleared with proctoscopy swabs.  After clearance there was noted to be a slow trickle of blood coming from the cervical os.  Exam similar to previous exam performed at 0245 however arguably slightly improved as there was no large clot sitting in the vault.  SVE unchanged from prior.  Patient still alert and oriented with no acute mental status changes.  Patient appropriately distressed by situation.  Patient vitally stable.  1 unit FFP going and with plan for 1 unit PRBCs.   Continue to monitor signs and symptoms plan for repeat exam at 5:45 AM, with repeat labs to be drawn at 615, 4 hours from prior labs

## 2024-07-05 NOTE — ASSESSMENT & PLAN NOTE
SVE 1.5/30/-3 > 2/50/-3  CL 3.9 cm    Betamethasone for fetal lung maturation 7/5-7/6  Do not recommend tocolysis in the setting of suspected placental abruption  GC/CT neg, FFN pos, UDS pos for THC and meth/amph

## 2024-07-05 NOTE — ASSESSMENT & PLAN NOTE
Patient recently prescribed oral iron but hadn't started taking it yet.  Hgb 10.0 > 11.1  Fibrinogen 287 > 362  PTT 26 > 26  PT/INR 13.2/0.95 > 14.8/1.10

## 2024-07-05 NOTE — H&P
H & P- Obstetrics   Isidra Chamberlain 38 y.o. female MRN: [id number]  Unit/Bed#: LD TRIAGE 3-01 Encounter: [id number]      Assessment/Plan:    Isidra is a 38 y.o.  at 27w1d admitted for vaginal bleeding and  contractions     SVE: Cervical Dilation: 1-2  Cervical Effacement: 30  Cervical Consistency: Medium  Fetal Station: -3  Method: Manual  OB Examiner: Blayne    Vaginal bleeding  Assessment & Plan  Patient described heavy bleeding that began around 1:30 AM after having intercourse  Reports passing several large clots  Brought in by EMS was diaphoretic, bradycardic, hypotensive at that time and started on fluids  No longer hypotensive or bradycardic in triage  Upon speculum exam large clots noted coming from the vagina as well as slow active bleeding coming from the cervical os  Hgb10, fibrinogen 287  Plan to transfuse 1 unit FFP and 1 unit PRBCs in the setting of concern for placental abruption, ongoing vaginal bleeding  Start running QBL       uterine contractions  Assessment & Plan  Patient noted to be having  contractions that she is feeling and seeing on tocometry.  Patient SVE found to be 1.5/30/-3, however fetal presentation is currently sariah breech  Consult [facility] for recommendations    Marijuana use  Assessment & Plan  Reports marijuana use  UDS pending    Request for sterilization  Assessment & Plan  Patient interested in tubal, MA 31 signed 2024      History of  delivery  Assessment & Plan  H/o spontaneous PTD at 36w in  with 3 subsequent term SVDs.    Anemia affecting pregnancy in second trimester  Assessment & Plan  Patient recently prescribed oral iron but hadn't started taking it yet.  Hgb 10.0 on admission    Fibrinogen 287  PTT 26  PT/INR: 13.2/0.95    History of diet controlled gestational diabetes mellitus (GDM)  Assessment & Plan  H/o A1GDM in most recent pregnancy per patient with no use of medications.  Unknown GDM status this pregnancy (no GTT completed to  "date).    Gestational hypertension, second trimester  Assessment & Plan  Patient reports elevated BP in the office at approximately 26w for which her OBGYN recommended she go to the hospital, but she could not go and was not reassessed at that time.  Elevated BP in L&D triage, thereby meeting criteria for GHTN in the setting of reported previously elevated BP this pregnancy after 20w.  Prescribed Amlodipine in prior pregnancy for gHTN (most recent pregnancy) but didn't end up taking it she she ended up delivering.    Monitor vitals this admission / pregnancy  Follow up preeclampsia labs  Monitor for signs/symptoms of evolving preeclampsia    Asthma during pregnancy  Assessment & Plan  Rarely needs Albuterol this pregnancy (last use was \"months ago\").    Albuterol prn    27 weeks gestation of pregnancy  Assessment & Plan  Difficult to obtain records and documentation of pregnancy  Patient seen in triage 2 weeks ago on , where she was reported to be dated by ultrasound done in unknown trimester and the due date was reported to be 10/3/2024  Recommend daily prenatal vitamin           Patient of: [facility] SOD by default  This patient will be an INPATIENT  and I certify the anticipated length of stay is >2 Midnights  Discussed with Dr. Chan      SUBJECTIVE:    Chief Complaint: Vaginal bleeding and cramping    HPI: Isidra Chamberlain is a 38 y.o.  with an TONA of 10/3/2024, by Patient Reported at 27w1d who is being admitted for vaginal bleeding and cramping.  She reports the vaginal bleeding began this evening around 1:30 AM after intercourse.  She noticed a large clot coming from her vagina and felt dizzy and EMS was called.  In the ambulance she proceeded to pass several more large clots and saturated several pads.  She reports no history of vaginal bleeding in this pregnancy and no history of placenta previa or vasa previa.  She has had no abnormal discharge.  She reports no substance use aside from THC use.  She " reports feeling slightly sweaty, dizzy, and off.  Review of systems is negative for chest pain, leg pain, shortness of breath.    Pregnancy Plan:  Fetal sex: Male     Delivery Plans  Planned anesthesia: Epidural  Acceptable blood products: All     Post-Delivery Plans  Feeding intentions: Non-human milk substitute  Circumcision requested: Provider Performed  Planned birth control: Female Sterilization      Patient Active Problem List   Diagnosis    Mood disorder (HCC)    Iliotibial band tendinitis of right side    Acute midline low back pain without sciatica    Chronic midline low back pain without sciatica    Alopecia    Trigger finger    Anxiety    Allergic rhinitis    Hemorrhoid    Sciatica of right side    27 weeks gestation of pregnancy    Asthma during pregnancy    Gestational hypertension, second trimester    History of diet controlled gestational diabetes mellitus (GDM)    Anemia affecting pregnancy in second trimester    History of  delivery    Vaginal bleeding    Request for sterilization    Marijuana use     uterine contractions       OB History    Para Term  AB Living   7 4 3 1 2 4   SAB IAB Ectopic Multiple Live Births   1 1 0 0 4      # Outcome Date GA Lbr Florencio/2nd Weight Sex Type Anes PTL Lv   7 Current            6 Term 10/05/23 37w1d   M Vag-Spont   DREA   5 SAB 22           4 Term     M Vag-Spont   DREA   3 Term     M Vag-Spont   DREA   2      F Vag-Spont   DREA   1 IAB      TAB         Birth Comments: 1st trimester, suction D&C      Obstetric Comments   Menarche - 15 yo       Past Medical History:   Diagnosis Date    Anemia affecting pregnancy 2023    Asthma     Carpal tunnel syndrome, bilateral     Chlamydia infection     Elevated blood pressure reading without diagnosis of hypertension 2023    Gastroesophageal reflux disease without esophagitis 2023       Past Surgical History:   Procedure Laterality Date    NO PAST SURGERIES          Social History     Tobacco Use    Smoking status: Never    Smokeless tobacco: Never   Substance Use Topics    Alcohol use: Not Currently     Comment: social ; Denied history of alcohol use       No Known Allergies      Medications Prior to Admission:     acetaminophen (TYLENOL) 500 mg tablet    albuterol (PROVENTIL HFA,VENTOLIN HFA) 90 mcg/act inhaler    amLODIPine (NORVASC) 5 mg tablet    [employer] Vitamin C 500 MG tablet    diphenhydrAMINE (BENADRYL) 25 mg capsule    escitalopram (LEXAPRO) 20 mg tablet    famotidine (PEPCID) 20 mg tablet    ferrous sulfate 325 (65 Fe) mg tablet    fluticasone (FLONASE) 50 mcg/act nasal spray    hydrocortisone 1 % cream    methocarbamol (Robaxin-750) 750 mg tablet    methylPREDNISolone 4 MG tablet therapy pack    OneTouch Verio test strip    oxyCODONE (Roxicodone) 5 immediate release tablet    predniSONE 20 mg tablet        OBJECTIVE:  Vitals:  HR:  [86] 86  BP: (134-139)/(84-88) 134/84  There is no height or weight on file to calculate BMI.     Physical Exam:  General: Well appearing, no distress  Respiratory: Unlabored breathing  Cardiovascular: Regular rate.  Abdomen: Soft, gravid, nontender  Fundal Height: Appropriate for gestational age.  Extremities: Warm and well perfused.  Non tender.  Psychiatric: Behavioral normal        FHT:  Reactive NST    TOCO:   [place] every 3-4 mins      Prenatal Labs: Blood Type:   Lab Results   Component Value Date/Time    ABO Grouping O 07/05/2024 02:15 AM    ABO Grouping O 06/24/2014 01:10 PM     , D (Rh type):   Lab Results   Component Value Date/Time    Rh Factor Positive 07/05/2024 02:15 AM    Rh Factor Positive 06/24/2014 01:10 PM     , Antibody Screen:   Lab Results   Component Value Date/Time    Antibody Screen Negative 06/24/2014 01:10 PM    , HCT/HGB:   Lab Results   Component Value Date/Time    Hematocrit 31.0 (L) 07/05/2024 02:15 AM    Hematocrit 37.4 08/28/2015 04:47 PM    Hemoglobin 10.0 (L) 07/05/2024 02:15 AM    Hemoglobin  "12.6 08/28/2015 04:47 PM      , Platelets:   Lab Results   Component Value Date/Time    Platelets 175 07/05/2024 02:15 AM    Platelets 195 08/28/2015 04:47 PM      , 1 hour Glucola: No results found for: \"[id number]\", Varicella: No results found for: \"VARICELLAIGG\"    , Rubella:   Lab Results   Component Value Date/Time    RUBELLA IGG QUANTITATION 162.1 06/24/2014 01:10 PM        , VDRL/RPR:   Lab Results   Component Value Date/Time    RPR Nonreactive 06/24/2014 01:10 PM      , Urine Culture/Screen:   Lab Results   Component Value Date/Time    Urine Culture 80,000-89,000 cfu/ml 07/26/2023 09:17 PM       , Hep B:   Lab Results   Component Value Date/Time    HEPATITIS B SURFACE ANTIGEN Non-Reactive (q 06/24/2014 01:10 PM     , Hep C: No components found for: \"HEPCSAG\", \"EXTHEPCSAG\"   , HIV:   Lab Results   Component Value Date/Time    HIV-1/2 AB-AG Non-Reactive (q 06/24/2014 01:10 PM     , Chlamydia: No results found for: \"EXTCHLAMYDIA\"  , Gonorrhea:   Lab Results   Component Value Date/Time    N GONORRHOEAE, AMPLIFIED DNA Negative 06/02/2014 05:25 PM    N gonorrhoeae, DNA Probe Negative 07/27/2023 12:26 AM     , Group B Strep:    Lab Results   Component Value Date/Time    Strep Grp B PCR Negative 07/27/2023 12:26 AM      ,       Michelle Cisneros DO  7/5/2024  4:05 AM        Portions of the record may have been created with voice recognition software.  Occasional wrong word or \"sound a like\" substitutions may have occurred due to the inherent limitations of voice recognition software.  Read the chart carefully and recognize, using context, where substitutions have occurred    "

## 2024-07-05 NOTE — QUICK NOTE
Patient discussed with MFM and anesthesia this morning around 0800. Fibrinogen returned at 302 (from 276). Tansfusing 1 unit of cryo due to high suspicion for abruption and increased risk for coagulopathy. Initial PC ratio came back elevated but likely impacted by vaginal bleeding. Obtained straight cath and PC ratio is 0.65. Patient has met criteria for preeclampsia without severe features at this time. Plan to redraw CBC/CMP and coags now 4 hours after previous set. Plan for q4 labs at this point.     Pedro Moulton  07/05/24  10:11 AM

## 2024-07-05 NOTE — ASSESSMENT & PLAN NOTE
Patient reports marijuana use (smokes) w/ no other substance use this pregnancy.    UDS positive for THC and methamphetamines  Will need CM consult

## 2024-07-05 NOTE — CONSULTS
MATERNAL CONSULTATION - NICU   Isidra Chamberlain 38 y.o. female MRN: [id number]  Unit/Bed#: LD TRIAGE 3 Encounter: [id number]    History of Present Illness     Inpatient consult to Neonatology  Consult performed by: Jameson Packer MD  Consult ordered by: Judith Christine MD          HPI: Isidra Chamberlain is a 38 y.o. year old  female at 27w1d with an TONA of 10/3/2024, by Patient Reported who presents with Vaginal bleeding, possible abruption .  She has the following prenatal labs: O+, all other labs are pending     Pregnancy complications: r/o  labor and vaginal bleeding  .   Fetal Complications: none.    Maternal medical history and medications:  h/o GDM, Gest HTN second trimester asthma during pregnancy    Maternal social history: THC and methamphetamine .  Marital status: /Civil Union.    Maternal  medications:  steroids: betamethasone and fibrinogen, Magnesium   Other medications: Penicllin     Historical Information   Past Medical History:   Diagnosis Date    Anemia affecting pregnancy 2023    Asthma     Carpal tunnel syndrome, bilateral     Chlamydia infection     Elevated blood pressure reading without diagnosis of hypertension 2023    Gastroesophageal reflux disease without esophagitis 2023     Past Surgical History:   Procedure Laterality Date    NO PAST SURGERIES       Social History     Tobacco Use   Smoking Status Never   Smokeless Tobacco Never     OB History:   # 1 - Date: None, Sex: None, Weight: None, GA: None, Type: Therapeutic , Apgar1: None, Apgar5: None, Living: None, Birth Comments: 1st trimester, suction D&C    # 2 - Date: , Sex: Female, Weight: None, GA: None, Type: Vaginal, Spontaneous, Apgar1: None, Apgar5: None, Living: Living, Birth Comments: None    # 3 - Date: , Sex: Male, Weight: None, GA: None, Type: Vaginal, Spontaneous, Apgar1: None, Apgar5: None, Living: Living, Birth Comments: None    # 4 - Date: , Sex: Male,  Weight: None, GA: None, Type: Vaginal, Spontaneous, Apgar1: None, Apgar5: None, Living: Living, Birth Comments: None    # 5 - Date: 22, Sex: None, Weight: None, GA: None, Type: None, Apgar1: None, Apgar5: None, Living: None, Birth Comments: None    # 6 - Date: 10/05/23, Sex: Male, Weight: None, GA: 37w1d, Type: Vaginal, Spontaneous, Apgar1: None, Apgar5: None, Living: Living, Birth Comments: None    # 7 - Date: None, Sex: None, Weight: None, GA: None, Type: None, Apgar1: None, Apgar5: None, Living: None, Birth Comments: None    Family History: non-contributory    Meds/Allergies   all current active meds have been reviewed    No Known Allergies    Objective     Intake/Output Summary (Last 24 hours) at 2024 0401  Last data filed at 2024 0342  Gross per 24 hour   Intake --   Output 777 ml   Net -777 ml     Invasive Devices       Peripheral Intravenous Line  Duration             Peripheral IV 24 Left Antecubital <1 day    Peripheral IV 24 Right Antecubital <1 day                    Lab Results: I have personally reviewed pertinent reports.    Imaging Studies: I have personally reviewed pertinent reports.    EKG, Pathology, and Other Studies: I have personally reviewed pertinent reports.    VTE Prophylaxis: Sequential compression device (Venodyne)     Code Status: Level 1 - Full Code  Advance Directive and Living Will:      Power of :    POLST:      Counseling / Coordination of Care  Total floor / unit time spent today 40 minutes. Greater than 50% of total time was spent with the patient and / or family counseling and / or coordination of care. A description of the counseling / coordination of care:     I had the pleasure of talking to Ms Isidra Chamberlain, a 38 y.o. year old  female at 27w1d with an TONA of 10/3/2024, by Patient Report, who presented with Vaginal bleeding, possible abruption .  She has the following prenatal labs: O+, all other labs are pending   Pregnancy  complications: r/o  labor and vaginal bleeding .   Fetal Complications: none.  Maternal medical history and medications: h/o GDM, Gest HTN second trimester asthma during pregnancy. She is on betamethasone, fibrinongen. Social history: THC and methamphetamine. I informed the mother that NICU team will be at the delivery to resuscitate the baby.  I discussed the consequences of delivery at 27 1/7 weeks gestation to the parents, which include RDS due to lung immaturity and need for respiratory support, which could be either mechanical ventilation if baby has no breathing effort, or just CPAP for good FRC, and sometimes the need for artificial surfactant if baby's oxygen requirement is high. Consequences of respiratory support such pneumothorax and pneumomediastinum were also discussed. ?We also discussed apnea of prematurity due to premature breathing center in the brain stem and need for caffeine citrate. Another concern discussed was ROP due to immature retinal blood vessels and the need for serial eye exam during the NICU stay. We also discussed the risk of IVH due to immature blood vessels of germinal matrix, and need for serial HUS to evaluate for head bleed. Consequences of severe IVH such as hydrocephalus, cerebral palsy and neurodevelopmental delay were discussed. We also discussed the issues of hypothermia and need to stay in [place] for thermoregulation, Hyperbilirubinemia and need for phototherapy, feeding intolerance and incoordination. I encouraged her to breast feed. She intends to formula feed, but I told her we do not premies formula. She is aware there is donor breast milk in the NICU and she consented to it, and baby can be switched to formula at 34 weeks. A severe consequence of feeding in premature babies called necrotizing enterocolitis was also discussed. But she is aware that babies who will get donor breast milk  are at a lesser risk from NEC. Other concerns of prematuriy such as anemia  and possible infection were also discussed. Mother is  also aware of the capability to view the baby via NICU camera from home. NICU visitation policies were also discussed. She understood everything. All her questions were answered        Assessment & Plan   Active Problems:    27 weeks gestation of pregnancy    Asthma during pregnancy    Gestational hypertension, second trimester    History of diet controlled gestational diabetes mellitus (GDM)    Anemia affecting pregnancy in second trimester    History of  delivery    Vaginal bleeding    Request for sterilization      Plan:  Expectant management as per OB/MFM   Call NICU when necessary       Consult and documentation  time = 40 minutes

## 2024-07-05 NOTE — DISCHARGE SUMMARY
Discharge Summary   Isidra Chamberlain MRN: [id number]  Unit/Bed#: LD TRIAGE 3- Encounter: [id number]      Admission Date: 2024     Discharge Date: 2024    Attending: Francisco J Godoy MD    Principal Diagnosis:  Placental Abruption  27 weeks gestation of pregnancy [Z3A.27]  Preeclampsia with severe features  Asthma  Anemia  THC use      Procedures:    Vaginal Delivery    Hospital course:  Isidra Chamberlain is a 39 yo  who initially presented at 27w1d for heavy vaginal bleeding s/p intercourse.  She had been in her usual state of health prior to this and had sudden onset of heavy vaginal bleeding after intercourse, at which time EMS was called and brought her to the hospital.  En route, maternal bradycardia with HR in the 40s was noted, and she was treated with IVF.  On arrival to L&D triage, heavy vaginal bleeding was noted with speculum exam notable for large blood clot and slow persistent trickle from the cervical os.  SVE was 1.5/30/-3, and cervical length was 3.9 cm.  Fetal monitoring was reactive, and toco showed contractions every 2-3 min which patient started to feel while in L&D triage.  Hgb was 10.0, Fibrinogen was 278, and coags were normal.  FFN was positive.  Due to concern for placental abruption and/or  labor, she was made NPO due to need for possible emergent delivery via  in the setting of breech presentation.  She was transfused 1U FFP and 1U pRBCs due to concern for evolving coagulopathy in the setting of low-normal fibrinogen with ongoing bleeding.  MFM & Neonatology were consulted.  Betamethasone was started on 24 for fetal lung maturation.  Magnesium was started on 24 for fetal neuroprotection.  Penicillin was started on 24 for GBS prophylaxis in the setting of unknown GBS status in a  gestation.  She disclosed a h/o smoking THC, but UDS resulted positive for THC & methamphetamines.  Patient declines use of methamphetamines.  On admission, she  met criteria for GHTN.  CBC & CMP showed no evidence of preeclampsia, and urine P:C was elevated, likely secondary to blood rather than true elevation.    Repeat exam shortly after admission showed slightly less bleeding than before.  Another repeat exam showed still less bleeding than before.  Because of stable maternal & fetal status and improving vaginal bleeding, expectant management was pursued, and labs were repeated and found to be normal. An ultrasound was performed and a large clot (9 x 10 cm) was noted confirming suspicions of placental abruption. Fetal monitoring and maternal status continued to be reassuring.     On hospital day #6, she began to feel more uncomfortable cramping through the afternoon and SVE was 2/50/-3. Fetal head was palpated on exam and TAUS confirmed fetus was cephalic. She was brought down to a labor room to allow for expectant management. Magnesium and PCN were restarted. Contractions lessened in severity, and labor did not progress. She returned to an antepartum room as clinically she remained stable with no contraindication to expectant management.    Throughout her hospital stay she was noted to have intermittently elevated blood pressures. On hospital day #7 she was noted to have sustained severe range blood pressures and was treated acutely with 20/40mg of IV labetalol. Given concern for abruption and new diagnosis of preeclampsia with severe features, the recommendation was made to proceed with IOL. Magnesium was started again for neuroprotection and seizure prophylaxis. Penicillin was restarting for GBS prophylaxis.      At the start of her induction she was noted to be 2/50/-3. She was started on a low dose pitocin titration and made change to 3/60/-2. She received an epidural for analgesia. During her labor course she became acutely hypotensive with blood pressures in the 70s/40s. IV fluid bolus was administered, magnesium infusion was briefly held. STAT labs were noted to  be largely unchanged from baseline. Anesthesia administered medication for blood pressure support and symptoms were noted to resolve.      She was AROM'd for blood tinged fluid. Her fetal heart tracing was noted to be briefly category II with variable decelerations. She progressed to complete and began pushing. She delivered a viable male  weighing 2lb 6.8 oz (1100 grams) via  with no lacerations.     She had an episode of hypotension and bradycardia overnight on PPD#0 - #1 Her bradycardia continued into the morning with elevated and some severe-range pressures. A cardiology consult was obtained to rule out any acute pathology. The hypotensive episode was largely attributed to the epidural and IV labetalol.     With her blood pressures elevated in the postpartum period with some non-sustained severe-range pressures Procardia XL was started and up-titrated to 60mg daily before discharge.     Lab Results:   Lab Results   Component Value Date    WBC 12.29 (H) 2024    HGB 10.5 (L) 2024    HCT 31.4 (L) 2024    MCV 90 2024     2024     Lab Results   Component Value Date    GLUCOSE 88 2015    CALCIUM 8.0 (L) 2024     2015    K 3.9 2024    CO2 21 2024     2024    BUN 9 2024    CREATININE 0.85 2024     Lab Results   Component Value Date/Time    POCGLU 99 2024 02:53 AM     Lab Results   Component Value Date    PTT 26 2024     Lab Results   Component Value Date    INR 0.95 2024    INR 1.05 2023    INR 1.02 2023    PROTIME 13.2 2024    PROTIME 13.9 2023    PROTIME 13.6 2023       Complications: none apparent    Condition at discharge: stable     Discharge instructions/Information to patient and family:   See After Visit Summary for information provided to patient and family.      Provisions for Follow-Up Care:  See After Visit Summary for information related to follow-up care  and any pertinent home health orders.      Disposition: See After Visit Summary for discharge disposition information.    Planned Readmission: Yes, pending clinical status    Discharge Medications:  For a complete list of the patient's medications, please refer to her med rec.

## 2024-07-06 PROBLEM — O46.90 VAGINAL BLEEDING DURING PREGNANCY: Status: ACTIVE | Noted: 2024-07-06

## 2024-07-06 PROBLEM — O13.2 GESTATIONAL HYPERTENSION, SECOND TRIMESTER: Status: RESOLVED | Noted: 2024-07-05 | Resolved: 2024-07-06

## 2024-07-06 LAB
ABO GROUP BLD BPU: NORMAL
ALBUMIN SERPL BCG-MCNC: 3.6 G/DL (ref 3.5–5)
ALBUMIN SERPL BCG-MCNC: 3.8 G/DL (ref 3.5–5)
ALP SERPL-CCNC: 61 U/L (ref 34–104)
ALP SERPL-CCNC: 69 U/L (ref 34–104)
ALT SERPL W P-5'-P-CCNC: 8 U/L (ref 7–52)
ALT SERPL W P-5'-P-CCNC: 8 U/L (ref 7–52)
ANION GAP SERPL CALCULATED.3IONS-SCNC: 11 MMOL/L (ref 4–13)
ANION GAP SERPL CALCULATED.3IONS-SCNC: 12 MMOL/L (ref 4–13)
APTT PPP: 24 SECONDS (ref 23–37)
APTT PPP: 25 SECONDS (ref 23–37)
APTT PPP: 26 SECONDS (ref 23–37)
AST SERPL W P-5'-P-CCNC: 16 U/L (ref 13–39)
AST SERPL W P-5'-P-CCNC: 17 U/L (ref 13–39)
BACTERIA UR CULT: NORMAL
BASOPHILS # BLD AUTO: 0.02 THOUSANDS/ÂΜL (ref 0–0.1)
BASOPHILS # BLD AUTO: 0.03 THOUSANDS/ÂΜL (ref 0–0.1)
BASOPHILS NFR BLD AUTO: 0 % (ref 0–1)
BASOPHILS NFR BLD AUTO: 0 % (ref 0–1)
BILIRUB SERPL-MCNC: 0.53 MG/DL (ref 0.2–1)
BILIRUB SERPL-MCNC: 0.56 MG/DL (ref 0.2–1)
BPU ID: NORMAL
BUN SERPL-MCNC: 6 MG/DL (ref 5–25)
BUN SERPL-MCNC: 7 MG/DL (ref 5–25)
CALCIUM SERPL-MCNC: 6 MG/DL (ref 8.4–10.2)
CALCIUM SERPL-MCNC: 6 MG/DL (ref 8.4–10.2)
CHLORIDE SERPL-SCNC: 100 MMOL/L (ref 96–108)
CHLORIDE SERPL-SCNC: 100 MMOL/L (ref 96–108)
CO2 SERPL-SCNC: 16 MMOL/L (ref 21–32)
CO2 SERPL-SCNC: 17 MMOL/L (ref 21–32)
CREAT SERPL-MCNC: 0.65 MG/DL (ref 0.6–1.3)
CREAT SERPL-MCNC: 0.67 MG/DL (ref 0.6–1.3)
CROSSMATCH: NORMAL
CROSSMATCH: NORMAL
EOSINOPHIL # BLD AUTO: 0.01 THOUSAND/ÂΜL (ref 0–0.61)
EOSINOPHIL # BLD AUTO: 0.01 THOUSAND/ÂΜL (ref 0–0.61)
EOSINOPHIL NFR BLD AUTO: 0 % (ref 0–6)
EOSINOPHIL NFR BLD AUTO: 0 % (ref 0–6)
ERYTHROCYTE [DISTWIDTH] IN BLOOD BY AUTOMATED COUNT: 13.4 % (ref 11.6–15.1)
ERYTHROCYTE [DISTWIDTH] IN BLOOD BY AUTOMATED COUNT: 13.5 % (ref 11.6–15.1)
FERRITIN SERPL-MCNC: 24 NG/ML (ref 11–307)
FIBRINOGEN PPP-MCNC: 362 MG/DL (ref 207–520)
FIBRINOGEN PPP-MCNC: 383 MG/DL (ref 207–520)
FIBRINOGEN PPP-MCNC: 386 MG/DL (ref 207–520)
GFR SERPL CREATININE-BSD FRML MDRD: 111 ML/MIN/1.73SQ M
GFR SERPL CREATININE-BSD FRML MDRD: 112 ML/MIN/1.73SQ M
GLUCOSE SERPL-MCNC: 114 MG/DL (ref 65–140)
GLUCOSE SERPL-MCNC: 146 MG/DL (ref 65–140)
HCT VFR BLD AUTO: 31.8 % (ref 34.8–46.1)
HCT VFR BLD AUTO: 33.6 % (ref 34.8–46.1)
HGB BLD-MCNC: 10.8 G/DL (ref 11.5–15.4)
HGB BLD-MCNC: 10.9 G/DL (ref 11.5–15.4)
IMM GRANULOCYTES # BLD AUTO: 0.16 THOUSAND/UL (ref 0–0.2)
IMM GRANULOCYTES # BLD AUTO: 0.17 THOUSAND/UL (ref 0–0.2)
IMM GRANULOCYTES NFR BLD AUTO: 1 % (ref 0–2)
IMM GRANULOCYTES NFR BLD AUTO: 1 % (ref 0–2)
INR PPP: 1.04 (ref 0.84–1.19)
INR PPP: 1.04 (ref 0.84–1.19)
INR PPP: 1.1 (ref 0.84–1.19)
IRON SATN MFR SERPL: 6 % (ref 15–50)
IRON SERPL-MCNC: 45 UG/DL (ref 50–212)
LYMPHOCYTES # BLD AUTO: 0.79 THOUSANDS/ÂΜL (ref 0.6–4.47)
LYMPHOCYTES # BLD AUTO: 1.31 THOUSANDS/ÂΜL (ref 0.6–4.47)
LYMPHOCYTES NFR BLD AUTO: 5 % (ref 14–44)
LYMPHOCYTES NFR BLD AUTO: 8 % (ref 14–44)
MCH RBC QN AUTO: 30.3 PG (ref 26.8–34.3)
MCH RBC QN AUTO: 30.8 PG (ref 26.8–34.3)
MCHC RBC AUTO-ENTMCNC: 32.1 G/DL (ref 31.4–37.4)
MCHC RBC AUTO-ENTMCNC: 34.3 G/DL (ref 31.4–37.4)
MCV RBC AUTO: 90 FL (ref 82–98)
MCV RBC AUTO: 94 FL (ref 82–98)
MONOCYTES # BLD AUTO: 0.19 THOUSAND/ÂΜL (ref 0.17–1.22)
MONOCYTES # BLD AUTO: 0.98 THOUSAND/ÂΜL (ref 0.17–1.22)
MONOCYTES NFR BLD AUTO: 1 % (ref 4–12)
MONOCYTES NFR BLD AUTO: 6 % (ref 4–12)
NEUTROPHILS # BLD AUTO: 13.47 THOUSANDS/ÂΜL (ref 1.85–7.62)
NEUTROPHILS # BLD AUTO: 14.6 THOUSANDS/ÂΜL (ref 1.85–7.62)
NEUTS SEG NFR BLD AUTO: 85 % (ref 43–75)
NEUTS SEG NFR BLD AUTO: 93 % (ref 43–75)
NRBC BLD AUTO-RTO: 0 /100 WBCS
NRBC BLD AUTO-RTO: 0 /100 WBCS
PLATELET # BLD AUTO: 186 THOUSANDS/UL (ref 149–390)
PLATELET # BLD AUTO: 194 THOUSANDS/UL (ref 149–390)
PLATELET BLD QL SMEAR: ADEQUATE
PMV BLD AUTO: 10.3 FL (ref 8.9–12.7)
PMV BLD AUTO: 9.6 FL (ref 8.9–12.7)
POTASSIUM SERPL-SCNC: 3.4 MMOL/L (ref 3.5–5.3)
POTASSIUM SERPL-SCNC: 3.5 MMOL/L (ref 3.5–5.3)
PROT SERPL-MCNC: 6.5 G/DL (ref 6.4–8.4)
PROT SERPL-MCNC: 7 G/DL (ref 6.4–8.4)
PROTHROMBIN TIME: 14.2 SECONDS (ref 11.6–14.5)
PROTHROMBIN TIME: 14.2 SECONDS (ref 11.6–14.5)
PROTHROMBIN TIME: 14.8 SECONDS (ref 11.6–14.5)
RBC # BLD AUTO: 3.54 MILLION/UL (ref 3.81–5.12)
RBC # BLD AUTO: 3.57 MILLION/UL (ref 3.81–5.12)
RBC MORPH BLD: PRESENT
SODIUM SERPL-SCNC: 127 MMOL/L (ref 135–147)
SODIUM SERPL-SCNC: 129 MMOL/L (ref 135–147)
TIBC SERPL-MCNC: 817 UG/DL (ref 250–450)
UIBC SERPL-MCNC: 772 UG/DL (ref 155–355)
UNIT DISPENSE STATUS: NORMAL
UNIT PRODUCT CODE: NORMAL
UNIT PRODUCT VOLUME: 125 ML
UNIT PRODUCT VOLUME: 350 ML
UNIT PRODUCT VOLUME: 350 ML
UNIT RH: NORMAL
WBC # BLD AUTO: 15.77 THOUSAND/UL (ref 4.31–10.16)
WBC # BLD AUTO: 15.97 THOUSAND/UL (ref 4.31–10.16)

## 2024-07-06 PROCEDURE — 83540 ASSAY OF IRON: CPT

## 2024-07-06 PROCEDURE — 99232 SBSQ HOSP IP/OBS MODERATE 35: CPT | Performed by: OBSTETRICS & GYNECOLOGY

## 2024-07-06 PROCEDURE — 80053 COMPREHEN METABOLIC PANEL: CPT

## 2024-07-06 PROCEDURE — 85730 THROMBOPLASTIN TIME PARTIAL: CPT

## 2024-07-06 PROCEDURE — 85610 PROTHROMBIN TIME: CPT

## 2024-07-06 PROCEDURE — 87150 DNA/RNA AMPLIFIED PROBE: CPT

## 2024-07-06 PROCEDURE — NC001 PR NO CHARGE: Performed by: OBSTETRICS & GYNECOLOGY

## 2024-07-06 PROCEDURE — 85384 FIBRINOGEN ACTIVITY: CPT

## 2024-07-06 PROCEDURE — 82728 ASSAY OF FERRITIN: CPT

## 2024-07-06 PROCEDURE — 85025 COMPLETE CBC W/AUTO DIFF WBC: CPT

## 2024-07-06 PROCEDURE — 83550 IRON BINDING TEST: CPT

## 2024-07-06 RX ORDER — FAMOTIDINE 20 MG/1
20 TABLET, FILM COATED ORAL 2 TIMES DAILY
Status: DISCONTINUED | OUTPATIENT
Start: 2024-07-06 | End: 2024-07-06

## 2024-07-06 RX ORDER — METOCLOPRAMIDE HYDROCHLORIDE 5 MG/ML
10 INJECTION INTRAMUSCULAR; INTRAVENOUS EVERY 6 HOURS PRN
Status: DISCONTINUED | OUTPATIENT
Start: 2024-07-06 | End: 2024-07-13 | Stop reason: HOSPADM

## 2024-07-06 RX ORDER — FAMOTIDINE 10 MG/ML
INJECTION, SOLUTION INTRAVENOUS
Status: DISCONTINUED
Start: 2024-07-06 | End: 2024-07-06 | Stop reason: WASHOUT

## 2024-07-06 RX ORDER — SODIUM CHLORIDE 9 MG/ML
50 INJECTION, SOLUTION INTRAVENOUS CONTINUOUS
Status: DISCONTINUED | OUTPATIENT
Start: 2024-07-06 | End: 2024-07-07

## 2024-07-06 RX ADMIN — Medication 1 TABLET: at 09:55

## 2024-07-06 RX ADMIN — SODIUM CHLORIDE 50 ML/HR: 0.9 INJECTION, SOLUTION INTRAVENOUS at 19:33

## 2024-07-06 RX ADMIN — BETAMETHASONE SODIUM PHOSPHATE AND BETAMETHASONE ACETATE 12 MG: 3; 3 INJECTION, SUSPENSION INTRA-ARTICULAR; INTRALESIONAL; INTRAMUSCULAR at 03:25

## 2024-07-06 RX ADMIN — FAMOTIDINE 20 MG: 20 TABLET, FILM COATED ORAL at 06:23

## 2024-07-06 RX ADMIN — MAGNESIUM SULFATE HEPTAHYDRATE 2 G/HR: 40 INJECTION, SOLUTION INTRAVENOUS at 03:27

## 2024-07-06 RX ADMIN — METOCLOPRAMIDE 10 MG: 5 INJECTION, SOLUTION INTRAMUSCULAR; INTRAVENOUS at 04:36

## 2024-07-06 RX ADMIN — Medication 2.5 MILLION UNITS: at 04:40

## 2024-07-06 NOTE — ASSESSMENT & PLAN NOTE
S/p 1u FFP, 1u pRBCs, 1u cryo during first 24hr of admission  S/p NICU consultation  S/p betamethasone for fetal lung maturation 7/5-7/6  Coagulation studies continue to be WNL

## 2024-07-06 NOTE — PLAN OF CARE
Problem: ANTEPARTUM  Goal: Maintain pregnancy as long as maternal and/or fetal condition is stable  Description: INTERVENTIONS:  - Maternal surveillance  - Fetal surveillance  - Monitor uterine activity  - Medications as ordered  - Bedrest  Outcome: Progressing     Problem: PAIN - ADULT  Goal: Verbalizes/displays adequate comfort level or baseline comfort level  Description: Interventions:  - Encourage patient to monitor pain and request assistance  - Assess pain using appropriate pain scale  - Administer analgesics based on type and severity of pain and evaluate response  - Implement non-pharmacological measures as appropriate and evaluate response  - Consider cultural and social influences on pain and pain management  - Notify physician/advanced practitioner if interventions unsuccessful or patient reports new pain  Outcome: Progressing     Problem: INFECTION - ADULT  Goal: Absence or prevention of progression during hospitalization  Description: INTERVENTIONS:  - Assess and monitor for signs and symptoms of infection  - Monitor lab/diagnostic results  - Monitor all insertion sites, i.e. indwelling lines, tubes, and drains  - Monitor endotracheal if appropriate and nasal secretions for changes in amount and color  - [place] appropriate cooling/warming therapies per order  - Administer medications as ordered  - Instruct and encourage patient and family to use good hand hygiene technique  - Identify and instruct in appropriate isolation precautions for identified infection/condition  Outcome: Progressing  Goal: Absence of fever/infection during neutropenic period  Description: INTERVENTIONS:  - Monitor WBC    Outcome: Progressing     Problem: SAFETY ADULT  Goal: Patient will remain free of falls  Description: INTERVENTIONS:  - Educate patient/family on patient safety including physical limitations  - Instruct patient to call for assistance with activity   - Consult OT/PT to assist with strengthening/mobility   -  Keep Call bell within reach  - Keep bed low and locked with side rails adjusted as appropriate  - Keep care items and personal belongings within reach  - Initiate and maintain comfort rounds  - Make Fall Risk Sign visible to staff  - Offer Toileting   - Apply yellow socks and bracelet for high fall risk patients  - Consider moving patient to room near nurses station  Outcome: Progressing  Goal: Maintain or return to baseline ADL function  Description: INTERVENTIONS:  -  Assess patient's ability to carry out ADLs; assess patient's baseline for ADL function and identify physical deficits which impact ability to perform ADLs (bathing, care of mouth/teeth, toileting, grooming, dressing, etc.)  - Assess/evaluate cause of self-care deficits   - Assess range of motion  - Assess patient's mobility; develop plan if impaired  - Assess patient's need for assistive devices and provide as appropriate  - Encourage maximum independence but intervene and supervise when necessary  - Involve family in performance of ADLs  - Assess for home care needs following discharge   - Consider OT consult to assist with ADL evaluation and planning for discharge  - Provide patient education as appropriate  Outcome: Progressing  Goal: Maintains/Returns to pre admission functional level  Description: INTERVENTIONS:  - Perform AM-PAC 6 Click Basic Mobility/ Daily Activity assessment daily.  - Set and communicate daily mobility goal to care team and patient/family/caregiver.   - Collaborate with rehabilitation services on mobility goals if consulted    - Out of bed for toileting  - Record patient progress and toleration of activity level   Outcome: Progressing     Problem: Knowledge Deficit  Goal: Patient/family/caregiver demonstrates understanding of disease process, treatment plan, medications, and discharge instructions  Description: Complete learning assessment and assess knowledge base.  Interventions:  - Provide teaching at level of  understanding  - Provide teaching via preferred learning methods  Outcome: Progressing     Problem: DISCHARGE PLANNING  Goal: Discharge to home or other facility with appropriate resources  Description: INTERVENTIONS:  - Identify barriers to discharge w/patient and caregiver  - Arrange for needed discharge resources and transportation as appropriate  - Identify discharge learning needs (meds, wound care, etc.)  - Arrange for interpretive services to assist at discharge as needed  - Refer to Case Management Department for coordinating discharge planning if the patient needs post-hospital services based on physician/advanced practitioner order or complex needs related to functional status, cognitive ability, or social support system  Outcome: Progressing

## 2024-07-06 NOTE — ASSESSMENT & PLAN NOTE
Systolic (24hrs), Av , Min:133 , Max:168     Diastolic (24hrs), Av, Min:62, Max:93    Asymptomatic  CBC/CMP wnl, PC ratio 0.65  S/p magnesium  Procardia 30

## 2024-07-06 NOTE — PROGRESS NOTES
Progress Note - Maternal Fetal Medicine   Isidra Chamberlain 38 y.o. female MRN: [id number]  Unit/Bed#: -01 Encounter: [id number]  Admit date: 2024.  Today's date: 24    Assessment & Plan     Ms. Chamberlain is a 38 y.o.  at 27w2d, Hospital Day: 2, admitted with acute placental abruption.  By issue:    * Placental abruption, second trimester  Assessment & Plan  Patient presented w/ heavy vaginal bleeding s/p intercourse w/ no known placenta/vasa previa  Reported passing several large clots PTA  Brought in by EMS and per their report was diaphoretic, bradycardic, & hypotensive en route and started on IVF  - These findings were not present on arrival to L&D triage  Speculum exam with large clot (~500 mL) and slow persistent trickle from the cervical os with no lacerations/injuries noted    Suspect evolving placental abruption and/or  labor as an etiology for her bleeding  High clinical concern for evolving coagulopathy in the setting of low-normal fibrinogen and ongoing bleeding  Pelvic imaging 24 with 9.8 x 4.4 x 10.8cm organized blood clot consistent with suspected placental abruption given acute onset of bleeding and abdominal pain without other source of vaginal bleeding  Fetal presentation breech    S/p 1u FFP, 1u pRBCs, 1u cryo  S/p NICU consultation  Betamethasone for fetal lung maturation -  Magnesium for fetal neuroprotection  PCN for GBS prophylaxis given unknown GBS status and  gestation  Q4h coagulations studies, CBC  Low threshold for classical  section in the event of maternal compromise with heavy vaginal bleeding, intractable abdominal pain, or need for additional blood products; or concern for fetal compromise with derangements in fetal heart tracing     Preeclampsia  Assessment & Plan  Systolic (24hrs), Av , Min:131 , Max:159     Diastolic (24hrs), Av, Min:79, Max:99    Patient met criteria with elevated blood pressures and proteinuria with UPC  "0.65  Asymptomatic  Magnesium gtt ongoing for fetal neuroprotection  Continue to monitor for evolving signs of severe features     uterine contractions  Assessment & Plan  [place] with contractions every 2-3 min which patient started feeling while in L&D triage.  NST reactive  SVE 1.5/30/-3  CL 3.9 cm    Betamethasone for fetal lung maturation -  Do not recommend tocolysis in the setting of suspected placental abruption  GC/CT neg, FFN pos, UDS pos for THC and meth/amph    Marijuana use  Assessment & Plan  Patient reports marijuana use (smokes) w/ no other substance use this pregnancy.    UDS positive for THC and methamphetamines  Will need CM consult    Request for sterilization  Assessment & Plan  Patient requests permanent sterilization  MA-31 signed 2024    History of  delivery  Assessment & Plan  H/o spontaneous PTD at 36w in  with 3 subsequent term SVDs.    Anemia affecting pregnancy in second trimester  Assessment & Plan  Patient recently prescribed oral iron but hadn't started taking it yet.  Hgb 10.0 > 11.1  Fibrinogen 287 > 362  PTT 26 > 26  PT/INR 13.2/0.95 > 14.8/1.10    History of diet controlled gestational diabetes mellitus (GDM)  Assessment & Plan  H/o A1GDM in most recent pregnancy per patient with no use of medications.  Unknown GDM status this pregnancy (no GTT completed to date).    Asthma during pregnancy  Assessment & Plan  Rarely needs Albuterol this pregnancy (last use was \"months ago\").    Albuterol prn    27 weeks gestation of pregnancy  Assessment & Plan  Dated by US (unclear if 1st or 2nd trimester as per records in Care Everywhere) w/ documented TONA of 10/3/24, consistent w/ patient reported TONA.  NST reactive  [place] with contractions every 2-3 min  CL 3.9 cm  TVUS w/ no placenta/vasa previa  TAUS w/ anterior placenta & sariah breech presentation  Delivery consent signed 24  Patient presents w/ heavy vaginal bleeding & subsequently developed contractions in L&D " triage    Prenatal labs reviewed  FEN: NPO, NS 50 cc/hr  DVT ppx: SCDs  Monitoring: continuous             Subjective    Contractions: cramping  Loss of fluid: no  Vaginal bleeding: yes, improved, spotting  Fetal movement: yes    Pain: no  Headaches: yes  Visual changes: no  Chest pain: no  Shortness of breath: no  Nausea: yes  Vomiting/Diarrhea: no  Dysuria: no  Leg pain: no          Objective      Patient Vitals for the past 24 hrs:   BP Temp Temp src Pulse Resp SpO2   07/06/24 0200 155/94 -- -- 81 18 --   07/06/24 0004 -- -- -- 76 -- 100 %   07/06/24 0003 -- -- -- 86 -- --   07/06/24 0001 159/94 -- -- 77 -- --   07/05/24 2359 -- 98.4 °F (36.9 °C) Oral 77 18 99 %   07/05/24 2211 138/92 98.2 °F (36.8 °C) Oral 82 18 99 %   07/05/24 2000 133/80 -- -- 75 18 100 %   07/05/24 1901 -- 98.2 °F (36.8 °C) Oral -- -- --   07/05/24 1800 -- -- -- 91 -- 100 %   07/05/24 1759 131/85 -- -- 90 20 --   07/05/24 1619 148/86 -- -- -- -- 97 %   07/05/24 1610 148/86 -- -- 75 20 94 %   07/05/24 1414 -- -- -- 78 -- --   07/05/24 1411 154/88 98.5 °F (36.9 °C) Oral 73 20 97 %   07/05/24 1410 -- -- -- 79 -- 91 %   07/05/24 1205 -- -- -- 68 -- 100 %   07/05/24 1203 -- -- -- 67 -- --   07/05/24 1200 -- -- -- 67 -- 100 %   07/05/24 1159 -- -- -- 68 -- --   07/05/24 1155 135/79 98 °F (36.7 °C) Oral 73 20 99 %   07/05/24 1151 -- -- -- 77 -- --   07/05/24 1150 -- -- -- 79 -- 99 %   07/05/24 1147 -- -- -- 76 -- --   07/05/24 1145 -- -- -- 76 -- 99 %   07/05/24 1143 -- -- -- 79 -- --   07/05/24 1140 -- -- -- 77 -- 99 %   07/05/24 1139 -- -- -- 78 -- --   07/05/24 0944 144/99 97.5 °F (36.4 °C) Oral 70 20 --   07/05/24 0941 -- -- -- 73 -- 100 %   07/05/24 0939 140/96 97.5 °F (36.4 °C) Oral 72 20 --   07/05/24 0938 -- -- -- 70 -- 100 %   07/05/24 0936 -- -- -- 72 -- 100 %   07/05/24 0933 -- -- -- 71 -- --   07/05/24 0931 -- -- -- 72 -- 97 %   07/05/24 0929 -- -- -- 81 -- --   07/05/24 0926 -- -- -- 75 -- 99 %   07/05/24 0925 -- -- -- 78 -- --    07/05/24 0924 145/97 97.9 °F (36.6 °C) Oral 70 18 --   07/05/24 0915 131/83 98.2 °F (36.8 °C) Oral 75 20 --   07/05/24 0748 -- -- -- 79 -- --   07/05/24 0747 -- -- -- 73 -- 100 %   07/05/24 0744 -- -- -- 85 -- --   07/05/24 0742 -- -- -- 79 -- 100 %   07/05/24 0740 -- -- -- 78 -- --   07/05/24 0737 -- -- -- 75 -- 98 %   07/05/24 0723 156/94 97.8 °F (36.6 °C) Oral 70 18 --   07/05/24 0653 144/94 -- -- 85 -- --   07/05/24 0638 150/99 -- -- 76 -- --   07/05/24 0623 138/86 -- -- 69 -- --   07/05/24 0615 138/96 97.8 °F (36.6 °C) -- 69 18 --   07/05/24 0612 152/94 97.7 °F (36.5 °C) Oral 71 16 98 %   07/05/24 0609 -- -- -- 71 -- --   07/05/24 0600 148/94 -- -- -- -- --   07/05/24 0542 154/85 97.9 °F (36.6 °C) Oral 77 18 100 %   07/05/24 0528 145/91 97.9 °F (36.6 °C) Oral 75 18 100 %   07/05/24 0512 158/92 97.6 °F (36.4 °C) Oral 77 18 100 %   07/05/24 0500 -- -- -- 77 -- 98 %   07/05/24 0457 159/87 97.6 °F (36.4 °C) Oral 68 18 100 %   07/05/24 0445 139/95 97.6 °F (36.4 °C) -- 68 18 99 %   07/05/24 0438 139/95 -- -- 79 -- 99 %   07/05/24 0432 134/95 97.6 °F (36.4 °C) Oral 74 18 100 %   07/05/24 0411 141/94 97.7 °F (36.5 °C) Oral 79 18 99 %   07/05/24 0400 148/83 -- -- 75 -- 99 %       Physical Exam  Vitals and nursing note reviewed. Exam conducted with a chaperone present.   Constitutional:       General: She is not in acute distress.  HENT:      Head: Normocephalic.      Right Ear: External ear normal.      Left Ear: External ear normal.   Eyes:      General: No scleral icterus.        Right eye: No discharge.         Left eye: No discharge.      Conjunctiva/sclera: Conjunctivae normal.   Cardiovascular:      Rate and Rhythm: Normal rate and regular rhythm.      Pulses: Normal pulses.      Heart sounds: Normal heart sounds.   Pulmonary:      Effort: Pulmonary effort is normal. No respiratory distress.      Breath sounds: Normal breath sounds.   Abdominal:      Palpations: Abdomen is soft.      Tenderness: There is no  abdominal tenderness. There is no guarding.      Comments: Gravid uterus   Musculoskeletal:         General: No swelling or tenderness. Normal range of motion.      Cervical back: Normal range of motion.      Right lower leg: No edema.      Left lower leg: No edema.   Skin:     General: Skin is warm and dry.      Capillary Refill: Capillary refill takes less than 2 seconds.   Neurological:      Mental Status: She is alert and oriented to person, place, and time. Mental status is at baseline.   Psychiatric:         Mood and Affect: Mood normal.         Behavior: Behavior normal.         I/O         07/04 0701 07/05 0700 07/05 0701 07/06 0700    I.V. 497.9 1531.7    Blood 753 125    IV Piggyback 395     Total Intake 1645.9 1656.7    Urine 2300 3725    Blood 292     Total Output 2592 3725    Net -946.1 -2068.3                  Invasive Devices       Peripheral Intravenous Line  Duration             Peripheral IV 07/05/24 Right Antecubital 1 day    Peripheral IV 07/06/24 Right;Ventral (anterior) Wrist <1 day                    Results from last 7 days   Lab Units 07/05/24  2334 07/05/24  1919 07/05/24  1442 07/05/24  1038 07/05/24  0617   WBC Thousand/uL 13.85* 13.68* 12.24* 11.28* 12.29*   TOTAL NEUT ABS Thousands/µL 11.72* 11.88* 10.85*  --   --    HEMOGLOBIN g/dL 11.1* 10.7* 11.4* 11.1* 10.5*   MCV fL 92 90 90 89 90   PLATELETS Thousands/uL 196 193 190 171 160     Results from last 7 days   Lab Units 07/05/24  2334 07/05/24  1919 07/05/24  1442 07/05/24  1038 07/05/24  0215   POTASSIUM mmol/L 4.0 3.5 4.3 3.7 3.9   CHLORIDE mmol/L 99 99 102 104 105   CO2 mmol/L 16* 16* 20* 21 21   BUN mg/dL 7 7 8 8 9   CREATININE mg/dL 0.70 0.65 0.67 0.62 0.85   EGFR ml/min/1.73sq m 110 112 111 114 87   MAGNESIUM mg/dL 6.8*  --   --   --   --      Results from last 7 days   Lab Units 07/05/24 2334 07/05/24  1919 07/05/24  1442 07/05/24  1038 07/05/24  0215   AST U/L 21 18 19 17 15   ALT U/L 8 9 8 9 7     Results from last 7 days    Lab Units 24  2334 24  1919 24  1442 24  1038 24  0617 24  0215   PLATELETS Thousands/uL 196 193 190 171 160 175   INR  1.10 1.05 0.98 0.98 0.96 0.95   PROTIME seconds 14.8* 14.3 13.6 13.5 13.4 13.2   PTT seconds 26 24 26 24 26 26   FIBRINOGEN mg/dL 362 347 377 361 302 278     Results from last 7 days   Lab Units 24  0253   POC GLUCOSE mg/dl 99     Results from last 7 days   Lab Units 24  0834 24  0351   PROT/CREAT RATIO UR  0.65* 9.80*                   Brief review of pertinent history:  Past Medical History:   Diagnosis Date    Anemia affecting pregnancy 2023    Asthma     Carpal tunnel syndrome, bilateral     Chlamydia infection     Elevated blood pressure reading without diagnosis of hypertension 2023    Gastroesophageal reflux disease without esophagitis 2023     Past Surgical History:   Procedure Laterality Date    NO PAST SURGERIES       OB History    Para Term  AB Living   7 4 3 1 2 4   SAB IAB Ectopic Multiple Live Births   1 1 0 0 4      # Outcome Date GA Lbr Florencio/2nd Weight Sex Type Anes PTL Lv   7 Current            6 Term 10/05/23 37w1d   M Vag-Spont   DREA   5 SAB 22           4 Term     M Vag-Spont   DREA   3 Term     M Vag-Spont   DREA   2      F Vag-Spont   DREA   1 IAB      TAB         Birth Comments: 1st trimester, suction D&C      Obstetric Comments   Menarche - 13 yo       Fetal data:  Nonstress test: date 24   Baseline: 130bpm  Variability: moderate  Accelerations: present, 10x10  Decelerations: absent  Contractions:  irregular  Assessment: reactive  Plan: CEFM      MFM ultrasound report key findings: Interval growth scan from 24 date at 27w1d gestational age.    INDICATIONS     The indications for this exam were second trimester bleeding, previous   delivery & short interval pregnancy.     Exam Types     Transvaginal Ultrasound  Level I     RESULTS     Fetus # 1 of  1  Breech presentation  Fetal growth appeared normal  Placenta Location = Anterior, fundal  No placenta previa     MEASUREMENTS (* Included In Average GA)     AC             23.48 cm        27 weeks 6 days* (63%)  BPD             6.68 cm        26 weeks 6 days* (31%)  HC             25.13 cm        27 weeks 2 days* (26%)  Femur           4.97 cm        26 weeks 6 days* (25%)     HC/AC           1.07 [1.05 - 1.22]                 (31%)  FL/AC             21 [20 - 24]  FL/BPD            74 [71 - 87]  EFW [place] 4   1062 grams - 2 lbs 5 oz                 (48%)     THE AVERAGE GESTATIONAL AGE is 27 weeks 1 day +/- 14 days.     AMNIOTIC FLUID     Q1: 2.3      Q2: 2.2      Q3: 4.7      Q4: 2.1  DAISY Total = 11.3 cm  Amniotic Fluid: Normal     FETAL VESSELS     S/D    PI       RI      PSV    AEDV RF  cm/s  Middle Cerebral Artery   0.00    0.00    0.00    26.39     CERVICAL EVALUATION     The cervix appeared normal (Ultrasound Examination).     SUPINE  Cervical Length: 2.90 cm     OTHER TEST RESULTS  Funneling?: No             Dynamic Changes?: No  Debris?: No     ANATOMY COMMENTS     Follow up evaluation of intracranial anatomy (calvarium and  lateral  ventricles), cardiac anatomy (outflow tract and three vessel views),  diaphragm, stomach, kidneys, and bladder appear normal.     IMPRESSION     [facility] IUP  27 weeks and 1 day by this ultrasound. (TONA=OCT 3 2024)  27 weeks and 1 day by 2nd Trimester Sono. (TONA=OCT 3 2024)  Breech presentation  Fetal growth appeared normal  Regular fetal heart rate of 138 bpm  Anterior, fundal placenta  No placenta previa    MEDS:   Medication Administration - last 24 hours from 07/05/2024 0339 to 07/06/2024 0339         Date/Time Order Dose Route Action Action by     07/05/2024 2059 EDT lactated ringers infusion 0 mL/hr Intravenous Arron Ross RN     07/05/2024 1126 EDT lactated ringers infusion 125 mL/hr Intravenous Flip Smith RN     07/05/2024 0546 EDT lactated  ringers infusion 50 mL/hr Intravenous Rate/Dose Change Deja Chandler RN     07/05/2024 2351 EDT acetaminophen (TYLENOL) tablet 975 mg 975 mg Oral Given Ania Vincent RN     07/05/2024 1432 EDT acetaminophen (TYLENOL) tablet 975 mg 975 mg Oral Given Kelsey Smith RN     07/05/2024 0408 EDT penicillin G (PFIZERPEN-G) in 0.9% sodium chloride 250 mL IVPB 5 Million Units 0 Million Units Intravenous Stopped Deja Chandler RN     07/06/2024 0055 EDT penicillin G (PFIZERPEN-G) in 0.9% sodium chloride 100 mL IVPB 2.5 Million Units 0 Million Units Intravenous Stopped Neal Ross RN     07/05/2024 2355 EDT penicillin G (PFIZERPEN-G) in 0.9% sodium chloride 100 mL IVPB 2.5 Million Units 2.5 Million Units Intravenous New Bag Neal Ross RN     07/05/2024 2036 EDT penicillin G (PFIZERPEN-G) in 0.9% sodium chloride 100 mL IVPB 2.5 Million Units 0 Million Units Intravenous Stopped Neal Ross RN     07/05/2024 1936 EDT penicillin G (PFIZERPEN-G) in 0.9% sodium chloride 100 mL IVPB 2.5 Million Units 2.5 Million Units Intravenous New Bag Neal oRss, JEREMY     07/05/2024 1535 EDT penicillin G (PFIZERPEN-G) in 0.9% sodium chloride 100 mL IVPB 2.5 Million Units 2.5 Million Units Intravenous New Bag Kelsey Smith RN     07/05/2024 1126 EDT penicillin G (PFIZERPEN-G) in 0.9% sodium chloride 100 mL IVPB 2.5 Million Units 2.5 Million Units Intravenous New Bag Kelsey Smith, JEREMY     07/05/2024 0744 EDT penicillin G (PFIZERPEN-G) in 0.9% sodium chloride 100 mL IVPB 2.5 Million Units 2.5 Million Units Intravenous New Bag Kelsey Smith RN     07/06/2024 0325 EDT betamethasone acetate-betamethasone sodium phosphate (CELESTONE) injection 12 mg 12 mg Intramuscular Given Neal Ross RN     07/05/2024 1624 EDT magnesium sulfate 20 g/500 mL infusion (premix) 0 g/hr Intravenous Stopped Kelsey Smith RN     07/05/2024 1319 EDT magnesium sulfate 20 g/500 mL infusion (premix) 2 g/hr Intravenous New Bag Kelsey Smith RN      07/05/2024 1420 EDT prenatal multivitamin tablet 1 tablet 1 tablet Oral Not Given Kelsey Smith RN     07/06/2024 0327 EDT magnesium sulfate 20 g/500 mL infusion (premix) 2 g/hr Intravenous New Bag Neal Ross RN     07/06/2024 0200 EDT magnesium sulfate 20 g/500 mL infusion (premix) 2 g/hr Intravenous Rate/Dose Verify Neal Ross RN     07/06/2024 0000 EDT magnesium sulfate 20 g/500 mL infusion (premix) 2 g/hr Intravenous Rate/Dose Verify Neal Ross RN     07/05/2024 2200 EDT magnesium sulfate 20 g/500 mL infusion (premix) 2 g/hr Intravenous Rate/Dose Verify Neal Ross RN     07/05/2024 2000 EDT magnesium sulfate 20 g/500 mL infusion (premix) 2 g/hr Intravenous Rate/Dose Verify Neal Ross RN     07/05/2024 1641 EDT magnesium sulfate 20 g/500 mL infusion (premix) 2 g/hr Intravenous New Bag Kelsey Smith RN     07/05/2024 2100 EDT sodium chloride 0.9 % infusion 50 mL/hr Intravenous New Bag Neal Ross RN          Current Facility-Administered Medications   Medication Dose Route Frequency    acetaminophen (TYLENOL) tablet 975 mg  975 mg Oral Q8H PRN    albuterol (PROVENTIL HFA,VENTOLIN HFA) inhaler 2 puff  2 puff Inhalation Q4H PRN    calcium carbonate (TUMS) chewable tablet 1,000 mg  1,000 mg Oral TID PRN    calcium gluconate 10 % injection 1 g  1 g Intravenous Once PRN    famotidine (PEPCID) tablet 20 mg  20 mg Oral BID    magnesium sulfate 20 g/500 mL infusion (premix)  2 g/hr Intravenous Continuous    metoclopramide (REGLAN) injection 10 mg  10 mg Intravenous Q6H PRN    ondansetron (ZOFRAN) injection 4 mg  4 mg Intravenous Q6H PRN    penicillin G (PFIZERPEN-G) in 0.9% sodium chloride 100 mL IVPB 2.5 Million Units  2.5 Million Units Intravenous Q4H    prenatal multivitamin tablet 1 tablet  1 tablet Oral Daily    sodium chloride 0.9 % infusion  50 mL/hr Intravenous Continuous            Cosme Christine MD  OBGYN, PGY-4  7/6/2024  3:39 AM

## 2024-07-07 PROBLEM — R01.0 FUNCTIONAL SYSTOLIC MURMUR: Status: ACTIVE | Noted: 2024-07-07

## 2024-07-07 PROBLEM — O44.03 PLACENTA PREVIA ANTEPARTUM IN THIRD TRIMESTER: Status: ACTIVE | Noted: 2024-07-07

## 2024-07-07 LAB
ALBUMIN SERPL BCG-MCNC: 3.4 G/DL (ref 3.5–5)
ALP SERPL-CCNC: 60 U/L (ref 34–104)
ALT SERPL W P-5'-P-CCNC: 8 U/L (ref 7–52)
ANION GAP SERPL CALCULATED.3IONS-SCNC: 8 MMOL/L (ref 4–13)
APTT PPP: 24 SECONDS (ref 23–37)
AST SERPL W P-5'-P-CCNC: 14 U/L (ref 13–39)
BASOPHILS # BLD AUTO: 0.01 THOUSANDS/ÂΜL (ref 0–0.1)
BASOPHILS NFR BLD AUTO: 0 % (ref 0–1)
BILIRUB SERPL-MCNC: 0.43 MG/DL (ref 0.2–1)
BUN SERPL-MCNC: 9 MG/DL (ref 5–25)
CALCIUM ALBUM COR SERPL-MCNC: 7.6 MG/DL (ref 8.3–10.1)
CALCIUM SERPL-MCNC: 7.1 MG/DL (ref 8.4–10.2)
CHLORIDE SERPL-SCNC: 106 MMOL/L (ref 96–108)
CO2 SERPL-SCNC: 19 MMOL/L (ref 21–32)
CREAT SERPL-MCNC: 0.64 MG/DL (ref 0.6–1.3)
EOSINOPHIL # BLD AUTO: 0 THOUSAND/ÂΜL (ref 0–0.61)
EOSINOPHIL NFR BLD AUTO: 0 % (ref 0–6)
ERYTHROCYTE [DISTWIDTH] IN BLOOD BY AUTOMATED COUNT: 13.6 % (ref 11.6–15.1)
ERYTHROCYTE [DISTWIDTH] IN BLOOD BY AUTOMATED COUNT: 13.7 % (ref 11.6–15.1)
FIBRINOGEN PPP-MCNC: 326 MG/DL (ref 207–520)
GFR SERPL CREATININE-BSD FRML MDRD: 113 ML/MIN/1.73SQ M
GLUCOSE SERPL-MCNC: 117 MG/DL (ref 65–140)
HCT VFR BLD AUTO: 29.3 % (ref 34.8–46.1)
HCT VFR BLD AUTO: 30.5 % (ref 34.8–46.1)
HGB BLD-MCNC: 10 G/DL (ref 11.5–15.4)
HGB BLD-MCNC: 9.8 G/DL (ref 11.5–15.4)
IMM GRANULOCYTES # BLD AUTO: 0.13 THOUSAND/UL (ref 0–0.2)
IMM GRANULOCYTES NFR BLD AUTO: 1 % (ref 0–2)
INR PPP: 1.01 (ref 0.84–1.19)
LYMPHOCYTES # BLD AUTO: 1.23 THOUSANDS/ÂΜL (ref 0.6–4.47)
LYMPHOCYTES NFR BLD AUTO: 10 % (ref 14–44)
MCH RBC QN AUTO: 29.9 PG (ref 26.8–34.3)
MCH RBC QN AUTO: 30.4 PG (ref 26.8–34.3)
MCHC RBC AUTO-ENTMCNC: 32.8 G/DL (ref 31.4–37.4)
MCHC RBC AUTO-ENTMCNC: 33.4 G/DL (ref 31.4–37.4)
MCV RBC AUTO: 91 FL (ref 82–98)
MCV RBC AUTO: 91 FL (ref 82–98)
MONOCYTES # BLD AUTO: 0.96 THOUSAND/ÂΜL (ref 0.17–1.22)
MONOCYTES NFR BLD AUTO: 8 % (ref 4–12)
NEUTROPHILS # BLD AUTO: 10.05 THOUSANDS/ÂΜL (ref 1.85–7.62)
NEUTS SEG NFR BLD AUTO: 81 % (ref 43–75)
NRBC BLD AUTO-RTO: 0 /100 WBCS
PLATELET # BLD AUTO: 170 THOUSANDS/UL (ref 149–390)
PLATELET # BLD AUTO: 174 THOUSANDS/UL (ref 149–390)
PMV BLD AUTO: 10 FL (ref 8.9–12.7)
PMV BLD AUTO: 10.2 FL (ref 8.9–12.7)
POTASSIUM SERPL-SCNC: 3.8 MMOL/L (ref 3.5–5.3)
PROT SERPL-MCNC: 6.1 G/DL (ref 6.4–8.4)
PROTHROMBIN TIME: 13.9 SECONDS (ref 11.6–14.5)
RBC # BLD AUTO: 3.22 MILLION/UL (ref 3.81–5.12)
RBC # BLD AUTO: 3.34 MILLION/UL (ref 3.81–5.12)
SODIUM SERPL-SCNC: 133 MMOL/L (ref 135–147)
WBC # BLD AUTO: 12.38 THOUSAND/UL (ref 4.31–10.16)
WBC # BLD AUTO: 12.45 THOUSAND/UL (ref 4.31–10.16)

## 2024-07-07 PROCEDURE — 85730 THROMBOPLASTIN TIME PARTIAL: CPT

## 2024-07-07 PROCEDURE — 85610 PROTHROMBIN TIME: CPT

## 2024-07-07 PROCEDURE — 85025 COMPLETE CBC W/AUTO DIFF WBC: CPT

## 2024-07-07 PROCEDURE — 85027 COMPLETE CBC AUTOMATED: CPT

## 2024-07-07 PROCEDURE — 99232 SBSQ HOSP IP/OBS MODERATE 35: CPT | Performed by: OBSTETRICS & GYNECOLOGY

## 2024-07-07 PROCEDURE — 80053 COMPREHEN METABOLIC PANEL: CPT

## 2024-07-07 PROCEDURE — NC001 PR NO CHARGE: Performed by: OBSTETRICS & GYNECOLOGY

## 2024-07-07 PROCEDURE — 85384 FIBRINOGEN ACTIVITY: CPT

## 2024-07-07 RX ORDER — SODIUM CHLORIDE 9 MG/ML
100 INJECTION, SOLUTION INTRAVENOUS CONTINUOUS
Status: DISCONTINUED | OUTPATIENT
Start: 2024-07-07 | End: 2024-07-07

## 2024-07-07 RX ADMIN — SODIUM CHLORIDE 100 ML/HR: 0.9 INJECTION, SOLUTION INTRAVENOUS at 14:01

## 2024-07-07 NOTE — ASSESSMENT & PLAN NOTE
Remote history of childhood murmur, patient unsure of what type, self-resolved  Holosystolic murmur on exam on 7/7  Likely functional holosystolic ejection murmur in pregnancy  Denies chest pain, pressure, or shortness of breath  Continue to monitor

## 2024-07-07 NOTE — PROGRESS NOTES
Progress Note - Maternal Fetal Medicine   Isidra Chamberlain 38 y.o. female MRN: [id number]  Unit/Bed#: -01 Encounter: [id number]  Admit date: 2024.  Today's date: 24    Assessment & Plan     Ms. Chamberlain is a 38 y.o.  at 27w3d, Hospital Day: 3, admitted with evolving placental abruption. Scant continued vaginal bleeding. Non-tender abdomen. VS stable. By issue:    * Placental abruption, second trimester  Assessment & Plan  Patient presented w/ heavy vaginal bleeding s/p intercourse w/ no known placenta/vasa previa  Reported passing several large clots PTA  Brought in by EMS and per their report was diaphoretic, bradycardic, & hypotensive en route and started on IVF  - These findings were not present on arrival to L&D triage  Speculum exam with large clot (~500 mL) and slow persistent trickle from the cervical os with no lacerations/injuries noted    Suspect evolving placental abruption and/or  labor as an etiology for her bleeding  High clinical concern for evolving coagulopathy in the setting of low-normal fibrinogen and ongoing bleeding  Pelvic imaging 24 with 9.8 x 4.4 x 10.8cm organized blood clot consistent with suspected placental abruption given acute onset of bleeding and abdominal pain without other source of vaginal bleeding  Fetal presentation breech    S/p 1u FFP, 1u pRBCs, 1u cryo  S/p NICU consultation  S/p betamethasone for fetal lung maturation -  S/p magnesium for fetal neuroprotection  Coagulation studies WNL, CBC with Hgb drop 10.9 -> 9.8 without apparent external bleeding but ongoing concerning for concealed abruption  Repeat CBC at 1200  Continued low threshold for classical  section in the event of maternal compromise with heavy vaginal bleeding, intractable abdominal pain, or need for additional blood products; or concern for fetal compromise with derangements in fetal heart tracing     Preeclampsia  Assessment & Plan  Systolic (24hrs), Av , Min:122  ", Max:163     Diastolic (24hrs), Av, Min:69, Max:95    Patient met criteria with elevated blood pressures and proteinuria with UPC 0.65  Asymptomatic  1x non-sustained SRBP over the past 24h, pressures this morning 122-139/69-87  Continue to trend pressures per protocol  Continue to monitor for evolving signs of severe features    Functional systolic murmur  Assessment & Plan  Remote history of childhood murmur, patient unsure of what type, self-resolved  Holosystolic murmur on exam on   Likely functional holosystolic ejection murmur in pregnancy  Denies chest pain, pressure, or shortness of breath  Continue to monitor     uterine contractions  Assessment & Plan  [place] with contractions every 2-3 min which patient started feeling while in L&D triage.  NST reactive  SVE 1.5/30/-3  CL 3.9 cm    Betamethasone for fetal lung maturation -  Do not recommend tocolysis in the setting of suspected placental abruption  GC/CT neg, FFN pos, UDS pos for THC and meth/amph    Marijuana use  Assessment & Plan  Patient reports marijuana use (smokes) w/ no other substance use this pregnancy.    UDS positive for THC and methamphetamines  Will need CM consult    Request for sterilization  Assessment & Plan  Patient requests permanent sterilization  MA-31 signed 2024    History of  delivery  Assessment & Plan  H/o spontaneous PTD at 36w in  with 3 subsequent term SVDs.    History of diet controlled gestational diabetes mellitus (GDM)  Assessment & Plan  H/o A1GDM in most recent pregnancy per patient with no use of medications.  Unknown GDM status this pregnancy (no GTT completed to date).    Asthma during pregnancy  Assessment & Plan  Rarely needs Albuterol this pregnancy (last use was \"months ago\").    Albuterol prn    27 weeks gestation of pregnancy  Assessment & Plan  Dated by US (unclear if 1st or 2nd trimester as per records in Care Everywhere) w/ documented TONA of 10/3/24, consistent w/ patient " "reported TONA.  NST reactive  [place] with contractions every 2-3 min  CL 3.9 cm  TVUS w/ no placenta/vasa previa  TAUS w/ anterior placenta & sariah breech presentation  Delivery consent signed 7/5/24  Patient presented w/ heavy vaginal bleeding & subsequently developed contractions in L&D triage    Prenatal labs reviewed  FEN: NPO,  cc/hr  DVT ppx: SCDs  Monitoring: continuous  See plan for \"Placental abruption, second trimester\"             Subjective    Contractions: cramping  Loss of fluid: no  Vaginal bleeding: scant  Fetal movement: yes    Pain: no  Headaches: no  Visual changes: no  Chest pain: no  Shortness of breath: no  Nausea: no  Vomiting/Diarrhea: no  Dysuria: no  Leg pain: no          Objective      Patient Vitals for the past 24 hrs:   BP Temp Temp src Pulse Resp SpO2   07/07/24 0604 127/76 97.6 °F (36.4 °C) Oral 62 16 --   07/07/24 0403 139/87 97.8 °F (36.6 °C) Oral 64 16 --   07/07/24 0202 135/84 98.3 °F (36.8 °C) Oral 65 16 --   07/07/24 0000 122/69 98 °F (36.7 °C) Oral 73 16 --   07/06/24 2235 131/80 97.7 °F (36.5 °C) Oral 60 -- --   07/06/24 2039 137/81 97.8 °F (36.6 °C) Oral -- 16 --   07/06/24 1833 146/95 -- -- 73 -- --   07/06/24 1818 163/93 -- -- 80 18 99 %   07/06/24 1618 149/90 98.5 °F (36.9 °C) Oral 79 18 --   07/06/24 1430 143/90 99.1 °F (37.3 °C) Oral 80 18 99 %   07/06/24 1222 142/85 98.1 °F (36.7 °C) Oral 77 18 100 %   07/06/24 1026 154/90 98.4 °F (36.9 °C) Oral 87 16 99 %   07/06/24 0811 155/87 98.7 °F (37.1 °C) Oral 83 16 99 %       Physical Exam  Vitals and nursing note reviewed. Exam conducted with a chaperone present.   Constitutional:       General: She is not in acute distress.  HENT:      Head: Normocephalic.      Right Ear: External ear normal.      Left Ear: External ear normal.   Eyes:      General: No scleral icterus.        Right eye: No discharge.         Left eye: No discharge.      Conjunctiva/sclera: Conjunctivae normal.   Cardiovascular:      Rate and Rhythm: Normal " rate and regular rhythm.      Pulses: Normal pulses.      Heart sounds: Murmur (holosystolic, grade 1) heard.   Pulmonary:      Effort: Pulmonary effort is normal.      Breath sounds: Normal breath sounds.   Abdominal:      Palpations: Abdomen is soft.      Tenderness: There is no abdominal tenderness. There is no guarding.      Comments: Gravid uterus   Genitourinary:     General: Normal vulva.      Comments: Scant dark red blood on pad  Musculoskeletal:         General: No swelling or tenderness. Normal range of motion.      Cervical back: Normal range of motion.      Right lower leg: No edema.      Left lower leg: No edema.   Skin:     General: Skin is warm and dry.      Capillary Refill: Capillary refill takes less than 2 seconds.   Neurological:      Mental Status: She is alert and oriented to person, place, and time. Mental status is at baseline.   Psychiatric:         Mood and Affect: Mood normal.         Behavior: Behavior normal.         I/O         07/05 0701 07/06 0700 07/06 0701 07/07 0700 07/07 0701 07/08 0700    I.V. 1531.7      Blood 125      IV Piggyback       Total Intake 1656.7      Urine 4575 2625     Blood       Total Output 4575 2625     Net -2918.3 -2625                    Invasive Devices       Peripheral Intravenous Line  Duration             Peripheral IV 07/05/24 Right Antecubital 2 days    Peripheral IV 07/06/24 Right;Ventral (anterior) Wrist 1 day                    Results from last 7 days   Lab Units 07/07/24  0626 07/06/24  0830 07/06/24 0422 07/05/24 2334 07/05/24  1919   WBC Thousand/uL 12.38* 15.77* 15.97* 13.85* 13.68*   TOTAL NEUT ABS Thousands/µL 10.05* 14.60* 13.47* 11.72* 11.88*   HEMOGLOBIN g/dL 9.8* 10.9* 10.8* 11.1* 10.7*   MCV fL 91 90 94 92 90   PLATELETS Thousands/uL 174 194 186 196 193     Results from last 7 days   Lab Units 07/06/24  0830 07/06/24  0422 07/05/24  2334 07/05/24  1919 07/05/24  1442   POTASSIUM mmol/L 3.5 3.4* 4.0 3.5 4.3   CHLORIDE mmol/L 100 100  99 99 102   CO2 mmol/L 17* 16* 16* 16* 20*   BUN mg/dL 7 6 7 7 8   CREATININE mg/dL 0.67 0.65 0.70 0.65 0.67   EGFR ml/min/1.73sq m 111 112 110 112 111   MAGNESIUM mg/dL  --   --  6.8*  --   --      Results from last 7 days   Lab Units 24  0830 24  0422 24  2334 24  1919 24  1442   AST U/L 17 16 21 18 19   ALT U/L 8 8 8 9 8     Results from last 7 days   Lab Units 24  0626 24  0830 24  0422 24  2334 24  1919 24  1442 24  1038 24  0617 24  0215   PLATELETS Thousands/uL 174 194 186 196 193 190 171 160 175   INR  1.01 1.04 1.04 1.10 1.05 0.98 0.98 0.96 0.95   PROTIME seconds 13.9 14.2 14.2 14.8* 14.3 13.6 13.5 13.4 13.2   PTT seconds 24 25 24 26 24 26 24 26 26   FIBRINOGEN mg/dL 326 383 386 362 347 377 361 302 278     Results from last 7 days   Lab Units 24  0253   POC GLUCOSE mg/dl 99     Results from last 7 days   Lab Units 24  0834 24  0351   PROT/CREAT RATIO UR  0.65* 9.80*             Results from last 7 days   Lab Units 24  0353   URINE CULTURE  No Growth <1000 cfu/mL       Brief review of pertinent history:  Past Medical History:   Diagnosis Date    Anemia affecting pregnancy 2023    Asthma     Carpal tunnel syndrome, bilateral     Chlamydia infection     Elevated blood pressure reading without diagnosis of hypertension 2023    Gastroesophageal reflux disease without esophagitis 2023     Past Surgical History:   Procedure Laterality Date    NO PAST SURGERIES       OB History    Para Term  AB Living   7 4 3 1 2 4   SAB IAB Ectopic Multiple Live Births   1 1 0 0 4      # Outcome Date GA Lbr Florencio/2nd Weight Sex Type Anes PTL Lv   7 Current            6 Term 10/05/23 37w1d   M Vag-Spont   DREA   5 SAB 05/28/22           4 Term     M Vag-Spont   DREA   3 Term     M Vag-Spont   DREA   2      F Vag-Spont   DREA   1 IAB      TAB         Birth Comments: 1st trimester,  suction D&C      Obstetric Comments   Menarche - 13 yo       Fetal data:  Nonstress test: date 24 Time: 0645 - 0705  Baseline: 130bpm  Variability: moderate  Accelerations: present, 10x10  Decelerations: absent  Contractions:  uterine irritability  Assessment: reactive  Plan: CEFM        M ultrasound report key findings: Interval growth scan from 24 date at 27w1d gestational age.     INDICATIONS     The indications for this exam were second trimester bleeding, previous   delivery & short interval pregnancy.     Exam Types     Transvaginal Ultrasound  Level I     RESULTS     Fetus # 1 of 1  Breech presentation  Fetal growth appeared normal  Placenta Location = Anterior, fundal  No placenta previa     MEASUREMENTS (* Included In Average GA)     AC             23.48 cm        27 weeks 6 days* (63%)  BPD             6.68 cm        26 weeks 6 days* (31%)  HC             25.13 cm        27 weeks 2 days* (26%)  Femur           4.97 cm        26 weeks 6 days* (25%)     HC/AC           1.07 [1.05 - 1.22]                 (31%)  FL/AC             21 [20 - 24]  FL/BPD            74 [71 - 87]  EFW [place] 4   1062 grams - 2 lbs 5 oz                 (48%)     THE AVERAGE GESTATIONAL AGE is 27 weeks 1 day +/- 14 days.     AMNIOTIC FLUID     Q1: 2.3      Q2: 2.2      Q3: 4.7      Q4: 2.1  DAISY Total = 11.3 cm  Amniotic Fluid: Normal     FETAL VESSELS     S/D    PI       RI      PSV    AEDV RF  cm/s  Middle Cerebral Artery   0.00    0.00    0.00    26.39     CERVICAL EVALUATION     The cervix appeared normal (Ultrasound Examination).     SUPINE  Cervical Length: 2.90 cm     OTHER TEST RESULTS  Funneling?: No             Dynamic Changes?: No  Debris?: No     ANATOMY COMMENTS     Follow up evaluation of intracranial anatomy (calvarium and  lateral  ventricles), cardiac anatomy (outflow tract and three vessel views),  diaphragm, stomach, kidneys, and bladder appear normal.     IMPRESSION     [place] IUP  27 weeks  and 1 day by this ultrasound. (TONA=OCT 3 2024)  27 weeks and 1 day by 2nd Trimester Son[facility]. (TONA=OCT 3 2024)  Breech presentation  Fetal growth appeared normal  Regular fetal heart rate of 138 bpm  Anterior, fundal placenta  No placenta previa    MEDS:   Medication Administration - last 24 hours from 07/06/2024 0733 to 07/07/2024 0733         Date/Time Order Dose Route Action Action by     07/06/2024 0955 EDT prenatal multivitamin tablet 1 tablet 1 tablet Oral Given Dahlia Roberto RN     07/06/2024 0849 EDT magnesium sulfate 20 g/500 mL infusion (premix) 0 g/hr Intravenous Stopped Dahlia Roberto RN     07/06/2024 1933 EDT sodium chloride 0.9 % infusion 50 mL/hr Intravenous New Bag Teresa Charles RN          Current Facility-Administered Medications   Medication Dose Route Frequency    acetaminophen (TYLENOL) tablet 975 mg  975 mg Oral Q8H PRN    albuterol (PROVENTIL HFA,VENTOLIN HFA) inhaler 2 puff  2 puff Inhalation Q4H PRN    calcium carbonate (TUMS) chewable tablet 1,000 mg  1,000 mg Oral TID PRN    calcium gluconate 10 % injection 1 g  1 g Intravenous Once PRN    metoclopramide (REGLAN) injection 10 mg  10 mg Intravenous Q6H PRN    ondansetron (ZOFRAN) injection 4 mg  4 mg Intravenous Q6H PRN    prenatal multivitamin tablet 1 tablet  1 tablet Oral Daily    sodium chloride 0.9 % infusion  100 mL/hr Intravenous Continuous            Cosme Christine MD  OBGYN, PGY-4  7/7/2024  7:33 AM

## 2024-07-07 NOTE — PLAN OF CARE
Problem: ANTEPARTUM  Goal: Maintain pregnancy as long as maternal and/or fetal condition is stable  Description: INTERVENTIONS:  - Maternal surveillance  - Fetal surveillance  - Monitor uterine activity  - Medications as ordered  - Bedrest  Outcome: Progressing     Problem: PAIN - ADULT  Goal: Verbalizes/displays adequate comfort level or baseline comfort level  Description: Interventions:  - Encourage patient to monitor pain and request assistance  - Assess pain using appropriate pain scale  - Administer analgesics based on type and severity of pain and evaluate response  - Implement non-pharmacological measures as appropriate and evaluate response  - Consider cultural and social influences on pain and pain management  - Notify physician/advanced practitioner if interventions unsuccessful or patient reports new pain  Outcome: Progressing     Problem: INFECTION - ADULT  Goal: Absence or prevention of progression during hospitalization  Description: INTERVENTIONS:  - Assess and monitor for signs and symptoms of infection  - Monitor lab/diagnostic results  - Monitor all insertion sites, i.e. indwelling lines, tubes, and drains  - Monitor endotracheal if appropriate and nasal secretions for changes in amount and color  - [place] appropriate cooling/warming therapies per order  - Administer medications as ordered  - Instruct and encourage patient and family to use good hand hygiene technique  - Identify and instruct in appropriate isolation precautions for identified infection/condition  Outcome: Progressing  Goal: Absence of fever/infection during neutropenic period  Description: INTERVENTIONS:  - Monitor WBC    Outcome: Progressing     Problem: SAFETY ADULT  Goal: Patient will remain free of falls  Description: INTERVENTIONS:  - Educate patient/family on patient safety including physical limitations  - Instruct patient to call for assistance with activity   - Consult OT/PT to assist with strengthening/mobility   -  Keep Call bell within reach  - Keep bed low and locked with side rails adjusted as appropriate  - Keep care items and personal belongings within reach  - Initiate and maintain comfort rounds  - Make Fall Risk Sign visible to staff  - Offer Toileting every  Hours, in advance of need  - Initiate/Maintain alarm  - Obtain necessary fall risk management equipment:   - Apply yellow socks and bracelet for high fall risk patients  - Consider moving patient to room near nurses station  Outcome: Progressing  Goal: Maintain or return to baseline ADL function  Description: INTERVENTIONS:  -  Assess patient's ability to carry out ADLs; assess patient's baseline for ADL function and identify physical deficits which impact ability to perform ADLs (bathing, care of mouth/teeth, toileting, grooming, dressing, etc.)  - Assess/evaluate cause of self-care deficits   - Assess range of motion  - Assess patient's mobility; develop plan if impaired  - Assess patient's need for assistive devices and provide as appropriate  - Encourage maximum independence but intervene and supervise when necessary  - Involve family in performance of ADLs  - Assess for home care needs following discharge   - Consider OT consult to assist with ADL evaluation and planning for discharge  - Provide patient education as appropriate  Outcome: Progressing  Goal: Maintains/Returns to pre admission functional level  Description: INTERVENTIONS:  - Perform AM-PAC 6 Click Basic Mobility/ Daily Activity assessment daily.  - Set and communicate daily mobility goal to care team and patient/family/caregiver.   - Collaborate with rehabilitation services on mobility goals if consulted  - Perform Range of Motion  times a day.  - Reposition patient every  hours.  - Dangle patient  times a day  - Stand patient  times a day  - Ambulate patient  times a day  - Out of bed to chair  times a day   - Out of bed for meals  times a day  - Out of bed for toileting  - Record patient  progress and toleration of activity level   Outcome: Progressing     Problem: Knowledge Deficit  Goal: Patient/family/caregiver demonstrates understanding of disease process, treatment plan, medications, and discharge instructions  Description: Complete learning assessment and assess knowledge base.  Interventions:  - Provide teaching at level of understanding  - Provide teaching via preferred learning methods  Outcome: Progressing     Problem: DISCHARGE PLANNING  Goal: Discharge to home or other facility with appropriate resources  Description: INTERVENTIONS:  - Identify barriers to discharge w/patient and caregiver  - Arrange for needed discharge resources and transportation as appropriate  - Identify discharge learning needs (meds, wound care, etc.)  - Arrange for interpretive services to assist at discharge as needed  - Refer to Case Management Department for coordinating discharge planning if the patient needs post-hospital services based on physician/advanced practitioner order or complex needs related to functional status, cognitive ability, or social support system  Outcome: Progressing

## 2024-07-07 NOTE — PLAN OF CARE
Problem: ANTEPARTUM  Goal: Maintain pregnancy as long as maternal and/or fetal condition is stable  Description: INTERVENTIONS:  - Maternal surveillance  - Fetal surveillance  - Monitor uterine activity  - Medications as ordered  - Bedrest  Outcome: Progressing     Problem: PAIN - ADULT  Goal: Verbalizes/displays adequate comfort level or baseline comfort level  Description: Interventions:  - Encourage patient to monitor pain and request assistance  - Assess pain using appropriate pain scale  - Administer analgesics based on type and severity of pain and evaluate response  - Implement non-pharmacological measures as appropriate and evaluate response  - Consider cultural and social influences on pain and pain management  - Notify physician/advanced practitioner if interventions unsuccessful or patient reports new pain  Outcome: Progressing     Problem: INFECTION - ADULT  Goal: Absence or prevention of progression during hospitalization  Description: INTERVENTIONS:  - Assess and monitor for signs and symptoms of infection  - Monitor lab/diagnostic results  - Monitor all insertion sites, i.e. indwelling lines, tubes, and drains  - Monitor endotracheal if appropriate and nasal secretions for changes in amount and color  - [place] appropriate cooling/warming therapies per order  - Administer medications as ordered  - Instruct and encourage patient and family to use good hand hygiene technique  - Identify and instruct in appropriate isolation precautions for identified infection/condition  Outcome: Progressing  Goal: Absence of fever/infection during neutropenic period  Description: INTERVENTIONS:  - Monitor WBC    Outcome: Progressing     Problem: SAFETY ADULT  Goal: Patient will remain free of falls  Description: INTERVENTIONS:  - Educate patient/family on patient safety including physical limitations  - Instruct patient to call for assistance with activity   - Consult OT/PT to assist with strengthening/mobility   -  Keep Call bell within reach  - Keep bed low and locked with side rails adjusted as appropriate  - Keep care items and personal belongings within reach  - Initiate and maintain comfort rounds  - Make Fall Risk Sign visible to staff  - Apply yellow socks and bracelet for high fall risk patients  - Consider moving patient to room near nurses station  Outcome: Progressing  Goal: Maintain or return to baseline ADL function  Description: INTERVENTIONS:  -  Assess patient's ability to carry out ADLs; assess patient's baseline for ADL function and identify physical deficits which impact ability to perform ADLs (bathing, care of mouth/teeth, toileting, grooming, dressing, etc.)  - Assess/evaluate cause of self-care deficits   - Assess range of motion  - Assess patient's mobility; develop plan if impaired  - Assess patient's need for assistive devices and provide as appropriate  - Encourage maximum independence but intervene and supervise when necessary  - Involve family in performance of ADLs  - Assess for home care needs following discharge   - Consider OT consult to assist with ADL evaluation and planning for discharge  - Provide patient education as appropriate  Outcome: Progressing  Goal: Maintains/Returns to pre admission functional level  Description: INTERVENTIONS:  - Perform AM-PAC 6 Click Basic Mobility/ Daily Activity assessment daily.  - Set and communicate daily mobility goal to care team and patient/family/caregiver.   - Collaborate with rehabilitation services on mobility goals if consulted  - Out of bed for toileting  - Record patient progress and toleration of activity level   Outcome: Progressing     Problem: Knowledge Deficit  Goal: Patient/family/caregiver demonstrates understanding of disease process, treatment plan, medications, and discharge instructions  Description: Complete learning assessment and assess knowledge base.  Interventions:  - Provide teaching at level of understanding  - Provide teaching  via preferred learning methods  Outcome: Progressing     Problem: DISCHARGE PLANNING  Goal: Discharge to home or other facility with appropriate resources  Description: INTERVENTIONS:  - Identify barriers to discharge w/patient and caregiver  - Arrange for needed discharge resources and transportation as appropriate  - Identify discharge learning needs (meds, wound care, etc.)  - Arrange for interpretive services to assist at discharge as needed  - Refer to Case Management Department for coordinating discharge planning if the patient needs post-hospital services based on physician/advanced practitioner order or complex needs related to functional status, cognitive ability, or social support system  Outcome: Progressing

## 2024-07-08 LAB
APTT PPP: 23 SECONDS (ref 23–37)
BASOPHILS # BLD AUTO: 0.02 THOUSANDS/ÂΜL (ref 0–0.1)
BASOPHILS NFR BLD AUTO: 0 % (ref 0–1)
EOSINOPHIL # BLD AUTO: 0.04 THOUSAND/ÂΜL (ref 0–0.61)
EOSINOPHIL NFR BLD AUTO: 0 % (ref 0–6)
ERYTHROCYTE [DISTWIDTH] IN BLOOD BY AUTOMATED COUNT: 13.5 % (ref 11.6–15.1)
FIBRINOGEN PPP-MCNC: 318 MG/DL (ref 207–520)
GP B STREP DNA SPEC QL NAA+PROBE: POSITIVE
HCT VFR BLD AUTO: 31.5 % (ref 34.8–46.1)
HGB BLD-MCNC: 10.2 G/DL (ref 11.5–15.4)
IMM GRANULOCYTES # BLD AUTO: 0.12 THOUSAND/UL (ref 0–0.2)
IMM GRANULOCYTES NFR BLD AUTO: 1 % (ref 0–2)
INR PPP: 0.98 (ref 0.84–1.19)
LYMPHOCYTES # BLD AUTO: 1.65 THOUSANDS/ÂΜL (ref 0.6–4.47)
LYMPHOCYTES NFR BLD AUTO: 17 % (ref 14–44)
MCH RBC QN AUTO: 30.4 PG (ref 26.8–34.3)
MCHC RBC AUTO-ENTMCNC: 32.4 G/DL (ref 31.4–37.4)
MCV RBC AUTO: 94 FL (ref 82–98)
MONOCYTES # BLD AUTO: 0.75 THOUSAND/ÂΜL (ref 0.17–1.22)
MONOCYTES NFR BLD AUTO: 8 % (ref 4–12)
NEUTROPHILS # BLD AUTO: 7.33 THOUSANDS/ÂΜL (ref 1.85–7.62)
NEUTS SEG NFR BLD AUTO: 74 % (ref 43–75)
NRBC BLD AUTO-RTO: 0 /100 WBCS
PLATELET # BLD AUTO: 160 THOUSANDS/UL (ref 149–390)
PMV BLD AUTO: 10.1 FL (ref 8.9–12.7)
PROTHROMBIN TIME: 13.6 SECONDS (ref 11.6–14.5)
RBC # BLD AUTO: 3.36 MILLION/UL (ref 3.81–5.12)
WBC # BLD AUTO: 9.91 THOUSAND/UL (ref 4.31–10.16)

## 2024-07-08 PROCEDURE — 85025 COMPLETE CBC W/AUTO DIFF WBC: CPT

## 2024-07-08 PROCEDURE — 85730 THROMBOPLASTIN TIME PARTIAL: CPT

## 2024-07-08 PROCEDURE — 59025 FETAL NON-STRESS TEST: CPT | Performed by: OBSTETRICS & GYNECOLOGY

## 2024-07-08 PROCEDURE — 76815 OB US LIMITED FETUS(S): CPT | Performed by: OBSTETRICS & GYNECOLOGY

## 2024-07-08 PROCEDURE — NC001 PR NO CHARGE: Performed by: OBSTETRICS & GYNECOLOGY

## 2024-07-08 PROCEDURE — 85610 PROTHROMBIN TIME: CPT

## 2024-07-08 PROCEDURE — 76821 MIDDLE CEREBRAL ARTERY ECHO: CPT | Performed by: OBSTETRICS & GYNECOLOGY

## 2024-07-08 PROCEDURE — 85384 FIBRINOGEN ACTIVITY: CPT

## 2024-07-08 PROCEDURE — 99233 SBSQ HOSP IP/OBS HIGH 50: CPT | Performed by: OBSTETRICS & GYNECOLOGY

## 2024-07-08 RX ADMIN — Medication 1 TABLET: at 08:59

## 2024-07-08 NOTE — UTILIZATION REVIEW
Initial Clinical Review    Admission: Date/Time/Statement:   Admission Orders (From admission, onward)       Ordered        24 1644  INPATIENT ADMISSION  Once                          Orders Placed This Encounter   Procedures    INPATIENT ADMISSION     Standing Status:   Standing     Number of Occurrences:   1     Order Specific Question:   Level of Care     Answer:   Med Surg [16]     Order Specific Question:   Estimated length of stay     Answer:   More than 2 Midnights     Order Specific Question:   Certification     Answer:   I certify that inpatient services are medically necessary for this patient for a duration of greater than two midnights. See H&P and MD Progress Notes for additional information about the patient's course of treatment.     ED Arrival Information       Patient not seen in ED                       Chief Complaint   Patient presents with    Vaginal Bleeding       Initial Presentation: 38 y.o. female  HX gHTN, diet controlled DM  at 27w1d Inpatient admission due to vaginal bleeding and  contractions DXd w acute placental abruption  She reports the vaginal bleeding began this evening around 1:30 AM after intercourse. She noticed a large clot coming from her vagina and felt dizzy w/ EMS summoned. En route, she proceeded to pass several more large clots and saturated several pads, became hypotensive & given IVF. Reports no history of vaginal bleeding in this pregnancy and no history of placenta previa or vasa previa.   She reports no substance use aside from THC use. She reports feeling slightly sweaty, dizzy, and off baseline.   EXAM   TOCO: Gareth every 3-4 mins SVE: Cervical Dilation: 1-2, Cervical  Effacement: 30, Cervical Consistency: Medium  Fetal Station: -3; large clots (~500 mL) and slow persistent trickle from the cervical os with no lacerations/injuries noted , Hgb10, fibrinogen 287, tachycardia, hypertension , Pelvic imaging 24 with 9.8 x 4.4 x 10.8cm  organized blood clot consistent with suspected placental abruption     PLAN  continuous fetal HR monitoring;    recs per consult MFM, TX 1 unit FFP & 1 unit PRBC; start running QBL/ pad counts , IVF, IV PCN for unknown GBS,  UDS, BTM,  Do not recommend tocolysis in the setting of suspected placental abrutpion   MFM  admitted for heavy vaginal bleeding concerning for possible placental abruption and/or  labor in the setting of  contractions and cervical dilation   DX Abruptio placentae with a placenta previa.   Reactive NST   delivery may be indicated pending labs and progression including bleeding, maternal status, & fetal status. She is amenable to delivery including classical  if indicated.   Date: 2024  Day 2:    at 27w2d w acute placental abruption   S/p 1u FFP, 1u pRBCs, 1u cryo  Cont Q 4h coagulations studies, CBC  Low threshold for classical  section in the event of maternal compromise with heavy vaginal bleeding, intractable abdominal pain, or need for additional blood products; or concern for fetal compromise with derangements in fetal heart tracing   IV MAG GTT ongoing for fetal neuro protection;   UDS positive for THC and methamphetamine, consult IP CM   DATE  2024  DAY 3  at 27w3d with Evolving placental abruption.   Scant continued vaginal bleeding. Non-tender abdomen. VS stable, Coagulation studies WNL, CBC with Hgb drop 10.9 -> 9.8 without apparent external bleeding but ongoing concerning for concealed abruption  Repeat CBC at 1200  Continued low threshold for classical  section in the event of maternal compromise with heavy vaginal bleeding, intractable abdominal pain, or need for additional blood products; or concern for fetal compromise with derangements in fetal heart tracing   Triage Vitals   Temperature Pulse Respirations Blood Pressure SpO2 Pain Score   24 0300 24 0245 24 0300 24 0245 24 0245 24 0723    97.7 °F (36.5 °C) 75 18 136/93 100 % 5     Weight (last 2 days)       Date/Time    07/07/24 2120    Comment rows:    OBSERV: pt reports positive fetal movement; denies LOF. reports feeling occasional contractions. at 07/07/24 2120 07/07/24 0800    Comment rows:    OBSERV: pt. reports occasional mild cramping. Pt. verbalized understanding of NPO order. pt. resting comfortably at this time. at 07/07/24 0800 07/07/24 0159    Comment rows:    OBSERV: pt called out stating she is feeling crampy* pain 2/10* repositioned self to semi-fowlers* instructed pt to advise if there are any changes at 07/07/24 0159    07/06/24 1618    Comment rows:    OBSERV: minimal blood noted in urine, MD aware at 07/06/24 1618 07/06/24 1200    Comment rows:    OBSERV: reports + fetal movement, denies LOF, denies ctxs, states bleeding has been absent today at 07/06/24 1200            Vital Signs (last 3 days)       Date/Time Temp Pulse Resp BP SpO2 O2 Device Cardiac (WDL) Patient Position - Orthostatic VS Clinical Opiate Withdrawal Scale Total Score Pain    07/08/24 0407 97.9 °F (36.6 °C) 60 -- 133/71 97 % None (Room air) -- Lying -- No Pain    07/08/24 0046 98 °F (36.7 °C) 62 -- 129/73 97 % None (Room air) -- Sitting -- No Pain    07/07/24 2120 -- -- -- -- -- None (Room air) WDL -- -- No Pain    OBSERV: pt reports positive fetal movement; denies LOF. reports feeling occasional contractions. at 07/07/24 2120 07/07/24 1556 98.2 °F (36.8 °C) 64 16 146/89 98 % None (Room air) -- Lying -- No Pain    07/07/24 1402 -- -- -- -- -- -- -- -- -- No Pain    07/07/24 1214 -- -- -- -- -- -- -- -- -- 2    07/07/24 1154 98 °F (36.7 °C) 65 18 133/79 97 % None (Room air) -- Sitting -- No Pain    07/07/24 0800 98 °F (36.7 °C) 75 18 141/81 100 % None (Room air) WDL Lying -- --    OBSERV: pt. reports occasional mild cramping. Pt. verbalized understanding of NPO order. pt. resting comfortably at this time. at 07/07/24 0800    07/07/24 0604 97.6 °F  (36.4 °C) 62 16 127/76 -- -- -- -- -- 1    07/07/24 0403 97.8 °F (36.6 °C) 64 16 139/87 -- -- -- -- -- 2    07/07/24 0202 98.3 °F (36.8 °C) 65 16 135/84 -- -- -- -- -- --    07/07/24 0159 -- -- -- -- -- -- -- -- -- 2    OBSERV: pt called out stating she is feeling crampy* pain 2/10* repositioned self to semi-fowlers* instructed pt to advise if there are any changes at 07/07/24 0159 07/07/24 0000 98 °F (36.7 °C) 73 16 122/69 -- -- -- Lying -- No Pain    07/06/24 2235 97.7 °F (36.5 °C) 60 -- 131/80 -- -- -- -- -- No Pain    07/06/24 2039 97.8 °F (36.6 °C) -- 16 137/81 -- -- -- -- -- 2    07/06/24 1956 -- -- -- -- -- -- -- -- -- 4    07/06/24 1833 -- 73 -- 146/95 -- -- -- -- -- --    07/06/24 1818 -- 80 18 163/93 99 % -- -- -- -- --    07/06/24 1618 98.5 °F (36.9 °C) 79 18 149/90 -- -- -- Lying -- No Pain    OBSERV: minimal blood noted in urine, MD aware at 07/06/24 1618    07/06/24 1430 99.1 °F (37.3 °C) 80 18 143/90 99 % -- -- Lying -- No Pain    07/06/24 1222 98.1 °F (36.7 °C) 77 18 142/85 100 % -- -- Lying -- No Pain    07/06/24 1200 -- -- -- -- -- -- -- -- -- --    OBSERV: reports + fetal movement, denies LOF, denies ctxs, states bleeding has been absent today at 07/06/24 1200    07/06/24 1026 98.4 °F (36.9 °C) 87 16 154/90 99 % -- -- Lying -- No Pain    07/06/24 0811 98.7 °F (37.1 °C) 83 16 155/87 99 % None (Room air) WDL Lying -- No Pain    07/06/24 0619 98.3 °F (36.8 °C) 98 18 140/92 -- -- -- Lying -- No Pain    07/06/24 0525 -- 75 -- -- -- -- -- -- -- --    07/06/24 0523 -- 92 -- -- 100 % -- -- -- -- --    07/06/24 0521 -- 96 -- -- -- -- -- -- -- --    07/06/24 0519 -- 98 -- 158/95 -- -- -- -- 15 --    07/06/24 0518 -- 90 -- -- 100 % -- -- -- -- --    07/06/24 0517 -- 89 -- -- -- -- -- -- -- --    07/06/24 0410 97.9 °F (36.6 °C) 88 18 145/82 -- -- -- Lying -- --    07/06/24 0200 98 °F (36.7 °C) 81 18 155/94 -- -- -- Lying -- --    07/06/24 0004 -- 76 -- -- 100 % -- -- -- -- --    07/06/24 0003 -- 86 -- --  -- -- -- -- -- --    07/06/24 0001 -- 77 -- 159/94 -- -- -- -- -- --    07/05/24 2359 98.4 °F (36.9 °C) 77 18 -- 99 % -- -- Sitting -- --    07/05/24 2351 -- -- -- -- -- -- -- -- -- 3    07/05/24 2211 98.2 °F (36.8 °C) 82 18 138/92 99 % -- -- Lying -- No Pain    07/05/24 2000 -- 75 18 133/80 100 % None (Room air) -- -- -- --    07/05/24 1915 -- -- -- -- -- -- -- -- -- No Pain    07/05/24 1901 98.2 °F (36.8 °C) -- -- -- -- -- -- -- -- --    07/05/24 1800 -- 91 -- -- 100 % None (Room air) -- -- -- --    07/05/24 1759 -- 90 20 131/85 -- -- -- -- -- --    07/05/24 1619 -- -- -- 148/86 97 % None (Room air) -- -- -- --    07/05/24 1610 -- 75 20 148/86 94 % None (Room air) -- -- -- --    07/05/24 1538 -- -- -- -- -- -- -- -- -- 2    07/05/24 1432 -- -- -- -- -- -- -- -- -- 4    07/05/24 1414 -- 78 -- -- -- -- -- -- -- 3    07/05/24 1411 98.5 °F (36.9 °C) 73 20 154/88 97 % None (Room air) -- -- -- --    07/05/24 1410 -- 79 -- -- 91 % -- -- -- -- --    07/05/24 1225 -- -- -- -- -- -- -- -- -- --    OBSERV: ABD  by Dr. Connell at 07/05/24 1225    07/05/24 1205 -- 68 -- -- 100 % -- -- -- -- --    07/05/24 1203 -- 67 -- -- -- -- -- -- -- --    07/05/24 1200 -- 67 -- -- 100 % -- -- -- -- --    07/05/24 1159 -- 68 -- -- -- -- -- -- -- --    07/05/24 1155 98 °F (36.7 °C) 73 20 135/79 99 % None (Room air) -- -- -- 3    07/05/24 1151 -- 77 -- -- -- -- -- -- -- --    07/05/24 1150 -- 79 -- -- 99 % -- -- -- -- --    07/05/24 1147 -- 76 -- -- -- -- -- -- -- --    07/05/24 1145 -- 76 -- -- 99 % -- -- -- -- --    07/05/24 1143 -- 79 -- -- -- -- -- -- -- --    07/05/24 1140 -- 77 -- -- 99 % -- -- -- -- --    07/05/24 1139 -- 78 -- -- -- -- -- -- -- --    07/05/24 1100 -- -- -- -- -- -- -- -- -- --    OBSERV: sleeping at 07/05/24 1100    07/05/24 0944 97.5 °F (36.4 °C) 70 20 144/99 -- -- -- -- -- --    07/05/24 0941 -- 73 -- -- 100 % None (Room air) -- -- -- --    07/05/24 0939 97.5 °F (36.4 °C) 72 20 140/96 -- -- -- -- -- --    07/05/24  0938 -- 70 -- -- 100 % None (Room air) -- -- -- No Pain    07/05/24 0936 -- 72 -- -- 100 % None (Room air) -- -- -- --    07/05/24 0933 -- 71 -- -- -- -- -- -- -- --    07/05/24 0931 -- 72 -- -- 97 % None (Room air) -- -- -- --    07/05/24 0929 -- 81 -- -- -- -- -- -- -- --    07/05/24 0926 -- 75 -- -- 99 % None (Room air) -- -- -- --    07/05/24 0925 -- 78 -- -- -- -- -- -- -- --    07/05/24 0924 97.9 °F (36.6 °C) 70 18 145/97 -- -- -- -- -- --    07/05/24 0915 98.2 °F (36.8 °C) 75 20 131/83 -- -- -- -- -- --    07/05/24 0859 -- -- -- -- -- -- -- -- -- --    OBSERV: [facility] ultrasound at 07/05/24 0859    07/05/24 0748 -- 79 -- -- -- -- -- -- -- --    07/05/24 0747 -- 73 -- -- 100 % None (Room air) -- -- -- --    07/05/24 0744 -- 85 -- -- -- -- -- -- -- --    07/05/24 0742 -- 79 -- -- 100 % -- -- -- -- --    07/05/24 0740 -- 78 -- -- -- -- -- -- -- --    07/05/24 0737 -- 75 -- -- 98 % -- -- -- -- --    07/05/24 0723 97.8 °F (36.6 °C) 70 18 156/94 -- -- -- -- -- 5    07/05/24 0653 -- 85 -- 144/94 -- -- -- -- -- --    07/05/24 0638 -- 76 -- 150/99 -- -- -- -- -- --    07/05/24 0623 -- 69 -- 138/86 -- -- -- -- -- --    07/05/24 0615 97.8 °F (36.6 °C) 69 18 138/96 -- -- -- -- -- --    07/05/24 0612 97.7 °F (36.5 °C) 71 16 152/94 98 % None (Room air) -- -- -- --    07/05/24 0609 -- 71 -- -- -- -- -- -- -- --    07/05/24 0600 -- -- -- 148/94 -- -- -- -- -- --    07/05/24 0542 97.9 °F (36.6 °C) 77 18 154/85 100 % None (Room air) -- -- -- --    07/05/24 0528 97.9 °F (36.6 °C) 75 18 145/91 100 % None (Room air) -- -- -- --    07/05/24 0512 97.6 °F (36.4 °C) 77 18 158/92 100 % None (Room air) -- -- -- --    07/05/24 0500 -- 77 -- -- 98 % None (Room air) -- -- -- --    07/05/24 0457 97.6 °F (36.4 °C) 68 18 159/87 100 % None (Room air) -- -- -- --    07/05/24 0445 97.6 °F (36.4 °C) 68 18 139/95 99 % None (Room air) -- -- -- --    07/05/24 0438 -- 79 -- 139/95 99 % None (Room air) -- -- -- --    07/05/24 0432 97.6 °F (36.4 °C) 74 18  134/95 100 % None (Room air) -- -- -- --    07/05/24 0411 97.7 °F (36.5 °C) 79 18 141/94 99 % None (Room air) -- -- -- --    07/05/24 0400 -- 75 -- 148/83 99 % -- -- -- -- --    07/05/24 0330 -- 85 -- 143/86 97 % -- -- -- -- --    07/05/24 0321 -- 86 -- 134/84 -- -- -- -- -- --    07/05/24 0300 97.7 °F (36.5 °C) 75 18 139/88 100 % None (Room air) -- -- -- --    07/05/24 0245 -- 75 -- 136/93 100 % -- -- -- -- --              Pertinent Labs/Diagnostic Test Results:   Radiology:  No orders to display     Cardiology:  No orders to display     GI:  No orders to display           Results from last 7 days   Lab Units 07/07/24  1200 07/07/24  0626 07/06/24  0830 07/06/24  0422 07/05/24  2334 07/05/24  1442 07/05/24  1038   WBC Thousand/uL 12.45* 12.38* 15.77* 15.97* 13.85*   < > 11.28*   HEMOGLOBIN g/dL 10.0* 9.8* 10.9* 10.8* 11.1*   < > 11.1*   HEMATOCRIT % 30.5* 29.3* 31.8* 33.6* 34.3*   < > 32.7*   PLATELETS Thousands/uL 170 174 194 186 196   < > 171   TOTAL NEUT ABS Thousands/µL  --  10.05* 14.60* 13.47* 11.72*   < >  --    BANDS PCT %  --   --   --   --   --   --  1    < > = values in this interval not displayed.         Results from last 7 days   Lab Units 07/07/24  0626 07/06/24  0830 07/06/24  0422 07/05/24  2334 07/05/24  1919   SODIUM mmol/L 133* 129* 127* 130* 129*   POTASSIUM mmol/L 3.8 3.5 3.4* 4.0 3.5   CHLORIDE mmol/L 106 100 100 99 99   CO2 mmol/L 19* 17* 16* 16* 16*   ANION GAP mmol/L 8 12 11 15* 14*   BUN mg/dL 9 7 6 7 7   CREATININE mg/dL 0.64 0.67 0.65 0.70 0.65   EGFR ml/min/1.73sq m 113 111 112 110 112   CALCIUM mg/dL 7.1* 6.0* 6.0* 6.6* 6.7*   MAGNESIUM mg/dL  --   --   --  6.8*  --      Results from last 7 days   Lab Units 07/07/24  0626 07/06/24  0830 07/06/24  0422 07/05/24  2334 07/05/24  1919   AST U/L 14 17 16 21 18   ALT U/L 8 8 8 8 9   ALK PHOS U/L 60 69 61 66 64   TOTAL PROTEIN g/dL 6.1* 7.0 6.5 7.0 6.6   ALBUMIN g/dL 3.4* 3.8 3.6 3.8 3.6   TOTAL BILIRUBIN mg/dL 0.43 0.56 0.53 0.53 0.49  "    Results from last 7 days   Lab Units 07/05/24  0253   POC GLUCOSE mg/dl 99     Results from last 7 days   Lab Units 07/07/24  0626 07/06/24  0830 07/06/24  0422 07/05/24  2334 07/05/24  1919 07/05/24  1442 07/05/24  1038 07/05/24  0215   GLUCOSE RANDOM mg/dL 117 146* 114 104 110 119 130 119             No results found for: \"BETA-HYDROXYBUTYRATE\"                           Results from last 7 days   Lab Units 07/07/24  0626 07/06/24  0830 07/06/24  0422   PROTIME seconds 13.9 14.2 14.2   INR  1.01 1.04 1.04   PTT seconds 24 25 24                             Results from last 7 days   Lab Units 07/06/24  0830   FERRITIN ng/mL 24   IRON SATURATION % 6*   IRON ug/dL 45*   TIBC ug/dL 817*         Results from last 7 days   Lab Units 07/06/24  0547 07/05/24  0547   UNIT PRODUCT CODE  [id number]  [id number]  [id number]   UNIT NUMBER  [id number]  [id number]  [id number]   UNITABO  O  O  O A   UNITRH  POS  POS  POS POS   CROSSMATCH  Compatible  Compatible  --    UNIT DISPENSE STATUS  Presumed Trans  Crossmatched  Presumed Trans Presumed Trans   UNIT PRODUCT VOL ml 125  350  350 280     Results from last 7 days   Lab Units 07/05/24  0301   HEP B S AG  Non-reactive   HEP C AB  Non-reactive                     Results from last 7 days   Lab Units 07/05/24  0834 07/05/24  0351   CLARITY UA   --  Extra Turbid   COLOR UA   --  Dark Brown   SPEC GRAV UA   --  1.006   PH UA   --  7.0   GLUCOSE UA mg/dl  --  Negative   KETONES UA mg/dl  --  Negative   BLOOD UA   --  Large*   PROTEIN UA mg/dl  --  100 (2+)*   NITRITE UA   --  Negative   BILIRUBIN UA   --  Negative   UROBILINOGEN UA (BE) mg/dl  --  <2.0   LEUKOCYTES UA   --  Small*   WBC UA /hpf  --  Innumerable*   RBC UA /hpf  --  Innumerable*   BACTERIA UA /hpf  --  Innumerable*   EPITHELIAL CELLS WET PREP /hpf  --  None Seen   MUCUS THREADS   --  Occasional*   CREATININE UR mg/dL 9.3 25.0   PROTEIN UR mg/dL 6 245   PROT/CREAT RATIO " UR  0.65* 9.80*             Results from last 7 days   Lab Units 07/05/24  0351   AMPH/METH  Positive*   BARBITURATE UR  Negative   BENZODIAZEPINE UR  Negative   COCAINE UR  Negative   METHADONE URINE  Negative   OPIATE UR  Negative   PCP UR  Negative   THC UR  Positive*                     Results from last 7 days   Lab Units 07/05/24  0353   URINE CULTURE  No Growth <1000 cfu/mL                   ED Treatment-Medication Administration - No Administrations Displayed (No Start Event Found)       None            Past Medical History:   Diagnosis Date    Anemia affecting pregnancy 07/27/2023    Asthma     Carpal tunnel syndrome, bilateral     Chlamydia infection     Elevated blood pressure reading without diagnosis of hypertension 07/27/2023    Gastroesophageal reflux disease without esophagitis 08/17/2023     Present on Admission:  **None**      Admitting Diagnosis: 27 weeks gestation of pregnancy [Z3A.27]  Pregnant [Z34.90]  Vaginal bleeding during pregnancy, antepartum [O46.90]  Age/Sex: 38 y.o. female  Admission Orders:  Continuous fetal HR monitoring  TX 1 unit FFP & 1 unit PRBC;  TX 1u cryo  Pad counts   BTM x2 IM       Scheduled Medications:  prenatal multivitamin, 1 tablet, Oral, Daily    x1 IM =betamethasone acetate-betamethasone sodium phosphate (CELESTONE) injection 12 mg  Dose: 12 mg  Freq: Every 24 hours Route: IM  Start: 07/05/24 0300 End: 07/06/24 0325     IV Bolus =magnesium sulfate 4 g/100 mL IVPB (premix) 4 g  Dose: 4 g  Freq: Once Route: IV  Last Dose: Stopped (07/05/24 0311)  Start: 07/05/24 0300 End: 07/05/24 0311    IV = penicillin G (PFIZERPEN-G) in 0.9% sodium chloride 250 mL IVPB 5 Million Units  Dose: 5 Million Units  Freq: Once Route: IV  Last Dose: Stopped (07/05/24 0408)  Start: 07/05/24 0300 End: 07/05/24 0408      Continuous IV Infusions:   7/5 0245 & DC 7/6 0228 =  lactated ringers infusion  Rate: 125 mL/hr Dose: 125 mL/hr  Freq: Continuous Route: IV  Last Dose: Stopped (07/05/24  2059)  Start: 07/05/24 0300 End: 07/06/24 0228 7/5 1641 & DC 7/6 0837=magnesium sulfate 20 g/500 mL infusion (premix)  Rate: 50 mL/hr Dose: 2 g/hr  Freq: Continuous Route: IV  Last Dose: Stopped (07/06/24 0849)  Start: 07/05/24 1630 End: 07/06/24 0837     sodium chloride 0.9 % infusion   Rate: 50 mL/hr Dose: 50 mL/hr  Freq: Continuous Route: IV  Indications of Use: IV Hydration  Last Dose: Stopped (07/07/24 0815)  Start: 07/06/24 0230 End: 07/07/24 0651    PRN Meds:   acetaminophen, 975 mg, Oral, Q8H PRN  albuterol, 2 puff, Inhalation, Q4H PRN & DC   calcium carbonate, 1,000 mg, Oral, TID PRN  calcium gluconate, 1 g, Intravenous, Once PRN  metoclopramide, 10 mg, Intravenous, Q6H PRN  ondansetron, 4 mg, Intravenous, Q6H PRN    IP CONSULT TO NEONATOLOGY  IP CONSULT TO PERINATOLOGY  IP CONSULT TO CASE MANAGEMENT    Network Utilization Review Department  ATTENTION: Please call with any questions or concerns to [phone] and carefully listen to the prompts so that you are directed to the right person. All voicemails are confidential.   For Discharge needs, contact Care Management DC Support Team at [phone] opt. 2  Send all requests for admission clinical reviews, approved or denied determinations and any other requests to dedicated fax number below belonging to the campus where the patient is receiving treatment. List of dedicated fax numbers for the Facilities:  FACILITY NAME UR FAX NUMBER   ADMISSION DENIALS (Administrative/Medical Necessity) [phone]   DISCHARGE SUPPORT TEAM (NETWORK) [phone]   PARENT CHILD HEALTH (Maternity/NICU/Pediatrics) [phone]   [facility] [phone]   [facility] [phone]   [facility] [phone]   [facility] [phone]   Critical access hospital [phone]   [facility] [phone]   [facility]  [place] [phone]   [facility] [phone]   [facility] [phone]   [facility] [phone]   [facility] [phone]   [facility] [phone]

## 2024-07-08 NOTE — PROGRESS NOTES
"Progress Note - Maternal Fetal Medicine   Isidra Chamberlain 38 y.o. female MRN: [id number]  Unit/Bed#: -01 Encounter: [id number]  Admit date: 2024.  Today's date: 24    Assessment & Plan     Ms. Chamberlain is a 38 y.o.  at 27w3d, Hospital Day: 4, admitted with evolving placental abruption. Minimal vaginal spotting this morning. Non-tender abdomen. VS stable. By issue:    27 weeks gestation of pregnancy  Assessment & Plan  Dated by US 2nd trimester ultrasound w/ documented TONA of 10/3/24, consistent w/ patient reported TONA.  NST reactive  [place] showing 2 contractions in 20 minutes  CL 3.9 cm  TVUS w/ no placenta/vasa previa  TAUS w/ anterior placenta & sariah breech presentation  Delivery consent signed 24      Prenatal labs reviewed  FEN: Regular diet  DVT ppx: SCDs  Monitoring: NST TID  See plan for \"Placental abruption, second trimester\"    * Placental abruption, second trimester  Assessment & Plan  Patient presented w/ heavy vaginal bleeding s/p intercourse w/ no known placenta/vasa previa  Reported passing several large clots PTA  Brought in by EMS and per their report was diaphoretic, bradycardic, & hypotensive en route and started on IVF  - These findings were not present on arrival to L&D triage  Speculum exam with large clot (~500 mL) and slow persistent trickle from the cervical os with no lacerations/injuries noted    Suspect evolving placental abruption and/or  labor as an etiology for her bleeding  High clinical concern for evolving coagulopathy in the setting of low-normal fibrinogen and ongoing bleeding  Pelvic imaging 24 with 9.8 x 4.4 x 10.8cm organized blood clot consistent with suspected placental abruption given acute onset of bleeding and abdominal pain without other source of vaginal bleeding  Fetal presentation breech    S/p 1u FFP, 1u pRBCs, 1u cryo  S/p NICU consultation  S/p betamethasone for fetal lung maturation -  S/p magnesium for fetal " neuroprotection  Coagulation studies WNL, CBC with Hgb drop 10.9 -> 9.8 -> 10 without apparent external bleeding but ongoing concerning for concealed abruption  Repeat labs this morning  Continued low threshold for classical  section in the event of maternal compromise with heavy vaginal bleeding, intractable abdominal pain, or need for additional blood products; or concern for fetal compromise with derangements in fetal heart tracing     Preeclampsia  Assessment & Plan  Systolic (24hrs), Av , Min:129 , Max:146     Diastolic (24hrs), Av, Min:71, Max:89    Patient met criteria with elevated blood pressures and proteinuria with UPC 0.65  Asymptomatic  1x non-sustained SRBP over the past 24h, pressures this morning 122-139/69-87  Continue to trend pressures per protocol  Continue to monitor for evolving signs of severe features    Functional systolic murmur  Assessment & Plan  Remote history of childhood murmur, patient unsure of what type, self-resolved  Holosystolic murmur on exam on   Likely functional holosystolic ejection murmur in pregnancy  Denies chest pain, pressure, or shortness of breath  Continue to monitor     uterine contractions  Assessment & Plan  SVE 1.5/30/-3  CL 3.9 cm    Betamethasone for fetal lung maturation -  Do not recommend tocolysis in the setting of suspected placental abruption  GC/CT neg, FFN pos, UDS pos for THC and meth/amph    Marijuana use  Assessment & Plan  Patient reports marijuana use (smokes) w/ no other substance use this pregnancy.    UDS positive for THC and methamphetamines  Will need CM consult    Request for sterilization  Assessment & Plan  Patient requests permanent sterilization  MA-31 signed 2024    History of  delivery  Assessment & Plan  H/o spontaneous PTD at 36w in  with 3 subsequent term SVDs.    History of diet controlled gestational diabetes mellitus (GDM)  Assessment & Plan  H/o A1GDM in most recent pregnancy per  "patient with no use of medications.  Unknown GDM status this pregnancy (no GTT completed to date).    Asthma during pregnancy  Assessment & Plan  Rarely needs Albuterol this pregnancy (last use was \"months ago\").    Albuterol prn             Subjective    Contractions: no  Loss of fluid: no  Vaginal bleedin episode of spotting  Fetal movement: yes    Pain: no  Headaches: no  Visual changes: no  Chest pain: no  Shortness of breath: no  Nausea: no  Vomiting/Diarrhea: no  Dysuria: no  Leg pain: no          Objective      Patient Vitals for the past 24 hrs:   BP Temp Temp src Pulse Resp SpO2   24 0805 151/80 98.5 °F (36.9 °C) Oral 59 -- 100 %   24 0407 133/71 97.9 °F (36.6 °C) Oral 60 -- 97 %   24 0046 129/73 98 °F (36.7 °C) Oral 62 -- 97 %   24 1556 146/89 98.2 °F (36.8 °C) Oral 64 16 98 %   24 1154 133/79 98 °F (36.7 °C) Oral 65 18 97 %       Physical Exam  Vitals and nursing note reviewed. Exam conducted with a chaperone present.   Constitutional:       General: She is not in acute distress.  HENT:      Head: Normocephalic.      Right Ear: External ear normal.      Left Ear: External ear normal.   Eyes:      General: No scleral icterus.        Right eye: No discharge.         Left eye: No discharge.      Extraocular Movements: Extraocular movements intact.      Conjunctiva/sclera: Conjunctivae normal.   Cardiovascular:      Rate and Rhythm: Normal rate and regular rhythm.      Pulses: Normal pulses.   Pulmonary:      Effort: Pulmonary effort is normal.      Breath sounds: Normal breath sounds.   Abdominal:      Palpations: Abdomen is soft.      Tenderness: There is no abdominal tenderness. There is no guarding.      Comments: Gravid uterus   Musculoskeletal:         General: No swelling or tenderness. Normal range of motion.      Cervical back: Normal range of motion.      Right lower leg: No edema.      Left lower leg: No edema.   Skin:     General: Skin is warm and dry.      " Capillary Refill: Capillary refill takes less than 2 seconds.   Neurological:      General: No focal deficit present.      Mental Status: She is alert and oriented to person, place, and time. Mental status is at baseline.   Psychiatric:         Mood and Affect: Mood normal.         Behavior: Behavior normal.         I/O         07/05 0701 07/06 0700 07/06 0701 07/07 0700 07/07 0701 07/08 0700    I.V. 1531.7      Blood 125      IV Piggyback       Total Intake 1656.7      Urine 4575 2625     Blood       Total Output 4575 2625     Net -2918.3 -2625                    Invasive Devices       Peripheral Intravenous Line  Duration             Peripheral IV 07/05/24 Right Antecubital 3 days    Peripheral IV 07/06/24 Right;Ventral (anterior) Wrist 2 days                    Results from last 7 days   Lab Units 07/07/24  1200 07/07/24 0626 07/06/24 0830 07/06/24 0422 07/05/24 2334 07/05/24 1919   WBC Thousand/uL 12.45* 12.38* 15.77* 15.97* 13.85* 13.68*   TOTAL NEUT ABS Thousands/µL  --  10.05* 14.60* 13.47* 11.72* 11.88*   HEMOGLOBIN g/dL 10.0* 9.8* 10.9* 10.8* 11.1* 10.7*   MCV fL 91 91 90 94 92 90   PLATELETS Thousands/uL 170 174 194 186 196 193     Results from last 7 days   Lab Units 07/07/24  0626 07/06/24  0830 07/06/24 0422 07/05/24 2334 07/05/24 1919   POTASSIUM mmol/L 3.8 3.5 3.4* 4.0 3.5   CHLORIDE mmol/L 106 100 100 99 99   CO2 mmol/L 19* 17* 16* 16* 16*   BUN mg/dL 9 7 6 7 7   CREATININE mg/dL 0.64 0.67 0.65 0.70 0.65   EGFR ml/min/1.73sq m 113 111 112 110 112   MAGNESIUM mg/dL  --   --   --  6.8*  --      Results from last 7 days   Lab Units 07/07/24  0626 07/06/24  0830 07/06/24 0422 07/05/24 2334 07/05/24 1919   AST U/L 14 17 16 21 18   ALT U/L 8 8 8 8 9     Results from last 7 days   Lab Units 07/07/24  1200 07/07/24  0626 07/06/24  0830 07/06/24  0422 07/05/24  2334 07/05/24  1919 07/05/24  1442 07/05/24  1038 07/05/24  0617 07/05/24  0215   PLATELETS Thousands/uL 170 174 194 186 196 193 190 171  160 175   INR   --  1.01 1.04 1.04 1.10 1.05 0.98 0.98 0.96 0.95   PROTIME seconds  --  13.9 14.2 14.2 14.8* 14.3 13.6 13.5 13.4 13.2   PTT seconds  --  24 25 24 26 24 26 24 26 26   FIBRINOGEN mg/dL  --  326 383 386 362 347 377 361 302 278     Results from last 7 days   Lab Units 24  0253   POC GLUCOSE mg/dl 99     Results from last 7 days   Lab Units 24  0834 24  0351   PROT/CREAT RATIO UR  0.65* 9.80*             Results from last 7 days   Lab Units 24  0353   URINE CULTURE  No Growth <1000 cfu/mL       Brief review of pertinent history:  Past Medical History:   Diagnosis Date    Anemia affecting pregnancy 2023    Asthma     Carpal tunnel syndrome, bilateral     Chlamydia infection     Elevated blood pressure reading without diagnosis of hypertension 2023    Gastroesophageal reflux disease without esophagitis 2023     Past Surgical History:   Procedure Laterality Date    NO PAST SURGERIES       OB History    Para Term  AB Living   7 4 3 1 2 4   SAB IAB Ectopic Multiple Live Births   1 1 0 0 4      # Outcome Date GA Lbr Florencio/2nd Weight Sex Type Anes PTL Lv   7 Current            6 Term 10/05/23 37w1d   M Vag-Spont   DREA   5 SAB 22           4 Term     M Vag-Spont   DREA   3 Term     M Vag-Spont   DREA   2      F Vag-Spont   DREA   1 IAB      TAB         Birth Comments: 1st trimester, suction D&C      Obstetric Comments   Menarche - 15 yo       Fetal data:  Nonstress test: date 24 Time: 606  Baseline: 140bpm  Variability: moderate  Accelerations: present, 10x10  Decelerations: absent  Contractions:  uterine irritability  Assessment: reactive  Plan: NST TID        MFM ultrasound report key findings: Interval growth scan from 24 date at 27w1d gestational age.     INDICATIONS     The indications for this exam were second trimester bleeding, previous   delivery & short interval pregnancy.     Exam Types      Transvaginal Ultrasound  Level I     RESULTS     Fetus # 1 of 1  Breech presentation  Fetal growth appeared normal  Placenta Location = Anterior, fundal  No placenta previa     MEASUREMENTS (* Included In Average GA)     AC             23.48 cm        27 weeks 6 days* (63%)  BPD             6.68 cm        26 weeks 6 days* (31%)  HC             25.13 cm        27 weeks 2 days* (26%)  Femur           4.97 cm        26 weeks 6 days* (25%)     HC/AC           1.07 [1.05 - 1.22]                 (31%)  FL/AC             21 [20 - 24]  FL/BPD            74 [71 - 87]  EFW [place] 4   1062 grams - 2 lbs 5 oz                 (48%)     THE AVERAGE GESTATIONAL AGE is 27 weeks 1 day +/- 14 days.     AMNIOTIC FLUID     Q1: 2.3      Q2: 2.2      Q3: 4.7      Q4: 2.1  DAISY Total = 11.3 cm  Amniotic Fluid: Normal     FETAL VESSELS     S/D    PI       RI      PSV    AEDV RF  cm/s  Middle Cerebral Artery   0.00    0.00    0.00    26.39     CERVICAL EVALUATION     The cervix appeared normal (Ultrasound Examination).     SUPINE  Cervical Length: 2.90 cm     OTHER TEST RESULTS  Funneling?: No             Dynamic Changes?: No  Debris?: No     ANATOMY COMMENTS     Follow up evaluation of intracranial anatomy (calvarium and  lateral  ventricles), cardiac anatomy (outflow tract and three vessel views),  diaphragm, stomach, kidneys, and bladder appear normal.     IMPRESSION     [facility] IUP  27 weeks and 1 day by this ultrasound. (OTNA=OCT 3 2024)  27 weeks and 1 day by 2nd Trimester Sono. (TONA=OCT 3 2024)  Breech presentation  Fetal growth appeared normal  Regular fetal heart rate of 138 bpm  Anterior, fundal placenta  No placenta previa    MEDS:   Medication Administration - last 24 hours from 07/07/2024 0817 to 07/08/2024 0817         Date/Time Order Dose Route Action Action by     07/07/2024 0835 EDT prenatal multivitamin tablet 1 tablet 1 tablet Oral Not Given Michelle Bertrand RN     07/07/2024 1630 EDT sodium chloride 0.9 % infusion 0  mL/hr Intravenous Stopped Michelle Bertrand RN     07/07/2024 1401 EDT sodium chloride 0.9 % infusion 100 mL/hr Intravenous New Bag Michelle Bertrand RN     07/07/2024 1400 EDT sodium chloride 0.9 % infusion 0 mL/hr Intravenous Stopped Michelle Bertrand RN          Current Facility-Administered Medications   Medication Dose Route Frequency    acetaminophen (TYLENOL) tablet 975 mg  975 mg Oral Q8H PRN    albuterol (PROVENTIL HFA,VENTOLIN HFA) inhaler 2 puff  2 puff Inhalation Q4H PRN    calcium carbonate (TUMS) chewable tablet 1,000 mg  1,000 mg Oral TID PRN    calcium gluconate 10 % injection 1 g  1 g Intravenous Once PRN    metoclopramide (REGLAN) injection 10 mg  10 mg Intravenous Q6H PRN    ondansetron (ZOFRAN) injection 4 mg  4 mg Intravenous Q6H PRN    prenatal multivitamin tablet 1 tablet  1 tablet Oral Daily            Pedro Moulton MD  OBGYN, PGY-3  7/8/2024  8:17 AM

## 2024-07-08 NOTE — PLAN OF CARE
Problem: ANTEPARTUM  Goal: Maintain pregnancy as long as maternal and/or fetal condition is stable  Description: INTERVENTIONS:  - Maternal surveillance  - Fetal surveillance  - Monitor uterine activity  - Medications as ordered  - Bedrest  Outcome: Progressing     Problem: PAIN - ADULT  Goal: Verbalizes/displays adequate comfort level or baseline comfort level  Description: Interventions:  - Encourage patient to monitor pain and request assistance  - Assess pain using appropriate pain scale  - Administer analgesics based on type and severity of pain and evaluate response  - Implement non-pharmacological measures as appropriate and evaluate response  - Consider cultural and social influences on pain and pain management  - Notify physician/advanced practitioner if interventions unsuccessful or patient reports new pain  Outcome: Progressing     Problem: INFECTION - ADULT  Goal: Absence or prevention of progression during hospitalization  Description: INTERVENTIONS:  - Assess and monitor for signs and symptoms of infection  - Monitor lab/diagnostic results  - Monitor all insertion sites, i.e. indwelling lines, tubes, and drains  - Monitor endotracheal if appropriate and nasal secretions for changes in amount and color  - [place] appropriate cooling/warming therapies per order  - Administer medications as ordered  - Instruct and encourage patient and family to use good hand hygiene technique  - Identify and instruct in appropriate isolation precautions for identified infection/condition  Outcome: Progressing  Goal: Absence of fever/infection during neutropenic period  Description: INTERVENTIONS:  - Monitor WBC    Outcome: Progressing     Problem: SAFETY ADULT  Goal: Patient will remain free of falls  Description: INTERVENTIONS:  - Educate patient/family on patient safety including physical limitations  - Instruct patient to call for assistance with activity   - Consult OT/PT to assist with strengthening/mobility   -  Keep Call bell within reach  - Keep bed low and locked with side rails adjusted as appropriate  - Keep care items and personal belongings within reach  - Initiate and maintain comfort rounds  - Make Fall Risk Sign visible to staff  - Apply yellow socks and bracelet for high fall risk patients  - Consider moving patient to room near nurses station  Outcome: Progressing  Goal: Maintain or return to baseline ADL function  Description: INTERVENTIONS:  -  Assess patient's ability to carry out ADLs; assess patient's baseline for ADL function and identify physical deficits which impact ability to perform ADLs (bathing, care of mouth/teeth, toileting, grooming, dressing, etc.)  - Assess/evaluate cause of self-care deficits   - Assess range of motion  - Assess patient's mobility; develop plan if impaired  - Assess patient's need for assistive devices and provide as appropriate  - Encourage maximum independence but intervene and supervise when necessary  - Involve family in performance of ADLs  - Assess for home care needs following discharge   - Consider OT consult to assist with ADL evaluation and planning for discharge  - Provide patient education as appropriate  Outcome: Progressing  Goal: Maintains/Returns to pre admission functional level  Description: INTERVENTIONS:  - Perform AM-PAC 6 Click Basic Mobility/ Daily Activity assessment daily.  - Set and communicate daily mobility goal to care team and patient/family/caregiver.   - Collaborate with rehabilitation services on mobility goals if consulted  - Out of bed for toileting  - Record patient progress and toleration of activity level   Outcome: Progressing     Problem: Knowledge Deficit  Goal: Patient/family/caregiver demonstrates understanding of disease process, treatment plan, medications, and discharge instructions  Description: Complete learning assessment and assess knowledge base.  Interventions:  - Provide teaching at level of understanding  - Provide teaching  via preferred learning methods  Outcome: Progressing     Problem: DISCHARGE PLANNING  Goal: Discharge to home or other facility with appropriate resources  Description: INTERVENTIONS:  - Identify barriers to discharge w/patient and caregiver  - Arrange for needed discharge resources and transportation as appropriate  - Identify discharge learning needs (meds, wound care, etc.)  - Arrange for interpretive services to assist at discharge as needed  - Refer to Case Management Department for coordinating discharge planning if the patient needs post-hospital services based on physician/advanced practitioner order or complex needs related to functional status, cognitive ability, or social support system  Outcome: Progressing

## 2024-07-08 NOTE — PLAN OF CARE
Problem: ANTEPARTUM  Goal: Maintain pregnancy as long as maternal and/or fetal condition is stable  Description: INTERVENTIONS:  - Maternal surveillance  - Fetal surveillance  - Monitor uterine activity  - Medications as ordered  - Bedrest  Outcome: Progressing     Problem: PAIN - ADULT  Goal: Verbalizes/displays adequate comfort level or baseline comfort level  Description: Interventions:  - Encourage patient to monitor pain and request assistance  - Assess pain using appropriate pain scale  - Administer analgesics based on type and severity of pain and evaluate response  - Implement non-pharmacological measures as appropriate and evaluate response  - Consider cultural and social influences on pain and pain management  - Notify physician/advanced practitioner if interventions unsuccessful or patient reports new pain  Outcome: Progressing     Problem: INFECTION - ADULT  Goal: Absence or prevention of progression during hospitalization  Description: INTERVENTIONS:  - Assess and monitor for signs and symptoms of infection  - Monitor lab/diagnostic results  - Monitor all insertion sites, i.e. indwelling lines, tubes, and drains  - Monitor endotracheal if appropriate and nasal secretions for changes in amount and color  - [place] appropriate cooling/warming therapies per order  - Administer medications as ordered  - Instruct and encourage patient and family to use good hand hygiene technique  - Identify and instruct in appropriate isolation precautions for identified infection/condition  Outcome: Progressing  Goal: Absence of fever/infection during neutropenic period  Description: INTERVENTIONS:  - Monitor WBC    Outcome: Progressing     Problem: SAFETY ADULT  Goal: Patient will remain free of falls  Description: INTERVENTIONS:  - Educate patient/family on patient safety including physical limitations  - Instruct patient to call for assistance with activity   - Consult OT/PT to assist with strengthening/mobility   -  Keep Call bell within reach  - Keep bed low and locked with side rails adjusted as appropriate  - Keep care items and personal belongings within reach  - Initiate and maintain comfort rounds  Outcome: Progressing  Goal: Maintain or return to baseline ADL function  Description: INTERVENTIONS:  -  Assess patient's ability to carry out ADLs; assess patient's baseline for ADL function and identify physical deficits which impact ability to perform ADLs (bathing, care of mouth/teeth, toileting, grooming, dressing, etc.)  - Assess/evaluate cause of self-care deficits   - Assess range of motion  - Assess patient's mobility; develop plan if impaired  - Assess patient's need for assistive devices and provide as appropriate  - Encourage maximum independence but intervene and supervise when necessary  - Involve family in performance of ADLs  - Assess for home care needs following discharge   - Consider OT consult to assist with ADL evaluation and planning for discharge  - Provide patient education as appropriate  Outcome: Progressing  Goal: Maintains/Returns to pre admission functional level  Description: INTERVENTIONS:  - Perform AM-PAC 6 Click Basic Mobility/ Daily Activity assessment daily.  - Set and communicate daily mobility goal to care team and patient/family/caregiver.   - Out of bed for toileting  - Record patient progress and toleration of activity level   Outcome: Progressing     Problem: Knowledge Deficit  Goal: Patient/family/caregiver demonstrates understanding of disease process, treatment plan, medications, and discharge instructions  Description: Complete learning assessment and assess knowledge base.  Interventions:  - Provide teaching at level of understanding  - Provide teaching via preferred learning methods  Outcome: Progressing     Problem: DISCHARGE PLANNING  Goal: Discharge to home or other facility with appropriate resources  Description: INTERVENTIONS:  - Identify barriers to discharge w/patient and  caregiver  - Arrange for needed discharge resources and transportation as appropriate  - Identify discharge learning needs (meds, wound care, etc.)  - Arrange for interpretive services to assist at discharge as needed  - Refer to Case Management Department for coordinating discharge planning if the patient needs post-hospital services based on physician/advanced practitioner order or complex needs related to functional status, cognitive ability, or social support system  Outcome: Progressing

## 2024-07-08 NOTE — UTILIZATION REVIEW
NOTIFICATION OF INPATIENT ADMISSION   [facility]   SERVICING FACILITY:   [facility]  Parent Child Health - L&D, [place], NICU  [address] [place] [address]  Tax ID: [id number]  NPI: [id number]   ATTENDING PROVIDER:  Attending Name and NPI#: Madison Andres Md [[id number]]  Address: [address] [place] [address]  Phone: [phone]     ADMISSION INFORMATION:  Place of Service: Inpatient [facility]  Place of Service Code: 21  Inpatient Admission Date/Time: 24  4:44 PM  Discharge Date/Time: No discharge date for patient encounter.  Admitting Diagnosis Code/Description:  27 weeks gestation of pregnancy [Z3A.27]  Pregnant [Z34.90]  Vaginal bleeding during pregnancy, antepartum [O46.90]     UTILIZATION REVIEW CONTACT:  Yajaira Toth Utilization   Network Utilization Review Department  Phone: [phone]  Fax [phone]  Email: [email or web address]  Contact for approvals/pending authorizations, clinical reviews, and discharge.     PHYSICIAN ADVISORY SERVICES:  Medical Necessity Denial & Mghk-do-Xpca Review  Phone: [phone]  Fax: [phone]  Email: [email or web address]     DISCHARGE SUPPORT TEAM:  For Patients Discharge Needs & Updates  Phone: [phone] opt. 2 Fax: [phone]  Email: [email or web address]

## 2024-07-09 LAB
ABO GROUP BLD BPU: NORMAL
ABO GROUP BLD BPU: NORMAL
ABO GROUP BLD: NORMAL
ALBUMIN SERPL BCG-MCNC: 3.5 G/DL (ref 3.5–5)
ALBUMIN SERPL BCG-MCNC: 3.7 G/DL (ref 3.5–5)
ALP SERPL-CCNC: 62 U/L (ref 34–104)
ALP SERPL-CCNC: 64 U/L (ref 34–104)
ALT SERPL W P-5'-P-CCNC: 14 U/L (ref 7–52)
ALT SERPL W P-5'-P-CCNC: 15 U/L (ref 7–52)
ANION GAP SERPL CALCULATED.3IONS-SCNC: 8 MMOL/L (ref 4–13)
ANION GAP SERPL CALCULATED.3IONS-SCNC: 9 MMOL/L (ref 4–13)
APTT PPP: 24 SECONDS (ref 23–37)
APTT PPP: 24 SECONDS (ref 23–37)
APTT PPP: 25 SECONDS (ref 23–37)
APTT PPP: 31 SECONDS (ref 23–37)
AST SERPL W P-5'-P-CCNC: 18 U/L (ref 13–39)
AST SERPL W P-5'-P-CCNC: 20 U/L (ref 13–39)
BILIRUB SERPL-MCNC: 0.41 MG/DL (ref 0.2–1)
BILIRUB SERPL-MCNC: 0.44 MG/DL (ref 0.2–1)
BLD GP AB SCN SERPL QL: NEGATIVE
BPU ID: NORMAL
BPU ID: NORMAL
BUN SERPL-MCNC: 11 MG/DL (ref 5–25)
BUN SERPL-MCNC: 14 MG/DL (ref 5–25)
CALCIUM SERPL-MCNC: 7.8 MG/DL (ref 8.4–10.2)
CALCIUM SERPL-MCNC: 8.7 MG/DL (ref 8.4–10.2)
CHLORIDE SERPL-SCNC: 104 MMOL/L (ref 96–108)
CHLORIDE SERPL-SCNC: 105 MMOL/L (ref 96–108)
CO2 SERPL-SCNC: 20 MMOL/L (ref 21–32)
CO2 SERPL-SCNC: 21 MMOL/L (ref 21–32)
CREAT SERPL-MCNC: 0.65 MG/DL (ref 0.6–1.3)
CREAT SERPL-MCNC: 0.73 MG/DL (ref 0.6–1.3)
CROSSMATCH: NORMAL
CROSSMATCH: NORMAL
ERYTHROCYTE [DISTWIDTH] IN BLOOD BY AUTOMATED COUNT: 13.1 % (ref 11.6–15.1)
ERYTHROCYTE [DISTWIDTH] IN BLOOD BY AUTOMATED COUNT: 13.2 % (ref 11.6–15.1)
ERYTHROCYTE [DISTWIDTH] IN BLOOD BY AUTOMATED COUNT: 13.2 % (ref 11.6–15.1)
FIBRINOGEN PPP-MCNC: 212 MG/DL (ref 207–520)
FIBRINOGEN PPP-MCNC: 339 MG/DL (ref 207–520)
FIBRINOGEN PPP-MCNC: 339 MG/DL (ref 207–520)
GFR SERPL CREATININE-BSD FRML MDRD: 104 ML/MIN/1.73SQ M
GFR SERPL CREATININE-BSD FRML MDRD: 112 ML/MIN/1.73SQ M
GLUCOSE SERPL-MCNC: 77 MG/DL (ref 65–140)
GLUCOSE SERPL-MCNC: 92 MG/DL (ref 65–140)
HCT VFR BLD AUTO: 30.1 % (ref 34.8–46.1)
HCT VFR BLD AUTO: 30.6 % (ref 34.8–46.1)
HCT VFR BLD AUTO: 33.4 % (ref 34.8–46.1)
HGB BLD-MCNC: 10 G/DL (ref 11.5–15.4)
HGB BLD-MCNC: 10.3 G/DL (ref 11.5–15.4)
HGB BLD-MCNC: 11.1 G/DL (ref 11.5–15.4)
INR PPP: 0.96 (ref 0.84–1.19)
INR PPP: 0.99 (ref 0.84–1.19)
INR PPP: 1 (ref 0.84–1.19)
INR PPP: 1.27 (ref 0.84–1.19)
MCH RBC QN AUTO: 29.9 PG (ref 26.8–34.3)
MCH RBC QN AUTO: 30 PG (ref 26.8–34.3)
MCH RBC QN AUTO: 30.3 PG (ref 26.8–34.3)
MCHC RBC AUTO-ENTMCNC: 33.2 G/DL (ref 31.4–37.4)
MCHC RBC AUTO-ENTMCNC: 33.2 G/DL (ref 31.4–37.4)
MCHC RBC AUTO-ENTMCNC: 33.7 G/DL (ref 31.4–37.4)
MCV RBC AUTO: 90 FL (ref 82–98)
PLATELET # BLD AUTO: 159 THOUSANDS/UL (ref 149–390)
PLATELET # BLD AUTO: 162 THOUSANDS/UL (ref 149–390)
PLATELET # BLD AUTO: 169 THOUSANDS/UL (ref 149–390)
PMV BLD AUTO: 10.4 FL (ref 8.9–12.7)
POTASSIUM SERPL-SCNC: 3.6 MMOL/L (ref 3.5–5.3)
POTASSIUM SERPL-SCNC: 3.6 MMOL/L (ref 3.5–5.3)
PROT SERPL-MCNC: 6.4 G/DL (ref 6.4–8.4)
PROT SERPL-MCNC: 6.9 G/DL (ref 6.4–8.4)
PROTHROMBIN TIME: 13.4 SECONDS (ref 11.6–14.5)
PROTHROMBIN TIME: 13.7 SECONDS (ref 11.6–14.5)
PROTHROMBIN TIME: 13.8 SECONDS (ref 11.6–14.5)
PROTHROMBIN TIME: 16.6 SECONDS (ref 11.6–14.5)
RBC # BLD AUTO: 3.33 MILLION/UL (ref 3.81–5.12)
RBC # BLD AUTO: 3.4 MILLION/UL (ref 3.81–5.12)
RBC # BLD AUTO: 3.71 MILLION/UL (ref 3.81–5.12)
RH BLD: POSITIVE
SODIUM SERPL-SCNC: 133 MMOL/L (ref 135–147)
SODIUM SERPL-SCNC: 134 MMOL/L (ref 135–147)
SPECIMEN EXPIRATION DATE: NORMAL
UNIT DISPENSE STATUS: NORMAL
UNIT DISPENSE STATUS: NORMAL
UNIT PRODUCT CODE: NORMAL
UNIT PRODUCT CODE: NORMAL
UNIT PRODUCT VOLUME: 350 ML
UNIT PRODUCT VOLUME: 350 ML
UNIT RH: NORMAL
UNIT RH: NORMAL
WBC # BLD AUTO: 10.76 THOUSAND/UL (ref 4.31–10.16)
WBC # BLD AUTO: 10.98 THOUSAND/UL (ref 4.31–10.16)
WBC # BLD AUTO: 11.87 THOUSAND/UL (ref 4.31–10.16)

## 2024-07-09 PROCEDURE — 85730 THROMBOPLASTIN TIME PARTIAL: CPT

## 2024-07-09 PROCEDURE — 85384 FIBRINOGEN ACTIVITY: CPT

## 2024-07-09 PROCEDURE — 85610 PROTHROMBIN TIME: CPT

## 2024-07-09 PROCEDURE — 59025 FETAL NON-STRESS TEST: CPT | Performed by: OBSTETRICS & GYNECOLOGY

## 2024-07-09 PROCEDURE — 85027 COMPLETE CBC AUTOMATED: CPT

## 2024-07-09 PROCEDURE — 86850 RBC ANTIBODY SCREEN: CPT

## 2024-07-09 PROCEDURE — NC001 PR NO CHARGE: Performed by: OBSTETRICS & GYNECOLOGY

## 2024-07-09 PROCEDURE — 86923 COMPATIBILITY TEST ELECTRIC: CPT

## 2024-07-09 PROCEDURE — 80053 COMPREHEN METABOLIC PANEL: CPT

## 2024-07-09 PROCEDURE — 99233 SBSQ HOSP IP/OBS HIGH 50: CPT | Performed by: OBSTETRICS & GYNECOLOGY

## 2024-07-09 PROCEDURE — 86900 BLOOD TYPING SEROLOGIC ABO: CPT

## 2024-07-09 PROCEDURE — 86901 BLOOD TYPING SEROLOGIC RH(D): CPT

## 2024-07-09 RX ORDER — CALCIUM GLUCONATE 94 MG/ML
1 INJECTION, SOLUTION INTRAVENOUS ONCE AS NEEDED
Status: DISCONTINUED | OUTPATIENT
Start: 2024-07-09 | End: 2024-07-09 | Stop reason: SDUPTHER

## 2024-07-09 RX ORDER — MAGNESIUM SULFATE HEPTAHYDRATE 40 MG/ML
4 INJECTION, SOLUTION INTRAVENOUS ONCE
Status: COMPLETED | OUTPATIENT
Start: 2024-07-09 | End: 2024-07-09

## 2024-07-09 RX ORDER — MAGNESIUM SULFATE HEPTAHYDRATE 40 MG/ML
1 INJECTION, SOLUTION INTRAVENOUS CONTINUOUS
Status: DISCONTINUED | OUTPATIENT
Start: 2024-07-09 | End: 2024-07-10

## 2024-07-09 RX ADMIN — Medication 1 TABLET: at 09:20

## 2024-07-09 RX ADMIN — Medication 2.5 MILLION UNITS: at 21:37

## 2024-07-09 RX ADMIN — PENICILLIN G POTASSIUM 5 MILLION UNITS: 20000000 INJECTION, POWDER, FOR SOLUTION INTRAVENOUS at 17:48

## 2024-07-09 RX ADMIN — MAGNESIUM SULFATE HEPTAHYDRATE 4 G: 40 INJECTION, SOLUTION INTRAVENOUS at 17:33

## 2024-07-09 RX ADMIN — HYDROCORTISONE 1 APPLICATION: 25 CREAM TOPICAL at 17:57

## 2024-07-09 RX ADMIN — MAGNESIUM SULFATE HEPTAHYDRATE 1 G/HR: 40 INJECTION, SOLUTION INTRAVENOUS at 17:51

## 2024-07-09 NOTE — PLAN OF CARE
Problem: ANTEPARTUM  Goal: Maintain pregnancy as long as maternal and/or fetal condition is stable  Description: INTERVENTIONS:  - Maternal surveillance  - Fetal surveillance  - Monitor uterine activity  - Medications as ordered  - Bedrest  Outcome: Progressing     Problem: PAIN - ADULT  Goal: Verbalizes/displays adequate comfort level or baseline comfort level  Description: Interventions:  - Encourage patient to monitor pain and request assistance  - Assess pain using appropriate pain scale  - Administer analgesics based on type and severity of pain and evaluate response  - Implement non-pharmacological measures as appropriate and evaluate response  - Consider cultural and social influences on pain and pain management  - Notify physician/advanced practitioner if interventions unsuccessful or patient reports new pain  Outcome: Progressing     Problem: INFECTION - ADULT  Goal: Absence or prevention of progression during hospitalization  Description: INTERVENTIONS:  - Assess and monitor for signs and symptoms of infection  - Monitor lab/diagnostic results  - Monitor all insertion sites, i.e. indwelling lines, tubes, and drains  - Monitor endotracheal if appropriate and nasal secretions for changes in amount and color  - [place] appropriate cooling/warming therapies per order  - Administer medications as ordered  - Instruct and encourage patient and family to use good hand hygiene technique  - Identify and instruct in appropriate isolation precautions for identified infection/condition  Outcome: Progressing  Goal: Absence of fever/infection during neutropenic period  Description: INTERVENTIONS:  - Monitor WBC    Outcome: Progressing     Problem: SAFETY ADULT  Goal: Patient will remain free of falls  Description: INTERVENTIONS:  - Educate patient/family on patient safety including physical limitations  - Instruct patient to call for assistance with activity   - Consult OT/PT to assist with strengthening/mobility   -  Keep Call bell within reach  - Keep bed low and locked with side rails adjusted as appropriate  - Keep care items and personal belongings within reach  - Initiate and maintain comfort rounds  Outcome: Progressing  Goal: Maintain or return to baseline ADL function  Description: INTERVENTIONS:  -  Assess patient's ability to carry out ADLs; assess patient's baseline for ADL function and identify physical deficits which impact ability to perform ADLs (bathing, care of mouth/teeth, toileting, grooming, dressing, etc.)  - Assess/evaluate cause of self-care deficits   - Assess range of motion  - Assess patient's mobility; develop plan if impaired  - Assess patient's need for assistive devices and provide as appropriate  - Encourage maximum independence but intervene and supervise when necessary  - Involve family in performance of ADLs  - Assess for home care needs following discharge   - Consider OT consult to assist with ADL evaluation and planning for discharge  - Provide patient education as appropriate  Outcome: Progressing  Goal: Maintains/Returns to pre admission functional level  Description: INTERVENTIONS:  - Perform AM-PAC 6 Click Basic Mobility/ Daily Activity assessment daily.  - Set and communicate daily mobility goal to care team and patient/family/caregiver.   - Out of bed for toileting  - Record patient progress and toleration of activity level   Outcome: Progressing     Problem: Knowledge Deficit  Goal: Patient/family/caregiver demonstrates understanding of disease process, treatment plan, medications, and discharge instructions  Description: Complete learning assessment and assess knowledge base.  Interventions:  - Provide teaching at level of understanding  - Provide teaching via preferred learning methods  Outcome: Progressing     Problem: DISCHARGE PLANNING  Goal: Discharge to home or other facility with appropriate resources  Description: INTERVENTIONS:  - Identify barriers to discharge w/patient and  caregiver  - Arrange for needed discharge resources and transportation as appropriate  - Identify discharge learning needs (meds, wound care, etc.)  - Arrange for interpretive services to assist at discharge as needed  - Refer to Case Management Department for coordinating discharge planning if the patient needs post-hospital services based on physician/advanced practitioner order or complex needs related to functional status, cognitive ability, or social support system  Outcome: Progressing

## 2024-07-09 NOTE — PROGRESS NOTES
Patient has had increasing discomfort with contractions through the afternoon. SVE 2/50/-3. Bleeding continues to be minimal. Fetus is cephalic on TAUS. Will bring patient down to labor room and plan for expectant management. Will plan for labs including coags q4hr. Will start magnesium for fetal neuroprotection as well as PCN for GBS prophylaxis.    Dr. Arenas and Dr. Godoy aware.    Pedro Moulton MD  07/09/24  4:50 PM

## 2024-07-09 NOTE — PROGRESS NOTES
"Patient reports passing a quarter size clot and is feeling \"strong menstrual cramping\". Patient placed on toco monitoring and Dr. Moulton made aware    "

## 2024-07-09 NOTE — PROGRESS NOTES
"Progress Note - Maternal Fetal Medicine   Isidra Chamberlain 38 y.o. female MRN: [id number]  Unit/Bed#: -01 Encounter: [id number]  Admit date: 2024.  Today's date: 24    Assessment & Plan     Ms. Chamberlain is a 38 y.o.  at 27w3d, Hospital Day: 5, admitted with evolving placental abruption. Minimal vaginal spotting this morning. Non-tender abdomen. VS stable. By issue:    27 weeks gestation of pregnancy  Assessment & Plan  Dated by US 2nd trimester ultrasound w/ documented TONA of 10/3/24, consistent w/ patient reported TONA.  NST reactive  [place] showing 2 contractions in 20 minutes  CL 3.9 cm  TVUS w/ no placenta/vasa previa  TAUS w/ anterior placenta & sariah breech presentation  Delivery consent signed 24      Prenatal labs reviewed  FEN: Regular diet  DVT ppx: SCDs  Monitoring: NST TID  See plan for \"Placental abruption, second trimester\"    * Placental abruption, second trimester  Assessment & Plan  Patient presented w/ heavy vaginal bleeding s/p intercourse w/ no known placenta/vasa previa  Reported passing several large clots PTA  Brought in by EMS and per their report was diaphoretic, bradycardic, & hypotensive en route and started on IVF  - These findings were not present on arrival to L&D triage  Speculum exam with large clot (~500 mL) and slow persistent trickle from the cervical os with no lacerations/injuries noted    Suspect evolving placental abruption and/or  labor as an etiology for her bleeding  High clinical concern for evolving coagulopathy in the setting of low-normal fibrinogen and ongoing bleeding  Pelvic imaging 24 with 9.8 x 4.4 x 10.8cm organized blood clot consistent with suspected placental abruption given acute onset of bleeding and abdominal pain without other source of vaginal bleeding  Fetal presentation breech    S/p 1u FFP, 1u pRBCs, 1u cryo  S/p NICU consultation  S/p betamethasone for fetal lung maturation -  S/p magnesium for fetal " neuroprotection  Coagulation studies WNL, CBC with Hgb drop 10.9 -> 9.8 -> 10 without apparent external bleeding but ongoing concerning for concealed abruption  Repeat labs this morning  Continued low threshold for classical  section in the event of maternal compromise with heavy vaginal bleeding, intractable abdominal pain, or need for additional blood products; or concern for fetal compromise with derangements in fetal heart tracing     Preeclampsia  Assessment & Plan  Systolic (24hrs), Av , Min:140 , Max:158     Diastolic (24hrs), Av, Min:71, Max:94    Patient met criteria with elevated blood pressures and proteinuria with UPC 0.65  Asymptomatic  1x non-sustained SRBP over the past 24h, pressures this morning 122-139/69-87  Continue to trend pressures per protocol  Continue to monitor for evolving signs of severe features    Functional systolic murmur  Assessment & Plan  Remote history of childhood murmur, patient unsure of what type, self-resolved  Holosystolic murmur on exam on   Likely functional holosystolic ejection murmur in pregnancy  Denies chest pain, pressure, or shortness of breath  Continue to monitor     uterine contractions  Assessment & Plan  SVE 1.5/30/-3  CL 3.9 cm    Betamethasone for fetal lung maturation -  Do not recommend tocolysis in the setting of suspected placental abruption  GC/CT neg, FFN pos, UDS pos for THC and meth/amph    Marijuana use  Assessment & Plan  Patient reports marijuana use (smokes) w/ no other substance use this pregnancy.    UDS positive for THC and methamphetamines  Will need CM consult    Request for sterilization  Assessment & Plan  Patient requests permanent sterilization  MA-31 signed 2024    History of  delivery  Assessment & Plan  H/o spontaneous PTD at 36w in  with 3 subsequent term SVDs.    History of diet controlled gestational diabetes mellitus (GDM)  Assessment & Plan  H/o A1GDM in most recent pregnancy per  "patient with no use of medications.  Unknown GDM status this pregnancy (no GTT completed to date).    Asthma during pregnancy  Assessment & Plan  Rarely needs Albuterol this pregnancy (last use was \"months ago\").    Albuterol prn             Subjective    Contractions: no  Loss of fluid: no  Vaginal bleeding: no  Fetal movement: yes    Pain: no  Headaches: no  Visual changes: no  Chest pain: no  Shortness of breath: no  Nausea: no  Vomiting/Diarrhea: no  Dysuria: no  Leg pain: no          Objective      Patient Vitals for the past 24 hrs:   BP Temp Temp src Pulse Resp SpO2   07/08/24 2355 152/94 98.1 °F (36.7 °C) Oral 63 17 99 %   07/08/24 2010 156/71 98.4 °F (36.9 °C) Oral 59 18 --   07/08/24 1630 140/83 97.8 °F (36.6 °C) Oral 65 18 99 %   07/08/24 1202 158/83 98.7 °F (37.1 °C) Oral 57 20 99 %   07/08/24 0805 151/80 98.5 °F (36.9 °C) Oral 59 -- 100 %       Physical Exam  Vitals and nursing note reviewed. Exam conducted with a chaperone present.   Constitutional:       General: She is not in acute distress.  HENT:      Head: Normocephalic.      Right Ear: External ear normal.      Left Ear: External ear normal.   Eyes:      General: No scleral icterus.        Right eye: No discharge.         Left eye: No discharge.      Extraocular Movements: Extraocular movements intact.      Conjunctiva/sclera: Conjunctivae normal.   Cardiovascular:      Rate and Rhythm: Normal rate and regular rhythm.      Pulses: Normal pulses.   Pulmonary:      Effort: Pulmonary effort is normal.      Breath sounds: Normal breath sounds.   Abdominal:      Palpations: Abdomen is soft.      Tenderness: There is no abdominal tenderness. There is no guarding.      Comments: Gravid uterus   Musculoskeletal:         General: No swelling or tenderness. Normal range of motion.      Cervical back: Normal range of motion.      Right lower leg: No edema.      Left lower leg: No edema.   Skin:     General: Skin is warm and dry.      Capillary Refill: " Capillary refill takes less than 2 seconds.   Neurological:      General: No focal deficit present.      Mental Status: She is alert and oriented to person, place, and time. Mental status is at baseline.   Psychiatric:         Mood and Affect: Mood normal.         Behavior: Behavior normal.         I/O         07/05 0701 07/06 0700 07/06 0701 07/07 0700 07/07 0701 07/08 0700    I.V. 1531.7      Blood 125      IV Piggyback       Total Intake 1656.7      Urine 4575 2625     Blood       Total Output 4575 2625     Net -2918.3 -2625                    Invasive Devices       Peripheral Intravenous Line  Duration             Peripheral IV 07/05/24 Right Antecubital 4 days    Peripheral IV 07/06/24 Right;Ventral (anterior) Wrist 3 days                    Results from last 7 days   Lab Units 07/08/24  0815 07/07/24  1200 07/07/24 0626 07/06/24 0830 07/06/24 0422 07/05/24 2334   WBC Thousand/uL 9.91 12.45* 12.38* 15.77* 15.97* 13.85*   TOTAL NEUT ABS Thousands/µL 7.33  --  10.05* 14.60* 13.47* 11.72*   HEMOGLOBIN g/dL 10.2* 10.0* 9.8* 10.9* 10.8* 11.1*   MCV fL 94 91 91 90 94 92   PLATELETS Thousands/uL 160 170 174 194 186 196     Results from last 7 days   Lab Units 07/07/24  0626 07/06/24  0830 07/06/24 0422 07/05/24 2334 07/05/24 1919   POTASSIUM mmol/L 3.8 3.5 3.4* 4.0 3.5   CHLORIDE mmol/L 106 100 100 99 99   CO2 mmol/L 19* 17* 16* 16* 16*   BUN mg/dL 9 7 6 7 7   CREATININE mg/dL 0.64 0.67 0.65 0.70 0.65   EGFR ml/min/1.73sq m 113 111 112 110 112   MAGNESIUM mg/dL  --   --   --  6.8*  --      Results from last 7 days   Lab Units 07/07/24  0626 07/06/24  0830 07/06/24  0422 07/05/24  2334 07/05/24  1919   AST U/L 14 17 16 21 18   ALT U/L 8 8 8 8 9     Results from last 7 days   Lab Units 07/08/24  0815 07/07/24  1200 07/07/24  0626 07/06/24  0830 07/06/24  0422 07/05/24  2334 07/05/24  1919 07/05/24  1442 07/05/24  1038 07/05/24  0617 07/05/24  0215   PLATELETS Thousands/uL 160 170 174 194 186 196 193 190 171  160 175   INR  0.98  --  1.01 1.04 1.04 1.10 1.05 0.98 0.98 0.96 0.95   PROTIME seconds 13.6  --  13.9 14.2 14.2 14.8* 14.3 13.6 13.5 13.4 13.2   PTT seconds 23  --  24 25 24 26 24 26 24 26 26   FIBRINOGEN mg/dL 318  --  326 383 386 362 347 377 361 302 278     Results from last 7 days   Lab Units 24  0253   POC GLUCOSE mg/dl 99     Results from last 7 days   Lab Units 24  0834 24  0351   PROT/CREAT RATIO UR  0.65* 9.80*             Results from last 7 days   Lab Units 24  0353   URINE CULTURE  No Growth <1000 cfu/mL       Brief review of pertinent history:  Past Medical History:   Diagnosis Date    Anemia affecting pregnancy 2023    Asthma     Carpal tunnel syndrome, bilateral     Chlamydia infection     Elevated blood pressure reading without diagnosis of hypertension 2023    Gastroesophageal reflux disease without esophagitis 2023     Past Surgical History:   Procedure Laterality Date    NO PAST SURGERIES       OB History    Para Term  AB Living   7 4 3 1 2 4   SAB IAB Ectopic Multiple Live Births   1 1 0 0 4      # Outcome Date GA Lbr Florencio/2nd Weight Sex Type Anes PTL Lv   7 Current            6 Term 10/05/23 37w1d   M Vag-Spont   DREA   5 SAB 22           4 Term     M Vag-Spont   DREA   3 Term     M Vag-Spont   DREA   2      F Vag-Spont   DREA   1 IAB      TAB         Birth Comments: 1st trimester, suction D&C      Obstetric Comments   Menarche - 15 yo       Fetal data:  Nonstress test: date 24 Time: 32663 - 0610  Baseline: 145bpm  Variability: moderate  Accelerations: present, 10x10  Decelerations: absent  Contractions:  uterine irritability  Assessment: reactive  Plan: NST TID        MFM ultrasound report key findings: Interval growth scan from 24 date at 27w1d gestational age.     INDICATIONS     The indications for this exam were second trimester bleeding, previous   delivery & short interval pregnancy.     Exam  Types     Transvaginal Ultrasound  Level I     RESULTS     Fetus # 1 of 1  Breech presentation  Fetal growth appeared normal  Placenta Location = Anterior, fundal  No placenta previa     MEASUREMENTS (* Included In Average GA)     AC             23.48 cm        27 weeks 6 days* (63%)  BPD             6.68 cm        26 weeks 6 days* (31%)  HC             25.13 cm        27 weeks 2 days* (26%)  Femur           4.97 cm        26 weeks 6 days* (25%)     HC/AC           1.07 [1.05 - 1.22]                 (31%)  FL/AC             21 [20 - 24]  FL/BPD            74 [71 - 87]  EFW [place] 4   1062 grams - 2 lbs 5 oz                 (48%)     THE AVERAGE GESTATIONAL AGE is 27 weeks 1 day +/- 14 days.     AMNIOTIC FLUID     Q1: 2.3      Q2: 2.2      Q3: 4.7      Q4: 2.1  DAISY Total = 11.3 cm  Amniotic Fluid: Normal     FETAL VESSELS     S/D    PI       RI      PSV    AEDV RF  cm/s  Middle Cerebral Artery   0.00    0.00    0.00    26.39     CERVICAL EVALUATION     The cervix appeared normal (Ultrasound Examination).     SUPINE  Cervical Length: 2.90 cm     OTHER TEST RESULTS  Funneling?: No             Dynamic Changes?: No  Debris?: No     ANATOMY COMMENTS     Follow up evaluation of intracranial anatomy (calvarium and  lateral  ventricles), cardiac anatomy (outflow tract and three vessel views),  diaphragm, stomach, kidneys, and bladder appear normal.     IMPRESSION     [place] IUP  27 weeks and 1 day by this ultrasound. (TONA=OCT 3 2024)  27 weeks and 1 day by 2nd Trimester Sono. (TONA=OCT 3 2024)  Breech presentation  Fetal growth appeared normal  Regular fetal heart rate of 138 bpm  Anterior, fundal placenta  No placenta previa    MEDS:   Medication Administration - last 24 hours from 07/08/2024 0601 to 07/09/2024 0601         Date/Time Order Dose Route Action Action by     07/08/2024 0859 EDT prenatal multivitamin tablet 1 tablet 1 tablet Oral Given Violeta Segal RN          Current Facility-Administered  Medications   Medication Dose Route Frequency    acetaminophen (TYLENOL) tablet 975 mg  975 mg Oral Q8H PRN    albuterol (PROVENTIL HFA,VENTOLIN HFA) inhaler 2 puff  2 puff Inhalation Q4H PRN    calcium carbonate (TUMS) chewable tablet 1,000 mg  1,000 mg Oral TID PRN    calcium gluconate 10 % injection 1 g  1 g Intravenous Once PRN    metoclopramide (REGLAN) injection 10 mg  10 mg Intravenous Q6H PRN    ondansetron (ZOFRAN) injection 4 mg  4 mg Intravenous Q6H PRN    prenatal multivitamin tablet 1 tablet  1 tablet Oral Daily            Pedro Moulton MD  OBGYN, PGY-3  7/9/2024  6:01 AM     same name as above

## 2024-07-09 NOTE — PLAN OF CARE
Problem: ANTEPARTUM  Goal: Maintain pregnancy as long as maternal and/or fetal condition is stable  Description: INTERVENTIONS:  - Maternal surveillance  - Fetal surveillance  - Monitor uterine activity  - Medications as ordered  - Bedrest  Outcome: Progressing     Problem: PAIN - ADULT  Goal: Verbalizes/displays adequate comfort level or baseline comfort level  Description: Interventions:  - Encourage patient to monitor pain and request assistance  - Assess pain using appropriate pain scale  - Administer analgesics based on type and severity of pain and evaluate response  - Implement non-pharmacological measures as appropriate and evaluate response  - Consider cultural and social influences on pain and pain management  - Notify physician/advanced practitioner if interventions unsuccessful or patient reports new pain  Outcome: Progressing     Problem: INFECTION - ADULT  Goal: Absence or prevention of progression during hospitalization  Description: INTERVENTIONS:  - Assess and monitor for signs and symptoms of infection  - Monitor lab/diagnostic results  - Monitor all insertion sites, i.e. indwelling lines, tubes, and drains  - Monitor endotracheal if appropriate and nasal secretions for changes in amount and color  - [place] appropriate cooling/warming therapies per order  - Administer medications as ordered  - Instruct and encourage patient and family to use good hand hygiene technique  - Identify and instruct in appropriate isolation precautions for identified infection/condition  Outcome: Progressing  Goal: Absence of fever/infection during neutropenic period  Description: INTERVENTIONS:  - Monitor WBC    Outcome: Progressing     Problem: SAFETY ADULT  Goal: Patient will remain free of falls  Description: INTERVENTIONS:  - Educate patient/family on patient safety including physical limitations  - Instruct patient to call for assistance with activity   - Consult OT/PT to assist with strengthening/mobility   -  Keep Call bell within reach  - Keep bed low and locked with side rails adjusted as appropriate  - Keep care items and personal belongings within reach  - Initiate and maintain comfort rounds  Outcome: Progressing  Goal: Maintain or return to baseline ADL function  Description: INTERVENTIONS:  -  Assess patient's ability to carry out ADLs; assess patient's baseline for ADL function and identify physical deficits which impact ability to perform ADLs (bathing, care of mouth/teeth, toileting, grooming, dressing, etc.)  - Assess/evaluate cause of self-care deficits   - Assess range of motion  - Assess patient's mobility; develop plan if impaired  - Assess patient's need for assistive devices and provide as appropriate  - Encourage maximum independence but intervene and supervise when necessary  - Involve family in performance of ADLs  - Assess for home care needs following discharge   - Consider OT consult to assist with ADL evaluation and planning for discharge  - Provide patient education as appropriate  Outcome: Progressing  Goal: Maintains/Returns to pre admission functional level  Description: INTERVENTIONS:  - Perform AM-PAC 6 Click Basic Mobility/ Daily Activity assessment daily.  - Set and communicate daily mobility goal to care team and patient/family/caregiver.   - Out of bed for toileting  - Record patient progress and toleration of activity level   Outcome: Progressing     Problem: Knowledge Deficit  Goal: Patient/family/caregiver demonstrates understanding of disease process, treatment plan, medications, and discharge instructions  Description: Complete learning assessment and assess knowledge base.  Interventions:  - Provide teaching at level of understanding  - Provide teaching via preferred learning methods  Outcome: Progressing     Problem: DISCHARGE PLANNING  Goal: Discharge to home or other facility with appropriate resources  Description: INTERVENTIONS:  - Identify barriers to discharge w/patient and  caregiver  - Arrange for needed discharge resources and transportation as appropriate  - Identify discharge learning needs (meds, wound care, etc.)  - Arrange for interpretive services to assist at discharge as needed  - Refer to Case Management Department for coordinating discharge planning if the patient needs post-hospital services based on physician/advanced practitioner order or complex needs related to functional status, cognitive ability, or social support system  Outcome: Progressing

## 2024-07-09 NOTE — CASE MANAGEMENT
Case Management Progress Note    Patient name Isidra Chamberlain  Location /-01 MRN [id number]  : 1985 Date 2024       LOS (days): 4  Geometric Mean LOS (GMLOS) (days):   Days to GMLOS:        OBJECTIVE:        Current admission status: Inpatient  Preferred Pharmacy:   [id number] - [place], [place] - [address]  [address] [address]  Phone: [phone] Fax: [phone]    Primary Care Provider: Edgar Cardenas MD    Primary Insurance: [employer]  Secondary Insurance:     PROGRESS NOTE:      CM met with MOB to introduce CM services, complete assessment, and provide CM contact info.    MOB had Significant Other present and verbalized agreement with personal interview with them present.    MOB reported the following:    Assessment:  Consult reason: Drug/ETOH/MH  Gestational Age Currently: 27 Weeks + 5 Days  MOB Name (& age if teen):   Isidra Chamberlain  FOB Name (& age if teen MOB):  Lam Hassan  Other Legal Guardian(s) for Baby:    n/a  Other Children:   4 children (Ages 20, 17, 9, 9 months)  Housing Plan/Lives with: FOB and 9 year old and 9 months old  Insurance Coverage/Plan for Baby: n/a  Support System: Family  Care Items: Needs to get iems, has some from 9 month old son.  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  NICU Resources: N/A    Discharge Plan:  Pediatrician:   Dara Velasco  Prenatal/ Care:  TBD  Follow-Up Appointments Needed/Scheduled: TBD  Medications/DME/Other Referrals: TBD  Transportation Plan: Family has a  vehicle    Follow-Up Needed from Care Management:    CM met with MOB to discuss UDS, CM informed her of [facility] referral, MOB understood.     (e-Referral ID: [id number]). CM will follow up with [facility].

## 2024-07-10 LAB
ALBUMIN SERPL BCG-MCNC: 3.5 G/DL (ref 3.5–5)
ALBUMIN SERPL BCG-MCNC: 3.5 G/DL (ref 3.5–5)
ALP SERPL-CCNC: 65 U/L (ref 34–104)
ALP SERPL-CCNC: 66 U/L (ref 34–104)
ALT SERPL W P-5'-P-CCNC: 13 U/L (ref 7–52)
ALT SERPL W P-5'-P-CCNC: 14 U/L (ref 7–52)
ANION GAP SERPL CALCULATED.3IONS-SCNC: 7 MMOL/L (ref 4–13)
ANION GAP SERPL CALCULATED.3IONS-SCNC: 9 MMOL/L (ref 4–13)
APTT PPP: 23 SECONDS (ref 23–37)
APTT PPP: 24 SECONDS (ref 23–37)
AST SERPL W P-5'-P-CCNC: 17 U/L (ref 13–39)
AST SERPL W P-5'-P-CCNC: 18 U/L (ref 13–39)
BILIRUB SERPL-MCNC: 0.34 MG/DL (ref 0.2–1)
BILIRUB SERPL-MCNC: 0.47 MG/DL (ref 0.2–1)
BUN SERPL-MCNC: 10 MG/DL (ref 5–25)
BUN SERPL-MCNC: 9 MG/DL (ref 5–25)
CALCIUM SERPL-MCNC: 7.5 MG/DL (ref 8.4–10.2)
CALCIUM SERPL-MCNC: 7.9 MG/DL (ref 8.4–10.2)
CHLORIDE SERPL-SCNC: 104 MMOL/L (ref 96–108)
CHLORIDE SERPL-SCNC: 104 MMOL/L (ref 96–108)
CO2 SERPL-SCNC: 20 MMOL/L (ref 21–32)
CO2 SERPL-SCNC: 22 MMOL/L (ref 21–32)
CREAT SERPL-MCNC: 0.67 MG/DL (ref 0.6–1.3)
CREAT SERPL-MCNC: 0.77 MG/DL (ref 0.6–1.3)
ERYTHROCYTE [DISTWIDTH] IN BLOOD BY AUTOMATED COUNT: 13.2 % (ref 11.6–15.1)
ERYTHROCYTE [DISTWIDTH] IN BLOOD BY AUTOMATED COUNT: 13.2 % (ref 11.6–15.1)
FIBRINOGEN PPP-MCNC: 347 MG/DL (ref 207–520)
FIBRINOGEN PPP-MCNC: 360 MG/DL (ref 207–520)
FIBRINOGEN PPP-MCNC: 371 MG/DL (ref 207–520)
GFR SERPL CREATININE-BSD FRML MDRD: 111 ML/MIN/1.73SQ M
GFR SERPL CREATININE-BSD FRML MDRD: 98 ML/MIN/1.73SQ M
GLUCOSE SERPL-MCNC: 85 MG/DL (ref 65–140)
GLUCOSE SERPL-MCNC: 96 MG/DL (ref 65–140)
HCT VFR BLD AUTO: 30.7 % (ref 34.8–46.1)
HCT VFR BLD AUTO: 31.6 % (ref 34.8–46.1)
HGB BLD-MCNC: 10.4 G/DL (ref 11.5–15.4)
HGB BLD-MCNC: 10.4 G/DL (ref 11.5–15.4)
INR PPP: 0.98 (ref 0.84–1.19)
INR PPP: 1.05 (ref 0.84–1.19)
MCH RBC QN AUTO: 30 PG (ref 26.8–34.3)
MCH RBC QN AUTO: 30.4 PG (ref 26.8–34.3)
MCHC RBC AUTO-ENTMCNC: 32.9 G/DL (ref 31.4–37.4)
MCHC RBC AUTO-ENTMCNC: 33.9 G/DL (ref 31.4–37.4)
MCV RBC AUTO: 90 FL (ref 82–98)
MCV RBC AUTO: 91 FL (ref 82–98)
PLATELET # BLD AUTO: 158 THOUSANDS/UL (ref 149–390)
PLATELET # BLD AUTO: 177 THOUSANDS/UL (ref 149–390)
PMV BLD AUTO: 10.1 FL (ref 8.9–12.7)
PMV BLD AUTO: 10.2 FL (ref 8.9–12.7)
POTASSIUM SERPL-SCNC: 3.7 MMOL/L (ref 3.5–5.3)
POTASSIUM SERPL-SCNC: 3.7 MMOL/L (ref 3.5–5.3)
PROT SERPL-MCNC: 6.6 G/DL (ref 6.4–8.4)
PROT SERPL-MCNC: 6.7 G/DL (ref 6.4–8.4)
PROTHROMBIN TIME: 13.6 SECONDS (ref 11.6–14.5)
PROTHROMBIN TIME: 14.3 SECONDS (ref 11.6–14.5)
RBC # BLD AUTO: 3.42 MILLION/UL (ref 3.81–5.12)
RBC # BLD AUTO: 3.47 MILLION/UL (ref 3.81–5.12)
SODIUM SERPL-SCNC: 133 MMOL/L (ref 135–147)
SODIUM SERPL-SCNC: 133 MMOL/L (ref 135–147)
WBC # BLD AUTO: 11.63 THOUSAND/UL (ref 4.31–10.16)
WBC # BLD AUTO: 12.31 THOUSAND/UL (ref 4.31–10.16)

## 2024-07-10 PROCEDURE — 85384 FIBRINOGEN ACTIVITY: CPT

## 2024-07-10 PROCEDURE — 85730 THROMBOPLASTIN TIME PARTIAL: CPT

## 2024-07-10 PROCEDURE — 80053 COMPREHEN METABOLIC PANEL: CPT

## 2024-07-10 PROCEDURE — 85610 PROTHROMBIN TIME: CPT

## 2024-07-10 PROCEDURE — 85027 COMPLETE CBC AUTOMATED: CPT

## 2024-07-10 PROCEDURE — 99233 SBSQ HOSP IP/OBS HIGH 50: CPT | Performed by: OBSTETRICS & GYNECOLOGY

## 2024-07-10 PROCEDURE — NC001 PR NO CHARGE: Performed by: OBSTETRICS & GYNECOLOGY

## 2024-07-10 PROCEDURE — 59025 FETAL NON-STRESS TEST: CPT | Performed by: OBSTETRICS & GYNECOLOGY

## 2024-07-10 RX ADMIN — SODIUM CHLORIDE, SODIUM LACTATE, POTASSIUM CHLORIDE, AND CALCIUM CHLORIDE 1000 ML: .6; .31; .03; .02 INJECTION, SOLUTION INTRAVENOUS at 22:06

## 2024-07-10 RX ADMIN — Medication 2.5 MILLION UNITS: at 02:10

## 2024-07-10 RX ADMIN — Medication 1 TABLET: at 09:19

## 2024-07-10 RX ADMIN — HYDROCORTISONE: 25 CREAM TOPICAL at 09:19

## 2024-07-10 RX ADMIN — Medication 2.5 MILLION UNITS: at 06:18

## 2024-07-10 RX ADMIN — HYDROCORTISONE: 25 CREAM TOPICAL at 18:13

## 2024-07-10 NOTE — OB LABOR/OXYTOCIN SAFETY PROGRESS
Labor Progress Note - Isidra Chamberlain 38 y.o. female MRN: [id number]    Unit/Bed#: -01 Encounter: [id number]       Contraction Frequency (minutes): difficult to trace (RN at bedside adjusting monitor, while RN at bedside patient has felt contractions more frequently than before)  Contraction Intensity: Mild/Moderate  Uterine Activity Characteristics: Irregular  Cervical Dilation: 2        Cervical Effacement: 50  Fetal Station: -3  Baseline Rate (FHR): 140 bpm  Fetal Heart Rate (FHT): 142 BPM  FHR Category: 1               Vital Signs:   Vitals:    07/09/24 2000   BP: 146/94   Pulse: 62   Resp:    Temp:    SpO2:        Notes/comments:   Patient feeling more contractions. SVE unchanged at this time. No significant blood/bleeding appreciated on exam.  Will continue expectant management.       Kelli Flores MD 7/9/2024 8:14 PM

## 2024-07-10 NOTE — PLAN OF CARE
Problem: ANTEPARTUM  Goal: Maintain pregnancy as long as maternal and/or fetal condition is stable  Description: INTERVENTIONS:  - Maternal surveillance  - Fetal surveillance  - Monitor uterine activity  - Medications as ordered  - Bedrest  Outcome: Progressing     Problem: Knowledge Deficit  Goal: Patient/family/caregiver demonstrates understanding of disease process, treatment plan, medications, and discharge instructions  Description: Complete learning assessment and assess knowledge base.  Interventions:  - Provide teaching at level of understanding  - Provide teaching via preferred learning methods  Outcome: Progressing     Problem: DISCHARGE PLANNING  Goal: Discharge to home or other facility with appropriate resources  Description: INTERVENTIONS:  - Identify barriers to discharge w/patient and caregiver  - Arrange for needed discharge resources and transportation as appropriate  - Identify discharge learning needs (meds, wound care, etc.)  - Arrange for interpretive services to assist at discharge as needed  - Refer to Case Management Department for coordinating discharge planning if the patient needs post-hospital services based on physician/advanced practitioner order or complex needs related to functional status, cognitive ability, or social support system  Outcome: Progressing

## 2024-07-10 NOTE — PROGRESS NOTES
"Progress Note - Maternal Fetal Medicine   Isidra Chamberlain 38 y.o. female MRN: [id number]  Unit/Bed#: -01 Encounter: [id number]  Admit date: 2024.  Today's date: 07/10/24    Assessment & Plan     Ms. Chamberlain is a 38 y.o.  at 27w3d, Hospital Day: 6, admitted with evolving placental abruption.. VS stable. By issue:    27 weeks gestation of pregnancy  Assessment & Plan  Dated by US 2nd trimester ultrasound w/ documented TONA of 10/3/24, consistent w/ patient reported TONA.  NST reactive  CL 3.9 cm  TVUS w/ no placenta/vasa previa  Delivery consent signed 24      Prenatal labs reviewed  FEN: Regular diet  DVT ppx: SCDs  Monitoring: NST TID  See plan for \"Placental abruption, second trimester\"    * Placental abruption, second trimester  Assessment & Plan  Patient presented w/ heavy vaginal bleeding s/p intercourse w/ no known placenta/vasa previa  Reported passing several large clots PTA  Brought in by EMS and per their report was diaphoretic, bradycardic, & hypotensive en route and started on IVF  - These findings were not present on arrival to L&D triage  Speculum exam with large clot (~500 mL) and slow persistent trickle from the cervical os with no lacerations/injuries noted    Suspect evolving placental abruption and/or  labor as an etiology for her bleeding  High clinical concern for evolving coagulopathy in the setting of low-normal fibrinogen and ongoing bleeding  Pelvic imaging 24 with 9.8 x 4.4 x 10.8cm organized blood clot consistent with suspected placental abruption given acute onset of bleeding and abdominal pain without other source of vaginal bleeding  Fetal presentation breech    S/p 1u FFP, 1u pRBCs, 1u cryo  S/p NICU consultation  S/p betamethasone for fetal lung maturation -  S/p magnesium for fetal neuroprotection  Coagulation studies WNL, CBC with Hgb drop 10.9 -> 9.8 -> 10 without apparent external bleeding but ongoing concerning for concealed abruption  Repeat labs " this morning  Continued low threshold for classical  section in the event of maternal compromise with heavy vaginal bleeding, intractable abdominal pain, or need for additional blood products; or concern for fetal compromise with derangements in fetal heart tracing     Preeclampsia  Assessment & Plan  Systolic (24hrs), Av , Min:123 , Max:163     Diastolic (24hrs), Av, Min:71, Max:108    Patient met criteria with elevated blood pressures and proteinuria with UPC 0.65  Asymptomatic  1x non-sustained SRBP over the past 24h, pressures this morning 122-139/69-87  Continue to trend pressures per protocol  Continue to monitor for evolving signs of severe features    Functional systolic murmur  Assessment & Plan  Remote history of childhood murmur, patient unsure of what type, self-resolved  Holosystolic murmur on exam on   Likely functional holosystolic ejection murmur in pregnancy  Denies chest pain, pressure, or shortness of breath  Continue to monitor     uterine contractions  Assessment & Plan  SVE 1.5/30/-3 > 2/50/-3  CL 3.9 cm    Betamethasone for fetal lung maturation -  Do not recommend tocolysis in the setting of suspected placental abruption  GC/CT neg, FFN pos, UDS pos for THC and meth/amph    Marijuana use  Assessment & Plan  Patient reports marijuana use (smokes) w/ no other substance use this pregnancy.    UDS positive for THC and methamphetamines  Will need CM consult    Request for sterilization  Assessment & Plan  Patient requests permanent sterilization  MA-31 signed 2024    History of  delivery  Assessment & Plan  H/o spontaneous PTD at 36w in  with 3 subsequent term SVDs.    History of diet controlled gestational diabetes mellitus (GDM)  Assessment & Plan  H/o A1GDM in most recent pregnancy per patient with no use of medications.  Unknown GDM status this pregnancy (no GTT completed to date).    Asthma during pregnancy  Assessment & Plan  Rarely needs  "Albuterol this pregnancy (last use was \"months ago\").    Albuterol prn             Subjective    Contractions: no  Loss of fluid: no  Vaginal bleeding: no  Fetal movement: yes    Pain: no  Headaches: no  Visual changes: no  Chest pain: no  Shortness of breath: no  Nausea: no  Vomiting/Diarrhea: no  Dysuria: no  Leg pain: no          Objective      Patient Vitals for the past 24 hrs:   BP Temp Temp src Pulse Resp SpO2   07/10/24 0615 -- -- -- 60 -- 100 %   07/10/24 0600 125/84 97.6 °F (36.4 °C) Oral 67 18 100 %   07/10/24 0530 154/80 -- -- 69 -- --   07/10/24 0500 128/83 -- -- 65 -- --   07/10/24 0430 131/86 -- -- 72 -- --   07/10/24 0400 144/93 -- -- 68 -- --   07/10/24 0330 148/91 -- -- 67 -- --   07/10/24 0315 144/88 -- -- 67 -- --   07/10/24 0300 141/78 -- -- 65 -- --   07/10/24 0245 137/85 -- -- 63 -- --   07/10/24 0230 146/89 -- -- 68 -- --   07/10/24 0215 150/90 97.8 °F (36.6 °C) Oral 67 16 98 %   07/10/24 0200 (!) 147/108 -- -- 70 -- --   07/10/24 0145 150/96 -- -- 72 -- --   07/10/24 0130 145/94 -- -- 67 -- --   07/10/24 0115 148/93 -- -- 66 -- --   07/10/24 0100 143/92 -- -- 73 -- --   07/10/24 0045 141/87 -- -- 65 -- --   07/10/24 0030 133/81 -- -- 59 -- --   07/10/24 0015 151/89 -- -- 56 -- --   07/10/24 0000 156/95 -- -- 55 -- --   07/09/24 2330 123/82 -- -- 68 -- --   07/09/24 2315 123/79 -- -- 64 -- --   07/09/24 2300 126/71 -- -- 61 -- --   07/09/24 2245 128/75 -- -- 65 -- --   07/09/24 2230 141/91 -- -- 63 -- --   07/09/24 2215 142/88 -- -- 63 -- --   07/09/24 2200 145/87 -- -- 62 -- --   07/09/24 2145 139/89 -- -- 62 -- --   07/09/24 2130 142/87 -- -- 60 -- --   07/09/24 2115 142/86 -- -- 64 -- --   07/09/24 2045 150/92 -- -- 66 -- --   07/09/24 2030 141/88 -- -- 65 -- --   07/09/24 2015 145/87 -- -- 60 -- --   07/09/24 2000 146/94 -- -- 62 -- --   07/09/24 1945 151/93 -- -- 56 -- --   07/09/24 1930 160/98 -- -- 65 -- --   07/09/24 1915 148/90 -- -- 58 -- --   07/09/24 1900 160/92 -- -- 57 -- -- "   07/09/24 1844 149/94 -- -- 62 -- --   07/09/24 1830 152/92 -- -- 59 -- --   07/09/24 1815 161/94 -- -- 58 -- --   07/09/24 1745 127/85 98.5 °F (36.9 °C) Oral 60 14 98 %   07/09/24 1607 163/83 98.6 °F (37 °C) Oral 60 20 95 %   07/09/24 1227 133/71 98.5 °F (36.9 °C) Oral 66 20 99 %   07/09/24 0915 147/87 98.5 °F (36.9 °C) Oral 68 20 98 %       Physical Exam  Vitals and nursing note reviewed. Exam conducted with a chaperone present.   Constitutional:       General: She is not in acute distress.  HENT:      Head: Normocephalic.      Right Ear: External ear normal.      Left Ear: External ear normal.   Eyes:      General: No scleral icterus.        Right eye: No discharge.         Left eye: No discharge.      Extraocular Movements: Extraocular movements intact.      Conjunctiva/sclera: Conjunctivae normal.   Cardiovascular:      Rate and Rhythm: Normal rate and regular rhythm.      Pulses: Normal pulses.   Pulmonary:      Effort: Pulmonary effort is normal.      Breath sounds: Normal breath sounds.   Abdominal:      Palpations: Abdomen is soft.      Tenderness: There is no abdominal tenderness. There is no guarding.      Comments: Gravid uterus   Musculoskeletal:         General: No swelling or tenderness. Normal range of motion.      Cervical back: Normal range of motion.      Right lower leg: No edema.      Left lower leg: No edema.   Skin:     General: Skin is warm and dry.      Capillary Refill: Capillary refill takes less than 2 seconds.   Neurological:      General: No focal deficit present.      Mental Status: She is alert and oriented to person, place, and time. Mental status is at baseline.   Psychiatric:         Mood and Affect: Mood normal.         Behavior: Behavior normal.         I/O         07/05 0701 07/06 0700 07/06 0701 07/07 0700 07/07 0701 07/08 0700    I.V. 1531.7      Blood 125      IV Piggyback       Total Intake 1656.7      Urine 4575 2625     Blood       Total Output 4575 2625     Net  -2918.3 -8755                    Invasive Devices       Peripheral Intravenous Line  Duration             Peripheral IV 07/06/24 Right;Ventral (anterior) Wrist 4 days    Peripheral IV 07/09/24 Left;Ventral (anterior) Forearm <1 day                    Results from last 7 days   Lab Units 07/10/24  0225 07/09/24  2236 07/09/24  1734 07/09/24  1252 07/08/24  0815 07/07/24  1200 07/07/24 0626 07/06/24 0830 07/06/24 0422 07/05/24  2334   WBC Thousand/uL 12.31* 11.87* 10.98* 10.76* 9.91   < > 12.38* 15.77* 15.97* 13.85*   TOTAL NEUT ABS Thousands/µL  --   --   --   --  7.33  --  10.05* 14.60* 13.47* 11.72*   HEMOGLOBIN g/dL 10.4* 10.3* 11.1* 10.0* 10.2*   < > 9.8* 10.9* 10.8* 11.1*   MCV fL 91 90 90 90 94   < > 91 90 94 92   PLATELETS Thousands/uL 158 159 169 162 160   < > 174 194 186 196    < > = values in this interval not displayed.     Results from last 7 days   Lab Units 07/10/24  0225 07/09/24  2236 07/09/24  1734 07/07/24  0626 07/06/24  0830 07/06/24 0422 07/05/24  2334   POTASSIUM mmol/L 3.7 3.6 3.6 3.8 3.5   < > 4.0   CHLORIDE mmol/L 104 105 104 106 100   < > 99   CO2 mmol/L 22 20* 21 19* 17*   < > 16*   BUN mg/dL 10 11 14 9 7   < > 7   CREATININE mg/dL 0.77 0.65 0.73 0.64 0.67   < > 0.70   EGFR ml/min/1.73sq m 98 112 104 113 111   < > 110   MAGNESIUM mg/dL  --   --   --   --   --   --  6.8*    < > = values in this interval not displayed.     Results from last 7 days   Lab Units 07/10/24  0225 07/09/24  2236 07/09/24  1734 07/07/24  0626 07/06/24  0830   AST U/L 18 18 20 14 17   ALT U/L 14 14 15 8 8     Results from last 7 days   Lab Units 07/10/24  0225 07/09/24  2236 07/09/24  2146 07/09/24  1734 07/09/24  1252 07/08/24  0815 07/07/24  1200 07/07/24  0626 07/06/24  0830 07/06/24  0422 07/05/24  2334   PLATELETS Thousands/uL 158 159  --  169 162 160   < > 174 194 186 196   INR  1.05 1.00 1.27* 0.96 0.99 0.98  --  1.01 1.04 1.04 1.10   PROTIME seconds 14.3 13.8 16.6* 13.4 13.7 13.6  --  13.9 14.2 14.2 14.8*    PTT seconds 24 25 31 24 24 23  --  24 25 24 26   FIBRINOGEN mg/dL 371 347 212 339 339 318  --  326 383 386 362    < > = values in this interval not displayed.     Results from last 7 days   Lab Units 24  0253   POC GLUCOSE mg/dl 99     Results from last 7 days   Lab Units 24  0834 24  0351   PROT/CREAT RATIO UR  0.65* 9.80*             Results from last 7 days   Lab Units 24  0353   URINE CULTURE  No Growth <1000 cfu/mL       Brief review of pertinent history:  Past Medical History:   Diagnosis Date    Anemia affecting pregnancy 2023    Asthma     Carpal tunnel syndrome, bilateral     Chlamydia infection     Elevated blood pressure reading without diagnosis of hypertension 2023    Gastroesophageal reflux disease without esophagitis 2023     Past Surgical History:   Procedure Laterality Date    NO PAST SURGERIES       OB History    Para Term  AB Living   7 4 3 1 2 4   SAB IAB Ectopic Multiple Live Births   1 1 0 0 4      # Outcome Date GA Lbr Florencio/2nd Weight Sex Type Anes PTL Lv   7 Current            6 Term 10/05/23 37w1d   M Vag-Spont   DREA   5 SAB 22           4 Term     M Vag-Spont   DREA   3 Term     M Vag-Spont   DREA   2      F Vag-Spont   DREA   1 IAB      TAB         Birth Comments: 1st trimester, suction D&C      Obstetric Comments   Menarche - 15 yo       MFM ultrasound report key findings: Interval growth scan from 24 date at 27w1d gestational age.     INDICATIONS     The indications for this exam were second trimester bleeding, previous   delivery & short interval pregnancy.     Exam Types     Transvaginal Ultrasound  Level I     RESULTS     Fetus # 1 of 1  Breech presentation  Fetal growth appeared normal  Placenta Location = Anterior, fundal  No placenta previa     MEASUREMENTS (* Included In Average GA)     AC             23.48 cm        27 weeks 6 days* (63%)  BPD             6.68 cm        26 weeks 6 days*  (31%)  HC             25.13 cm        27 weeks 2 days* (26%)  Femur           4.97 cm        26 weeks 6 days* (25%)     HC/AC           1.07 [1.05 - 1.22]                 (31%)  FL/AC             21 [20 - 24]  FL/BPD            74 [71 - 87]  EFW [place] 4   1062 grams - 2 lbs 5 oz                 (48%)     THE AVERAGE GESTATIONAL AGE is 27 weeks 1 day +/- 14 days.     AMNIOTIC FLUID     Q1: 2.3      Q2: 2.2      Q3: 4.7      Q4: 2.1  DAISY Total = 11.3 cm  Amniotic Fluid: Normal     FETAL VESSELS     S/D    PI       RI      PSV    AEDV RF  cm/s  Middle Cerebral Artery   0.00    0.00    0.00    26.39     CERVICAL EVALUATION     The cervix appeared normal (Ultrasound Examination).     SUPINE  Cervical Length: 2.90 cm     OTHER TEST RESULTS  Funneling?: No             Dynamic Changes?: No  Debris?: No     ANATOMY COMMENTS     Follow up evaluation of intracranial anatomy (calvarium and  lateral  ventricles), cardiac anatomy (outflow tract and three vessel views),  diaphragm, stomach, kidneys, and bladder appear normal.     IMPRESSION     [facility] IUP  27 weeks and 1 day by this ultrasound. (TONA=OCT 3 2024)  27 weeks and 1 day by 2nd Trimester Sono. (TONA=OCT 3 2024)  Breech presentation  Fetal growth appeared normal  Regular fetal heart rate of 138 bpm  Anterior, fundal placenta  No placenta previa    MEDS:   Medication Administration - last 24 hours from 07/09/2024 0647 to 07/10/2024 0647         Date/Time Order Dose Route Action Action by     07/09/2024 0920 EDT prenatal multivitamin tablet 1 tablet 1 tablet Oral Given Delfina Covarrubias RN     07/09/2024 1757 EDT hydrocortisone 2.5 % cream 1 Application Topical Given Kusum Thomas RN     07/09/2024 1750 EDT magnesium sulfate 4 g/100 mL IVPB (premix) 4 g 0 g Intravenous Stopped Kusum Thomas RN     07/09/2024 1733 EDT magnesium sulfate 4 g/100 mL IVPB (premix) 4 g 4 g Intravenous New Javid Celeste RN     07/09/2024 1751 EDT magnesium sulfate 20 g/500 mL  infusion (premix) 1 g/hr Intravenous New Bag Kusum Thomas RN     07/09/2024 1848 EDT penicillin G (PFIZERPEN-G) in 0.9% sodium chloride 250 mL IVPB 5 Million Units 0 Million Units Intravenous Stopped Delfina Lino RN     07/09/2024 1748 EDT penicillin G (PFIZERPEN-G) in 0.9% sodium chloride 250 mL IVPB 5 Million Units 5 Million Units Intravenous New Bag Kusum Thomas RN     07/10/2024 0618 EDT penicillin G (PFIZERPEN-G) in 0.9% sodium chloride 100 mL IVPB 2.5 Million Units 2.5 Million Units Intravenous New Bag Delfina Lino RN     07/10/2024 0310 EDT penicillin G (PFIZERPEN-G) in 0.9% sodium chloride 100 mL IVPB 2.5 Million Units 0 Million Units Intravenous Stopped Delfina Lino RN     07/10/2024 0210 EDT penicillin G (PFIZERPEN-G) in 0.9% sodium chloride 100 mL IVPB 2.5 Million Units 2.5 Million Units Intravenous New Bag Delfina Lino RN     07/09/2024 2237 EDT penicillin G (PFIZERPEN-G) in 0.9% sodium chloride 100 mL IVPB 2.5 Million Units 0 Million Units Intravenous Stopped Delfina Lino RN     07/09/2024 2137 EDT penicillin G (PFIZERPEN-G) in 0.9% sodium chloride 100 mL IVPB 2.5 Million Units 2.5 Million Units Intravenous New Bag Delfina Lino RN          Current Facility-Administered Medications   Medication Dose Route Frequency    acetaminophen (TYLENOL) tablet 975 mg  975 mg Oral Q8H PRN    albuterol (PROVENTIL HFA,VENTOLIN HFA) inhaler 2 puff  2 puff Inhalation Q4H PRN    calcium carbonate (TUMS) chewable tablet 1,000 mg  1,000 mg Oral TID PRN    calcium gluconate 10 % injection 1 g  1 g Intravenous Once PRN    hydrocortisone 2.5 % cream   Topical BID    magnesium sulfate 20 g/500 mL infusion (premix)  1 g/hr Intravenous Continuous    metoclopramide (REGLAN) injection 10 mg  10 mg Intravenous Q6H PRN    ondansetron (ZOFRAN) injection 4 mg  4 mg Intravenous Q6H PRN    penicillin G (PFIZERPEN-G) in 0.9% sodium chloride 100 mL IVPB 2.5 Million Units   2.5 Million Units Intravenous Q4H    prenatal multivitamin tablet 1 tablet  1 tablet Oral Daily            Pedro Moulton MD  OBGYN, PGY-3  7/10/2024  6:47 AM

## 2024-07-11 ENCOUNTER — ANESTHESIA EVENT (INPATIENT)
Dept: ANESTHESIOLOGY | Facility: HOSPITAL | Age: 39
DRG: 560 | End: 2024-07-11
Payer: COMMERCIAL

## 2024-07-11 ENCOUNTER — ANESTHESIA (INPATIENT)
Dept: ANESTHESIOLOGY | Facility: HOSPITAL | Age: 39
DRG: 560 | End: 2024-07-11
Payer: COMMERCIAL

## 2024-07-11 LAB
ALBUMIN SERPL BCG-MCNC: 3.2 G/DL (ref 3.5–5)
ALBUMIN SERPL BCG-MCNC: 3.4 G/DL (ref 3.5–5)
ALBUMIN SERPL BCG-MCNC: 3.6 G/DL (ref 3.5–5)
ALP SERPL-CCNC: 59 U/L (ref 34–104)
ALP SERPL-CCNC: 66 U/L (ref 34–104)
ALP SERPL-CCNC: 71 U/L (ref 34–104)
ALT SERPL W P-5'-P-CCNC: 13 U/L (ref 7–52)
ALT SERPL W P-5'-P-CCNC: 13 U/L (ref 7–52)
ALT SERPL W P-5'-P-CCNC: 15 U/L (ref 7–52)
ANION GAP SERPL CALCULATED.3IONS-SCNC: 6 MMOL/L (ref 4–13)
ANION GAP SERPL CALCULATED.3IONS-SCNC: 9 MMOL/L (ref 4–13)
ANION GAP SERPL CALCULATED.3IONS-SCNC: 9 MMOL/L (ref 4–13)
APTT PPP: 23 SECONDS (ref 23–37)
AST SERPL W P-5'-P-CCNC: 15 U/L (ref 13–39)
AST SERPL W P-5'-P-CCNC: 17 U/L (ref 13–39)
AST SERPL W P-5'-P-CCNC: 17 U/L (ref 13–39)
BASE EXCESS BLDCOA CALC-SCNC: -3.4 MMOL/L (ref 3–11)
BASE EXCESS BLDCOV CALC-SCNC: -3.2 MMOL/L (ref 1–9)
BASOPHILS # BLD AUTO: 0.02 THOUSANDS/ÂΜL (ref 0–0.1)
BASOPHILS NFR BLD AUTO: 0 % (ref 0–1)
BILIRUB SERPL-MCNC: 0.39 MG/DL (ref 0.2–1)
BILIRUB SERPL-MCNC: 0.4 MG/DL (ref 0.2–1)
BILIRUB SERPL-MCNC: 0.41 MG/DL (ref 0.2–1)
BUN SERPL-MCNC: 11 MG/DL (ref 5–25)
BUN SERPL-MCNC: 11 MG/DL (ref 5–25)
BUN SERPL-MCNC: 12 MG/DL (ref 5–25)
CALCIUM ALBUM COR SERPL-MCNC: 8 MG/DL (ref 8.3–10.1)
CALCIUM ALBUM COR SERPL-MCNC: 8.9 MG/DL (ref 8.3–10.1)
CALCIUM SERPL-MCNC: 7.5 MG/DL (ref 8.4–10.2)
CALCIUM SERPL-MCNC: 8.3 MG/DL (ref 8.4–10.2)
CALCIUM SERPL-MCNC: 8.4 MG/DL (ref 8.4–10.2)
CHLORIDE SERPL-SCNC: 100 MMOL/L (ref 96–108)
CHLORIDE SERPL-SCNC: 104 MMOL/L (ref 96–108)
CHLORIDE SERPL-SCNC: 97 MMOL/L (ref 96–108)
CO2 SERPL-SCNC: 22 MMOL/L (ref 21–32)
CO2 SERPL-SCNC: 22 MMOL/L (ref 21–32)
CO2 SERPL-SCNC: 23 MMOL/L (ref 21–32)
CREAT SERPL-MCNC: 0.73 MG/DL (ref 0.6–1.3)
CREAT SERPL-MCNC: 0.78 MG/DL (ref 0.6–1.3)
CREAT SERPL-MCNC: 0.88 MG/DL (ref 0.6–1.3)
EOSINOPHIL # BLD AUTO: 0.1 THOUSAND/ÂΜL (ref 0–0.61)
EOSINOPHIL NFR BLD AUTO: 1 % (ref 0–6)
ERYTHROCYTE [DISTWIDTH] IN BLOOD BY AUTOMATED COUNT: 13.2 % (ref 11.6–15.1)
FIBRINOGEN PPP-MCNC: 329 MG/DL (ref 207–520)
FIBRINOGEN PPP-MCNC: 353 MG/DL (ref 207–520)
FIBRINOGEN PPP-MCNC: 386 MG/DL (ref 207–520)
GFR SERPL CREATININE-BSD FRML MDRD: 104 ML/MIN/1.73SQ M
GFR SERPL CREATININE-BSD FRML MDRD: 83 ML/MIN/1.73SQ M
GFR SERPL CREATININE-BSD FRML MDRD: 96 ML/MIN/1.73SQ M
GLUCOSE SERPL-MCNC: 144 MG/DL (ref 65–140)
GLUCOSE SERPL-MCNC: 96 MG/DL (ref 65–140)
GLUCOSE SERPL-MCNC: 97 MG/DL (ref 65–140)
HCO3 BLDCOA-SCNC: 22.3 MMOL/L (ref 17.3–27.3)
HCO3 BLDCOV-SCNC: 21.3 MMOL/L (ref 12.2–28.6)
HCT VFR BLD AUTO: 28.8 % (ref 34.8–46.1)
HCT VFR BLD AUTO: 30.7 % (ref 34.8–46.1)
HCT VFR BLD AUTO: 30.7 % (ref 34.8–46.1)
HCT VFR BLD AUTO: 32.7 % (ref 34.8–46.1)
HGB BLD-MCNC: 10 G/DL (ref 11.5–15.4)
HGB BLD-MCNC: 10 G/DL (ref 11.5–15.4)
HGB BLD-MCNC: 10.9 G/DL (ref 11.5–15.4)
HGB BLD-MCNC: 9.8 G/DL (ref 11.5–15.4)
IMM GRANULOCYTES # BLD AUTO: 0.2 THOUSAND/UL (ref 0–0.2)
IMM GRANULOCYTES NFR BLD AUTO: 1 % (ref 0–2)
INR PPP: 1 (ref 0.84–1.19)
LYMPHOCYTES # BLD AUTO: 1.73 THOUSANDS/ÂΜL (ref 0.6–4.47)
LYMPHOCYTES NFR BLD AUTO: 12 % (ref 14–44)
MAGNESIUM SERPL-MCNC: 5.6 MG/DL (ref 1.9–2.7)
MAGNESIUM SERPL-MCNC: 6.6 MG/DL (ref 1.9–2.7)
MAGNESIUM SERPL-MCNC: 7.2 MG/DL (ref 1.9–2.7)
MCH RBC QN AUTO: 29.9 PG (ref 26.8–34.3)
MCH RBC QN AUTO: 29.9 PG (ref 26.8–34.3)
MCH RBC QN AUTO: 30.3 PG (ref 26.8–34.3)
MCH RBC QN AUTO: 30.9 PG (ref 26.8–34.3)
MCHC RBC AUTO-ENTMCNC: 32.6 G/DL (ref 31.4–37.4)
MCHC RBC AUTO-ENTMCNC: 32.6 G/DL (ref 31.4–37.4)
MCHC RBC AUTO-ENTMCNC: 33.3 G/DL (ref 31.4–37.4)
MCHC RBC AUTO-ENTMCNC: 34 G/DL (ref 31.4–37.4)
MCV RBC AUTO: 91 FL (ref 82–98)
MCV RBC AUTO: 91 FL (ref 82–98)
MCV RBC AUTO: 92 FL (ref 82–98)
MCV RBC AUTO: 92 FL (ref 82–98)
MONOCYTES # BLD AUTO: 0.93 THOUSAND/ÂΜL (ref 0.17–1.22)
MONOCYTES NFR BLD AUTO: 7 % (ref 4–12)
NEUTROPHILS # BLD AUTO: 11.09 THOUSANDS/ÂΜL (ref 1.85–7.62)
NEUTS SEG NFR BLD AUTO: 79 % (ref 43–75)
O2 CT VFR BLDCOA CALC: 12.6 ML/DL
OXYHGB MFR BLDCOA: 77.6 %
OXYHGB MFR BLDCOV: 88.1 %
PCO2 BLDCOA: 42.4 MM[HG] (ref 30–60)
PCO2 BLDCOV: 36.4 MM HG (ref 27–43)
PH BLDCOA: 7.34 [PH] (ref 7.23–7.43)
PH BLDCOV: 7.38 [PH] (ref 7.19–7.49)
PLATELET # BLD AUTO: 167 THOUSANDS/UL (ref 149–390)
PLATELET # BLD AUTO: 177 THOUSANDS/UL (ref 149–390)
PLATELET # BLD AUTO: 181 THOUSANDS/UL (ref 149–390)
PLATELET # BLD AUTO: 181 THOUSANDS/UL (ref 149–390)
PMV BLD AUTO: 10.1 FL (ref 8.9–12.7)
PMV BLD AUTO: 10.2 FL (ref 8.9–12.7)
PMV BLD AUTO: 10.2 FL (ref 8.9–12.7)
PMV BLD AUTO: 10.3 FL (ref 8.9–12.7)
PO2 BLDCOA: 35.3 MM HG (ref 5–25)
PO2 BLDCOV: 43.2 MM HG (ref 15–45)
POTASSIUM SERPL-SCNC: 3.6 MMOL/L (ref 3.5–5.3)
POTASSIUM SERPL-SCNC: 3.7 MMOL/L (ref 3.5–5.3)
POTASSIUM SERPL-SCNC: 3.8 MMOL/L (ref 3.5–5.3)
PROT SERPL-MCNC: 6 G/DL (ref 6.4–8.4)
PROT SERPL-MCNC: 6.4 G/DL (ref 6.4–8.4)
PROT SERPL-MCNC: 6.9 G/DL (ref 6.4–8.4)
PROTHROMBIN TIME: 13.7 SECONDS (ref 11.6–14.5)
RBC # BLD AUTO: 3.17 MILLION/UL (ref 3.81–5.12)
RBC # BLD AUTO: 3.34 MILLION/UL (ref 3.81–5.12)
RBC # BLD AUTO: 3.34 MILLION/UL (ref 3.81–5.12)
RBC # BLD AUTO: 3.6 MILLION/UL (ref 3.81–5.12)
SAO2 % BLDCOV: 14.6 ML/DL
SODIUM SERPL-SCNC: 129 MMOL/L (ref 135–147)
SODIUM SERPL-SCNC: 131 MMOL/L (ref 135–147)
SODIUM SERPL-SCNC: 132 MMOL/L (ref 135–147)
WBC # BLD AUTO: 10.95 THOUSAND/UL (ref 4.31–10.16)
WBC # BLD AUTO: 13.21 THOUSAND/UL (ref 4.31–10.16)
WBC # BLD AUTO: 14.33 THOUSAND/UL (ref 4.31–10.16)
WBC # BLD AUTO: 14.33 THOUSAND/UL (ref 4.31–10.16)

## 2024-07-11 PROCEDURE — NC001 PR NO CHARGE: Performed by: OBSTETRICS & GYNECOLOGY

## 2024-07-11 PROCEDURE — 88307 TISSUE EXAM BY PATHOLOGIST: CPT | Performed by: PATHOLOGY

## 2024-07-11 PROCEDURE — 59025 FETAL NON-STRESS TEST: CPT | Performed by: OBSTETRICS & GYNECOLOGY

## 2024-07-11 PROCEDURE — 85384 FIBRINOGEN ACTIVITY: CPT

## 2024-07-11 PROCEDURE — 82805 BLOOD GASES W/O2 SATURATION: CPT | Performed by: OBSTETRICS & GYNECOLOGY

## 2024-07-11 PROCEDURE — 83735 ASSAY OF MAGNESIUM: CPT

## 2024-07-11 PROCEDURE — 80053 COMPREHEN METABOLIC PANEL: CPT

## 2024-07-11 PROCEDURE — 59409 OBSTETRICAL CARE: CPT | Performed by: STUDENT IN AN ORGANIZED HEALTH CARE EDUCATION/TRAINING PROGRAM

## 2024-07-11 PROCEDURE — 10907ZC DRAINAGE OF AMNIOTIC FLUID, THERAPEUTIC FROM PRODUCTS OF CONCEPTION, VIA NATURAL OR ARTIFICIAL OPENING: ICD-10-PCS | Performed by: STUDENT IN AN ORGANIZED HEALTH CARE EDUCATION/TRAINING PROGRAM

## 2024-07-11 PROCEDURE — 85025 COMPLETE CBC W/AUTO DIFF WBC: CPT

## 2024-07-11 PROCEDURE — 99233 SBSQ HOSP IP/OBS HIGH 50: CPT | Performed by: OBSTETRICS & GYNECOLOGY

## 2024-07-11 PROCEDURE — 85027 COMPLETE CBC AUTOMATED: CPT

## 2024-07-11 PROCEDURE — 85610 PROTHROMBIN TIME: CPT

## 2024-07-11 PROCEDURE — 85730 THROMBOPLASTIN TIME PARTIAL: CPT

## 2024-07-11 PROCEDURE — 93005 ELECTROCARDIOGRAM TRACING: CPT

## 2024-07-11 RX ORDER — CALCIUM GLUCONATE 94 MG/ML
1 INJECTION, SOLUTION INTRAVENOUS ONCE AS NEEDED
Status: DISCONTINUED | OUTPATIENT
Start: 2024-07-11 | End: 2024-07-13 | Stop reason: HOSPADM

## 2024-07-11 RX ORDER — MAGNESIUM SULFATE HEPTAHYDRATE 40 MG/ML
2 INJECTION, SOLUTION INTRAVENOUS CONTINUOUS
Status: DISPENSED | OUTPATIENT
Start: 2024-07-11 | End: 2024-07-12

## 2024-07-11 RX ORDER — OXYTOCIN/RINGER'S LACTATE 30/500 ML
250 PLASTIC BAG, INJECTION (ML) INTRAVENOUS CONTINUOUS
Status: DISCONTINUED | OUTPATIENT
Start: 2024-07-11 | End: 2024-07-13 | Stop reason: HOSPADM

## 2024-07-11 RX ORDER — ONDANSETRON 2 MG/ML
4 INJECTION INTRAMUSCULAR; INTRAVENOUS EVERY 8 HOURS PRN
Status: DISCONTINUED | OUTPATIENT
Start: 2024-07-11 | End: 2024-07-13 | Stop reason: HOSPADM

## 2024-07-11 RX ORDER — OXYTOCIN/RINGER'S LACTATE 30/500 ML
1-30 PLASTIC BAG, INJECTION (ML) INTRAVENOUS
Status: DISCONTINUED | OUTPATIENT
Start: 2024-07-11 | End: 2024-07-11

## 2024-07-11 RX ORDER — DOCUSATE SODIUM 100 MG/1
100 CAPSULE, LIQUID FILLED ORAL 2 TIMES DAILY
Status: DISCONTINUED | OUTPATIENT
Start: 2024-07-11 | End: 2024-07-12

## 2024-07-11 RX ORDER — OXYTOCIN/RINGER'S LACTATE 30/500 ML
PLASTIC BAG, INJECTION (ML) INTRAVENOUS
Status: COMPLETED
Start: 2024-07-11 | End: 2024-07-11

## 2024-07-11 RX ORDER — DIPHENHYDRAMINE HCL 25 MG
25 TABLET ORAL EVERY 6 HOURS PRN
Status: DISCONTINUED | OUTPATIENT
Start: 2024-07-11 | End: 2024-07-13 | Stop reason: HOSPADM

## 2024-07-11 RX ORDER — LABETALOL HYDROCHLORIDE 5 MG/ML
40 INJECTION, SOLUTION INTRAVENOUS ONCE
Status: COMPLETED | OUTPATIENT
Start: 2024-07-11 | End: 2024-07-11

## 2024-07-11 RX ORDER — LABETALOL HYDROCHLORIDE 5 MG/ML
20 INJECTION, SOLUTION INTRAVENOUS ONCE
Status: COMPLETED | OUTPATIENT
Start: 2024-07-11 | End: 2024-07-11

## 2024-07-11 RX ORDER — SIMETHICONE 80 MG
80 TABLET,CHEWABLE ORAL 4 TIMES DAILY PRN
Status: DISCONTINUED | OUTPATIENT
Start: 2024-07-11 | End: 2024-07-13 | Stop reason: HOSPADM

## 2024-07-11 RX ORDER — IBUPROFEN 600 MG/1
600 TABLET ORAL EVERY 6 HOURS
Status: DISCONTINUED | OUTPATIENT
Start: 2024-07-11 | End: 2024-07-13 | Stop reason: HOSPADM

## 2024-07-11 RX ORDER — ACETAMINOPHEN 325 MG/1
650 TABLET ORAL EVERY 4 HOURS PRN
Status: DISCONTINUED | OUTPATIENT
Start: 2024-07-11 | End: 2024-07-12

## 2024-07-11 RX ORDER — LABETALOL HYDROCHLORIDE 5 MG/ML
INJECTION, SOLUTION INTRAVENOUS
Status: COMPLETED
Start: 2024-07-11 | End: 2024-07-11

## 2024-07-11 RX ORDER — MAGNESIUM SULFATE HEPTAHYDRATE 40 MG/ML
2 INJECTION, SOLUTION INTRAVENOUS ONCE
Status: COMPLETED | OUTPATIENT
Start: 2024-07-11 | End: 2024-07-11

## 2024-07-11 RX ORDER — SODIUM CHLORIDE, SODIUM LACTATE, POTASSIUM CHLORIDE, CALCIUM CHLORIDE 600; 310; 30; 20 MG/100ML; MG/100ML; MG/100ML; MG/100ML
50 INJECTION, SOLUTION INTRAVENOUS CONTINUOUS
Status: DISCONTINUED | OUTPATIENT
Start: 2024-07-11 | End: 2024-07-11

## 2024-07-11 RX ORDER — CALCIUM CARBONATE 500 MG/1
1000 TABLET, CHEWABLE ORAL DAILY PRN
Status: DISCONTINUED | OUTPATIENT
Start: 2024-07-11 | End: 2024-07-13 | Stop reason: HOSPADM

## 2024-07-11 RX ORDER — BENZOCAINE/MENTHOL 6 MG-10 MG
1 LOZENGE MUCOUS MEMBRANE DAILY PRN
Status: DISCONTINUED | OUTPATIENT
Start: 2024-07-11 | End: 2024-07-13 | Stop reason: HOSPADM

## 2024-07-11 RX ORDER — MAGNESIUM SULFATE HEPTAHYDRATE 40 MG/ML
4 INJECTION, SOLUTION INTRAVENOUS ONCE
Status: COMPLETED | OUTPATIENT
Start: 2024-07-11 | End: 2024-07-11

## 2024-07-11 RX ADMIN — SODIUM CHLORIDE, SODIUM LACTATE, POTASSIUM CHLORIDE, AND CALCIUM CHLORIDE 50 ML/HR: .6; .31; .03; .02 INJECTION, SOLUTION INTRAVENOUS at 16:29

## 2024-07-11 RX ADMIN — Medication 2 MILLI-UNITS/MIN: at 10:29

## 2024-07-11 RX ADMIN — HYDROCORTISONE: 25 CREAM TOPICAL at 08:47

## 2024-07-11 RX ADMIN — MAGNESIUM SULFATE HEPTAHYDRATE 2 G: 40 INJECTION, SOLUTION INTRAVENOUS at 09:10

## 2024-07-11 RX ADMIN — LABETALOL HYDROCHLORIDE 20 MG: 5 INJECTION, SOLUTION INTRAVENOUS at 10:13

## 2024-07-11 RX ADMIN — LABETALOL HYDROCHLORIDE 40 MG: 5 INJECTION, SOLUTION INTRAVENOUS at 14:34

## 2024-07-11 RX ADMIN — MAGNESIUM SULFATE HEPTAHYDRATE 4 G: 40 INJECTION, SOLUTION INTRAVENOUS at 08:49

## 2024-07-11 RX ADMIN — SODIUM CHLORIDE, SODIUM LACTATE, POTASSIUM CHLORIDE, AND CALCIUM CHLORIDE 125 ML/HR: .6; .31; .03; .02 INJECTION, SOLUTION INTRAVENOUS at 04:18

## 2024-07-11 RX ADMIN — Medication 1 TABLET: at 08:30

## 2024-07-11 RX ADMIN — OXYTOCIN 250 MILLI-UNITS/MIN: 10 INJECTION INTRAVENOUS at 22:10

## 2024-07-11 RX ADMIN — ROPIVACAINE HYDROCHLORIDE: 2 INJECTION, SOLUTION EPIDURAL; INFILTRATION at 15:36

## 2024-07-11 RX ADMIN — PENICILLIN G POTASSIUM 5 MILLION UNITS: 20000000 INJECTION, POWDER, FOR SOLUTION INTRAVENOUS at 10:39

## 2024-07-11 RX ADMIN — SODIUM CHLORIDE, SODIUM LACTATE, POTASSIUM CHLORIDE, AND CALCIUM CHLORIDE 125 ML/HR: .6; .31; .03; .02 INJECTION, SOLUTION INTRAVENOUS at 20:05

## 2024-07-11 RX ADMIN — ACETAMINOPHEN 975 MG: 325 TABLET, FILM COATED ORAL at 12:56

## 2024-07-11 RX ADMIN — ONDANSETRON 4 MG: 2 INJECTION INTRAMUSCULAR; INTRAVENOUS at 19:24

## 2024-07-11 RX ADMIN — PENICILLIN G POTASSIUM 2.5 MILLION UNITS: 20000000 INJECTION, POWDER, FOR SOLUTION INTRAVENOUS at 14:21

## 2024-07-11 RX ADMIN — OXYTOCIN 2 MILLI-UNITS/MIN: 10 INJECTION INTRAVENOUS at 10:29

## 2024-07-11 RX ADMIN — PENICILLIN G POTASSIUM 2.5 MILLION UNITS: 20000000 INJECTION, POWDER, FOR SOLUTION INTRAVENOUS at 18:22

## 2024-07-11 RX ADMIN — MAGNESIUM SULFATE HEPTAHYDRATE 2 G/HR: 40 INJECTION, SOLUTION INTRAVENOUS at 09:23

## 2024-07-11 NOTE — OB LABOR/OXYTOCIN SAFETY PROGRESS
Oxytocin Safety Progress Check Note - Isidra Chamberlain 38 y.o. female MRN: [id number]    Unit/Bed#: -01 Encounter: [id number]    Dose (pauline-units/min) Oxytocin: 18 pauline-units/min  Contraction Frequency (minutes): 2-4  Contraction Intensity: Moderate  Uterine Activity Characteristics: Irregular  Cervical Dilation: 4        Cervical Effacement: 70  Fetal Station: -2  Baseline Rate (FHR): 130 bpm  Fetal Heart Rate (FHT): 130 BPM  FHR Category: 1               Vital Signs:   Vitals:    07/11/24 1946   BP:    Pulse: 70   Resp:    Temp:    SpO2: 100%       Notes/comments:   AROM for bloody fluid with old blood. Continue pitocin.         Micaela Luna MD 7/11/2024 7:55 PM

## 2024-07-11 NOTE — PLAN OF CARE
Problem: ANTEPARTUM  Goal: Maintain pregnancy as long as maternal and/or fetal condition is stable  Description: INTERVENTIONS:  - Maternal surveillance  - Fetal surveillance  - Monitor uterine activity  - Medications as ordered  - Bedrest  Outcome: Progressing     Problem: PAIN - ADULT  Goal: Verbalizes/displays adequate comfort level or baseline comfort level  Description: Interventions:  - Encourage patient to monitor pain and request assistance  - Assess pain using appropriate pain scale  - Administer analgesics based on type and severity of pain and evaluate response  - Implement non-pharmacological measures as appropriate and evaluate response  - Consider cultural and social influences on pain and pain management  - Notify physician/advanced practitioner if interventions unsuccessful or patient reports new pain  Outcome: Progressing     Problem: INFECTION - ADULT  Goal: Absence or prevention of progression during hospitalization  Description: INTERVENTIONS:  - Assess and monitor for signs and symptoms of infection  - Monitor lab/diagnostic results  - Monitor all insertion sites, i.e. indwelling lines, tubes, and drains  - Monitor endotracheal if appropriate and nasal secretions for changes in amount and color  - [place] appropriate cooling/warming therapies per order  - Administer medications as ordered  - Instruct and encourage patient and family to use good hand hygiene technique  - Identify and instruct in appropriate isolation precautions for identified infection/condition  Outcome: Progressing  Goal: Absence of fever/infection during neutropenic period  Description: INTERVENTIONS:  - Monitor WBC    Outcome: Progressing     Problem: SAFETY ADULT  Goal: Patient will remain free of falls  Description: INTERVENTIONS:  - Educate patient/family on patient safety including physical limitations  - Instruct patient to call for assistance with activity   - Consult OT/PT to assist with strengthening/mobility   -  Keep Call bell within reach  - Keep bed low and locked with side rails adjusted as appropriate  - Keep care items and personal belongings within reach  - Initiate and maintain comfort rounds  Outcome: Progressing  Goal: Maintain or return to baseline ADL function  Description: INTERVENTIONS:  -  Assess patient's ability to carry out ADLs; assess patient's baseline for ADL function and identify physical deficits which impact ability to perform ADLs (bathing, care of mouth/teeth, toileting, grooming, dressing, etc.)  - Assess/evaluate cause of self-care deficits   - Assess range of motion  - Assess patient's mobility; develop plan if impaired  - Assess patient's need for assistive devices and provide as appropriate  - Encourage maximum independence but intervene and supervise when necessary  - Involve family in performance of ADLs  - Assess for home care needs following discharge   - Consider OT consult to assist with ADL evaluation and planning for discharge  - Provide patient education as appropriate  Outcome: Progressing  Goal: Maintains/Returns to pre admission functional level  Description: INTERVENTIONS:  - Perform AM-PAC 6 Click Basic Mobility/ Daily Activity assessment daily.  - Set and communicate daily mobility goal to care team and patient/family/caregiver.   - Out of bed for toileting  - Record patient progress and toleration of activity level   Outcome: Progressing     Problem: Knowledge Deficit  Goal: Patient/family/caregiver demonstrates understanding of disease process, treatment plan, medications, and discharge instructions  Description: Complete learning assessment and assess knowledge base.  Interventions:  - Provide teaching at level of understanding  - Provide teaching via preferred learning methods  Outcome: Progressing     Problem: DISCHARGE PLANNING  Goal: Discharge to home or other facility with appropriate resources  Description: INTERVENTIONS:  - Identify barriers to discharge w/patient and  caregiver  - Arrange for needed discharge resources and transportation as appropriate  - Identify discharge learning needs (meds, wound care, etc.)  - Arrange for interpretive services to assist at discharge as needed  - Refer to Case Management Department for coordinating discharge planning if the patient needs post-hospital services based on physician/advanced practitioner order or complex needs related to functional status, cognitive ability, or social support system  Outcome: Progressing

## 2024-07-11 NOTE — OB LABOR/OXYTOCIN SAFETY PROGRESS
Oxytocin Safety Progress Check Note - Isidra Chamberlain 38 y.o. female MRN: [id number]    Unit/Bed#: -01 Encounter: [id number]    Dose (pauline-units/min) Oxytocin: 8 pauline-units/min  Contraction Frequency (minutes): 3-5  Contraction Intensity: Mild/Moderate  Uterine Activity Characteristics: Irritability  Cervical Dilation: 2-3        Cervical Effacement: 50  Fetal Station: -3  Baseline Rate (FHR): 140 bpm  Fetal Heart Rate (FHT): 142 BPM  FHR Category: 1               Vital Signs:   Vitals:    07/11/24 1212   BP: 151/95   Pulse: 63   Resp:    Temp:    SpO2:        Notes/comments:     Defer cervical exam  Sustained SRBP  Giving 40mg of labetalol IV  Continue to monitor BPs      Dahlia Nicholson MD 7/11/2024 12:33 PM

## 2024-07-11 NOTE — PROGRESS NOTES
Patient seen and evaluated in the setting of q2-4 minute contractions noted on tocometer. Isidra reports that she feels well at this time. She denies any vaginal bleeding or feeling any contractions. FHT remains reactive. SVE remains unchanged. At this time, will continue IV fluid bolus and recheck PRN. Isidra was encouraged to notify team if she begins to feel contractions, vaginal pressure or notices any vaginal bleeding.     Discussed w/ Dr. Arenas and Dr. Ely Flores MD  Obstetrics & Gynecology PGY-4  7/10/2024  11:21 PM

## 2024-07-11 NOTE — PLAN OF CARE
Problem: ANTEPARTUM  Goal: Maintain pregnancy as long as maternal and/or fetal condition is stable  Description: INTERVENTIONS:  - Maternal surveillance  - Fetal surveillance  - Monitor uterine activity  - Medications as ordered  - Bedrest  Outcome: Progressing     Problem: PAIN - ADULT  Goal: Verbalizes/displays adequate comfort level or baseline comfort level  Description: Interventions:  - Encourage patient to monitor pain and request assistance  - Assess pain using appropriate pain scale  - Administer analgesics based on type and severity of pain and evaluate response  - Implement non-pharmacological measures as appropriate and evaluate response  - Consider cultural and social influences on pain and pain management  - Notify physician/advanced practitioner if interventions unsuccessful or patient reports new pain  Outcome: Progressing     Problem: INFECTION - ADULT  Goal: Absence or prevention of progression during hospitalization  Description: INTERVENTIONS:  - Assess and monitor for signs and symptoms of infection  - Monitor lab/diagnostic results  - Monitor all insertion sites, i.e. indwelling lines, tubes, and drains  - Monitor endotracheal if appropriate and nasal secretions for changes in amount and color  - [place] appropriate cooling/warming therapies per order  - Administer medications as ordered  - Instruct and encourage patient and family to use good hand hygiene technique  - Identify and instruct in appropriate isolation precautions for identified infection/condition  Outcome: Progressing  Goal: Absence of fever/infection during neutropenic period  Description: INTERVENTIONS:  - Monitor WBC    Outcome: Progressing     Problem: SAFETY ADULT  Goal: Patient will remain free of falls  Description: INTERVENTIONS:  - Educate patient/family on patient safety including physical limitations  - Instruct patient to call for assistance with activity   - Consult OT/PT to assist with strengthening/mobility   -  Keep Call bell within reach  - Keep bed low and locked with side rails adjusted as appropriate  - Keep care items and personal belongings within reach  - Initiate and maintain comfort rounds  Outcome: Progressing  Goal: Maintain or return to baseline ADL function  Description: INTERVENTIONS:  -  Assess patient's ability to carry out ADLs; assess patient's baseline for ADL function and identify physical deficits which impact ability to perform ADLs (bathing, care of mouth/teeth, toileting, grooming, dressing, etc.)  - Assess/evaluate cause of self-care deficits   - Assess range of motion  - Assess patient's mobility; develop plan if impaired  - Assess patient's need for assistive devices and provide as appropriate  - Encourage maximum independence but intervene and supervise when necessary  - Involve family in performance of ADLs  - Assess for home care needs following discharge   - Consider OT consult to assist with ADL evaluation and planning for discharge  - Provide patient education as appropriate  Outcome: Progressing  Goal: Maintains/Returns to pre admission functional level  Description: INTERVENTIONS:  - Perform AM-PAC 6 Click Basic Mobility/ Daily Activity assessment daily.  - Set and communicate daily mobility goal to care team and patient/family/caregiver.   - Out of bed for toileting  - Record patient progress and toleration of activity level   Outcome: Progressing     Problem: Knowledge Deficit  Goal: Patient/family/caregiver demonstrates understanding of disease process, treatment plan, medications, and discharge instructions  Description: Complete learning assessment and assess knowledge base.  Interventions:  - Provide teaching at level of understanding  - Provide teaching via preferred learning methods  Outcome: Progressing     Problem: DISCHARGE PLANNING  Goal: Discharge to home or other facility with appropriate resources  Description: INTERVENTIONS:  - Identify barriers to discharge w/patient and  caregiver  - Arrange for needed discharge resources and transportation as appropriate  - Identify discharge learning needs (meds, wound care, etc.)  - Arrange for interpretive services to assist at discharge as needed  - Refer to Case Management Department for coordinating discharge planning if the patient needs post-hospital services based on physician/advanced practitioner order or complex needs related to functional status, cognitive ability, or social support system  Outcome: Progressing

## 2024-07-11 NOTE — PROGRESS NOTES
Patient seen and reevaluated in setting of acute onset hypotension and lightheadedness.  On arrival to the room Isidra's blood pressure was noted to be in the 70s over 40s.  Heart rate remains in the 50s-60s.  Anesthesia called to bedside to also assess.  At this time Isidra reports that she feels lightheaded.  She denies any chest pain or shortness of breath at this time.  SVE remains unchanged.  Lungs are clear to auscultation bilaterally and regular rhythm appreciated on cardiac exam.    Stat CBC/CMP/mag level, coags and EKG ordered.  Magnesium infusion held in the setting of hypotension.    Blood pressure noted to improve to 80s over 50s and patient reports feeling improved.  Will continue to monitor          Kelli Flores MD  Obstetrics & Gynecology PGY-4

## 2024-07-11 NOTE — OB LABOR/OXYTOCIN SAFETY PROGRESS
Oxytocin Safety Progress Check Note - Isidra Chamberlain 38 y.o. female MRN: [id number]    Unit/Bed#: -01 Encounter: [id number]    Dose (pauline-units/min) Oxytocin: 18 pauline-units/min  Contraction Frequency (minutes): 2-4  Contraction Intensity: Mild/Moderate  Uterine Activity Characteristics: Regular  Cervical Dilation: 3        Cervical Effacement: 60  Fetal Station: -2  Baseline Rate (FHR): 130 bpm  Fetal Heart Rate (FHT): 129 BPM  FHR Category: 1               Vital Signs:   Vitals:    07/11/24 1752   BP: 113/67   Pulse: 73   Resp:    Temp:    SpO2:        Notes/comments:   SVE as above      Kelli Flores MD 7/11/2024 5:59 PM

## 2024-07-11 NOTE — PLAN OF CARE
Problem: ANTEPARTUM  Goal: Maintain pregnancy as long as maternal and/or fetal condition is stable  Description: INTERVENTIONS:  - Maternal surveillance  - Fetal surveillance  - Monitor uterine activity  - Medications as ordered  - Bedrest  Outcome: Progressing     Problem: PAIN - ADULT  Goal: Verbalizes/displays adequate comfort level or baseline comfort level  Description: Interventions:  - Encourage patient to monitor pain and request assistance  - Assess pain using appropriate pain scale  - Administer analgesics based on type and severity of pain and evaluate response  - Implement non-pharmacological measures as appropriate and evaluate response  - Consider cultural and social influences on pain and pain management  - Notify physician/advanced practitioner if interventions unsuccessful or patient reports new pain  Outcome: Progressing     Problem: INFECTION - ADULT  Goal: Absence or prevention of progression during hospitalization  Description: INTERVENTIONS:  - Assess and monitor for signs and symptoms of infection  - Monitor lab/diagnostic results  - Monitor all insertion sites, i.e. indwelling lines, tubes, and drains  - Monitor endotracheal if appropriate and nasal secretions for changes in amount and color  - [place] appropriate cooling/warming therapies per order  - Administer medications as ordered  - Instruct and encourage patient and family to use good hand hygiene technique  - Identify and instruct in appropriate isolation precautions for identified infection/condition  Outcome: Progressing  Goal: Absence of fever/infection during neutropenic period  Description: INTERVENTIONS:  - Monitor WBC    Outcome: Progressing     Problem: SAFETY ADULT  Goal: Patient will remain free of falls  Description: INTERVENTIONS:  - Educate patient/family on patient safety including physical limitations  - Instruct patient to call for assistance with activity   - Consult OT/PT to assist with strengthening/mobility   -  Keep Call bell within reach  - Keep bed low and locked with side rails adjusted as appropriate  - Keep care items and personal belongings within reach  - Initiate and maintain comfort rounds  Outcome: Progressing  Goal: Maintain or return to baseline ADL function  Description: INTERVENTIONS:  -  Assess patient's ability to carry out ADLs; assess patient's baseline for ADL function and identify physical deficits which impact ability to perform ADLs (bathing, care of mouth/teeth, toileting, grooming, dressing, etc.)  - Assess/evaluate cause of self-care deficits   - Assess range of motion  - Assess patient's mobility; develop plan if impaired  - Assess patient's need for assistive devices and provide as appropriate  - Encourage maximum independence but intervene and supervise when necessary  - Involve family in performance of ADLs  - Assess for home care needs following discharge   - Consider OT consult to assist with ADL evaluation and planning for discharge  - Provide patient education as appropriate  Outcome: Progressing  Goal: Maintains/Returns to pre admission functional level  Description: INTERVENTIONS:  - Perform AM-PAC 6 Click Basic Mobility/ Daily Activity assessment daily.  - Set and communicate daily mobility goal to care team and patient/family/caregiver.   - Out of bed for toileting  - Record patient progress and toleration of activity level   Outcome: Progressing     Problem: Knowledge Deficit  Goal: Patient/family/caregiver demonstrates understanding of disease process, treatment plan, medications, and discharge instructions  Description: Complete learning assessment and assess knowledge base.  Interventions:  - Provide teaching at level of understanding  - Provide teaching via preferred learning methods  Outcome: Progressing     Problem: DISCHARGE PLANNING  Goal: Discharge to home or other facility with appropriate resources  Description: INTERVENTIONS:  - Identify barriers to discharge w/patient and  caregiver  - Arrange for needed discharge resources and transportation as appropriate  - Identify discharge learning needs (meds, wound care, etc.)  - Arrange for interpretive services to assist at discharge as needed  - Refer to Case Management Department for coordinating discharge planning if the patient needs post-hospital services based on physician/advanced practitioner order or complex needs related to functional status, cognitive ability, or social support system  Outcome: Progressing

## 2024-07-11 NOTE — OB LABOR/OXYTOCIN SAFETY PROGRESS
Oxytocin Safety Progress Check Note - Isidra Chamberlain 38 y.o. female MRN: [id number]    Unit/Bed#: -01 Encounter: [id number]    Dose (pauline-units/min) Oxytocin: 16 pauline-units/min  Contraction Frequency (minutes): 2-3  Contraction Intensity: Moderate/Strong  Uterine Activity Characteristics: Regular  Cervical Dilation: 3        Cervical Effacement: 60  Fetal Station: -2  Baseline Rate (FHR): 125 bpm  Fetal Heart Rate (FHT): 129 BPM  FHR Category: 1               Vital Signs:   Vitals:    07/11/24 1635   BP: (!) 127/103   Pulse: 63   Resp:    Temp:    SpO2:        Notes/comments:   SVE as above.   Patient is currently comfortable with epidural.   Fetus still feels ballotable, therefore AROM not performed.  Continue pitocin titration.  Discussed with Dr. Walt Dixon MD 7/11/2024 4:44 PM

## 2024-07-11 NOTE — ANESTHESIA PROCEDURE NOTES
Epidural Block    Patient location during procedure: OB/L&D  Start time: 7/11/2024 3:30 PM  Reason for block: procedure for pain  Staffing  Performed by: Markell Harrison CRNA  Authorized by: Christopher Carreon MD    Preanesthetic Checklist  Completed: patient identified, IV checked, site marked, risks and benefits discussed, surgical consent, monitors and equipment checked, pre-op evaluation and timeout performed  Epidural  Patient position: sitting  Prep: ChloraPrep  Sedation Level: no sedation  Patient monitoring: frequent blood pressure checks, continuous pulse oximetry and heart rate  Approach: midline  Location: lumbar, L4-5  Injection technique: JIMBO saline  Needle  Needle type: Tuohy   Needle gauge: 18 G  Needle insertion depth: 5 cm  Catheter type: multi-orifice  Catheter size: 20 G  Catheter at skin depth: 10 cm  Catheter securement method: stabilization device and clear occlusive dressing  Test dose: negative  Assessment  Sensory level: T10  Number of attempts: 1negative aspiration for CSF, negative aspiration for heme and no paresthesia on injection  patient tolerated the procedure well with no immediate complications           .

## 2024-07-11 NOTE — PROGRESS NOTES
"Progress Note - Maternal Fetal Medicine   Isidra Chamberlain 38 y.o. female MRN: [id number]  Unit/Bed#: -01 Encounter: [id number]  Admit date: 2024.  Today's date: 24    Assessment & Plan     Ms. Chamberlain is a 38 y.o.  at 27w3d, Hospital Day: 7, admitted with evolving placental abruption.. VS stable. By issue:    27 weeks gestation of pregnancy  Assessment & Plan  Dated by US 2nd trimester ultrasound w/ documented TONA of 10/3/24, consistent w/ patient reported TONA.  NST reactive  CL 3.9 cm  TVUS w/ no placenta/vasa previa  Delivery consent signed 24      Prenatal labs reviewed  FEN: Regular diet  DVT ppx: SCDs  Monitoring: NST TID  See plan for \"Placental abruption, second trimester\"    * Placental abruption, second trimester  Assessment & Plan  Patient presented w/ heavy vaginal bleeding s/p intercourse w/ no known placenta/vasa previa  Reported passing several large clots PTA  Brought in by EMS and per their report was diaphoretic, bradycardic, & hypotensive en route and started on IVF  - These findings were not present on arrival to L&D triage  Speculum exam with large clot (~500 mL) and slow persistent trickle from the cervical os with no lacerations/injuries noted    Suspect evolving placental abruption and/or  labor as an etiology for her bleeding  High clinical concern for evolving coagulopathy in the setting of low-normal fibrinogen and ongoing bleeding  Pelvic imaging 24 with 9.8 x 4.4 x 10.8cm organized blood clot consistent with suspected placental abruption given acute onset of bleeding and abdominal pain without other source of vaginal bleeding  Fetal presentation breech    S/p 1u FFP, 1u pRBCs, 1u cryo  S/p NICU consultation  S/p betamethasone for fetal lung maturation -  S/p magnesium for fetal neuroprotection  Coagulation studies WNL, CBC with Hgb drop 10.9 -> 9.8 -> 10 without apparent external bleeding but ongoing concerning for concealed abruption  Repeat labs " this morning  Continued low threshold for classical  section in the event of maternal compromise with heavy vaginal bleeding, intractable abdominal pain, or need for additional blood products; or concern for fetal compromise with derangements in fetal heart tracing     Preeclampsia  Assessment & Plan  Systolic (24hrs), Av , Min:123 , Max:163     Diastolic (24hrs), Av, Min:71, Max:108    Patient met criteria with elevated blood pressures and proteinuria with UPC 0.65  Asymptomatic  1x non-sustained SRBP over the past 24h, pressures this morning 122-139/69-87  Continue to trend pressures per protocol  Continue to monitor for evolving signs of severe features    Functional systolic murmur  Assessment & Plan  Remote history of childhood murmur, patient unsure of what type, self-resolved  Holosystolic murmur on exam on   Likely functional holosystolic ejection murmur in pregnancy  Denies chest pain, pressure, or shortness of breath  Continue to monitor     uterine contractions  Assessment & Plan  SVE 1.5/30/-3 > 2/50/-3  CL 3.9 cm    Betamethasone for fetal lung maturation -  Do not recommend tocolysis in the setting of suspected placental abruption  GC/CT neg, FFN pos, UDS pos for THC and meth/amph    Marijuana use  Assessment & Plan  Patient reports marijuana use (smokes) w/ no other substance use this pregnancy.    UDS positive for THC and methamphetamines  Will need CM consult    Request for sterilization  Assessment & Plan  Patient requests permanent sterilization  MA-31 signed 2024    History of  delivery  Assessment & Plan  H/o spontaneous PTD at 36w in  with 3 subsequent term SVDs.    History of diet controlled gestational diabetes mellitus (GDM)  Assessment & Plan  H/o A1GDM in most recent pregnancy per patient with no use of medications.  Unknown GDM status this pregnancy (no GTT completed to date).    Asthma during pregnancy  Assessment & Plan  Rarely needs  "Albuterol this pregnancy (last use was \"months ago\").    Albuterol prn             Subjective    Contractions: no  Loss of fluid: no  Vaginal bleeding: no  Fetal movement: yes    Pain: no  Headaches: no  Visual changes: no  Chest pain: no  Shortness of breath: no  Nausea: no  Vomiting/Diarrhea: no  Dysuria: no  Leg pain: no          Objective      Patient Vitals for the past 24 hrs:   BP Temp Temp src Pulse Resp SpO2   07/11/24 0300 142/68 98.1 °F (36.7 °C) Oral 60 16 99 %   07/10/24 2348 123/80 -- -- 57 16 --   07/10/24 2346 -- 97.8 °F (36.6 °C) Oral -- 16 98 %   07/10/24 1925 138/74 98 °F (36.7 °C) Oral 69 16 97 %   07/10/24 1603 149/93 98.1 °F (36.7 °C) Oral 64 18 100 %   07/10/24 1412 139/89 -- -- 67 -- 98 %   07/10/24 1143 148/96 -- -- 76 20 99 %   07/10/24 0703 153/86 98 °F (36.7 °C) Oral 65 14 100 %   07/10/24 0645 133/96 -- -- -- -- --       Physical Exam  Vitals and nursing note reviewed. Exam conducted with a chaperone present.   Constitutional:       General: She is not in acute distress.  HENT:      Head: Normocephalic.      Right Ear: External ear normal.      Left Ear: External ear normal.   Eyes:      General: No scleral icterus.        Right eye: No discharge.         Left eye: No discharge.      Extraocular Movements: Extraocular movements intact.      Conjunctiva/sclera: Conjunctivae normal.   Cardiovascular:      Rate and Rhythm: Normal rate and regular rhythm.      Pulses: Normal pulses.   Pulmonary:      Effort: Pulmonary effort is normal.      Breath sounds: Normal breath sounds.   Abdominal:      Palpations: Abdomen is soft.      Tenderness: There is no abdominal tenderness. There is no guarding.      Comments: Gravid uterus   Musculoskeletal:         General: No swelling or tenderness. Normal range of motion.      Cervical back: Normal range of motion.      Right lower leg: No edema.      Left lower leg: No edema.   Skin:     General: Skin is warm and dry.      Capillary Refill: Capillary " refill takes less than 2 seconds.   Neurological:      General: No focal deficit present.      Mental Status: She is alert and oriented to person, place, and time. Mental status is at baseline.   Psychiatric:         Mood and Affect: Mood normal.         Behavior: Behavior normal.         I/O         07/05 0701 07/06 0700 07/06 0701 07/07 0700 07/07 0701 07/08 0700    I.V. 1531.7      Blood 125      IV Piggyback       Total Intake 1656.7      Urine 4575 2625     Blood       Total Output 4575 2625     Net -2918.3 -2625                    Invasive Devices       Peripheral Intravenous Line  Duration             Peripheral IV 07/09/24 Left;Ventral (anterior) Forearm 1 day    Peripheral IV 07/10/24 Right;Ventral (anterior) Forearm <1 day                    Results from last 7 days   Lab Units 07/10/24  0718 07/10/24  0225 07/09/24  2236 07/09/24  1734 07/09/24  1252 07/08/24  0815 07/07/24  1200 07/07/24 0626 07/06/24  0830 07/06/24  0422 07/05/24  2334   WBC Thousand/uL 11.63* 12.31* 11.87* 10.98* 10.76* 9.91   < > 12.38* 15.77* 15.97* 13.85*   TOTAL NEUT ABS Thousands/µL  --   --   --   --   --  7.33  --  10.05* 14.60* 13.47* 11.72*   HEMOGLOBIN g/dL 10.4* 10.4* 10.3* 11.1* 10.0* 10.2*   < > 9.8* 10.9* 10.8* 11.1*   MCV fL 90 91 90 90 90 94   < > 91 90 94 92   PLATELETS Thousands/uL 177 158 159 169 162 160   < > 174 194 186 196    < > = values in this interval not displayed.     Results from last 7 days   Lab Units 07/10/24  0718 07/10/24  0225 07/09/24  2236 07/09/24  1734 07/07/24  0626 07/06/24  0422 07/05/24  2334   POTASSIUM mmol/L 3.7 3.7 3.6 3.6 3.8   < > 4.0   CHLORIDE mmol/L 104 104 105 104 106   < > 99   CO2 mmol/L 20* 22 20* 21 19*   < > 16*   BUN mg/dL 9 10 11 14 9   < > 7   CREATININE mg/dL 0.67 0.77 0.65 0.73 0.64   < > 0.70   EGFR ml/min/1.73sq m 111 98 112 104 113   < > 110   MAGNESIUM mg/dL  --   --   --   --   --   --  6.8*    < > = values in this interval not displayed.     Results from last 7  days   Lab Units 07/10/24  0718 07/10/24  0225 24  2236 24  1734 24  0626   AST U/L 17 18 18 20 14   ALT U/L 13 14 14 15 8     Results from last 7 days   Lab Units 07/10/24  0718 07/10/24  0225 24  2236 24  2146 24  1734 24  1252 24  0815 24  1200 24  0626 24  0830 24  0422   PLATELETS Thousands/uL 177 158 159  --  169 162 160   < > 174 194 186   INR  0.98 1.05 1.00 1.27* 0.96 0.99 0.98  --  1.01 1.04 1.04   PROTIME seconds 13.6 14.3 13.8 16.6* 13.4 13.7 13.6  --  13.9 14.2 14.2   PTT seconds 23 24 25 31 24 24 23  --  24 25 24   FIBRINOGEN mg/dL 360 371 347 212 339 339 318  --  326 383 386    < > = values in this interval not displayed.     Results from last 7 days   Lab Units 24  0253   POC GLUCOSE mg/dl 99     Results from last 7 days   Lab Units 24  0834 24  0351   PROT/CREAT RATIO UR  0.65* 9.80*             Results from last 7 days   Lab Units 24  0353   URINE CULTURE  No Growth <1000 cfu/mL       Brief review of pertinent history:  Past Medical History:   Diagnosis Date    Anemia affecting pregnancy 2023    Asthma     Carpal tunnel syndrome, bilateral     Chlamydia infection     Elevated blood pressure reading without diagnosis of hypertension 2023    Gastroesophageal reflux disease without esophagitis 2023     Past Surgical History:   Procedure Laterality Date    NO PAST SURGERIES       OB History    Para Term  AB Living   7 4 3 1 2 4   SAB IAB Ectopic Multiple Live Births   1 1 0 0 4      # Outcome Date GA Lbr Florencio/2nd Weight Sex Type Anes PTL Lv   7 Current            6 Term 10/05/23 37w1d   M Vag-Spont   DREA   5 SAB 22           4 Term     M Vag-Spont   DREA   3 Term     M Vag-Spont   DREA   2      F Vag-Spont   DREA   1 IAB      TAB         Birth Comments: 1st trimester, suction D&C      Obstetric Comments   Menarche - 13 yo     Fetal data:  Nonstress test:  date 24  Baseline: 140  Variability: moderate  Accelerations: present, 15x15  Decelerations: absent  Contractions: absent  Assessment: reactive  Plan: continue TID and PRN        MFM ultrasound report key findings: Interval growth scan from 24 date at 27w1d gestational age.     INDICATIONS     The indications for this exam were second trimester bleeding, previous   delivery & short interval pregnancy.     Exam Types     Transvaginal Ultrasound  Level I     RESULTS     Fetus # 1 of 1  Breech presentation  Fetal growth appeared normal  Placenta Location = Anterior, fundal  No placenta previa     MEASUREMENTS (* Included In Average GA)     AC             23.48 cm        27 weeks 6 days* (63%)  BPD             6.68 cm        26 weeks 6 days* (31%)  HC             25.13 cm        27 weeks 2 days* (26%)  Femur           4.97 cm        26 weeks 6 days* (25%)     HC/AC           1.07 [1.05 - 1.22]                 (31%)  FL/AC             21 [20 - 24]  FL/BPD            74 [71 - 87]  EFW Hadlock 4   1062 grams - 2 lbs 5 oz                 (48%)     THE AVERAGE GESTATIONAL AGE is 27 weeks 1 day +/- 14 days.     AMNIOTIC FLUID     Q1: 2.3      Q2: 2.2      Q3: 4.7      Q4: 2.1  DAISY Total = 11.3 cm  Amniotic Fluid: Normal     FETAL VESSELS     S/D    PI       RI      PSV    AEDV RF  cm/s  Middle Cerebral Artery   0.00    0.00    0.00    26.39     CERVICAL EVALUATION     The cervix appeared normal (Ultrasound Examination).     SUPINE  Cervical Length: 2.90 cm     OTHER TEST RESULTS  Funneling?: No             Dynamic Changes?: No  Debris?: No     ANATOMY COMMENTS     Follow up evaluation of intracranial anatomy (calvarium and  lateral  ventricles), cardiac anatomy (outflow tract and three vessel views),  diaphragm, stomach, kidneys, and bladder appear normal.     IMPRESSION     [facility] IUP  27 weeks and 1 day by this ultrasound. (TONA=OCT 3 2024)  27 weeks and 1 day by 2nd Trimester Sono. (TONA=OCT 3  2024)  Breech presentation  Fetal growth appeared normal  Regular fetal heart rate of 138 bpm  Anterior, fundal placenta  No placenta previa    MEDS:   Medication Administration - last 24 hours from 07/10/2024 0623 to 07/11/2024 0623         Date/Time Order Dose Route Action Action by     07/10/2024 0919 EDT prenatal multivitamin tablet 1 tablet 1 tablet Oral Given Violeta Segal RN     07/10/2024 1813 EDT hydrocortisone 2.5 % cream -- Topical Given Violeta Segal RN     07/10/2024 0919 EDT hydrocortisone 2.5 % cream -- Topical Given Violeta Segal RN     07/10/2024 0721 EDT magnesium sulfate 20 g/500 mL infusion (premix) 0 g/hr Intravenous Stopped Kusum Thomas RN     07/10/2024 0718 EDT penicillin G (PFIZERPEN-G) in 0.9% sodium chloride 100 mL IVPB 2.5 Million Units 0 Million Units Intravenous Stopped Violeta Segal RN     07/11/2024 0006 EDT lactated ringers bolus 1,000 mL 0 mL Intravenous Stopped Rosetta Elizabeth RN     07/10/2024 2206 EDT lactated ringers bolus 1,000 mL 1,000 mL Intravenous New Bag Rosetta Elizabeth RN     07/11/2024 0418 EDT lactated ringers infusion 125 mL/hr Intravenous New Bag Petrona Hernandez RN          Current Facility-Administered Medications   Medication Dose Route Frequency    acetaminophen (TYLENOL) tablet 975 mg  975 mg Oral Q8H PRN    albuterol (PROVENTIL HFA,VENTOLIN HFA) inhaler 2 puff  2 puff Inhalation Q4H PRN    calcium carbonate (TUMS) chewable tablet 1,000 mg  1,000 mg Oral TID PRN    calcium gluconate 10 % injection 1 g  1 g Intravenous Once PRN    hydrocortisone 2.5 % cream   Topical BID    lactated ringers infusion  125 mL/hr Intravenous Continuous    metoclopramide (REGLAN) injection 10 mg  10 mg Intravenous Q6H PRN    ondansetron (ZOFRAN) injection 4 mg  4 mg Intravenous Q6H PRN    prenatal multivitamin tablet 1 tablet  1 tablet Oral Daily            Pedro Moulton MD  OBGYN, PGY-3  7/11/2024  6:23 AM

## 2024-07-12 ENCOUNTER — APPOINTMENT (INPATIENT)
Dept: NON INVASIVE DIAGNOSTICS | Facility: HOSPITAL | Age: 39
DRG: 560 | End: 2024-07-12
Payer: COMMERCIAL

## 2024-07-12 PROBLEM — O47.00 PRETERM UTERINE CONTRACTIONS: Chronic | Status: RESOLVED | Noted: 2024-07-05 | Resolved: 2024-07-12

## 2024-07-12 LAB
ALBUMIN SERPL BCG-MCNC: 3.4 G/DL (ref 3.5–5)
ALBUMIN SERPL BCG-MCNC: 3.5 G/DL (ref 3.5–5)
ALP SERPL-CCNC: 58 U/L (ref 34–104)
ALP SERPL-CCNC: 64 U/L (ref 34–104)
ALT SERPL W P-5'-P-CCNC: 13 U/L (ref 7–52)
ALT SERPL W P-5'-P-CCNC: 14 U/L (ref 7–52)
ANION GAP SERPL CALCULATED.3IONS-SCNC: 11 MMOL/L (ref 4–13)
ANION GAP SERPL CALCULATED.3IONS-SCNC: 8 MMOL/L (ref 4–13)
AORTIC ROOT: 3.8 CM
APICAL FOUR CHAMBER EJECTION FRACTION: 52 %
ASCENDING AORTA: 3.5 CM
AST SERPL W P-5'-P-CCNC: 18 U/L (ref 13–39)
AST SERPL W P-5'-P-CCNC: 23 U/L (ref 13–39)
ATRIAL RATE: 62 BPM
AV REGURGITATION PRESSURE HALF TIME: 632 MS
BILIRUB SERPL-MCNC: 0.33 MG/DL (ref 0.2–1)
BILIRUB SERPL-MCNC: 0.43 MG/DL (ref 0.2–1)
BSA FOR ECHO PROCEDURE: 1.74 M2
BUN SERPL-MCNC: 10 MG/DL (ref 5–25)
BUN SERPL-MCNC: 8 MG/DL (ref 5–25)
CALCIUM ALBUM COR SERPL-MCNC: 7.3 MG/DL (ref 8.3–10.1)
CALCIUM SERPL-MCNC: 6.8 MG/DL (ref 8.4–10.2)
CALCIUM SERPL-MCNC: 7.4 MG/DL (ref 8.4–10.2)
CHLORIDE SERPL-SCNC: 97 MMOL/L (ref 96–108)
CHLORIDE SERPL-SCNC: 99 MMOL/L (ref 96–108)
CO2 SERPL-SCNC: 21 MMOL/L (ref 21–32)
CO2 SERPL-SCNC: 24 MMOL/L (ref 21–32)
CREAT SERPL-MCNC: 0.81 MG/DL (ref 0.6–1.3)
CREAT SERPL-MCNC: 0.83 MG/DL (ref 0.6–1.3)
DME PARACHUTE DELIVERY DATE ACTUAL: NORMAL
DME PARACHUTE DELIVERY DATE REQUESTED: NORMAL
DME PARACHUTE DELIVERY NOTE: NORMAL
DME PARACHUTE ITEM DESCRIPTION: NORMAL
DME PARACHUTE ORDER STATUS: NORMAL
DME PARACHUTE SUPPLIER NAME: NORMAL
DME PARACHUTE SUPPLIER PHONE: NORMAL
E WAVE DECELERATION TIME: 231 MS
E/A RATIO: 1.49
ERYTHROCYTE [DISTWIDTH] IN BLOOD BY AUTOMATED COUNT: 13.1 % (ref 11.6–15.1)
ERYTHROCYTE [DISTWIDTH] IN BLOOD BY AUTOMATED COUNT: 13.2 % (ref 11.6–15.1)
FIBRINOGEN PPP-MCNC: 383 MG/DL (ref 207–520)
FRACTIONAL SHORTENING: 37 (ref 28–44)
GFR SERPL CREATININE-BSD FRML MDRD: 89 ML/MIN/1.73SQ M
GFR SERPL CREATININE-BSD FRML MDRD: 92 ML/MIN/1.73SQ M
GLUCOSE SERPL-MCNC: 134 MG/DL (ref 65–140)
GLUCOSE SERPL-MCNC: 156 MG/DL (ref 65–140)
HCT VFR BLD AUTO: 28.9 % (ref 34.8–46.1)
HCT VFR BLD AUTO: 31 % (ref 34.8–46.1)
HGB BLD-MCNC: 10.1 G/DL (ref 11.5–15.4)
HGB BLD-MCNC: 9.5 G/DL (ref 11.5–15.4)
INTERVENTRICULAR SEPTUM IN DIASTOLE (PARASTERNAL SHORT AXIS VIEW): 1 CM
INTERVENTRICULAR SEPTUM: 1 CM (ref 0.6–1.1)
LAAS-AP2: 17.2 CM2
LAAS-AP4: 22.1 CM2
LEFT ATRIUM SIZE: 3.9 CM
LEFT ATRIUM VOLUME (MOD BIPLANE): 68 ML
LEFT ATRIUM VOLUME INDEX (MOD BIPLANE): 39.1 ML/M2
LEFT INTERNAL DIMENSION IN SYSTOLE: 3.1 CM (ref 2.1–4)
LEFT VENTRICULAR INTERNAL DIMENSION IN DIASTOLE: 4.9 CM (ref 3.5–6)
LEFT VENTRICULAR POSTERIOR WALL IN END DIASTOLE: 0.9 CM
LEFT VENTRICULAR STROKE VOLUME: 73 ML
LVSV (TEICH): 73 ML
MAGNESIUM SERPL-MCNC: 5.7 MG/DL (ref 1.9–2.7)
MAGNESIUM SERPL-MCNC: 7.1 MG/DL (ref 1.9–2.7)
MCH RBC QN AUTO: 29.9 PG (ref 26.8–34.3)
MCH RBC QN AUTO: 30.1 PG (ref 26.8–34.3)
MCHC RBC AUTO-ENTMCNC: 32.6 G/DL (ref 31.4–37.4)
MCHC RBC AUTO-ENTMCNC: 32.9 G/DL (ref 31.4–37.4)
MCV RBC AUTO: 91 FL (ref 82–98)
MCV RBC AUTO: 92 FL (ref 82–98)
MV PEAK A VEL: 0.63 M/S
MV PEAK E VEL: 94 CM/S
MV STENOSIS PRESSURE HALF TIME: 67 MS
MV VALVE AREA P 1/2 METHOD: 3.28
P AXIS: 67 DEGREES
PLATELET # BLD AUTO: 180 THOUSANDS/UL (ref 149–390)
PLATELET # BLD AUTO: 195 THOUSANDS/UL (ref 149–390)
PMV BLD AUTO: 10.5 FL (ref 8.9–12.7)
PMV BLD AUTO: 10.6 FL (ref 8.9–12.7)
POTASSIUM SERPL-SCNC: 3.4 MMOL/L (ref 3.5–5.3)
POTASSIUM SERPL-SCNC: 4 MMOL/L (ref 3.5–5.3)
PR INTERVAL: 162 MS
PROT SERPL-MCNC: 6.1 G/DL (ref 6.4–8.4)
PROT SERPL-MCNC: 6.5 G/DL (ref 6.4–8.4)
QRS AXIS: 15 DEGREES
QRSD INTERVAL: 82 MS
QT INTERVAL: 464 MS
QTC INTERVAL: 470 MS
RA PRESSURE ESTIMATED: 5 MMHG
RBC # BLD AUTO: 3.18 MILLION/UL (ref 3.81–5.12)
RBC # BLD AUTO: 3.36 MILLION/UL (ref 3.81–5.12)
RIGHT ATRIUM AREA SYSTOLE A4C: 16 CM2
RIGHT VENTRICLE ID DIMENSION: 3.8 CM
RV PSP: 15 MMHG
SL CV AV DECELERATION TIME RETROGRADE: 2179 MS
SL CV AV PEAK GRADIENT RETROGRADE: 106 MMHG
SL CV LEFT ATRIUM LENGTH A2C: 4.3 CM
SL CV LV EF: 55
SL CV PED ECHO LEFT VENTRICLE DIASTOLIC VOLUME (MOD BIPLANE) 2D: 110 ML
SL CV PED ECHO LEFT VENTRICLE SYSTOLIC VOLUME (MOD BIPLANE) 2D: 38 ML
SODIUM SERPL-SCNC: 129 MMOL/L (ref 135–147)
SODIUM SERPL-SCNC: 131 MMOL/L (ref 135–147)
T WAVE AXIS: 51 DEGREES
TR MAX PG: 10 MMHG
TR PEAK VELOCITY: 1.6 M/S
TRICUSPID ANNULAR PLANE SYSTOLIC EXCURSION: 2.8 CM
TRICUSPID VALVE PEAK REGURGITATION VELOCITY: 1.62 M/S
VENTRICULAR RATE: 62 BPM
WBC # BLD AUTO: 12.91 THOUSAND/UL (ref 4.31–10.16)
WBC # BLD AUTO: 16.78 THOUSAND/UL (ref 4.31–10.16)

## 2024-07-12 PROCEDURE — 99024 POSTOP FOLLOW-UP VISIT: CPT | Performed by: OBSTETRICS & GYNECOLOGY

## 2024-07-12 PROCEDURE — 80053 COMPREHEN METABOLIC PANEL: CPT

## 2024-07-12 PROCEDURE — 93306 TTE W/DOPPLER COMPLETE: CPT | Performed by: INTERNAL MEDICINE

## 2024-07-12 PROCEDURE — 93306 TTE W/DOPPLER COMPLETE: CPT

## 2024-07-12 PROCEDURE — 85027 COMPLETE CBC AUTOMATED: CPT

## 2024-07-12 PROCEDURE — 85384 FIBRINOGEN ACTIVITY: CPT

## 2024-07-12 PROCEDURE — 93010 ELECTROCARDIOGRAM REPORT: CPT | Performed by: INTERNAL MEDICINE

## 2024-07-12 PROCEDURE — 83735 ASSAY OF MAGNESIUM: CPT

## 2024-07-12 PROCEDURE — 99222 1ST HOSP IP/OBS MODERATE 55: CPT | Performed by: INTERNAL MEDICINE

## 2024-07-12 RX ORDER — ACETAMINOPHEN 325 MG/1
650 TABLET ORAL EVERY 6 HOURS SCHEDULED
Status: DISCONTINUED | OUTPATIENT
Start: 2024-07-12 | End: 2024-07-13 | Stop reason: HOSPADM

## 2024-07-12 RX ORDER — LANOLIN ALCOHOL/MO/W.PET/CERES
CREAM (GRAM) TOPICAL 3 TIMES DAILY PRN
Status: DISCONTINUED | OUTPATIENT
Start: 2024-07-12 | End: 2024-07-13 | Stop reason: HOSPADM

## 2024-07-12 RX ORDER — POLYETHYLENE GLYCOL 3350 17 G/17G
17 POWDER, FOR SOLUTION ORAL DAILY PRN
Status: DISCONTINUED | OUTPATIENT
Start: 2024-07-12 | End: 2024-07-13 | Stop reason: HOSPADM

## 2024-07-12 RX ORDER — NIFEDIPINE 30 MG/1
30 TABLET, EXTENDED RELEASE ORAL DAILY
Status: DISCONTINUED | OUTPATIENT
Start: 2024-07-12 | End: 2024-07-13

## 2024-07-12 RX ORDER — ACETAMINOPHEN 325 MG/1
650 TABLET ORAL EVERY 6 HOURS SCHEDULED
Status: DISCONTINUED | OUTPATIENT
Start: 2024-07-12 | End: 2024-07-12

## 2024-07-12 RX ADMIN — ACETAMINOPHEN 650 MG: 325 TABLET, FILM COATED ORAL at 06:52

## 2024-07-12 RX ADMIN — NIFEDIPINE 30 MG: 30 TABLET, EXTENDED RELEASE ORAL at 10:55

## 2024-07-12 RX ADMIN — IBUPROFEN 600 MG: 600 TABLET, FILM COATED ORAL at 18:38

## 2024-07-12 RX ADMIN — Medication: at 22:25

## 2024-07-12 RX ADMIN — ACETAMINOPHEN 650 MG: 325 TABLET, FILM COATED ORAL at 18:37

## 2024-07-12 RX ADMIN — HYDROCORTISONE: 25 CREAM TOPICAL at 09:09

## 2024-07-12 RX ADMIN — ACETAMINOPHEN 650 MG: 325 TABLET, FILM COATED ORAL at 12:42

## 2024-07-12 RX ADMIN — IBUPROFEN 600 MG: 600 TABLET, FILM COATED ORAL at 12:43

## 2024-07-12 RX ADMIN — MAGNESIUM SULFATE HEPTAHYDRATE 2 G/HR: 40 INJECTION, SOLUTION INTRAVENOUS at 18:42

## 2024-07-12 RX ADMIN — MAGNESIUM SULFATE HEPTAHYDRATE 2 G/HR: 40 INJECTION, SOLUTION INTRAVENOUS at 09:07

## 2024-07-12 RX ADMIN — HYDROCORTISONE: 25 CREAM TOPICAL at 18:38

## 2024-07-12 RX ADMIN — Medication 1 TABLET: at 09:04

## 2024-07-12 RX ADMIN — IBUPROFEN 600 MG: 600 TABLET, FILM COATED ORAL at 04:18

## 2024-07-12 RX ADMIN — ACETAMINOPHEN 650 MG: 325 TABLET, FILM COATED ORAL at 01:50

## 2024-07-12 RX ADMIN — DOCUSATE SODIUM 100 MG: 100 CAPSULE, LIQUID FILLED ORAL at 09:04

## 2024-07-12 NOTE — CONSULTS
Consultation - Cardiology Team One  Isidra Chamberlain 38 y.o. female MRN: [id number]  Unit/Bed#: -01 Encounter: [id number]    Inpatient consult to Cardiology  Consult performed by: ROSHNI Henderson  Consult ordered by: Pedro Moulton MD      Physician Requesting Consult: Micaela Luna MD  Reason for Consult / Principal Problem: hypotension    Assessment    Transient hypotension  Suspect this was related to combination of medications (2 doses of IV labetalol earlier in the day, PCEA dose just prior to symptom onset) in combination with active labor/discomfort from contractions  She recovered in about 20 minutes and tolerated the rest of her labor and delivery without complication  She is now hypertensive and started on nifedipine  She denies any history of similar episodes while pregnant or during delivery of any of her other children  ECG- NSR with possible LVH, nonspecific ST/T wave abnormality    Preeclampsia- complicated last pregnancy as well as this one. Started on nifedipine 30 mg daily today due to /62 this morning    Systolic murmur- reports hx of childhood murmur. Does not recall any echocardiograms being done and believes she grew out of it. +holosystolic murmur on exam    Placental abruption with  delivery- per ob-gyn    Plan/discussion  Suspect this episode was related to combination of medications given during labor  Now hypertensive and completely asymptomatic  ECG NSR with possible LVH, unchanged from prior  Murmur likely related to increased blood volume during pregnancy  Check echocardiogram. If normal, do not anticipate any further cardiac work up.    History of Present Illness   HPI: Isidra Chamberlain is a 38 y.o. year old female who initially presented at 27 weeks 1 day gestation for heavy vaginal bleeding s/p intercourse.  She was diagnosed with ovarian placental abruption.  She received FFP and packed red blood cells with close fetal monitoring.  She was given betamethasone  for fetal lung maturation and magnesium started on 7/5 for fetal neuroprotection.  UDS on admission positive for THC and methamphetamines.  She admitted to THC use but denies any use of methamphetamines.  Throughout her hospitalization she was noted to have intermittently elevated blood pressures with sustained severe range blood pressures yesterday which was treated with 20 mg of IV labetalol at 1015 and 40 mg of IV labetalol at 1430.  She was started on Pitocin yesterday morning around 1030 and an epidural was placed at 1530.  A few hours later she developed symptomatic hypotension with blood pressures dropping into the 70s over 40s.  She felt palpitations and lightheadedness as if she was about to pass out. She tells me she had felt some pressure from a contraction and given herself a PCEA bolus right before onset of her pre-syncopal symptoms. Anesthesia was called to the bedside and reportedly gave medications but I do not see a record of what interventions were provided. Her blood pressure slowly improved over the course of the next 20 minutes and she tolerated the rest of her labor and delivery without a problem.  This morning she is hypertensive with most recent blood pressure of 155/62 and she has been started on nifedipine 30 mg daily.  Cardiology consulted to evaluate her episode of hypotension and bradycardia.    EKG reviewed personally: NSR with nonspecific ST/T wave abnormality.  Possible LVH.  Unchanged from previous.    Telemetry reviewed personally: N/A    Review of Systems   Constitutional: Negative for chills, malaise/fatigue and weight gain.   Cardiovascular:  Negative for chest pain, dyspnea on exertion, leg swelling, orthopnea, palpitations and syncope.   Respiratory:  Negative for cough, shortness of breath, sleep disturbances due to breathing and sputum production.    Gastrointestinal:  Negative for bloating, nausea and vomiting.   Neurological:  Negative for dizziness, light-headedness and  weakness.   Psychiatric/Behavioral:  Negative for altered mental status.    All other systems reviewed and are negative.    Historical Information   Past Medical History:   Diagnosis Date    Anemia affecting pregnancy 2023    Asthma     Carpal tunnel syndrome, bilateral     Chlamydia infection     Elevated blood pressure reading without diagnosis of hypertension 2023    Gastroesophageal reflux disease without esophagitis 2023     uterine contractions 2024     Past Surgical History:   Procedure Laterality Date    NO PAST SURGERIES       Social History     Substance and Sexual Activity   Alcohol Use Not Currently    Comment: social ; Denied history of alcohol use     Social History     Substance and Sexual Activity   Drug Use No     Social History     Tobacco Use   Smoking Status Never   Smokeless Tobacco Never     Family History:   Family History   Problem Relation Age of Onset    No Known Problems Mother        Meds/Allergies   all current active meds have been reviewed and current meds:   Current Facility-Administered Medications   Medication Dose Route Frequency    acetaminophen (TYLENOL) tablet 650 mg  650 mg Oral Q6H SERA    albuterol (PROVENTIL HFA,VENTOLIN HFA) inhaler 2 puff  2 puff Inhalation Q4H PRN    benzocaine-menthol-lanolin-aloe (DERMOPLAST) 20-0.5 % topical spray 1 Application  1 Application Topical Q6H PRN    calcium carbonate (TUMS) chewable tablet 1,000 mg  1,000 mg Oral Daily PRN    calcium gluconate 10 % injection 1 g  1 g Intravenous Once PRN    diphenhydrAMINE (BENADRYL) tablet 25 mg  25 mg Oral Q6H PRN    hydrocortisone 1 % cream 1 Application  1 Application Topical Daily PRN    hydrocortisone 2.5 % cream   Topical BID    ibuprofen (MOTRIN) tablet 600 mg  600 mg Oral Q6H    magnesium sulfate 20 g/500 mL infusion (premix)  2 g/hr Intravenous Continuous    metoclopramide (REGLAN) injection 10 mg  10 mg Intravenous Q6H PRN    NIFEdipine (PROCARDIA XL) 24 hr tablet 30  "mg  30 mg Oral Daily    ondansetron (ZOFRAN) injection 4 mg  4 mg Intravenous Q8H PRN    oxytocin (PITOCIN) 30 Units in lactated ringers 500 mL infusion  250 pauline-units/min Intravenous Continuous    polyethylene glycol (MIRALAX) packet 17 g  17 g Oral Daily PRN    prenatal multivitamin tablet 1 tablet  1 tablet Oral Daily    simethicone (MYLICON) chewable tablet 80 mg  80 mg Oral 4x Daily PRN    witch hazel-glycerin (TUCKS) topical pad 1 Pad  1 Pad Topical Q4H PRN     magnesium sulfate, 2 g/hr, Last Rate: 2 g/hr (24 0907)  oxytocin, 250 pauline-units/min, Last Rate: Stopped (24 912)        No Known Allergies    Objective   Vitals: Blood pressure 155/62, pulse 62, temperature 97.8 °F (36.6 °C), temperature source Oral, resp. rate 20, height 5' 6\" (1.676 m), weight 65.8 kg (145 lb), last menstrual period 2024, SpO2 100%, unknown if currently breastfeeding., Body mass index is 23.4 kg/m².,     Systolic (24hrs), Av , Min:71 , Max:168     Diastolic (24hrs), Av, Min:37, Max:103      Intake/Output Summary (Last 24 hours) at 2024 1028  Last data filed at 2024 0504  Gross per 24 hour   Intake 1559.57 ml   Output 3514 ml   Net -1954.43 ml     Wt Readings from Last 3 Encounters:   24 65.8 kg (145 lb)   24 63.7 kg (140 lb 8 oz)   23 66.7 kg (147 lb)     Invasive Devices       Peripheral Intravenous Line  Duration             Peripheral IV 24 Left;Ventral (anterior) Forearm 2 days    Peripheral IV 07/10/24 Right;Ventral (anterior) Forearm 2 days              Epidural Line  Duration             Epidural Catheter 24 <1 day                  Physical Exam  Vitals reviewed.   Constitutional:       General: She is not in acute distress.  Neck:      Vascular: No hepatojugular reflux or JVD.   Cardiovascular:      Rate and Rhythm: Normal rate and regular rhythm.      Pulses: Normal pulses.      Heart sounds: Murmur heard.      Systolic murmur is present.      No friction " rub. No gallop.   Pulmonary:      Effort: Pulmonary effort is normal. No respiratory distress.      Breath sounds: Normal breath sounds. No rales.   Abdominal:      General: Bowel sounds are normal. There is no distension.      Palpations: Abdomen is soft.      Tenderness: There is no abdominal tenderness.   Musculoskeletal:         General: No tenderness. Normal range of motion.      Cervical back: Neck supple.      Right lower leg: No edema.      Left lower leg: No edema.   Skin:     General: Skin is warm and dry.      Capillary Refill: Capillary refill takes less than 2 seconds.      Findings: No erythema.   Neurological:      Mental Status: She is alert and oriented to person, place, and time.   Psychiatric:         Mood and Affect: Mood normal.         LABORATORY RESULTS:      CBC with diff:   Results from last 7 days   Lab Units 07/12/24  0816 07/12/24  0201 07/11/24  1920 07/11/24  1420 07/11/24  0919 07/10/24  0718 07/10/24  0225 07/09/24  1252 07/08/24  0815 07/07/24  1200 07/07/24  0626 07/06/24  0830 07/06/24  0422 07/05/24  2334 07/05/24  1919 07/05/24  1442   WBC Thousand/uL 12.91* 16.78* 14.33*  14.33* 13.21* 10.95* 11.63* 12.31*   < > 9.91   < > 12.38* 15.77* 15.97* 13.85* 13.68* 12.24*   HEMOGLOBIN g/dL 9.5* 10.1* 10.0*  10.0* 10.9* 9.8* 10.4* 10.4*   < > 10.2*   < > 9.8* 10.9* 10.8* 11.1* 10.7* 11.4*   HEMATOCRIT % 28.9* 31.0* 30.7*  30.7* 32.7* 28.8* 30.7* 31.6*   < > 31.5*   < > 29.3* 31.8* 33.6* 34.3* 31.7* 33.8*   MCV fL 91 92 92  92 91 91 90 91   < > 94   < > 91 90 94 92 90 90   PLATELETS Thousands/uL 180 195 181  181 177 167 177 158   < > 160   < > 174 194 186 196 193 190   RBC Million/uL 3.18* 3.36* 3.34*  3.34* 3.60* 3.17* 3.42* 3.47*   < > 3.36*   < > 3.22* 3.54* 3.57* 3.74* 3.53* 3.75*   MCH pg 29.9 30.1 29.9  29.9 30.3 30.9 30.4 30.0   < > 30.4   < > 30.4 30.8 30.3 29.7 30.3 30.4   MCHC g/dL 32.9 32.6 32.6  32.6 33.3 34.0 33.9 32.9   < > 32.4   < > 33.4 34.3 32.1 32.4 33.8 33.7  "  RDW % 13.1 13.2 13.2  13.2 13.2 13.2 13.2 13.2   < > 13.5   < > 13.6 13.4 13.5 13.5 13.3 13.2   MPV fL 10.6 10.5 10.2  10.2 10.3 10.1 10.2 10.1   < > 10.1   < > 10.2 9.6 10.3 10.2 10.1 10.0   NRBC AUTO /100 WBCs  --   --   --   --   --   --   --   --  0  --  0 0 0 0 0 0    < > = values in this interval not displayed.     CMP:  Results from last 7 days   Lab Units 07/12/24  0816 07/12/24  0201 07/11/24  1920 07/11/24  1420 07/11/24  0919 07/10/24  0718 07/10/24  0225   POTASSIUM mmol/L 4.0 3.4* 3.8 3.7 3.6 3.7 3.7   CHLORIDE mmol/L 97 99 97 100 104 104 104   CO2 mmol/L 21 24 23 22 22 20* 22   BUN mg/dL 8 10 11 11 12 9 10   CREATININE mg/dL 0.81 0.83 0.88 0.73 0.78 0.67 0.77   CALCIUM mg/dL 6.8* 7.4* 7.5* 8.4 8.3* 7.5* 7.9*   AST U/L 23 18 17 17 15 17 18   ALT U/L 14 13 13 15 13 13 14   ALK PHOS U/L 58 64 66 71 59 65 66   EGFR ml/min/1.73sq m 92 89 83 104 96 111 98     BMP:  Results from last 7 days   Lab Units 07/12/24  0816 07/12/24  0201 07/11/24  1920 07/11/24  1420 07/11/24  0919 07/10/24  0718 07/10/24  0225   POTASSIUM mmol/L 4.0 3.4* 3.8 3.7 3.6 3.7 3.7   CHLORIDE mmol/L 97 99 97 100 104 104 104   CO2 mmol/L 21 24 23 22 22 20* 22   BUN mg/dL 8 10 11 11 12 9 10   CREATININE mg/dL 0.81 0.83 0.88 0.73 0.78 0.67 0.77   CALCIUM mg/dL 6.8* 7.4* 7.5* 8.4 8.3* 7.5* 7.9*       Lab Results   Component Value Date    CREATININE 0.81 07/12/2024    CREATININE 0.83 07/12/2024    CREATININE 0.88 07/11/2024      Results from last 7 days   Lab Units 07/12/24  0816 07/12/24  0201 07/11/24  1920 07/11/24  1420 07/11/24  0919 07/05/24  2334   MAGNESIUM mg/dL 7.1* 5.7* 7.2* 6.6* 5.6* 6.8*     Results from last 7 days   Lab Units 07/11/24  1934 07/10/24  0718 07/10/24  0225 07/09/24  2236 07/09/24  2146 07/09/24  1734 07/09/24  1252   INR  1.00 0.98 1.05 1.00 1.27* 0.96 0.99     Lipid Profile:   No results found for: \"CHOL\"  Lab Results   Component Value Date    HDL 49 01/30/2020     Lab Results   Component Value Date    LDLCALC " 121 (H) 2020     Lab Results   Component Value Date    TRIG 162 (H) 2020     Imaging: I have personally reviewed pertinent reports.   and I have personally reviewed pertinent films in PACS  US OB FOLLOW UP TRANSABDOMINAL APPROACH    Result Date: 2024  Narrative: 2ND/3RD TRIMESTER  Testing Center Department of Maternal - Fetal Medicine [facility] [place] [address] [place], [place]  [address] Voice [phone] / [phone] Fax: [phone] Exam Date: 2024 RE: Isidra Chamberlain                   Physician: [facility] MR#: [id number]                      [facility] : SEP 4 1985                    [address] [place] [address] SS#:   (Exam #: [id number])            [place] [address] Fax: [phone] The LMP of this 38 year old,  7, para 4 patient was MAR 28 2024, her working TONA is OCT 3 2024 and the current gestational age is 26 weeks 5 days by 2nd Trimester Sono. A sonographic examination was performed on 2024 using real time equipment. The ultrasound examination was performed using abdominal technique. Cardiac motion was observed at 150 bpm. INDICATIONS followup previous incomplete anatomy confirm gestational age EXAM TYPES ultrasound, followup repeat ([id number]) (Quantity: 1) RESULTS Fetus # 1 of 1 Breech presentation Placenta Location = Anterior, fundal No placenta previa Placenta Grade = I AMNIOTIC FLUID Largest Vertical Pocket = 4.5 cm Amniotic Fluid: Normal MEASUREMENTS  *Indicates Measurement Included In Average Gestational Age BPD              6.6 cm        26 weeks 5 days* (40%) HC              24.8 cm        26 weeks 5 days* (28%) AC              22.0 cm        26 weeks 2 days* (33%) Femur            4.9 cm        26 weeks 5 days* (30%) HC/AC           1.13 FL/AC             22 FL/BPD            74 EFW (AC/FL/HC)   950 grams - 2 lbs 2 oz                 (32%) THE AVERAGE  GESTATIONAL AGE is 26 weeks 4 days +/- 14 days. ANATOMY Head                                    Normal Heart                                   Normal Stomach                                 Normal Right Kidney                            Normal Left Kidney                             Normal Bladder                                 Normal Spine                                   Normal Placenta                                Normal ANATOMY DETAILS Visualized Appearing Sonographically Normal: HEAD: (Calvarium, BPD Level);    HEART: (Four [place], Cardiac [place], Cardiac Position);    STOMACH, RIGHT KIDNEY, LEFT KIDNEY, BLADDER, SPINE: (Cervical Spine, Thoracic Spine, Lumbar Spine, Sacrum);    PLACENTA IMPRESSION Jennings IUP 26 weeks and 4 days by this ultrasound. (TONA=OCT 4 2024) Breech presentation Regular fetal heart rate of 150 bpm Anterior, fundal placenta No placenta previa GENERAL COMMENT Seen to complete the anatomic survey and growth to confirm late presenter gestational age. The fetal sagittal spine is seen and appears normal. The fetal size is appropriate for the gestational age as determined from last ultrasound. Follow up: none scheduled in our office. Images were reviewed by telemedicine. I certify that I have personally reviewed this study and agree with the report. Angy James M.D. Electronically Signed 07/02/24 11:14    Counseling / Coordination of Care  Total floor / unit time spent today 45 minutes.  Greater than 50% of total time was spent with the patient and / or family counseling and / or coordination of care.  A description of the counseling / coordination of care: Review of history, current assessment, development of a plan.    Code Status: Level 1 - Full Code    ** Please Note: Dragon 360 Dictation voice to text software may have been used in the creation of this document. **

## 2024-07-12 NOTE — CASE MANAGEMENT
Case Management Progress Note    Patient name Isidra Chamberlain  Location /-01 MRN [id number]  : 1985 Date 2024       LOS (days): 7  Geometric Mean LOS (GMLOS) (days):   Days to GMLOS:        OBJECTIVE:        Current admission status: Inpatient  Preferred Pharmacy:   [facility]/pharmacy #[id number] - [place], [place] - [address]  [address] [address]  Phone: [phone] Fax: [phone]    Primary Care Provider: Edgar Cardenas MD    Primary Insurance: [employer]  Secondary Insurance:     PROGRESS NOTE:    Cm advised by Jorge Mckee that she would be in this AM to see baby.

## 2024-07-12 NOTE — UTILIZATION REVIEW
Continued Stay Review    Date: 24                          Current Patient Class: inpatient   Current Level of Care: medical    HPI:38 y.o. female initially admitted on 24     Assessment/Plan: 24 She had additional severe range blood pressures that were nonsustained on 2024 of 160/92 and 160/98. She now meets criteria for preeclampsia with severe features. (+) contractions IV magnesium for both seizure prophylaxis and fetal neuroprotection, to be continued 24 hours postpartum. I also recommend q6 hour CBC/CMP. She is T&C and has 2 IVs in place. @1233  (+) cervical changes noted start IOL. Plan continuous external monitoring, IVF,cytotec menjivar balloon, IV pitocin if needed Epidural and IV MGSO 4 if needed and supportive care     Contraction Frequency (minutes): 3-5  Contraction Intensity: Mild/Moderate  Uterine Activity Characteristics: Irritability  Cervical Dilation: 2-3  Cervical Effacement: 50  Fetal Station: -3    24   @ 28 WEEKS   MALE @ 2209  APGAR 6  8   WT 1100 GRAMS  Baby taken to NICU      24  PP#1 remains on IV MGSO4 x 24 post delivery VS, I/O lung assessments and deep tendon reflexes q 2 hrs while on MGSO 4  Continue routine post partum care    Vital Signs (last 3 days)       Date/Time Temp Pulse Resp BP MAP (mmHg) SpO2 O2 Device Cardiac (WDL) Patient Position - Orthostatic VS Pain    24 1000 97.8 °F (36.6 °C) 62 20 155/62 -- 100 % None (Room air) -- Lying --    24 0825 -- -- -- 146/90 -- -- -- -- Sitting --    24 0800 97.5 °F (36.4 °C) 58 18 168/91 -- 100 % -- -- Sitting --    24 0640 -- -- -- 155/93 -- -- -- -- Sitting --    24 0623 97.6 °F (36.4 °C) 55 18 162/85 -- 99 % -- -- Sitting 7    24 0418 -- -- -- -- -- -- -- -- -- 4    24 0400 97.7 °F (36.5 °C) 54 18 152/85 113 99 % None (Room air) -- Lying --    24 0200 98 °F (36.7 °C) 63 16 144/79 105 99 % None (Room air) -- Sitting --    24 0150 -- -- -- -- -- -- --  -- -- 2    07/11/24 2347 -- 70 -- 125/67 -- -- None (Room air) WDL -- --    07/11/24 2332 -- 60 -- 139/73 -- -- -- -- -- --    07/11/24 2325 -- -- -- 138/76 -- -- -- -- -- --    07/11/24 2302 -- 68 -- 122/73 -- -- -- -- -- --    07/11/24 2245 -- 69 -- 125/69 -- -- -- -- -- --    07/11/24 2231 -- 61 -- 125/65 -- -- -- -- -- --    07/11/24 2219 -- 70 -- 124/68 -- -- -- -- -- --    07/11/24 2200 -- -- -- 105/60 -- -- -- -- -- --    07/11/24 2159 -- 85 -- -- -- -- -- -- -- --    07/11/24 2156 -- 71 -- -- -- 99 % -- -- -- --    07/11/24 2145 -- 65 -- 109/60 -- -- -- -- -- --    07/11/24 2130 -- 66 -- 100/62 -- 99 % -- -- -- --    07/11/24 2100 -- 73 -- 101/59 -- 99 % -- -- -- --    07/11/24 2045 -- 68 -- 103/62 -- 100 % -- -- -- --    07/11/24 2030 -- 75 -- 102/57 -- 99 % -- -- -- --    07/11/24 2019 -- 70 -- -- -- -- -- -- -- --    07/11/24 2017 -- 70 -- 99/57 -- -- -- -- -- --    07/11/24 2016 -- 78 -- -- -- 100 % -- -- -- --    07/11/24 2015 -- 67 -- -- -- -- -- -- -- --    07/11/24 2011 97.5 °F (36.4 °C) 71 -- -- -- 99 % -- -- -- No Pain    07/11/24 2007 -- 75 -- -- -- -- -- -- -- --    07/11/24 2006 -- 73 -- -- -- 99 % -- -- -- --    07/11/24 2003 -- 68 -- -- -- -- -- -- -- --    07/11/24 2001 -- 69 -- -- -- 100 % -- -- -- --    07/11/24 2000 -- 64 -- 125/82 -- -- -- -- -- --    07/11/24 1959 -- 74 -- -- -- -- -- -- -- --    07/11/24 1946 -- 70 -- -- -- 100 % -- -- -- --    07/11/24 1945 -- 69 -- 103/67 -- -- -- -- -- --    07/11/24 1943 -- 70 -- -- -- -- -- -- -- --    07/11/24 1942 -- 71 -- 102/63 -- -- -- -- -- --    07/11/24 1941 -- 72 -- -- -- 100 % -- -- -- --    07/11/24 1939 -- 66 -- 96/57 -- -- -- -- -- --    07/11/24 1936 -- 68 -- 93/56 -- 100 % -- -- -- --    07/11/24 1935 -- 68 -- -- -- -- -- -- -- --    07/11/24 1933 -- 65 -- 95/54 -- -- -- -- -- --    07/11/24 1931 -- 72 -- -- -- 98 % -- -- -- --    07/11/24 1929 -- 63 -- 85/60 -- -- -- -- -- --    07/11/24 1927 -- 66 -- -- -- -- -- -- -- --     07/11/24 1926 -- 67 -- 84/53 -- 97 % -- -- -- --    07/11/24 1924 -- 60 -- 78/46 -- -- -- -- -- --    07/11/24 1923 -- 60 -- 78/44 -- -- -- -- -- --    07/11/24 1922 -- 58 -- 80/47 -- -- -- -- -- --    07/11/24 1921 -- 63 -- -- -- 100 % -- -- -- --    07/11/24 1920 -- 50 -- 72/37 -- -- -- -- -- --    07/11/24 1919 -- 55 -- -- -- -- -- -- -- --    07/11/24 1916 -- 53 -- -- -- -- -- -- -- --    07/11/24 1915 -- 54 -- 71/44 -- 98 % -- -- -- --    07/11/24 1912 -- 57 -- 76/46 -- -- -- -- -- --    07/11/24 1911 -- 60 -- -- -- 82 % -- -- -- --    07/11/24 1909 -- 63 -- 90/54 -- -- -- -- -- --    07/11/24 1852 -- 66 -- 119/76 -- -- -- -- -- --    07/11/24 1837 -- 68 -- 118/72 -- -- -- -- -- --    07/11/24 1822 -- 77 -- 114/63 -- -- -- -- -- --    07/11/24 1807 -- 65 -- 117/70 -- -- -- -- -- --    07/11/24 1752 -- 73 -- 113/67 -- -- -- -- -- --    07/11/24 1737 -- 66 -- 117/72 -- -- -- -- -- --    07/11/24 1723 -- 62 -- 122/75 -- 98 % None (Room air) -- -- --    07/11/24 1709 -- 59 -- 128/73 -- -- -- -- -- --    07/11/24 1654 -- 62 -- 129/76 -- -- -- -- -- --    07/11/24 1635 -- 63 -- 127/103 -- -- -- -- -- --    07/11/24 1629 -- 61 -- 119/74 -- -- -- -- -- --    07/11/24 1624 -- 53 -- 119/79 -- -- -- -- -- --    07/11/24 1622 -- 51 -- 113/70 -- -- -- -- -- --    07/11/24 1621 -- 0 -- -- -- -- -- -- -- --    07/11/24 1620 -- 54 -- 107/62 -- -- -- -- -- --    07/11/24 1617 -- 51 -- 94/54 -- 98 % -- -- -- --    07/11/24 1615 -- 48 -- 91/54 -- -- -- -- -- --    07/11/24 1601 -- 65 -- 126/74 -- -- -- -- -- --    07/11/24 1544 -- 64 -- 122/80 -- -- -- -- -- --    07/11/24 1541 -- 64 -- 129/81 -- -- -- -- -- --    07/11/24 1539 -- 65 -- -- -- -- -- -- -- --    07/11/24 1538 -- 68 -- 133/85 -- 99 % -- -- -- --    07/11/24 1535 -- 64 -- 132/80 -- -- -- -- -- --    07/11/24 1533 97.8 °F (36.6 °C) 72 -- -- -- 98 % -- -- -- --    07/11/24 1532 -- 71 -- 141/79 -- -- -- -- -- --    07/11/24 1531 -- 68 -- -- -- -- -- -- -- --    07/11/24  1529 -- 69 -- 124/83 -- -- -- -- -- --    07/11/24 1528 -- 66 -- -- -- 99 % -- -- -- --    07/11/24 1527 -- 65 -- -- -- -- -- -- -- --    07/11/24 1526 -- 67 -- 147/90 -- -- -- -- -- --    07/11/24 1524 -- 67 -- 148/86 -- -- -- -- -- --    07/11/24 1523 -- 70 -- -- -- 99 % -- -- -- --    07/11/24 1507 -- 62 -- 128/80 -- -- -- -- -- --    07/11/24 1456 -- 67 -- 123/78 -- -- -- -- -- --    07/11/24 1447 -- 67 -- 129/79 -- -- -- -- -- --    07/11/24 1429 -- 67 -- 159/94 -- -- -- -- -- --    07/11/24 1413 -- 67 -- 160/90 -- -- -- -- -- --    07/11/24 1331 -- 69 16 -- -- 98 % None (Room air) -- -- --    07/11/24 1328 -- 68 -- 162/93 -- -- -- -- -- 2    07/11/24 1258 -- 66 -- 159/88 -- -- -- -- -- --    07/11/24 1256 -- -- -- -- -- -- -- -- -- 4    07/11/24 1212 -- 63 -- 151/95 -- -- -- -- -- --    07/11/24 1157 -- 61 -- 146/92 -- -- -- -- -- --    07/11/24 1142 -- 56 -- 150/96 -- -- -- -- -- --    07/11/24 1126 -- 58 -- 152/91 -- -- -- -- -- --    07/11/24 1124 -- 65 -- -- -- 100 % None (Room air) -- -- --    07/11/24 1116 -- 62 -- 141/92 -- -- -- -- -- --    07/11/24 1106 -- 64 -- 141/88 -- -- -- -- -- --    07/11/24 1056 -- 63 -- 136/87 -- -- -- -- -- --    07/11/24 1046 -- 67 -- 137/89 -- -- -- -- -- --    07/11/24 1036 -- 64 -- 137/91 -- -- -- -- -- --    07/11/24 1027 -- 65 -- 145/88 -- -- -- -- -- --    07/11/24 1007 -- 64 -- 163/96 -- -- -- -- -- --    07/11/24 0947 97.9 °F (36.6 °C) 65 16 166/92 -- -- -- -- Lying --    07/11/24 0902 -- 70 -- 155/80 -- -- -- -- -- --    07/11/24 0900 -- 70 -- 155/80 -- 98 % None (Room air) -- Lying No Pain    07/11/24 0801 -- 65 -- 142/94 -- -- -- -- -- --    07/11/24 0745 98 °F (36.7 °C) 63 16 163/89 -- 99 % -- -- -- No Pain    07/11/24 0737 -- -- -- -- -- -- -- -- -- No Pain    OBSERV: according to the patient the baby is active and denies feeling contractions. at 07/11/24 0737    07/11/24 0500 -- -- -- -- -- -- -- -- -- --    OBSERV: No bleeding at 07/11/24 0500    07/11/24  0416 -- -- -- -- -- -- -- -- -- --    OBSERV: No bleeding at 07/11/24 0416    07/11/24 0300 98.1 °F (36.7 °C) 60 16 142/68 -- 99 % -- -- -- No Pain    OBSERV: No bleeding at 07/11/24 0300    07/11/24 0100 -- -- -- -- -- -- -- -- -- No Pain    07/10/24 2348 -- 57 16 123/80 -- -- -- -- -- --    07/10/24 2346 97.8 °F (36.6 °C) -- 16 -- -- 98 % None (Room air) -- Lying No Pain    07/10/24 2300 -- -- -- -- -- -- -- WDL -- --    07/10/24 2200 -- -- -- -- -- -- -- -- -- No Pain    07/10/24 1925 98 °F (36.7 °C) 69 16 138/74 -- 97 % -- -- Sitting No Pain    07/10/24 1603 98.1 °F (36.7 °C) 64 18 149/93 -- 100 % -- -- -- No Pain    07/10/24 1437 -- -- -- -- -- -- -- -- -- --    OBSERV: patient drowsy. once monitor removed she rolled over to take a nap. at 07/10/24 1437    07/10/24 1412 -- 67 -- 139/89 -- 98 % -- -- -- --    07/10/24 1400 -- -- -- -- -- -- -- -- -- --    OBSERV: awoke from a nap complaining of chest tightness. feels it is stress related. at 07/10/24 1400    07/10/24 1143 -- 76 20 148/96 -- 99 % -- -- -- No Pain    07/10/24 0703 98 °F (36.7 °C) 65 14 153/86 -- 100 % -- WDL Lying 4    07/10/24 0645 -- -- -- 133/96 -- -- -- -- -- --    07/10/24 0615 -- 60 -- -- -- 100 % -- -- -- --    07/10/24 0600 97.6 °F (36.4 °C) 67 18 125/84 -- 100 % None (Room air) -- Lying 4    07/10/24 0530 -- 69 -- 154/80 -- -- -- -- -- --    07/10/24 0500 -- 65 -- 128/83 -- -- -- -- -- --    07/10/24 0430 -- 72 -- 131/86 -- -- -- -- -- --    07/10/24 0400 -- 68 -- 144/93 -- -- None (Room air) -- -- --    07/10/24 0330 -- 67 -- 148/91 -- -- -- -- -- --    07/10/24 0315 -- 67 -- 144/88 -- -- -- -- -- --    07/10/24 0300 -- 65 -- 141/78 -- -- -- -- -- --    07/10/24 0245 -- 63 -- 137/85 -- -- -- -- -- --    07/10/24 0230 -- 68 -- 146/89 -- -- -- -- -- --    07/10/24 0215 97.8 °F (36.6 °C) 67 16 150/90 -- 98 % None (Room air) -- Lying 3    07/10/24 0200 -- 70 -- 147/108 -- -- -- -- -- --    07/10/24 0145 -- 72 -- 150/96 -- -- -- -- -- --     07/10/24 0130 -- 67 -- 145/94 -- -- -- -- -- --    07/10/24 0115 -- 66 -- 148/93 -- -- -- -- -- --    07/10/24 0100 -- 73 -- 143/92 -- -- -- -- -- --    07/10/24 0045 -- 65 -- 141/87 -- -- -- -- -- --    07/10/24 0030 -- 59 -- 133/81 -- -- None (Room air) -- -- --    07/10/24 0015 -- 56 -- 151/89 -- -- -- -- -- --    07/10/24 0000 -- 55 -- 156/95 -- -- None (Room air) -- -- --    07/09/24 2330 -- 68 -- 123/82 -- -- -- -- -- --    07/09/24 2315 -- 64 -- 123/79 -- -- -- -- -- --    07/09/24 2300 -- 61 -- 126/71 -- -- -- -- -- --    07/09/24 2245 -- 65 -- 128/75 -- -- -- -- -- --    07/09/24 2230 -- 63 -- 141/91 -- -- None (Room air) -- -- --    07/09/24 2215 -- 63 -- 142/88 -- -- -- -- -- --    07/09/24 2200 -- 62 -- 145/87 -- -- -- -- -- --    07/09/24 2145 -- 62 -- 139/89 -- -- -- -- -- --    07/09/24 2130 -- 60 -- 142/87 -- -- -- -- -- --    07/09/24 2115 -- 64 -- 142/86 -- -- -- -- -- --    07/09/24 2045 -- 66 -- 150/92 -- -- -- -- -- --    07/09/24 2030 -- 65 -- 141/88 -- -- None (Room air) -- -- --    07/09/24 2015 -- 60 -- 145/87 -- -- -- -- -- --    07/09/24 2000 -- 62 -- 146/94 -- -- -- -- -- --    07/09/24 1945 -- 56 -- 151/93 -- -- -- -- -- --    07/09/24 1930 -- 65 -- 160/98 -- -- -- -- -- 6    07/09/24 1925 -- -- -- -- -- -- None (Room air) WDL -- --    OBSERV: Patient reports positive fetal movement, Denies any LOF. Patient had 1 pad, scant old red brown blood, no clots visualized by RN. Patient reports 6/10 pain of contractions, reports feeling ctx about every 10 min. Patient otherwise comfortable at this time, call bell within reach. at 07/09/24 1925 07/09/24 1915 -- 58 -- 148/90 -- -- -- -- -- --    07/09/24 1900 -- 57 -- 160/92 -- -- -- -- -- --    07/09/24 1844 -- 62 -- 149/94 -- -- -- -- -- --    07/09/24 1830 -- 59 -- 152/92 -- -- -- -- -- --    07/09/24 1815 -- 58 -- 161/94 -- -- -- -- -- --    07/09/24 1745 98.5 °F (36.9 °C) 60 14 127/85 -- 98 % None (Room air) -- Lying --    07/09/24 1607  98.6 °F (37 °C) 60 20 163/83 116 95 % None (Room air) -- Lying 5    07/09/24 1545 -- -- -- -- -- -- -- -- -- 5    07/09/24 1227 98.5 °F (36.9 °C) 66 20 133/71 -- 99 % -- -- -- No Pain    07/09/24 1149 -- -- -- -- -- -- -- -- -- 2    07/09/24 0915 98.5 °F (36.9 °C) 68 20 147/87 -- 98 % -- WDL -- No Pain          Weight (last 2 days)       Date/Time Weight    07/11/24 0947 65.8 (145)    07/11/24 0737 --    Comment rows:    OBSERV: according to the patient the baby is active and denies feeling contractions. at 07/11/24 0737    07/11/24 0500 --    Comment rows:    OBSERV: No bleeding at 07/11/24 0500    07/11/24 0416 --    Comment rows:    OBSERV: No bleeding at 07/11/24 0416    07/11/24 0300 --    Comment rows:    OBSERV: No bleeding at 07/11/24 0300    07/10/24 1437 --    Comment rows:    OBSERV: patient drowsy. once monitor removed she rolled over to take a nap. at 07/10/24 1437    07/10/24 1400 --    Comment rows:    OBSERV: awoke from a nap complaining of chest tightness. feels it is stress related. at 07/10/24 1400              Pertinent Labs/Diagnostic Results:   Radiology:  No orders to display     Cardiology:  ECG 12 lead   Final Result by Francisco J Jaquez DO (07/12 0736)   Normal sinus rhythm   Moderate voltage criteria for LVH, may be normal variant   Nonspecific ST and T wave abnormality   Abnormal ECG   Confirmed by Francisco J Jaquez ([id number]) on 7/12/2024 7:36:57 AM        GI:  No orders to display           Results from last 7 days   Lab Units 07/12/24  0816 07/12/24  0201 07/11/24  1920 07/11/24  1420 07/11/24  0919 07/09/24  1252 07/08/24  0815 07/07/24  1200 07/07/24  0626   WBC Thousand/uL 12.91* 16.78* 14.33*  14.33* 13.21* 10.95*   < > 9.91   < > 12.38*   HEMOGLOBIN g/dL 9.5* 10.1* 10.0*  10.0* 10.9* 9.8*   < > 10.2*   < > 9.8*   HEMATOCRIT % 28.9* 31.0* 30.7*  30.7* 32.7* 28.8*   < > 31.5*   < > 29.3*   PLATELETS Thousands/uL 180 195 181  181 177 167   < > 160   < > 174   TOTAL NEUT ABS  Thousands/µL  --   --  11.09*  --   --   --  7.33  --  10.05*    < > = values in this interval not displayed.         Results from last 7 days   Lab Units 07/12/24  0816 07/12/24 0201 07/11/24 1920 07/11/24 1420 07/11/24  0919   SODIUM mmol/L 129* 131* 129* 131* 132*   POTASSIUM mmol/L 4.0 3.4* 3.8 3.7 3.6   CHLORIDE mmol/L 97 99 97 100 104   CO2 mmol/L 21 24 23 22 22   ANION GAP mmol/L 11 8 9 9 6   BUN mg/dL 8 10 11 11 12   CREATININE mg/dL 0.81 0.83 0.88 0.73 0.78   EGFR ml/min/1.73sq m 92 89 83 104 96   CALCIUM mg/dL 6.8* 7.4* 7.5* 8.4 8.3*   MAGNESIUM mg/dL 7.1* 5.7* 7.2* 6.6* 5.6*     Results from last 7 days   Lab Units 07/12/24  0816 07/12/24 0201 07/11/24 1920 07/11/24 1420 07/11/24  0919   AST U/L 23 18 17 17 15   ALT U/L 14 13 13 15 13   ALK PHOS U/L 58 64 66 71 59   TOTAL PROTEIN g/dL 6.1* 6.5 6.4 6.9 6.0*   ALBUMIN g/dL 3.4* 3.5 3.4* 3.6 3.2*   TOTAL BILIRUBIN mg/dL 0.33 0.43 0.39 0.41 0.40         Results from last 7 days   Lab Units 07/12/24  0816 07/12/24 0201 07/11/24 1920 07/11/24 1420 07/11/24 0919 07/10/24  0718 07/10/24  0225 07/09/24  2236 07/09/24  1734 07/07/24  0626 07/06/24  0830 07/06/24  0422   GLUCOSE RANDOM mg/dL 156* 134 144* 96 97 85 96 92 77 117 146* 114           Results from last 7 days   Lab Units 07/11/24  1934 07/10/24  0718 07/10/24  0225   PROTIME seconds 13.7 13.6 14.3   INR  1.00 0.98 1.05   PTT seconds 23 23 24     Results from last 7 days   Lab Units 07/06/24  0830   FERRITIN ng/mL 24   IRON SATURATION % 6*   IRON ug/dL 45*   TIBC ug/dL 817*         Results from last 7 days   Lab Units 07/11/24  0912 07/09/24  0641 07/06/24  0547   UNIT PRODUCT CODE  [id number]  [id number]  [id number]   UNIT NUMBER  [id number]  [id number]  [id number]   UNITABO  O  O O  O O   UNITRH  POS  POS POS  POS POS   CROSSMATCH  Compatible  Compatible Compatible  Compatible  --    UNIT DISPENSE STATUS  Crossmatched   Crossmatched Return to Inv  Presumed Trans Presumed Trans   UNIT PRODUCT VOL ml 350  350 350  350 125     Medications:   Scheduled Medications:  acetaminophen, 650 mg, Oral, Q6H SERA  hydrocortisone, , Topical, BID  ibuprofen, 600 mg, Oral, Q6H  NIFEdipine, 30 mg, Oral, Daily  prenatal multivitamin, 1 tablet, Oral, Daily      Continuous IV Infusions:  magnesium sulfate, 2 g/hr, Intravenous, Continuous  oxytocin, 250 pauline-units/min, Intravenous, Continuous      PRN Meds:  albuterol, 2 puff, Inhalation, Q4H PRN  benzocaine-menthol-lanolin-aloe, 1 Application, Topical, Q6H PRN  calcium carbonate, 1,000 mg, Oral, Daily PRN  calcium gluconate, 1 g, Intravenous, Once PRN  diphenhydrAMINE, 25 mg, Oral, Q6H PRN  hydrocortisone, 1 Application, Topical, Daily PRN  metoclopramide, 10 mg, Intravenous, Q6H PRN  ondansetron, 4 mg, Intravenous, Q8H PRN  polyethylene glycol, 17 g, Oral, Daily PRN  simethicone, 80 mg, Oral, 4x Daily PRN  witch hazel-glycerin, 1 Pad, Topical, Q4H PRN        Discharge Plan: home when medically stable     Network Utilization Review Department  ATTENTION: Please call with any questions or concerns to [phone] and carefully listen to the prompts so that you are directed to the right person. All voicemails are confidential.   For Discharge needs, contact Care Management DC Support Team at [phone] opt. [phone]  Send all requests for admission clinical reviews, approved or denied determinations and any other requests to dedicated fax number below belonging to the campus where the patient is receiving treatment. List of dedicated fax numbers for the Facilities:  FACILITY NAME UR FAX NUMBER   ADMISSION DENIALS (Administrative/Medical Necessity) [phone]   DISCHARGE SUPPORT TEAM (NETWORK) [phone]   PARENT CHILD HEALTH (Maternity/NICU/Pediatrics) [phone]   [facility] [phone]   [facility] [phone]   [facility]  [facility] [phone]   [facility] [phone]   [facility] [phone]   [facility] [phone]   [facility] [phone]   [facility] [phone]   [facility] [phone]   [facility] [phone]   [facility] [phone]   [facility] [phone]

## 2024-07-12 NOTE — LACTATION NOTE
Met with Isidra who is pumping for her baby boy in NICU who was born at 28 weeks Gestational Age.    This is Isidra's first time pumping. She exclusively formula/bottle fed her other 4 children. She plans to pump for this baby.    Normal Breast Exam noted.    Mom provided with and discussed Ready Set Baby, Hand expression and increasing supply for NICU baby information.     Reviewed pumping log and expectations for pumping output in the first week. Reviewed cycle pumping and appropriate pump settings, as well as pumping for 15-20 min 8-12 times per day.      Encouraged Isidra to discuss putting baby skin to skin with the NICU team when baby is medically stable to do so. Also encouraged her to call for lactation /pumping support as needed throughout her stay.     Case management was consulted for the Zomee Z2 Breast Pump.

## 2024-07-12 NOTE — PLAN OF CARE
Problem: ANTEPARTUM  Goal: Maintain pregnancy as long as maternal and/or fetal condition is stable  Description: INTERVENTIONS:  - Maternal surveillance  - Fetal surveillance  - Monitor uterine activity  - Medications as ordered  - Bedrest  Outcome: Progressing

## 2024-07-12 NOTE — L&D DELIVERY NOTE
Vaginal Delivery Summary - OB/GYN   Isidra Chamberlain 38 y.o. female MRN: [id number]  Unit/Bed#: -01 Encounter: [id number]          Predelivery Diagnosis:  1. Pregnancy at 28 weeks of gestation  2.  Placental abruption  3.  Preeclampsia with severe features  4.  No bleeding during pregnancy  5.  Anemia in pregnancy      Postdelivery Diagnosis:  1. Same as above  2. Delivery of      Procedure: Spontaneous Vaginal Delivery    Attending: Micaela Luna MD     Assistant: Kelli Flores MD - PGY4    Anesthesia: Epidural    QBL: 64 cc  Admission Hg: 10.0 mg/dL   Admission platelets: 175k    Complications: Placental Abruption     Specimens: cord blood, arterial and venous cord blood gasses, placenta to pathology    Findings:   1. Viable male at 2209, with APGARS of 6 and 8 at 1 and 5 minutes respectively,  2. Spontaneous delivery of intact placenta followed by large clot consistent with placental abruption visualized on ultrasound  3. Intact perineum, vagina, cervix   4. Blood gases:    Umbilical Cord Venous Blood Gas:  Results from last 7 days   Lab Units 24  2212   PH COV  7.385   PCO2 COV mm HG 36.4   HCO3 COV mmol/L 21.3   BASE EXC COV mmol/L -3.2*   O2 CT CD VB mL/dL 14.6   O2 HGB, VENOUS CORD % 88.1     Umbilical Cord Arterial Blood Gas:  Results from last 7 days   Lab Units 24  2212   PH COA  7.338   PCO2 COA  42.4   PO2 COA mm HG 35.3*   HCO3 COA mmol/L 22.3   BASE EXC COA mmol/L -3.4*   O2 CONTENT CORD ART ml/dl 12.6   O2 HGB, ARTERIAL CORD % 77.6       Disposition:  Patient tolerated the procedure well and was recovering in labor and delivery room     Brief history and labor course:  Ms. Isidra Chamberlain is a 38 y.o.  who initially presented at 27w1d for heavy vaginal bleeding s/p intercourse.      Isidra reported that she had been in her usual state of health prior to this and had sudden onset of heavy vaginal bleeding after intercourse. On arrival to L&D triage, heavy vaginal  bleeding was noted with speculum exam notable for large blood clot and slow persistent trickle from the cervical os.  SVE was 1.5/30/-3, and cervical length was 3.9 cm.  Fetal monitoring was reactive, and toco showed contractions every 2-3 min which patient started to feel while in L&D triage.  Hgb was 10.0, Fibrinogen was 278, and coags were normal.  FFN was positive.  Due to concern for placental abruption she was admitted to labor and delivery.   She was transfused 1U FFP and 1U pRBCs due to concern for evolving coagulopathy in the setting of low-normal fibrinogen with ongoing bleeding.  MFM & Neonatology were consulted.  Betamethasone was started on 24 for fetal lung maturation.  Magnesium was started on 24 for fetal neuroprotection.  Penicillin was started on 24 for GBS prophylaxis in the setting of unknown GBS status in a  gestation.  She disclosed a h/o smoking THC, but UDS resulted positive for THC & methamphetamines.  Patient declines use of methamphetamines.  On admission, she met criteria for GHTN.  CBC & CMP showed no evidence of preeclampsia, and urine P:C was elevated however this was felt to be secondary to blood rather than representing sustained elevations. After no cervical change was appreciated, magnesium and penicillin were discontinued and she was moved to the antepartum unit.     Throughout her hospital stay she was noted to have intermittently elevated blood pressures. On hospital day #7 she was noted to have sustained severe range blood pressures and was treated acutely with 20/40mg of IV labetalol. Given concern for abruption and new diagnosis of preeclampsia with severe features, the recommendation was made to proceed with IOL. Magnesium was started again for neuroprotection and seizure prophylaxis. Penicillin was restarting for GBS prophylaxis.     At the start of her induction she was noted to be 2/50/-3. She was started on a low dose pitocin titration and made change  to 3/60/-2. She received an epidural for analgesia. During her labor course she became acutely hypotensive with blood pressures in the 70s/40s. IV fluid bolus was administered, magnesium infusion was briefly held. STAT labs were noted to be largely unchanged from baseline. Anesthesia administered medication for blood pressure support and symptoms were noted to resolve.     She was AROM'd for blood tinged fluid. Her fetal heart tracing was noted to be briefly category II with variable decelerations. She progressed to complete and began pushing.     Description of procedure    After pushing for 1 minute, at 2209 patient delivered a viable male , weighing 2lb 6.8oz (1100g) , apgars of 6 (1 min) and 8 (5 min). The fetal vertex delivered spontaneously. Baby was checked for nuchal.  The remainder of the fetus delivered spontaneously.     Upon delivery, the infant was placed on the mothers abdomen and the cord was clamped and cut. Delayed cord clamping was performed.  The infant was noted to cry spontaneously and was moving all extremities appropriately. There was no evidence for injury. Awaiting NICU staff evaluated the . Arterial and venous cord blood gases and cord blood was collected for analysis. These were promptly sent to the lab. In the immediate post-partum, 30 units of IV pitocin was administered, and the uterus was noted to contract down well with massage and pitocin. The placenta delivered spontaneously at 2212 and was noted to have a centrally inserted 3 vessel cord. Upon delivery of the placenta, a large clot was noted, consistent with prior abruption visualized on ultrasound .    The vagina, cervix, perineum, and rectum were inspected and there were noted to be intact.    At the conclusion of the procedure, all needle, sponge, and instrument counts were noted to be correct. Patient tolerated the procedure well and was allowed to recover in labor and delivery room with family  before being  transferred to the post-partum floor.  was transferred to NICU in the setting of prematurity. Dr. Luna was present and participated in all key portions of the case.        Kelli Flores MD  2024  4:03 AM

## 2024-07-12 NOTE — PROGRESS NOTES
Progress Note - OB/GYN  Isidra Chamberlain 38 y.o. female MRN: [id number]  Unit/Bed#: -01 Encounter: [id number]    Assessment and Plan     Isidra Chamberlain is a patient of: [facility]. She is PPD# 1 s/p  spontaneous vaginal delivery at 28w 2/2 placental abruption and preeclampsia with severe features. Recovering well and is stable       Placental abruption, second trimester  Assessment & Plan  S/p 1u FFP, 1u pRBCs, 1u cryo during first 24hr of admission  S/p NICU consultation  S/p betamethasone for fetal lung maturation -  Coagulation studies continue to be WNL    *  delivery  Assessment & Plan  Lochia WNL   Recovering well   Appropriate bowel and bladder function   Pain well controlled   Tolerating diet   Ambulating without issues   No lower extremity tenderness  GBS pos, adequate treatment   Rh pos       Preeclampsia  Assessment & Plan  Systolic (24hrs), Av , Min:71 , Max:166     Diastolic (24hrs), Av, Min:37, Max:103    Asymptomatic  CBC/CMP wnl, PC ratio 0.65  Magnesium 24hr postpartum    Functional systolic murmur  Assessment & Plan  Remote history of childhood murmur, patient unsure of what type, self-resolved  Holosystolic murmur on exam on   Likely functional holosystolic ejection murmur in pregnancy  Denies chest pain, pressure, or shortness of breath  Continue to monitor    Marijuana use  Assessment & Plan  Patient reports marijuana use (smokes) w/ no other substance use this pregnancy.    UDS positive for THC and methamphetamines  Will need CM consult    Request for sterilization  Assessment & Plan  Patient requests permanent sterilization  MA-31 signed 2024    Asthma during pregnancy  Assessment & Plan  Albuterol prn        Disposition    - Anticipate discharge home on PPD# 2      Subjective/Objective     Chief Complaint: Postpartum State     Subjective:    Isidra Chamberlain is PPD#1 s/p  spontaneous vaginal delivery. She has no current complaints.  Pain is well controlled.  Patient is  "currently voiding.  She is ambulating.  Patient is currently passing flatus and has had no bowel movement. She is tolerating PO, and denies nausea or vomitting. Patient denies fever, chills, chest pain, shortness of breath, or calf tenderness. Lochia is normal. She is recovering well and is stable.       Vitals:   /93 (BP Location: Left arm)   Pulse 55   Temp 97.6 °F (36.4 °C) (Oral)   Resp 18   Ht 5' 6\" (1.676 m)   Wt 65.8 kg (145 lb)   LMP 01/25/2024   SpO2 99%   Breastfeeding Unknown   BMI 23.40 kg/m²       Intake/Output Summary (Last 24 hours) at 7/12/2024 0657  Last data filed at 7/12/2024 0504  Gross per 24 hour   Intake 1709.57 ml   Output 3514 ml   Net -1804.43 ml       Invasive Devices       Peripheral Intravenous Line  Duration             Peripheral IV 07/09/24 Left;Ventral (anterior) Forearm 2 days    Peripheral IV 07/10/24 Right;Ventral (anterior) Forearm 1 day              Epidural Line  Duration             Epidural Catheter 07/11/24 <1 day                    Physical Exam:   GEN: Isidra Chamberlain appears well, alert, pleasant and cooperative   CARDIO: RRR  RESP:  Normal respiratory effort  ABDOMEN: Soft, no tenderness, no distention, fundus firm  EXTREMITIES: Non tender, no erythema, b/l Martha's sign negative      Labs:     Hemoglobin   Date Value Ref Range Status   07/12/2024 10.1 (L) 11.5 - 15.4 g/dL Final   07/11/2024 10.0 (L) 11.5 - 15.4 g/dL Final   07/11/2024 10.0 (L) 11.5 - 15.4 g/dL Final   08/28/2015 12.6 11.5 - 15.4 g/dL Final   06/24/2014 12.4 11.5 - 15.4 g/dL Final     WBC   Date Value Ref Range Status   07/12/2024 16.78 (H) 4.31 - 10.16 Thousand/uL Final   07/11/2024 14.33 (H) 4.31 - 10.16 Thousand/uL Final   07/11/2024 14.33 (H) 4.31 - 10.16 Thousand/uL Final   08/28/2015 7.80 4.31 - 10.16 Thousand/uL Final   06/24/2014 8.53 4.31 - 10.16 Thousand/uL Final     Platelets   Date Value Ref Range Status   07/12/2024 195 149 - 390 Thousands/uL Final   07/11/2024 181 149 - 390 " Thousands/uL Final   07/11/2024 181 149 - 390 Thousands/uL Final   08/28/2015 195 149 - 390 Thousand/uL Final   06/24/2014 216 149 - 390 Thousand/uL Final     Creatinine   Date Value Ref Range Status   07/12/2024 0.83 0.60 - 1.30 mg/dL Final     Comment:     Standardized to IDMS reference method   07/11/2024 0.88 0.60 - 1.30 mg/dL Final     Comment:     Standardized to IDMS reference method   05/14/2024 0.82 0.40 - 1.10 mg/dL Final   10/23/2023 0.85 0.40 - 1.10 mg/dL Final     AST   Date Value Ref Range Status   07/12/2024 18 13 - 39 U/L Final   07/11/2024 17 13 - 39 U/L Final   05/14/2024 18 <41 U/L Final   10/23/2023 34 <41 U/L Final     ALT   Date Value Ref Range Status   07/12/2024 13 7 - 52 U/L Final     Comment:     Specimen collection should occur prior to Sulfasalazine administration due to the potential for falsely depressed results.    07/11/2024 13 7 - 52 U/L Final     Comment:     Specimen collection should occur prior to Sulfasalazine administration due to the potential for falsely depressed results.    05/14/2024 9 <56 U/L Final   10/23/2023 27 <56 U/L Final          Pedro Moulton MD  OBGYN PGY3  7/12/2024  6:57 AM

## 2024-07-12 NOTE — CASE MANAGEMENT
Case Management Progress Note    Patient name Isidra Chamberlain  Location /-01 MRN [id number]  : 1985 Date 2024       LOS (days): 7  Geometric Mean LOS (GMLOS) (days):   Days to GMLOS:        OBJECTIVE:        Current admission status: Inpatient  Preferred Pharmacy:   [facility]/pharmacy #[id number] - [place], [place] - [address]  [address] [address]  Phone: [phone] Fax: [phone]    Primary Care Provider: Edgar Cardenas MD    Primary Insurance: [employer]  Secondary Insurance:     PROGRESS NOTE:    Consult for MOB's Breast Pump:    COLETTE BAUER placed order for Zomee Z2 breast pump for room delivery via [employer] [facility] on [address]. Awaiting approval for delivery.

## 2024-07-12 NOTE — ANESTHESIA POSTPROCEDURE EVALUATION
Post-Op Assessment Note    CV Status:  Stable  Pain Score: 0    Pain management: adequate      Post-op block assessment: site cleaned, catheter intact and no complications   Mental Status:  Alert   Hydration Status:  Stable   PONV Controlled:  None   Airway Patency:  Patent     Post Op Vitals Reviewed: Yes    No anethesia notable event occurred.    Staff: Anesthesiologist               BP   128/80   Temp   98.6F   Pulse  62   Resp   16   SpO2   98

## 2024-07-12 NOTE — DISCHARGE INSTR - AVS FIRST PAGE
You will receive a call within 1-2 weeks to schedule your bilateral salpingectomy (sterilization) procedure.

## 2024-07-12 NOTE — CASE MANAGEMENT
Case Management Progress Note    Patient name Isidra Chamberlain  Location /-01 MRN [id number]  : 1985 Date 2024       LOS (days): 7  Geometric Mean LOS (GMLOS) (days):   Days to GMLOS:        OBJECTIVE:        Current admission status: Inpatient  Preferred Pharmacy:   [facility]/pharmacy #[id number] - [place], [place] - [address]  [address] [address]  Phone: [phone] Fax: [phone]    Primary Care Provider: Edgar Cardenas MD    Primary Insurance: [employer]  Secondary Insurance:     PROGRESS NOTE:      Cm left Vm for [facility]  Jorge Mckee advising her of baby being delivered. CM left call back number for  to reach .

## 2024-07-12 NOTE — CASE MANAGEMENT
Case Management Progress Note    Patient name Isidra Chamberlain  Location /-01 MRN [id number]  : 1985 Date 2024       LOS (days): 7  Geometric Mean LOS (GMLOS) (days):   Days to GMLOS:        OBJECTIVE:        Current admission status: Inpatient  Preferred Pharmacy:   [facility]/pharmacy #[id number] - [place], [place] - [address]  [address] [address]  Phone: [phone] Fax: [phone]    Primary Care Provider: Edgar Cardenas MD    Primary Insurance: [employer]  Secondary Insurance:     PROGRESS NOTE:    Cm advised by [facility] CYS Jorge Mckee that MOB and baby cleared for discharge home. Jorge requesting CM reach out and advise her when MOB and baby discharging so she can see family at home.

## 2024-07-12 NOTE — CASE MANAGEMENT
Case Management Progress Note    Patient name Isidra Chamberlain  Location /-01 MRN [id number]  : 1985 Date 2024       LOS (days): 7  Geometric Mean LOS (GMLOS) (days):   Days to GMLOS:        OBJECTIVE:        Current admission status: Inpatient  Preferred Pharmacy:   [facility]/pharmacy #[id number] - [place], [place] - [address]  [address] [address]  Phone: [phone] Fax: [phone]    Primary Care Provider: Edgar Cardenas MD    Primary Insurance: [employer]  Secondary Insurance:     PROGRESS NOTE:    CM received consult for breast pump. MOB requesting Zomee Z2  pump. CM placed order via [employer]. Order approved. CM delivered pump to MOB's room. Delivery ticket signed and placed in bin for DME liaison.

## 2024-07-13 VITALS
HEIGHT: 66 IN | SYSTOLIC BLOOD PRESSURE: 138 MMHG | RESPIRATION RATE: 18 BRPM | HEART RATE: 70 BPM | BODY MASS INDEX: 23.3 KG/M2 | DIASTOLIC BLOOD PRESSURE: 87 MMHG | TEMPERATURE: 98 F | WEIGHT: 145 LBS | OXYGEN SATURATION: 100 %

## 2024-07-13 LAB
ABO GROUP BLD BPU: NORMAL
ABO GROUP BLD BPU: NORMAL
BPU ID: NORMAL
BPU ID: NORMAL
CROSSMATCH: NORMAL
CROSSMATCH: NORMAL
UNIT DISPENSE STATUS: NORMAL
UNIT DISPENSE STATUS: NORMAL
UNIT PRODUCT CODE: NORMAL
UNIT PRODUCT CODE: NORMAL
UNIT PRODUCT VOLUME: 350 ML
UNIT PRODUCT VOLUME: 350 ML
UNIT RH: NORMAL
UNIT RH: NORMAL

## 2024-07-13 PROCEDURE — 99232 SBSQ HOSP IP/OBS MODERATE 35: CPT | Performed by: INTERNAL MEDICINE

## 2024-07-13 RX ORDER — NIFEDIPINE 30 MG/1
60 TABLET, EXTENDED RELEASE ORAL DAILY
Status: DISCONTINUED | OUTPATIENT
Start: 2024-07-13 | End: 2024-07-13 | Stop reason: HOSPADM

## 2024-07-13 RX ORDER — IBUPROFEN 600 MG/1
600 TABLET ORAL EVERY 6 HOURS
Qty: 30 TABLET | Refills: 0 | Status: SHIPPED | OUTPATIENT
Start: 2024-07-13 | End: 2024-08-12

## 2024-07-13 RX ORDER — NIFEDIPINE 30 MG/1
60 TABLET, EXTENDED RELEASE ORAL DAILY
Qty: 90 TABLET | Refills: 2 | Status: SHIPPED | OUTPATIENT
Start: 2024-07-14 | End: 2024-07-16 | Stop reason: DRUGHIGH

## 2024-07-13 RX ORDER — NIFEDIPINE 30 MG/1
30 TABLET, EXTENDED RELEASE ORAL DAILY
Qty: 90 TABLET | Refills: 0 | Status: CANCELLED | OUTPATIENT
Start: 2024-07-13 | End: 2024-10-11

## 2024-07-13 RX ORDER — LANOLIN ALCOHOL/MO/W.PET/CERES
CREAM (GRAM) TOPICAL 3 TIMES DAILY PRN
Start: 2024-07-13 | End: 2024-07-19

## 2024-07-13 RX ADMIN — IBUPROFEN 600 MG: 600 TABLET, FILM COATED ORAL at 06:21

## 2024-07-13 RX ADMIN — NIFEDIPINE 60 MG: 30 TABLET, EXTENDED RELEASE ORAL at 08:53

## 2024-07-13 RX ADMIN — HYDROCORTISONE: 25 CREAM TOPICAL at 08:56

## 2024-07-13 RX ADMIN — ACETAMINOPHEN 650 MG: 325 TABLET, FILM COATED ORAL at 12:43

## 2024-07-13 RX ADMIN — IBUPROFEN 600 MG: 600 TABLET, FILM COATED ORAL at 00:36

## 2024-07-13 RX ADMIN — ACETAMINOPHEN 650 MG: 325 TABLET, FILM COATED ORAL at 06:21

## 2024-07-13 RX ADMIN — IBUPROFEN 600 MG: 600 TABLET, FILM COATED ORAL at 12:43

## 2024-07-13 RX ADMIN — Medication 1 TABLET: at 08:53

## 2024-07-13 RX ADMIN — ACETAMINOPHEN 650 MG: 325 TABLET, FILM COATED ORAL at 00:36

## 2024-07-13 NOTE — PLAN OF CARE
Problem: PAIN - ADULT  Goal: Verbalizes/displays adequate comfort level or baseline comfort level  Description: Interventions:  - Encourage patient to monitor pain and request assistance  - Assess pain using appropriate pain scale  - Administer analgesics based on type and severity of pain and evaluate response  - Implement non-pharmacological measures as appropriate and evaluate response  - Consider cultural and social influences on pain and pain management  - Notify physician/advanced practitioner if interventions unsuccessful or patient reports new pain  Outcome: Progressing     Problem: INFECTION - ADULT  Goal: Absence or prevention of progression during hospitalization  Description: INTERVENTIONS:  - Assess and monitor for signs and symptoms of infection  - Monitor lab/diagnostic results  - Monitor all insertion sites, i.e. indwelling lines, tubes, and drains  - Monitor endotracheal if appropriate and nasal secretions for changes in amount and color  - [place] appropriate cooling/warming therapies per order  - Administer medications as ordered  - Instruct and encourage patient and family to use good hand hygiene technique  - Identify and instruct in appropriate isolation precautions for identified infection/condition  Outcome: Progressing  Goal: Absence of fever/infection during neutropenic period  Description: INTERVENTIONS:  - Monitor WBC    Outcome: Progressing     Problem: SAFETY ADULT  Goal: Patient will remain free of falls  Description: INTERVENTIONS:  - Educate patient/family on patient safety including physical limitations  - Instruct patient to call for assistance with activity   - Consult OT/PT to assist with strengthening/mobility   - Keep Call bell within reach  - Keep bed low and locked with side rails adjusted as appropriate  - Keep care items and personal belongings within reach  - Initiate and maintain comfort rounds  Outcome: Progressing  Goal: Maintain or return to baseline ADL  function  Description: INTERVENTIONS:  -  Assess patient's ability to carry out ADLs; assess patient's baseline for ADL function and identify physical deficits which impact ability to perform ADLs (bathing, care of mouth/teeth, toileting, grooming, dressing, etc.)  - Assess/evaluate cause of self-care deficits   - Assess range of motion  - Assess patient's mobility; develop plan if impaired  - Assess patient's need for assistive devices and provide as appropriate  - Encourage maximum independence but intervene and supervise when necessary  - Involve family in performance of ADLs  - Assess for home care needs following discharge   - Consider OT consult to assist with ADL evaluation and planning for discharge  - Provide patient education as appropriate  Outcome: Progressing  Goal: Maintains/Returns to pre admission functional level  Description: INTERVENTIONS:  - Perform AM-PAC 6 Click Basic Mobility/ Daily Activity assessment daily.  - Set and communicate daily mobility goal to care team and patient/family/caregiver.   - Out of bed for toileting  - Record patient progress and toleration of activity level   Outcome: Progressing     Problem: Knowledge Deficit  Goal: Patient/family/caregiver demonstrates understanding of disease process, treatment plan, medications, and discharge instructions  Description: Complete learning assessment and assess knowledge base.  Interventions:  - Provide teaching at level of understanding  - Provide teaching via preferred learning methods  Outcome: Progressing     Problem: DISCHARGE PLANNING  Goal: Discharge to home or other facility with appropriate resources  Description: INTERVENTIONS:  - Identify barriers to discharge w/patient and caregiver  - Arrange for needed discharge resources and transportation as appropriate  - Identify discharge learning needs (meds, wound care, etc.)  - Arrange for interpretive services to assist at discharge as needed  - Refer to Case Management  Department for coordinating discharge planning if the patient needs post-hospital services based on physician/advanced practitioner order or complex needs related to functional status, cognitive ability, or social support system  Outcome: Progressing     Problem: ANTEPARTUM  Goal: Maintain pregnancy as long as maternal and/or fetal condition is stable  Description: INTERVENTIONS:  - Maternal surveillance  - Fetal surveillance  - Monitor uterine activity  - Medications as ordered  - Bedrest  Outcome: Completed

## 2024-07-13 NOTE — PLAN OF CARE
Problem: PAIN - ADULT  Goal: Verbalizes/displays adequate comfort level or baseline comfort level  Description: Interventions:  - Encourage patient to monitor pain and request assistance  - Assess pain using appropriate pain scale  - Administer analgesics based on type and severity of pain and evaluate response  - Implement non-pharmacological measures as appropriate and evaluate response  - Consider cultural and social influences on pain and pain management  - Notify physician/advanced practitioner if interventions unsuccessful or patient reports new pain  Outcome: Completed     Problem: INFECTION - ADULT  Goal: Absence or prevention of progression during hospitalization  Description: INTERVENTIONS:  - Assess and monitor for signs and symptoms of infection  - Monitor lab/diagnostic results  - Monitor all insertion sites, i.e. indwelling lines, tubes, and drains  - Monitor endotracheal if appropriate and nasal secretions for changes in amount and color  - [place] appropriate cooling/warming therapies per order  - Administer medications as ordered  - Instruct and encourage patient and family to use good hand hygiene technique  - Identify and instruct in appropriate isolation precautions for identified infection/condition  Outcome: Completed  Goal: Absence of fever/infection during neutropenic period  Description: INTERVENTIONS:  - Monitor WBC    Outcome: Completed     Problem: SAFETY ADULT  Goal: Patient will remain free of falls  Description: INTERVENTIONS:  - Educate patient/family on patient safety including physical limitations  - Instruct patient to call for assistance with activity   - Consult OT/PT to assist with strengthening/mobility   - Keep Call bell within reach  - Keep bed low and locked with side rails adjusted as appropriate  - Keep care items and personal belongings within reach  - Initiate and maintain comfort rounds  Outcome: Completed  Goal: Maintain or return to baseline ADL  function  Description: INTERVENTIONS:  -  Assess patient's ability to carry out ADLs; assess patient's baseline for ADL function and identify physical deficits which impact ability to perform ADLs (bathing, care of mouth/teeth, toileting, grooming, dressing, etc.)  - Assess/evaluate cause of self-care deficits   - Assess range of motion  - Assess patient's mobility; develop plan if impaired  - Assess patient's need for assistive devices and provide as appropriate  - Encourage maximum independence but intervene and supervise when necessary  - Involve family in performance of ADLs  - Assess for home care needs following discharge   - Consider OT consult to assist with ADL evaluation and planning for discharge  - Provide patient education as appropriate  Outcome: Completed  Goal: Maintains/Returns to pre admission functional level  Description: INTERVENTIONS:  - Perform AM-PAC 6 Click Basic Mobility/ Daily Activity assessment daily.  - Set and communicate daily mobility goal to care team and patient/family/caregiver.   - Out of bed for toileting  - Record patient progress and toleration of activity level   Outcome: Completed     Problem: Knowledge Deficit  Goal: Patient/family/caregiver demonstrates understanding of disease process, treatment plan, medications, and discharge instructions  Description: Complete learning assessment and assess knowledge base.  Interventions:  - Provide teaching at level of understanding  - Provide teaching via preferred learning methods  Outcome: Completed     Problem: DISCHARGE PLANNING  Goal: Discharge to home or other facility with appropriate resources  Description: INTERVENTIONS:  - Identify barriers to discharge w/patient and caregiver  - Arrange for needed discharge resources and transportation as appropriate  - Identify discharge learning needs (meds, wound care, etc.)  - Arrange for interpretive services to assist at discharge as needed  - Refer to Case Management Department for  coordinating discharge planning if the patient needs post-hospital services based on physician/advanced practitioner order or complex needs related to functional status, cognitive ability, or social support system  Outcome: Completed

## 2024-07-13 NOTE — PROGRESS NOTES
"Cardiology Progress Note - Isidra Chamberlain 38 y.o. female MRN: [id number]    Unit/Bed#: -01 Encounter: [id number]    Assessment and plan  #1 transient hypotension resolved  #2 preeclampsia  #3 pulmonic flow murmur    Recommendations: Overall she is doing well and feels much better.  No further hypotension likely secondary to medications.  Echocardiogram shows structurally normal heart and no left ventricular hypertrophy.  No further cardiac workup is required.  Please call if any questions.    Subjective:    No significant events overnight.  Feels well denies any chest pain, shortness of breath, palpitations, lightheadedness, dizziness, or syncope.    ROS    Objective:   Vitals: Blood pressure 139/80, pulse 59, temperature 98 °F (36.7 °C), temperature source Oral, resp. rate 20, height 5' 6\" (1.676 m), weight 65.8 kg (145 lb), last menstrual period 2024, SpO2 96%, currently breastfeeding., Body mass index is 23.4 kg/m².,   Orthostatic Blood Pressures      Flowsheet Row Most Recent Value   Blood Pressure 139/80 filed at 2024 0419   Patient Position - Orthostatic VS Lying filed at 2024 0419           Systolic (24hrs), Av , Min:133 , Max:168     Diastolic (24hrs), Av, Min:62, Max:93      Intake/Output Summary (Last 24 hours) at 2024 0749  Last data filed at 2024 2225  Gross per 24 hour   Intake 450 ml   Output --   Net 450 ml     Weight (last 2 days)       Date/Time Weight    24 1510 65.8 (145)    24 0947 65.8 (145)    24 0737 --    Comment rows:    OBSERV: according to the patient the baby is active and denies feeling contractions. at 24 0737    24 0500 --    Comment rows:    OBSERV: No bleeding at 24 0500    24 0416 --    Comment rows:    OBSERV: No bleeding at 24 0416    24 0300 --    Comment rows:    OBSERV: No bleeding at 24 0300              Telemetry Review: No significant arrhythmias seen on telemetry review. "   EKG personally reviewed by Francisco J Jaquez DO.     Physical Exam  Vitals and nursing note reviewed.   Constitutional:       General: She is not in acute distress.     Appearance: She is well-developed.   HENT:      Head: Normocephalic and atraumatic.   Eyes:      Conjunctiva/sclera: Conjunctivae normal.      Pupils: Pupils are equal, round, and reactive to light.   Cardiovascular:      Rate and Rhythm: Normal rate and regular rhythm.      Pulses: Normal pulses.      Heart sounds: Normal heart sounds. No murmur heard.     No friction rub.   Pulmonary:      Effort: Pulmonary effort is normal. No respiratory distress.      Breath sounds: Normal breath sounds. No wheezing or rales.   Abdominal:      General: Bowel sounds are normal. There is no distension.      Palpations: Abdomen is soft.      Tenderness: There is no abdominal tenderness. There is no rebound.   Musculoskeletal:         General: No tenderness, deformity or edema. Normal range of motion.      Cervical back: Neck supple.      Right lower leg: No edema.      Left lower leg: No edema.   Skin:     General: Skin is warm and dry.      Findings: No erythema.   Neurological:      Mental Status: She is alert and oriented to person, place, and time.      Cranial Nerves: No cranial nerve deficit.   Psychiatric:         Mood and Affect: Mood and affect normal.           Laboratory Results:        CBC with diff:   Results from last 7 days   Lab Units 07/12/24  0816 07/12/24  0201 07/11/24  1920 07/11/24  1420 07/11/24  0919 07/10/24  0718 07/10/24  0225 07/09/24  1252 07/08/24  0815 07/07/24  1200 07/07/24  0626 07/06/24  0830   WBC Thousand/uL 12.91* 16.78* 14.33*  14.33* 13.21* 10.95* 11.63* 12.31*   < > 9.91   < > 12.38* 15.77*   HEMOGLOBIN g/dL 9.5* 10.1* 10.0*  10.0* 10.9* 9.8* 10.4* 10.4*   < > 10.2*   < > 9.8* 10.9*   HEMATOCRIT % 28.9* 31.0* 30.7*  30.7* 32.7* 28.8* 30.7* 31.6*   < > 31.5*   < > 29.3* 31.8*   MCV fL 91 92 92  92 91 91 90 91   < >  "94   < > 91 90   PLATELETS Thousands/uL 180 195 181  181 177 167 177 158   < > 160   < > 174 194   RBC Million/uL 3.18* 3.36* 3.34*  3.34* 3.60* 3.17* 3.42* 3.47*   < > 3.36*   < > 3.22* 3.54*   MCH pg 29.9 30.1 29.9  29.9 30.3 30.9 30.4 30.0   < > 30.4   < > 30.4 30.8   MCHC g/dL 32.9 32.6 32.6  32.6 33.3 34.0 33.9 32.9   < > 32.4   < > 33.4 34.3   RDW % 13.1 13.2 13.2  13.2 13.2 13.2 13.2 13.2   < > 13.5   < > 13.6 13.4   MPV fL 10.6 10.5 10.2  10.2 10.3 10.1 10.2 10.1   < > 10.1   < > 10.2 9.6   NRBC AUTO /100 WBCs  --   --   --   --   --   --   --   --  0  --  0 0    < > = values in this interval not displayed.         CMP:  Results from last 7 days   Lab Units 07/12/24  0816 07/12/24  0201 07/11/24  1920 07/11/24  1420 07/11/24  0919 07/10/24  0718 07/10/24  0225   POTASSIUM mmol/L 4.0 3.4* 3.8 3.7 3.6 3.7 3.7   CHLORIDE mmol/L 97 99 97 100 104 104 104   CO2 mmol/L 21 24 23 22 22 20* 22   BUN mg/dL 8 10 11 11 12 9 10   CREATININE mg/dL 0.81 0.83 0.88 0.73 0.78 0.67 0.77   CALCIUM mg/dL 6.8* 7.4* 7.5* 8.4 8.3* 7.5* 7.9*   AST U/L 23 18 17 17 15 17 18   ALT U/L 14 13 13 15 13 13 14   ALK PHOS U/L 58 64 66 71 59 65 66   EGFR ml/min/1.73sq m 92 89 83 104 96 111 98         BMP:  Results from last 7 days   Lab Units 07/12/24  0816 07/12/24  0201 07/11/24  1920 07/11/24  1420 07/11/24  0919 07/10/24  0718 07/10/24  0225   POTASSIUM mmol/L 4.0 3.4* 3.8 3.7 3.6 3.7 3.7   CHLORIDE mmol/L 97 99 97 100 104 104 104   CO2 mmol/L 21 24 23 22 22 20* 22   BUN mg/dL 8 10 11 11 12 9 10   CREATININE mg/dL 0.81 0.83 0.88 0.73 0.78 0.67 0.77   CALCIUM mg/dL 6.8* 7.4* 7.5* 8.4 8.3* 7.5* 7.9*       BNP: No results for input(s): \"BNP\" in the last 72 hours.    Magnesium:   Results from last 7 days   Lab Units 07/12/24  0816 07/12/24  0201 07/11/24  1920 07/11/24  1420 07/11/24  0919   MAGNESIUM mg/dL 7.1* 5.7* 7.2* 6.6* 5.6*       Coags:   Results from last 7 days   Lab Units 07/11/24  1934 07/10/24  0718 07/10/24  0225 " 24  2236 24  2146 24  1734 24  1252   PTT seconds 23 23 24 25 31 24 24   INR  1.00 0.98 1.05 1.00 1.27* 0.96 0.99       TSH:        Hemoglobin A1C       Lipid Profile:       Cardiac testing:   No results found for this or any previous visit.    No results found for this or any previous visit.    No results found for this or any previous visit.    No results found for this or any previous visit.      Meds/Allergies   all current active meds have been reviewed    Medications Prior to Admission:     acetaminophen (TYLENOL) 500 mg tablet    albuterol (PROVENTIL HFA,VENTOLIN HFA) 90 mcg/act inhaler    amLODIPine (NORVASC) 5 mg tablet    [employer] Vitamin C 500 MG tablet    diphenhydrAMINE (BENADRYL) 25 mg capsule    escitalopram (LEXAPRO) 20 mg tablet    famotidine (PEPCID) 20 mg tablet    ferrous sulfate 325 (65 Fe) mg tablet    fluticasone (FLONASE) 50 mcg/act nasal spray    hydrocortisone 1 % cream    methocarbamol (Robaxin-750) 750 mg tablet    methylPREDNISolone 4 MG tablet therapy pack    OneTouch Verio test strip    oxyCODONE (Roxicodone) 5 immediate release tablet    predniSONE 20 mg tablet    oxytocin, 250 pauline-units/min, Last Rate: Stopped (24)      Assessment:  Principal Problem:     delivery  Active Problems:    Asthma during pregnancy    History of diet controlled gestational diabetes mellitus (GDM)    Anemia affecting pregnancy in second trimester    History of  delivery    Request for sterilization    Marijuana use    Preeclampsia    Placental abruption, second trimester    Second trimester bleeding    Vaginal bleeding during pregnancy    Functional systolic murmur            Counseling / Coordination of Care  Total floor / unit time spent today 25 minutes.  Greater than 50% of total time was spent with the patient and / or family counseling and / or coordination of care.  A description of the counseling / coordination of care: .

## 2024-07-13 NOTE — PROGRESS NOTES
Progress Note - OB/GYN  Isidra Chamberlain 38 y.o. female MRN: [id number]  Unit/Bed#: -01 Encounter: [id number]    Assessment and Plan     Isidra Chamberlain is a patient of: [facility]. She is PPD# 2 s/p  spontaneous vaginal delivery  at 28w 2/2 placental abruption and preeclampsia with severe features. Recovering well and is stable       Placental abruption, second trimester  Assessment & Plan  S/p 1u FFP, 1u pRBCs, 1u cryo during first 24hr of admission  S/p NICU consultation  S/p betamethasone for fetal lung maturation -  Coagulation studies continue to be WNL    *  delivery  Assessment & Plan  Lochia WNL   Recovering well   Appropriate bowel and bladder function   Pain well controlled   Tolerating diet   Ambulating without issues   No lower extremity tenderness  GBS pos, adequate treatment   Rh pos       Preeclampsia  Assessment & Plan  Systolic (24hrs), Av , Min:133 , Max:168     Diastolic (24hrs), Av, Min:62, Max:93    Asymptomatic  CBC/CMP wnl, PC ratio 0.65  S/p magnesium  Procardia 30    Functional systolic murmur  Assessment & Plan  Remote history of childhood murmur, patient unsure of what type, self-resolved  Holosystolic murmur on exam on   Likely functional holosystolic ejection murmur in pregnancy  Denies chest pain, pressure, or shortness of breath  Continue to monitor    Request for sterilization  Assessment & Plan  Patient requests permanent sterilization  MA-31 signed 2024    Asthma during pregnancy  Assessment & Plan  Albuterol prn        Disposition    - Anticipate discharge home today      Subjective/Objective     Chief Complaint: Postpartum State     Subjective:    Isidra Chamberlain is PPD#2 s/p  spontaneous vaginal delivery. She has no current complaints.  Pain is well controlled.  Patient is currently voiding.  She is ambulating.  Patient is currently passing flatus and has had bowel movement. She is tolerating PO, and denies nausea or vomitting. Patient denies fever, chills,  "chest pain, shortness of breath, or calf tenderness. Lochia is minimal. She is recovering well and is stable.       Vitals:   /80 (BP Location: Right arm)   Pulse 59   Temp 98 °F (36.7 °C) (Oral)   Resp 20   Ht 5' 6\" (1.676 m)   Wt 65.8 kg (145 lb)   LMP 01/25/2024   SpO2 96%   Breastfeeding Yes   BMI 23.40 kg/m²       Intake/Output Summary (Last 24 hours) at 7/13/2024 0653  Last data filed at 7/12/2024 2225  Gross per 24 hour   Intake 450 ml   Output --   Net 450 ml       Invasive Devices       Peripheral Intravenous Line  Duration             Peripheral IV 07/10/24 Right;Ventral (anterior) Forearm 2 days                    Physical Exam:   GEN: Isidra Chamberlain appears well, alert, pleasant and cooperative   CARDIO: RRR  RESP:  Normal respiratory effort  ABDOMEN: Soft, no tenderness, no distention, fundus firm  EXTREMITIES: Non tender, no erythema, b/l Martha's sign negative      Labs:     Hemoglobin   Date Value Ref Range Status   07/12/2024 9.5 (L) 11.5 - 15.4 g/dL Final   07/12/2024 10.1 (L) 11.5 - 15.4 g/dL Final   08/28/2015 12.6 11.5 - 15.4 g/dL Final   06/24/2014 12.4 11.5 - 15.4 g/dL Final     WBC   Date Value Ref Range Status   07/12/2024 12.91 (H) 4.31 - 10.16 Thousand/uL Final   07/12/2024 16.78 (H) 4.31 - 10.16 Thousand/uL Final   08/28/2015 7.80 4.31 - 10.16 Thousand/uL Final   06/24/2014 8.53 4.31 - 10.16 Thousand/uL Final     Platelets   Date Value Ref Range Status   07/12/2024 180 149 - 390 Thousands/uL Final   07/12/2024 195 149 - 390 Thousands/uL Final   08/28/2015 195 149 - 390 Thousand/uL Final   06/24/2014 216 149 - 390 Thousand/uL Final     Creatinine   Date Value Ref Range Status   07/12/2024 0.81 0.60 - 1.30 mg/dL Final     Comment:     Standardized to IDMS reference method   07/12/2024 0.83 0.60 - 1.30 mg/dL Final     Comment:     Standardized to IDMS reference method   05/14/2024 0.82 0.40 - 1.10 mg/dL Final   10/23/2023 0.85 0.40 - 1.10 mg/dL Final     AST   Date Value Ref " Range Status   07/12/2024 23 13 - 39 U/L Final     Comment:     Slightly Hemolyzed:Results may be affected.   07/12/2024 18 13 - 39 U/L Final   05/14/2024 18 <41 U/L Final   10/23/2023 34 <41 U/L Final     ALT   Date Value Ref Range Status   07/12/2024 14 7 - 52 U/L Final     Comment:     Specimen collection should occur prior to Sulfasalazine administration due to the potential for falsely depressed results.    07/12/2024 13 7 - 52 U/L Final     Comment:     Specimen collection should occur prior to Sulfasalazine administration due to the potential for falsely depressed results.    05/14/2024 9 <56 U/L Final   10/23/2023 27 <56 U/L Final          Pedro Moulton MD  OBGYN PGY3  7/13/2024  6:53 AM

## 2024-07-15 ENCOUNTER — TELEPHONE (OUTPATIENT)
Dept: GYNECOLOGY | Facility: CLINIC | Age: 39
End: 2024-07-15

## 2024-07-15 NOTE — TELEPHONE ENCOUNTER
----- Message from Michelle Cisneros DO sent at 2024  9:39 AM EDT -----  Regarding: Expedited PP tubal for MR  MA-31 scanned and emailed 24, signed 24    [facility] GYN Department  Surgery Scheduling Sheet    Patient Name: Isidra hCamberlain  : 1985    Provider: Michelle Cisneros DO     Needed: no; Language: N/A    Procedure: exam under anesthesia, bilateral salpingectomy    Diagnosis: request for permanent sterilization    Special Needs or Equipment: none    Anesthesia: general    Length of stay: outpatient  Does patient have comorbid conditions that will require close perioperative monitoring prior to safe discharge: no    The patient has comorbid conditions that will require close perioperative monitoring prior to safe discharge, including N/A.   This may require acute care beyond the usual and routine recovery period. As such, inpatient admission post-operatively is expected and appropriate, and anticipated hospital length of stay will be >2 midnights.    Pre-Admission Testing Needed: no   Labs that should be ordered: N/A    Order PAT that is recommended in prep for procedure?: No    Medical Clearance Needed: no; Provider: Dr. Madison Andres - date available 10/7    MA Form Signed (tubals/hysterectomy): Yes, signed , emailed     Surgical Drink Given: no    How many days out of work: 1 to 2 weeks  How many days no drivin to 5 day, no driving on opioids    Is pre op appt needed?  Pre-op and Post-partum visit can be combined  Interval for post op appt: 2 weeks     For Surgical Scheduler:     Surgery Scheduled On:  [place]:    Pre-op Appt:   Post op Appt:  Consult/Medical clearance appt:

## 2024-07-15 NOTE — LACTATION NOTE
Met with Isidra who is pumping for her baby boy in NICU. She reports that pumping is going well and she has no questions or concerns at this time.    Her Zomee Z2 Breast Pump was delivered to her bedside today.    Encouraged her to reach out to lactation as needed while her baby is a patient in the hospital.

## 2024-07-15 NOTE — UTILIZATION REVIEW
Discharge Summary   Isidra Chamberlain MRN: [id number]  Unit/Bed#: LD TRIAGE 3- Encounter: [id number]        Admission Date: 2024      Discharge Date: 2024     Attending: Francisco J Godoy MD     Principal Diagnosis:  Placental Abruption  27 weeks gestation of pregnancy [Z3A.27]  Preeclampsia with severe features  Asthma  Anemia  THC use        Procedures:    Vaginal Delivery     Hospital course:  Isidra Chamberlain is a 37 yo  who initially presented at 27w1d for heavy vaginal bleeding s/p intercourse.  She had been in her usual state of health prior to this and had sudden onset of heavy vaginal bleeding after intercourse, at which time EMS was called and brought her to the hospital.  En route, maternal bradycardia with HR in the 40s was noted, and she was treated with IVF.  On arrival to L&D triage, heavy vaginal bleeding was noted with speculum exam notable for large blood clot and slow persistent trickle from the cervical os.  SVE was 1.5/30/-3, and cervical length was 3.9 cm.  Fetal monitoring was reactive, and toco showed contractions every 2-3 min which patient started to feel while in L&D triage.  Hgb was 10.0, Fibrinogen was 278, and coags were normal.  FFN was positive.  Due to concern for placental abruption and/or  labor, she was made NPO due to need for possible emergent delivery via  in the setting of breech presentation.  She was transfused 1U FFP and 1U pRBCs due to concern for evolving coagulopathy in the setting of low-normal fibrinogen with ongoing bleeding.  MFM & Neonatology were consulted.  Betamethasone was started on 24 for fetal lung maturation.  Magnesium was started on 24 for fetal neuroprotection.  Penicillin was started on 24 for GBS prophylaxis in the setting of unknown GBS status in a  gestation.  She disclosed a h/o smoking THC, but UDS resulted positive for THC & methamphetamines.  Patient declines use of methamphetamines.  On  admission, she met criteria for GHTN.  CBC & CMP showed no evidence of preeclampsia, and urine P:C was elevated, likely secondary to blood rather than true elevation.     Repeat exam shortly after admission showed slightly less bleeding than before.  Another repeat exam showed still less bleeding than before.  Because of stable maternal & fetal status and improving vaginal bleeding, expectant management was pursued, and labs were repeated and found to be normal. An ultrasound was performed and a large clot (9 x 10 cm) was noted confirming suspicions of placental abruption. Fetal monitoring and maternal status continued to be reassuring.      On hospital day #6, she began to feel more uncomfortable cramping through the afternoon and SVE was 2/50/-3. Fetal head was palpated on exam and TAUS confirmed fetus was cephalic. She was brought down to a labor room to allow for expectant management. Magnesium and PCN were restarted. Contractions lessened in severity, and labor did not progress. She returned to an antepartum room as clinically she remained stable with no contraindication to expectant management.     Throughout her hospital stay she was noted to have intermittently elevated blood pressures. On hospital day #7 she was noted to have sustained severe range blood pressures and was treated acutely with 20/40mg of IV labetalol. Given concern for abruption and new diagnosis of preeclampsia with severe features, the recommendation was made to proceed with IOL. Magnesium was started again for neuroprotection and seizure prophylaxis. Penicillin was restarting for GBS prophylaxis.      At the start of her induction she was noted to be 2/50/-3. She was started on a low dose pitocin titration and made change to 3/60/-2. She received an epidural for analgesia. During her labor course she became acutely hypotensive with blood pressures in the 70s/40s. IV fluid bolus was administered, magnesium infusion was briefly held. STAT  labs were noted to be largely unchanged from baseline. Anesthesia administered medication for blood pressure support and symptoms were noted to resolve.      She was AROM'd for blood tinged fluid. Her fetal heart tracing was noted to be briefly category II with variable decelerations. She progressed to complete and began pushing. She delivered a viable male  weighing 2lb 6.8 oz (1100 grams) via  with no lacerations.      She had an episode of hypotension and bradycardia overnight on PPD#0 - #1 Her bradycardia continued into the morning with elevated and some severe-range pressures. A cardiology consult was obtained to rule out any acute pathology. The hypotensive episode was largely attributed to the epidural and IV labetalol.      With her blood pressures elevated in the postpartum period with some non-sustained severe-range pressures Procardia XL was started and up-titrated to 60mg daily before discharge.      Lab Results:         Lab Results   Component Value Date     WBC 12.29 (H) 2024     HGB 10.5 (L) 2024     HCT 31.4 (L) 2024     MCV 90 2024      2024            Lab Results   Component Value Date     GLUCOSE 88 2015     CALCIUM 8.0 (L) 2024      2015     K 3.9 2024     CO2 21 2024      2024     BUN 9 2024     CREATININE 0.85 2024            Lab Results   Component Value Date/Time     POCGLU 99 2024 02:53 AM            Lab Results   Component Value Date     PTT 26 2024            Lab Results   Component Value Date     INR 0.95 2024     INR 1.05 2023     INR 1.02 2023     PROTIME 13.2 2024     PROTIME 13.9 2023     PROTIME 13.6 2023         Complications: none apparent     Condition at discharge: stable      Discharge instructions/Information to patient and family:   See After Visit Summary for information provided to patient and family.       Provisions for  Follow-Up Care:  See After Visit Summary for information related to follow-up care and any pertinent home health orders.       Disposition: See After Visit Summary for discharge disposition information.     Planned Readmission: Yes, pending clinical status     Discharge Medications:  For a complete list of the patient's medications, please refer to her med rec.

## 2024-07-16 ENCOUNTER — DOCUMENTATION (OUTPATIENT)
Dept: OBGYN CLINIC | Facility: CLINIC | Age: 39
End: 2024-07-16

## 2024-07-16 DIAGNOSIS — O14.93 PRE-ECLAMPSIA IN THIRD TRIMESTER: ICD-10-CM

## 2024-07-16 DIAGNOSIS — O14.93 PRE-ECLAMPSIA IN THIRD TRIMESTER: Primary | ICD-10-CM

## 2024-07-16 RX ORDER — NIFEDIPINE 60 MG/1
60 TABLET, EXTENDED RELEASE ORAL DAILY
Qty: 30 TABLET | Refills: 2 | Status: SHIPPED | OUTPATIENT
Start: 2024-07-16 | End: 2024-07-16

## 2024-07-16 RX ORDER — NIFEDIPINE 60 MG/1
60 TABLET, EXTENDED RELEASE ORAL DAILY
Qty: 90 TABLET | Refills: 2 | Status: SHIPPED | OUTPATIENT
Start: 2024-07-16 | End: 2025-04-12

## 2024-07-16 RX ORDER — NIFEDIPINE 30 MG/1
60 TABLET, EXTENDED RELEASE ORAL DAILY
Qty: 90 TABLET | Refills: 2 | Status: SHIPPED | OUTPATIENT
Start: 2024-07-16 | End: 2024-07-16 | Stop reason: DRUGHIGH

## 2024-07-16 NOTE — PROGRESS NOTES
Unable to reach patient  to set up tubal  date and post partum pre op  appt    Number is not reachable?     Will call back when different number is given.     Looking at 8/23 with Dr Dumont and post partum pre op prior.    thanks

## 2024-07-17 PROCEDURE — 88307 TISSUE EXAM BY PATHOLOGIST: CPT | Performed by: PATHOLOGY

## 2024-07-18 NOTE — PROGRESS NOTES
"OB/GYN VISIT  Isidra Chamberlain  2024  10:45 AM    Subjective:     Isidra Chamberlain is a 38 y.o.  female who presents for blood pressure check. Patient is 1 week postpartum following a vaginal delivery in the setting of an induction of labor for preeclampsia with severe features and suspected placental abruption.  She was discharge home on Procardia XL 60mg daily. She reports that she takes it every day at 4pm.   Today she denies any s/s of PreE including headache, vision changes, chest pain, SOB, swelling.  She says that her baby is doing well in the NICU.   She desires permanent sterilization and her phone number was confirmed for surgical scheduling.       Past Medical History:   Diagnosis Date    Anemia affecting pregnancy 2023    Asthma     Carpal tunnel syndrome, bilateral     Chlamydia infection     Elevated blood pressure reading without diagnosis of hypertension 2023    Gastroesophageal reflux disease without esophagitis 2023     uterine contractions 2024     Past Surgical History:   Procedure Laterality Date    NO PAST SURGERIES         Objective:    Vitals: Blood pressure 131/90, pulse 83, height 5' 6\" (1.676 m), weight 61 kg (134 lb 6.4 oz), last menstrual period 2024, currently breastfeeding.Body mass index is 21.69 kg/m².    Physical Exam  Constitutional:       General: She is not in acute distress.     Appearance: Normal appearance.   HENT:      Head: Normocephalic and atraumatic.   Cardiovascular:      Rate and Rhythm: Normal rate.      Pulses: Normal pulses.   Pulmonary:      Effort: Pulmonary effort is normal. No respiratory distress.      Breath sounds: Normal breath sounds.   Abdominal:      General: Abdomen is flat.      Palpations: Abdomen is soft.   Skin:     General: Skin is warm and dry.   Neurological:      General: No focal deficit present.      Mental Status: She is alert.   Psychiatric:         Mood and Affect: Mood normal.         Behavior: " Behavior normal.           Assessment/Plan:  Problem List Items Addressed This Visit          Cardiovascular and Mediastinum    Preeclampsia - Primary     /90 today  Denies s/s  Continue Procardia XL 60mg daily  RTO in 2 weeks for PP visit.             D/w Dr. Dmitry Dixon MD  7/19/2024  10:45 AM        Please note that while the recent CURES Act permits medical records be visible for patient review, clinical documentation is intended for utilization by healthcare professionals.  All test results are immediately available through [employer] as well, and we will review results at earliest opportunity and contact you with the appropriate urgency.  If you have a question about something you see in your chart, please don't hesitate to ask about it at your next appointment.

## 2024-07-19 ENCOUNTER — OFFICE VISIT (OUTPATIENT)
Dept: OBGYN CLINIC | Facility: CLINIC | Age: 39
End: 2024-07-19

## 2024-07-19 VITALS
DIASTOLIC BLOOD PRESSURE: 90 MMHG | BODY MASS INDEX: 21.6 KG/M2 | WEIGHT: 134.4 LBS | HEART RATE: 83 BPM | HEIGHT: 66 IN | SYSTOLIC BLOOD PRESSURE: 131 MMHG

## 2024-07-19 DIAGNOSIS — O14.90 PRE-ECLAMPSIA, ANTEPARTUM: Primary | ICD-10-CM

## 2024-07-19 PROCEDURE — 99024 POSTOP FOLLOW-UP VISIT: CPT | Performed by: OBSTETRICS & GYNECOLOGY

## 2024-08-01 ENCOUNTER — TELEPHONE (OUTPATIENT)
Dept: FAMILY MEDICINE CLINIC | Facility: CLINIC | Age: 39
End: 2024-08-01

## 2024-08-01 NOTE — TELEPHONE ENCOUNTER
Patient called and stated that she just had a drug screen at work and there are 2 medications that she had taken within the last week,  Its Methocarbamol 750 mg and Tramadol for her sciatica.   She needs a letter stating that these prescriptions were prescribed to her. It looks like they were discontinued from the hospital.  She only was using them sparingly, and since she was having pain she decided to take what she has had left.  Is this letter ok to write?    Please call to advise

## 2024-08-23 ENCOUNTER — ANESTHESIA (OUTPATIENT)
Dept: PERIOP | Facility: HOSPITAL | Age: 39
End: 2024-08-23
Payer: COMMERCIAL

## 2024-08-23 ENCOUNTER — ANESTHESIA EVENT (OUTPATIENT)
Dept: PERIOP | Facility: HOSPITAL | Age: 39
End: 2024-08-23
Payer: COMMERCIAL

## 2024-08-23 ENCOUNTER — HOSPITAL ENCOUNTER (OUTPATIENT)
Facility: HOSPITAL | Age: 39
Setting detail: OUTPATIENT SURGERY
Discharge: HOME | End: 2024-08-23
Attending: OBSTETRICS & GYNECOLOGY | Admitting: OBSTETRICS & GYNECOLOGY
Payer: COMMERCIAL

## 2024-08-23 VITALS
DIASTOLIC BLOOD PRESSURE: 93 MMHG | SYSTOLIC BLOOD PRESSURE: 164 MMHG | HEIGHT: 66 IN | OXYGEN SATURATION: 98 % | BODY MASS INDEX: 21.69 KG/M2 | TEMPERATURE: 97.8 F | RESPIRATION RATE: 21 BRPM | HEART RATE: 52 BPM | WEIGHT: 135 LBS

## 2024-08-23 DIAGNOSIS — Z30.2 STERILIZATION: ICD-10-CM

## 2024-08-23 DIAGNOSIS — G89.18 POST-OP PAIN: Primary | ICD-10-CM

## 2024-08-23 LAB
EXT PREGNANCY TEST URINE: NEGATIVE
EXT. CONTROL: NORMAL

## 2024-08-23 PROCEDURE — 88302 TISSUE EXAM BY PATHOLOGIST: CPT | Performed by: PATHOLOGY

## 2024-08-23 PROCEDURE — 81025 URINE PREGNANCY TEST: CPT | Performed by: OBSTETRICS & GYNECOLOGY

## 2024-08-23 RX ORDER — ACETAMINOPHEN 325 MG/1
650 TABLET ORAL EVERY 6 HOURS PRN
Status: CANCELLED | OUTPATIENT
Start: 2024-08-23

## 2024-08-23 RX ORDER — ONDANSETRON 2 MG/ML
INJECTION INTRAMUSCULAR; INTRAVENOUS AS NEEDED
Status: DISCONTINUED | OUTPATIENT
Start: 2024-08-23 | End: 2024-08-23

## 2024-08-23 RX ORDER — FENTANYL CITRATE/PF 50 MCG/ML
50 SYRINGE (ML) INJECTION
Status: DISCONTINUED | OUTPATIENT
Start: 2024-08-23 | End: 2024-08-23 | Stop reason: HOSPADM

## 2024-08-23 RX ORDER — HYDROMORPHONE HCL/PF 1 MG/ML
0.5 SYRINGE (ML) INJECTION
Status: DISCONTINUED | OUTPATIENT
Start: 2024-08-23 | End: 2024-08-23 | Stop reason: HOSPADM

## 2024-08-23 RX ORDER — LIDOCAINE HYDROCHLORIDE 10 MG/ML
INJECTION, SOLUTION EPIDURAL; INFILTRATION; INTRACAUDAL; PERINEURAL AS NEEDED
Status: DISCONTINUED | OUTPATIENT
Start: 2024-08-23 | End: 2024-08-23

## 2024-08-23 RX ORDER — OXYCODONE AND ACETAMINOPHEN 5; 325 MG/1; MG/1
1-2 TABLET ORAL EVERY 6 HOURS PRN
Qty: 8 TABLET | Refills: 0 | Status: SHIPPED | OUTPATIENT
Start: 2024-08-23 | End: 2024-09-02

## 2024-08-23 RX ORDER — MIDAZOLAM HYDROCHLORIDE 2 MG/2ML
INJECTION, SOLUTION INTRAMUSCULAR; INTRAVENOUS AS NEEDED
Status: DISCONTINUED | OUTPATIENT
Start: 2024-08-23 | End: 2024-08-23

## 2024-08-23 RX ORDER — SODIUM CHLORIDE, SODIUM LACTATE, POTASSIUM CHLORIDE, CALCIUM CHLORIDE 600; 310; 30; 20 MG/100ML; MG/100ML; MG/100ML; MG/100ML
INJECTION, SOLUTION INTRAVENOUS CONTINUOUS PRN
Status: DISCONTINUED | OUTPATIENT
Start: 2024-08-23 | End: 2024-08-23

## 2024-08-23 RX ORDER — PROPOFOL 10 MG/ML
INJECTION, EMULSION INTRAVENOUS AS NEEDED
Status: DISCONTINUED | OUTPATIENT
Start: 2024-08-23 | End: 2024-08-23

## 2024-08-23 RX ORDER — FENTANYL CITRATE 50 UG/ML
INJECTION, SOLUTION INTRAMUSCULAR; INTRAVENOUS AS NEEDED
Status: DISCONTINUED | OUTPATIENT
Start: 2024-08-23 | End: 2024-08-23

## 2024-08-23 RX ORDER — ONDANSETRON 2 MG/ML
4 INJECTION INTRAMUSCULAR; INTRAVENOUS ONCE AS NEEDED
Status: DISCONTINUED | OUTPATIENT
Start: 2024-08-23 | End: 2024-08-23 | Stop reason: HOSPADM

## 2024-08-23 RX ORDER — ROCURONIUM BROMIDE 10 MG/ML
INJECTION, SOLUTION INTRAVENOUS AS NEEDED
Status: DISCONTINUED | OUTPATIENT
Start: 2024-08-23 | End: 2024-08-23

## 2024-08-23 RX ORDER — IBUPROFEN 400 MG/1
600 TABLET, FILM COATED ORAL EVERY 6 HOURS PRN
Status: CANCELLED | OUTPATIENT
Start: 2024-08-23

## 2024-08-23 RX ORDER — KETOROLAC TROMETHAMINE 30 MG/ML
INJECTION, SOLUTION INTRAMUSCULAR; INTRAVENOUS AS NEEDED
Status: DISCONTINUED | OUTPATIENT
Start: 2024-08-23 | End: 2024-08-23

## 2024-08-23 RX ORDER — SODIUM CHLORIDE, SODIUM LACTATE, POTASSIUM CHLORIDE, CALCIUM CHLORIDE 600; 310; 30; 20 MG/100ML; MG/100ML; MG/100ML; MG/100ML
75 INJECTION, SOLUTION INTRAVENOUS CONTINUOUS
Status: DISCONTINUED | OUTPATIENT
Start: 2024-08-23 | End: 2024-08-23 | Stop reason: HOSPADM

## 2024-08-23 RX ORDER — ONDANSETRON 2 MG/ML
4 INJECTION INTRAMUSCULAR; INTRAVENOUS EVERY 6 HOURS PRN
Status: CANCELLED | OUTPATIENT
Start: 2024-08-23

## 2024-08-23 RX ORDER — BUPIVACAINE HYDROCHLORIDE 2.5 MG/ML
INJECTION, SOLUTION EPIDURAL; INFILTRATION; INTRACAUDAL AS NEEDED
Status: DISCONTINUED | OUTPATIENT
Start: 2024-08-23 | End: 2024-08-23 | Stop reason: HOSPADM

## 2024-08-23 RX ORDER — DEXAMETHASONE SODIUM PHOSPHATE 10 MG/ML
INJECTION, SOLUTION INTRAMUSCULAR; INTRAVENOUS AS NEEDED
Status: DISCONTINUED | OUTPATIENT
Start: 2024-08-23 | End: 2024-08-23

## 2024-08-23 RX ADMIN — FENTANYL CITRATE 50 MCG: 50 INJECTION INTRAMUSCULAR; INTRAVENOUS at 15:16

## 2024-08-23 RX ADMIN — SODIUM CHLORIDE, SODIUM LACTATE, POTASSIUM CHLORIDE, AND CALCIUM CHLORIDE: .6; .31; .03; .02 INJECTION, SOLUTION INTRAVENOUS at 13:55

## 2024-08-23 RX ADMIN — DEXAMETHASONE SODIUM PHOSPHATE 10 MG: 10 INJECTION, SOLUTION INTRAMUSCULAR; INTRAVENOUS at 14:05

## 2024-08-23 RX ADMIN — FENTANYL CITRATE 50 MCG: 50 INJECTION INTRAMUSCULAR; INTRAVENOUS at 14:26

## 2024-08-23 RX ADMIN — FENTANYL CITRATE 50 MCG: 50 INJECTION INTRAMUSCULAR; INTRAVENOUS at 14:36

## 2024-08-23 RX ADMIN — PROPOFOL 200 MG: 10 INJECTION, EMULSION INTRAVENOUS at 14:05

## 2024-08-23 RX ADMIN — KETOROLAC TROMETHAMINE 15 MG: 30 INJECTION, SOLUTION INTRAMUSCULAR; INTRAVENOUS at 15:03

## 2024-08-23 RX ADMIN — ONDANSETRON 4 MG: 2 INJECTION INTRAMUSCULAR; INTRAVENOUS at 14:59

## 2024-08-23 RX ADMIN — ROCURONIUM BROMIDE 50 MG: 10 INJECTION, SOLUTION INTRAVENOUS at 14:05

## 2024-08-23 RX ADMIN — SUGAMMADEX 150 MG: 100 INJECTION, SOLUTION INTRAVENOUS at 15:10

## 2024-08-23 RX ADMIN — MIDAZOLAM 2 MG: 1 INJECTION INTRAMUSCULAR; INTRAVENOUS at 13:55

## 2024-08-23 RX ADMIN — LIDOCAINE HYDROCHLORIDE 50 MG: 10 INJECTION, SOLUTION EPIDURAL; INFILTRATION; INTRACAUDAL; PERINEURAL at 14:05

## 2024-08-23 RX ADMIN — FENTANYL CITRATE 50 MCG: 50 INJECTION INTRAMUSCULAR; INTRAVENOUS at 14:05

## 2024-08-23 RX ADMIN — SODIUM CHLORIDE, SODIUM LACTATE, POTASSIUM CHLORIDE, AND CALCIUM CHLORIDE 75 ML/HR: .6; .31; .03; .02 INJECTION, SOLUTION INTRAVENOUS at 11:49

## 2024-08-23 NOTE — ANESTHESIA PREPROCEDURE EVALUATION
Procedure:  SALPINGECTOMY, LAPAROSCOPIC BILATERAL, AND EXAM UNDER ANESTHESIA (Bilateral: Abdomen)    Relevant Problems   CARDIO   (+) Functional systolic murmur      MUSCULOSKELETAL   (+) Acute midline low back pain without sciatica   (+) Chronic midline low back pain without sciatica   (+) Sciatica of right side      NEURO/PSYCH   (+) Anxiety   (+) Chronic midline low back pain without sciatica      PULMONARY   (+) Asthma during pregnancy      Behavioral Health   (+) Marijuana use      Recent   delivery for pre-eclampsia 2024    Physical Exam    Airway    Mallampati score: II  TM Distance: >3 FB  Neck ROM: full     Dental   No notable dental hx     Cardiovascular      Pulmonary      Other Findings  post-pubertal.      Anesthesia Plan  ASA Score- 2     Anesthesia Type- general with ASA Monitors.         Additional Monitors:     Airway Plan: ETT.           Plan Factors-Exercise tolerance (METS): >4 METS.    Chart reviewed.    Patient summary reviewed.                  Induction- intravenous.    Postoperative Plan-         Informed Consent- Anesthetic plan and risks discussed with patient.  I personally reviewed this patient with the CRNA. Discussed and agreed on the Anesthesia Plan with the CRNA..

## 2024-08-23 NOTE — H&P
"H&P Exam - Gynecology   Isidra Chamberlain 38 y.o. female MRN: [id number]  Unit/Bed#: OR [place] Encounter: [id number]    Assessment & Plan     Assessment:  37 y/o  approximately 6 weeks postpartum presents for bilateral salpingectomy. No complaints, feels well.  Plan:  - to OR for bilateral salpingectomy    History of Present Illness   HPI:  Isidra Chamberlain is a 38 y.o. female who presents for request for sterilization with bilateral salpingectomy. She is a  who most recently gave birth via  on 2024. Today she feels well. Reports no new health issues or medications since her delivery. Reports sexual intercourse 2 days prior to current presentation. LMP 2024.    Historical Information   Past Medical History:   Diagnosis Date    Anemia affecting pregnancy 2023    Asthma     Carpal tunnel syndrome, bilateral     Chlamydia infection     Elevated blood pressure reading without diagnosis of hypertension 2023    Gastroesophageal reflux disease without esophagitis 2023     uterine contractions 2024     Past Surgical History:   Procedure Laterality Date    NO PAST SURGERIES       OB/GYN History:   Family History   Problem Relation Age of Onset    No Known Problems Mother      Social History   Social History     Substance and Sexual Activity   Alcohol Use Not Currently    Comment: social ; Denied history of alcohol use     Social History     Substance and Sexual Activity   Drug Use No     Social History     Tobacco Use   Smoking Status Never   Smokeless Tobacco Never     E-Cigarette/Vaping    E-Cigarette Use Never User      E-Cigarette/Vaping Substances    Nicotine No     THC Yes     CBD No     Flavoring No     Other No     Unknown No      Meds/Allergies   No Known Allergies    Objective   Vitals: Blood pressure 144/95, pulse 55, temperature 97.9 °F (36.6 °C), resp. rate 16, height 5' 6\" (1.676 m), weight 61.2 kg (135 lb), last menstrual period 2024, SpO2 100%, not " currently breastfeeding.    No intake or output data in the 24 hours ending 08/23/24 1338    Invasive Devices:   Invasive Devices       Peripheral Intravenous Line  Duration             Peripheral IV 08/23/24 Dorsal (posterior);Right Hand <1 day                  Physical Exam  Vitals reviewed.   Constitutional:       General: She is not in acute distress.     Appearance: Normal appearance.   HENT:      Head: Normocephalic and atraumatic.   Cardiovascular:      Rate and Rhythm: Bradycardia present.   Pulmonary:      Effort: Pulmonary effort is normal. No respiratory distress.   Neurological:      Mental Status: She is alert.   Psychiatric:         Mood and Affect: Affect normal.         Behavior: Behavior normal.       Lab Results: I have personally reviewed pertinent reports.    Imaging:  n/a  EKG, Pathology, and Other Studies:  n/a    Code Status: Prior  Advance Directive and Living Will:      Power of :    POLST:      Anita Washburn MD  OBGYN PGY-1  08/23/24  1:46 PM

## 2024-08-23 NOTE — ANESTHESIA POSTPROCEDURE EVALUATION
Post-Op Assessment Note    CV Status:  Stable  Pain Score: 0    Pain management: adequate       Mental Status:  Alert and awake   Hydration Status:  Euvolemic   PONV Controlled:  Controlled   Airway Patency:  Patent     Post Op Vitals Reviewed: Yes    No anethesia notable event occurred.    Staff: Anesthesiologist, CRNA               BP   174/92   Temp   97.6   Pulse  65   Resp   16   SpO2   99

## 2024-08-29 PROCEDURE — 88302 TISSUE EXAM BY PATHOLOGIST: CPT | Performed by: PATHOLOGY

## 2024-09-22 ENCOUNTER — HOSPITAL ENCOUNTER (EMERGENCY)
Facility: HOSPITAL | Age: 39
Discharge: HOME | End: 2024-09-22
Attending: EMERGENCY MEDICINE | Admitting: EMERGENCY MEDICINE
Payer: COMMERCIAL

## 2024-09-22 ENCOUNTER — APPOINTMENT (EMERGENCY)
Dept: RADIOLOGY | Facility: HOSPITAL | Age: 39
End: 2024-09-22
Payer: COMMERCIAL

## 2024-09-22 VITALS
SYSTOLIC BLOOD PRESSURE: 135 MMHG | RESPIRATION RATE: 18 BRPM | HEART RATE: 73 BPM | OXYGEN SATURATION: 100 % | TEMPERATURE: 97.6 F | DIASTOLIC BLOOD PRESSURE: 100 MMHG

## 2024-09-22 DIAGNOSIS — M25.512 LEFT SHOULDER PAIN: ICD-10-CM

## 2024-09-22 DIAGNOSIS — S49.90XA SHOULDER INJURY: Primary | ICD-10-CM

## 2024-09-22 PROCEDURE — 96372 THER/PROPH/DIAG INJ SC/IM: CPT

## 2024-09-22 PROCEDURE — 99283 EMERGENCY DEPT VISIT LOW MDM: CPT

## 2024-09-22 PROCEDURE — 99284 EMERGENCY DEPT VISIT MOD MDM: CPT | Performed by: EMERGENCY MEDICINE

## 2024-09-22 PROCEDURE — 73030 X-RAY EXAM OF SHOULDER: CPT

## 2024-09-22 RX ORDER — CYCLOBENZAPRINE HCL 10 MG
10 TABLET ORAL 2 TIMES DAILY PRN
Qty: 20 TABLET | Refills: 0 | Status: SHIPPED | OUTPATIENT
Start: 2024-09-22

## 2024-09-22 RX ORDER — LIDOCAINE 50 MG/G
1 PATCH TOPICAL ONCE
Status: DISCONTINUED | OUTPATIENT
Start: 2024-09-22 | End: 2024-09-22 | Stop reason: HOSPADM

## 2024-09-22 RX ORDER — ACETAMINOPHEN 325 MG/1
650 TABLET ORAL ONCE
Status: COMPLETED | OUTPATIENT
Start: 2024-09-22 | End: 2024-09-22

## 2024-09-22 RX ORDER — KETOROLAC TROMETHAMINE 30 MG/ML
15 INJECTION, SOLUTION INTRAMUSCULAR; INTRAVENOUS ONCE
Status: COMPLETED | OUTPATIENT
Start: 2024-09-22 | End: 2024-09-22

## 2024-09-22 RX ORDER — CYCLOBENZAPRINE HCL 10 MG
10 TABLET ORAL ONCE
Status: COMPLETED | OUTPATIENT
Start: 2024-09-22 | End: 2024-09-22

## 2024-09-22 RX ADMIN — LIDOCAINE 1 PATCH: 50 PATCH CUTANEOUS at 13:24

## 2024-09-22 RX ADMIN — ACETAMINOPHEN 650 MG: 325 TABLET ORAL at 13:24

## 2024-09-22 RX ADMIN — KETOROLAC TROMETHAMINE 15 MG: 30 INJECTION, SOLUTION INTRAMUSCULAR; INTRAVENOUS at 13:23

## 2024-09-22 RX ADMIN — CYCLOBENZAPRINE HYDROCHLORIDE 10 MG: 10 TABLET, FILM COATED ORAL at 13:24

## 2024-09-22 NOTE — DISCHARGE INSTRUCTIONS
You were evaluated in the emergency department for shoulder pain.  Your evaluation is showing no injury requiring emergency treatment or admission to the hospital.  It is possible that you have a sprain, or Tatar cuff.  For this, follow-up with orthopedic surgery at the address number of bided.  Call them to make an appointment.  Return to the emergency department if your symptoms get severely worse despite treatment or you develop significant swelling, or severely worsening pain

## 2024-09-22 NOTE — ED ATTENDING ATTESTATION
"9/22/2024  IPedro DO, saw and evaluated the patient. I have discussed the patient with the resident/non-physician practitioner and agree with the resident's/non-physician practitioner's findings, Plan of Care, and MDM as documented in the resident's/non-physician practitioner's note, except where noted. All available labs and Radiology studies were reviewed.  I was present for key portions of any procedure(s) performed by the resident/non-physician practitioner and I was immediately available to provide assistance.       At this point I agree with the current assessment done in the Emergency Department.  I have conducted an independent evaluation of this patient a history and physical is as follows:    39-year-old female presents for evaluation of left anterior shoulder pain after stretching last night.  She states that she was doing an overhead stretch of her shoulder and felt a pop which caused pain.  Since then, when she attempts to range the left shoulder she has increased pain which makes her feel weak.  No prior fracture or surgery to that shoulder.  She is right-handed.  She denies weakness, numbness or paresthesias in the left hand.    ROS: No associated fever, LH/dizziness, CP, SOB, n/v/d. Denies other injury or complaint.    PE: NAD, appears comfortable, alert; PERRL, EOMI; MMM, no posterior oropharyngeal exudate, edema or erythema; HRR, no murmur; lungs CTA w/o w/r/r, POx 100% on RA (nl); (-) LE edema, FROM extremities x4 though with pain in the anterior left shoulder with abduction above 135 degrees or \"soda can test\", she also has TTP just medial to the anterior glenoid, with passive movement of the left scapula and palpation of the cephalad and cervical left trapezius; skin is p/w/d.    MDM/DDx: Left shoulder pain - trapezius muscle strain, less likely but at risk for supraspinatus ligament sprain, clinically doubt fracture, dislocation, SLAP injury and other rotator cuff tear.     I " independently reviewed and interpreted ordered imaging from this encounter.    A/P: Will check x-ray, treat symptoms, reevaluate for further work up and disposition.  Recommend rest, NSAIDs, ice and follow-up with PCP.    ED Course         Critical Care Time  Procedures

## 2024-09-22 NOTE — ED PROVIDER NOTES
1. Shoulder injury    2. Left shoulder pain      ED Disposition       ED Disposition   Discharge    Condition   Stable    Date/Time   Sun Sep 22, 2024  1:57 PM    Comment   Isidra Chamberlain discharge to home/self care.                   Assessment & Plan       Medical Decision Making  Patient presenting with shoulder pain, after physical exertion.  Will perform x-ray to rule out fracture.  Symptomatic management, Ortho follow-up.  Doubt septic joint, other significant injury    Strays interpreted by myself showing no acute fracture.  Patient history and physical exam most consistent with muscle strain versus rotator cuff injury.  Will discharge to follow-up with orthopedic surgery.  Return precautions discussed, all questions answered prior to discharge.    Amount and/or Complexity of Data Reviewed  Radiology: ordered.    Risk  OTC drugs.  Prescription drug management.                     Medications   lidocaine (LIDODERM) 5 % patch 1 patch (1 patch Topical Medication Applied 9/22/24 1324)   ketorolac (TORADOL) injection 15 mg (15 mg Intramuscular Given 9/22/24 1323)   acetaminophen (TYLENOL) tablet 650 mg (650 mg Oral Given 9/22/24 1324)   cyclobenzaprine (FLEXERIL) tablet 10 mg (10 mg Oral Given 9/22/24 1324)       History of Present Illness       Is a 39-year-old female without significant medical history presenting with a left shoulder injury that started after stretching.  Patient was stretching yesterday when she felt a pop in her left shoulder and has consistent pain left shoulder since then.  Pain with specific range of motion, no fevers, weakness, numbness, tingling, or other symptoms.  No falls or trauma.  Further review of systems negative        Review of Systems   All other systems reviewed and are negative.          Objective     ED Triage Vitals [09/22/24 1225]   Temperature Pulse Blood Pressure Respirations SpO2 Patient Position - Orthostatic VS   97.6 °F (36.4 °C) 73 135/100 18 100 % Sitting      Temp  Source Heart Rate Source BP Location FiO2 (%) Pain Score    Temporal Monitor Left arm -- 10 - Worst Possible Pain        Physical Exam  Vitals and nursing note reviewed.   Constitutional:       General: She is not in acute distress.     Appearance: She is well-developed.   HENT:      Head: Normocephalic and atraumatic.   Eyes:      Conjunctiva/sclera: Conjunctivae normal.   Cardiovascular:      Rate and Rhythm: Normal rate and regular rhythm.      Heart sounds: No murmur heard.  Pulmonary:      Effort: Pulmonary effort is normal. No respiratory distress.      Breath sounds: Normal breath sounds.   Abdominal:      Palpations: Abdomen is soft.      Tenderness: There is no abdominal tenderness.   Musculoskeletal:         General: No swelling.      Cervical back: Neck supple.      Comments: Tenderness along scapular region and distribution of the trapezius muscle  Pain with abduction of shoulder above shoulder.  Neer's test positive   Skin:     General: Skin is warm and dry.      Capillary Refill: Capillary refill takes less than 2 seconds.   Neurological:      Mental Status: She is alert.   Psychiatric:         Mood and Affect: Mood normal.         Labs Reviewed - No data to display  XR shoulder 2+ views LEFT    (Results Pending)       Procedures    ED Medication and Procedure Management   Prior to Admission Medications   Prescriptions Last Dose Informant Patient Reported? Taking?   acetaminophen (TYLENOL) 500 mg tablet  Self Yes No   Sig: Take 500 mg by mouth every 6 (six) hours as needed for mild pain   albuterol (PROVENTIL HFA,VENTOLIN HFA) 90 mcg/act inhaler   No No   Sig: Inhale 2 puffs every 6 (six) hours as needed for wheezing   ibuprofen (MOTRIN) 600 mg tablet   No No   Sig: Take 1 tablet (600 mg total) by mouth every 6 (six) hours      Facility-Administered Medications: None     Discharge Medication List as of 9/22/2024  1:59 PM        START taking these medications    Details   cyclobenzaprine (FLEXERIL) 10  mg tablet Take 1 tablet (10 mg total) by mouth 2 (two) times a day as needed for muscle spasms, Starting Sun 9/22/2024, Normal           CONTINUE these medications which have NOT CHANGED    Details   acetaminophen (TYLENOL) 500 mg tablet Take 500 mg by mouth every 6 (six) hours as needed for mild pain, Historical Med      albuterol (PROVENTIL HFA,VENTOLIN HFA) 90 mcg/act inhaler Inhale 2 puffs every 6 (six) hours as needed for wheezing, Starting Sat 2/24/2024, Normal      ibuprofen (MOTRIN) 600 mg tablet Take 1 tablet (600 mg total) by mouth every 6 (six) hours, Starting Sat 7/13/2024, Until Mon 8/12/2024, Normal                Agapito Lucas MD  09/22/24 [id number]

## 2025-01-15 ENCOUNTER — HOSPITAL ENCOUNTER (EMERGENCY)
Facility: HOSPITAL | Age: 40
Discharge: HOME | End: 2025-01-15
Attending: EMERGENCY MEDICINE
Payer: COMMERCIAL

## 2025-01-15 ENCOUNTER — APPOINTMENT (EMERGENCY)
Dept: CT IMAGING | Facility: HOSPITAL | Age: 40
End: 2025-01-15
Payer: COMMERCIAL

## 2025-01-15 VITALS
DIASTOLIC BLOOD PRESSURE: 92 MMHG | RESPIRATION RATE: 18 BRPM | TEMPERATURE: 97.9 F | OXYGEN SATURATION: 99 % | SYSTOLIC BLOOD PRESSURE: 165 MMHG | HEART RATE: 66 BPM

## 2025-01-15 DIAGNOSIS — R10.2 PELVIC PAIN: Primary | ICD-10-CM

## 2025-01-15 DIAGNOSIS — N94.89 PELVIC CONGESTION SYNDROME: ICD-10-CM

## 2025-01-15 LAB
ALBUMIN SERPL BCG-MCNC: 4.3 G/DL (ref 3.5–5)
ALP SERPL-CCNC: 58 U/L (ref 34–104)
ALT SERPL W P-5'-P-CCNC: 12 U/L (ref 7–52)
ANION GAP SERPL CALCULATED.3IONS-SCNC: 6 MMOL/L (ref 4–13)
AST SERPL W P-5'-P-CCNC: 20 U/L (ref 13–39)
BACTERIA UR QL AUTO: ABNORMAL /HPF
BASOPHILS # BLD AUTO: 0.08 THOUSANDS/ΜL (ref 0–0.1)
BASOPHILS NFR BLD AUTO: 1 % (ref 0–1)
BILIRUB SERPL-MCNC: 0.45 MG/DL (ref 0.2–1)
BILIRUB UR QL STRIP: NEGATIVE
BUN SERPL-MCNC: 12 MG/DL (ref 5–25)
CALCIUM SERPL-MCNC: 9 MG/DL (ref 8.4–10.2)
CHLORIDE SERPL-SCNC: 108 MMOL/L (ref 96–108)
CLARITY UR: CLEAR
CO2 SERPL-SCNC: 22 MMOL/L (ref 21–32)
COLOR UR: ABNORMAL
CREAT SERPL-MCNC: 0.91 MG/DL (ref 0.6–1.3)
EOSINOPHIL # BLD AUTO: 0.27 THOUSAND/ΜL (ref 0–0.61)
EOSINOPHIL NFR BLD AUTO: 3 % (ref 0–6)
ERYTHROCYTE [DISTWIDTH] IN BLOOD BY AUTOMATED COUNT: 12.7 % (ref 11.6–15.1)
EXT PREGNANCY TEST URINE: NEGATIVE
EXT. CONTROL: NORMAL
GFR SERPL CREATININE-BSD FRML MDRD: 79 ML/MIN/1.73SQ M
GLUCOSE SERPL-MCNC: 87 MG/DL (ref 65–140)
GLUCOSE UR STRIP-MCNC: NEGATIVE MG/DL
HCT VFR BLD AUTO: 37.8 % (ref 34.8–46.1)
HGB BLD-MCNC: 12.5 G/DL (ref 11.5–15.4)
HGB UR QL STRIP.AUTO: NEGATIVE
IMM GRANULOCYTES # BLD AUTO: 0.04 THOUSAND/UL (ref 0–0.2)
KETONES UR STRIP-MCNC: NEGATIVE MG/DL
LEUKOCYTE ESTERASE UR QL STRIP: NEGATIVE
LIPASE SERPL-CCNC: 14 U/L (ref 11–82)
LYMPHOCYTES # BLD AUTO: 1.87 THOUSANDS/ΜL (ref 0.6–4.47)
LYMPHOCYTES NFR BLD AUTO: 18 % (ref 14–44)
MCH RBC QN AUTO: 28.9 PG (ref 26.8–34.3)
MCHC RBC AUTO-ENTMCNC: 33.1 G/DL (ref 31.4–37.4)
MCV RBC AUTO: 88 FL (ref 82–98)
MONOCYTES # BLD AUTO: 0.59 THOUSAND/ΜL (ref 0.17–1.22)
MONOCYTES NFR BLD AUTO: 6 % (ref 4–12)
MUCOUS THREADS UR QL AUTO: ABNORMAL
NEUTROPHILS # BLD AUTO: 7.46 THOUSANDS/ΜL (ref 1.85–7.62)
NEUTS SEG NFR BLD AUTO: 72 % (ref 43–75)
NITRITE UR QL STRIP: NEGATIVE
NON-SQ EPI CELLS URNS QL MICRO: ABNORMAL /HPF
PH UR STRIP.AUTO: 6 [PH]
PLATELET # BLD AUTO: 250 THOUSANDS/UL (ref 149–390)
PMV BLD AUTO: 10.7 FL (ref 8.9–12.7)
POTASSIUM SERPL-SCNC: 3.8 MMOL/L (ref 3.5–5.3)
PROT SERPL-MCNC: 7.2 G/DL (ref 6.4–8.4)
PROT UR STRIP-MCNC: ABNORMAL MG/DL
RBC # BLD AUTO: 4.32 MILLION/UL (ref 3.81–5.12)
RBC #/AREA URNS AUTO: ABNORMAL /HPF
SODIUM SERPL-SCNC: 136 MMOL/L (ref 135–147)
SP GR UR STRIP.AUTO: 1.02 (ref 1–1.03)
UROBILINOGEN UR STRIP-ACNC: <2 MG/DL
WBC # BLD AUTO: 10.31 THOUSAND/UL (ref 4.31–10.16)
WBC #/AREA URNS AUTO: ABNORMAL /HPF

## 2025-01-15 PROCEDURE — 81001 URINALYSIS AUTO W/SCOPE: CPT | Performed by: PHYSICIAN ASSISTANT

## 2025-01-15 PROCEDURE — 36415 COLL VENOUS BLD VENIPUNCTURE: CPT | Performed by: PHYSICIAN ASSISTANT

## 2025-01-15 PROCEDURE — 85025 COMPLETE CBC W/AUTO DIFF WBC: CPT | Performed by: PHYSICIAN ASSISTANT

## 2025-01-15 PROCEDURE — 81025 URINE PREGNANCY TEST: CPT | Performed by: PHYSICIAN ASSISTANT

## 2025-01-15 PROCEDURE — 96375 TX/PRO/DX INJ NEW DRUG ADDON: CPT

## 2025-01-15 PROCEDURE — 99285 EMERGENCY DEPT VISIT HI MDM: CPT | Performed by: PHYSICIAN ASSISTANT

## 2025-01-15 PROCEDURE — 93005 ELECTROCARDIOGRAM TRACING: CPT

## 2025-01-15 PROCEDURE — 99284 EMERGENCY DEPT VISIT MOD MDM: CPT

## 2025-01-15 PROCEDURE — 83690 ASSAY OF LIPASE: CPT | Performed by: PHYSICIAN ASSISTANT

## 2025-01-15 PROCEDURE — 96374 THER/PROPH/DIAG INJ IV PUSH: CPT

## 2025-01-15 PROCEDURE — 80053 COMPREHEN METABOLIC PANEL: CPT | Performed by: PHYSICIAN ASSISTANT

## 2025-01-15 PROCEDURE — 74177 CT ABD & PELVIS W/CONTRAST: CPT

## 2025-01-15 RX ORDER — MORPHINE SULFATE 4 MG/ML
4 INJECTION, SOLUTION INTRAMUSCULAR; INTRAVENOUS ONCE
Status: COMPLETED | OUTPATIENT
Start: 2025-01-15 | End: 2025-01-15

## 2025-01-15 RX ORDER — NAPROXEN 500 MG/1
500 TABLET ORAL 2 TIMES DAILY WITH MEALS
Qty: 14 TABLET | Refills: 0 | Status: SHIPPED | OUTPATIENT
Start: 2025-01-15

## 2025-01-15 RX ORDER — ONDANSETRON 2 MG/ML
4 INJECTION INTRAMUSCULAR; INTRAVENOUS ONCE
Status: COMPLETED | OUTPATIENT
Start: 2025-01-15 | End: 2025-01-15

## 2025-01-15 RX ADMIN — ONDANSETRON 4 MG: 2 INJECTION INTRAMUSCULAR; INTRAVENOUS at 10:23

## 2025-01-15 RX ADMIN — MORPHINE SULFATE 4 MG: 4 INJECTION INTRAVENOUS at 10:25

## 2025-01-15 RX ADMIN — IOHEXOL 70 ML: 350 INJECTION, SOLUTION INTRAVENOUS at 12:06

## 2025-01-15 NOTE — ED PROVIDER NOTES
ED Disposition       None          Assessment & Plan       Medical Decision Making  This 39-year-old female presents emergency room with her .  She states at 5:00 this morning she awoke with periumbilical abdominal pain that she describes as constant.  She states it comes and goes.  She states he gets really tight and then also relax.  She states sometimes she has sharp pain that radiates up into her chest.  She complains of nausea but no active vomiting.  She had 1 episode of diarrhea but that did not improve the pain.  She denies any blood or mucus in her stools.  She denies any fever or chills.  She has a history of having a previous tubal ligation in August 2024.  She has had a decreased appetite.    Physical exam this 39-year-old female is alert and oriented x 3..  She is in moderate distress secondary to pain.  Her vital signs are stable.  Her lungs are clear to auscultation.  Her heart is regular rate and rhythm without murmur.  Her abdomen is soft with tenderness around the periumbilical area and right lower quadrant.  Patient has  guarding in the right lower quadrant.  There is no rebound tenderness.    Differential diagnosis includes but is not limited to appendicitis, ovarian cyst rupture, gastritis, diverticulitis, colitis, acute coronary syndrome, pyelonephritis, urinary tract infection, biliary colic, renal colic.    Laboratory studies were taken and were reviewed.  Patient has some mucus threads in her urine as well as protein.  It will be reflexed to a culture.  CAT scan demonstrated some vascular congestion concerning for pelvic congestion syndrome.  There were no other abnormalities identified.    Impression-pelvic pain/pelvic congestion syndrome    Plan  Patient was given a prescription for Naprosyn.  She was instructed to follow-up with her gynecologist.  Patient will be notified if she requires an antibiotic for her urinalysis when the culture returns.      Amount and/or Complexity of  Data Reviewed  Labs: ordered.  Radiology: ordered.    Risk  Prescription drug management.        ED Course as of 01/15/25 1335   Wed Garcia 15, 2025   1223 Patient is feeling better.   1334 I reviewed the results of the CAT scan with the patient concerning for pelvic congestion syndrome.  I am going to have the patient follow-up with her gynecologist for this problem.  Patient is going to call and arrange a follow-up appointment.  She is feeling better.  She was discharged home with a prescription for Naprosyn to take 500 mg twice daily as needed for pain.  She was given reasons to return to the emergency room should her symptoms worsen.       Medications   morphine injection 4 mg (has no administration in time range)   ondansetron (ZOFRAN) injection 4 mg (has no administration in time range)       ED Risk Strat Scores                                              History of Present Illness       Chief Complaint   Patient presents with    Abdominal Pain     Patient reports mid abdominal pain since 0500, reports episode of diarrhea this morning       Past Medical History:   Diagnosis Date    Anemia affecting pregnancy 2023    Asthma     Carpal tunnel syndrome, bilateral     Chlamydia infection     Elevated blood pressure reading without diagnosis of hypertension 2023    Gastroesophageal reflux disease without esophagitis 2023     uterine contractions 2024      Past Surgical History:   Procedure Laterality Date    NO PAST SURGERIES      NM LAPAROSCOPY W/RMVL ADNEXAL STRUCTURES Bilateral 2024    Procedure: SALPINGECTOMY, LAPAROSCOPIC BILATERAL, AND EXAM UNDER ANESTHESIA;  Surgeon: Miguel Dumont MD;  Location: [facility];  Service: Gynecology      Family History   Problem Relation Age of Onset    No Known Problems Mother       Social History     Tobacco Use    Smoking status: Never    Smokeless tobacco: Never   Vaping Use    Vaping status: Never Used   Substance Use Topics    Alcohol  use: Not Currently     Comment: social ; Denied history of alcohol use    Drug use: No      E-Cigarette/Vaping    E-Cigarette Use Never User       E-Cigarette/Vaping Substances    Nicotine No     THC Yes     CBD No     Flavoring No     Other No     Unknown No       I have reviewed and agree with the history as documented.       Abdominal Pain      Review of Systems   Gastrointestinal:  Positive for abdominal pain.           Objective       ED Triage Vitals [01/15/25 0927]   Temperature Pulse Blood Pressure Respirations SpO2 Patient Position - Orthostatic VS   97.9 °F (36.6 °C) 66 165/92 18 99 % Lying      Temp Source Heart Rate Source BP Location FiO2 (%) Pain Score    Oral Monitor Right arm -- --      Vitals      Date and Time Temp Pulse SpO2 Resp BP Pain Score FACES Pain Rating User   01/15/25 0927 97.9 °F (36.6 °C) 66 99 % 18 165/92 -- -- BS            Physical Exam    Results Reviewed       Procedure Component Value Units Date/Time    CBC and differential [[id number]]     Lab Status: No result Specimen: Blood     Comprehensive metabolic panel [[id number]]     Lab Status: No result Specimen: Blood     UA w Reflex to Microscopic w Reflex to Culture [[id number]]     Lab Status: No result Specimen: Urine     POCT pregnancy, urine [[id number]]     Lab Status: No result     Lipase [[id number]]     Lab Status: No result Specimen: Blood             CT abdomen pelvis with contrast    (Results Pending)       Procedures    ED Medication and Procedure Management   Prior to Admission Medications   Prescriptions Last Dose Informant Patient Reported? Taking?   acetaminophen (TYLENOL) 500 mg tablet  Self Yes No   Sig: Take 500 mg by mouth every 6 (six) hours as needed for mild pain   albuterol (PROVENTIL HFA,VENTOLIN HFA) 90 mcg/act inhaler   No No   Sig: Inhale 2 puffs every 6 (six) hours as needed for wheezing   cyclobenzaprine (FLEXERIL) 10 mg tablet   No No   Sig: Take 1 tablet (10 mg total) by mouth 2 (two) times a day as  needed for muscle spasms   ibuprofen (MOTRIN) 600 mg tablet   No No   Sig: Take 1 tablet (600 mg total) by mouth every 6 (six) hours      Facility-Administered Medications: None     Patient's Medications   Discharge Prescriptions    No medications on file     No discharge procedures on file.  ED SEPSIS DOCUMENTATION            Julie Lynn Gutzweiler, PA-C  01/15/25 1956

## 2025-01-16 LAB
ATRIAL RATE: 44 BPM
P AXIS: 51 DEGREES
PR INTERVAL: 128 MS
QRS AXIS: 53 DEGREES
QRSD INTERVAL: 110 MS
QT INTERVAL: 446 MS
QTC INTERVAL: 381 MS
T WAVE AXIS: 53 DEGREES
VENTRICULAR RATE: 44 BPM

## 2025-01-16 PROCEDURE — 93010 ELECTROCARDIOGRAM REPORT: CPT | Performed by: INTERNAL MEDICINE

## 2025-06-15 NOTE — PLAN OF CARE
Problem: ANTEPARTUM  Goal: Maintain pregnancy as long as maternal and/or fetal condition is stable  Description: INTERVENTIONS:  - Maternal surveillance  - Fetal surveillance  - Monitor uterine activity  - Medications as ordered  - Bedrest  Outcome: Progressing     Problem: PAIN - ADULT  Goal: Verbalizes/displays adequate comfort level or baseline comfort level  Description: Interventions:  - Encourage patient to monitor pain and request assistance  - Assess pain using appropriate pain scale  - Administer analgesics based on type and severity of pain and evaluate response  - Implement non-pharmacological measures as appropriate and evaluate response  - Consider cultural and social influences on pain and pain management  - Notify physician/advanced practitioner if interventions unsuccessful or patient reports new pain  Outcome: Progressing     Problem: INFECTION - ADULT  Goal: Absence or prevention of progression during hospitalization  Description: INTERVENTIONS:  - Assess and monitor for signs and symptoms of infection  - Monitor lab/diagnostic results  - Monitor all insertion sites, i.e. indwelling lines, tubes, and drains  - Monitor endotracheal if appropriate and nasal secretions for changes in amount and color  - [place] appropriate cooling/warming therapies per order  - Administer medications as ordered  - Instruct and encourage patient and family to use good hand hygiene technique  - Identify and instruct in appropriate isolation precautions for identified infection/condition  Outcome: Progressing  Goal: Absence of fever/infection during neutropenic period  Description: INTERVENTIONS:  - Monitor WBC    Outcome: Progressing     Problem: SAFETY ADULT  Goal: Patient will remain free of falls  Description: INTERVENTIONS:  - Educate patient/family on patient safety including physical limitations  - Instruct patient to call for assistance with activity   - Consult OT/PT to assist with strengthening/mobility   -  Subjective   Patient ID: Mackenzie Acosta is a 74 y.o. female who presents for Annual Exam.  Today she is accompanied by alone.     HPI  Overall patient is doing well.   Does a lot with helping her mother and son.     Does have a concern for thyroid mass. No hypo or hyperthyroid symptoms.   Feels that the growth has been a year long now. No pain.     Immunization: Tdap: 5/2024  Influenza vaccine: 9/2024  Shingrix: one dose 8/2024, about to get  PCV: 2015, 2017  RSV: due in fall  COVID-19 vaccine: got multiple  Colon Cancer Screening: Last Cologuard completed 12/2022 and negative, due this year, wants to do Cologuard again  Last mammogram 5/2024, negative, declines now  Last osteoporosis Dexa Scan: 5/2024 and osteopenia, takes 600 mg calcium and vit d, eats plenty of dairy  Hep C one time screening: never done  Tobacco: Denies use  EtOH: Rarely, Socially   Other drugs: denies use    Dentist - due for regular cleanings  Optometry - goes annually    Chronic Conditions:  HTN  On lisinopril-hydrochlorothiazide 10-12.5mg once daily  Well controlled  Depression with anxiety  Celexa 20mg once daily  Feels stable on current dose  Has seen therapist in past, one time they feel asleep during her visit  Prediabetes  Managed with diet and exercise  Hgb A1c 6.0% on 6/13/2025  Hyperlipidemia  Takes fish oil supplement  Declines statins, never taken them  Would be open to Fenofibrate  Will get CT cardiac score first      Medications Ordered Prior to Encounter[1]     Allergies[2]    Immunization History   Administered Date(s) Administered    COVID-19, mRNA, LNP-S, PF, 30 mcg/0.3 mL dose 02/27/2021, 03/20/2021    Flu vaccine, quadrivalent, high-dose, preservative free, age 65y+ (FLUZONE) 09/22/2020, 10/08/2021, 10/31/2022    Flu vaccine, trivalent, preservative free, HIGH-DOSE, age 65y+ (Fluzone) 12/03/2018, 11/14/2019    Flu vaccine, trivalent, preservative free, age 6 months and greater (Fluarix/Fluzone/Flulaval) 10/22/2014,  "10/01/2015    Influenza, Seasonal, Quadrivalent, Adjuvanted 10/31/2022, 10/14/2023    Influenza, Unspecified 10/24/2011, 11/02/2012, 11/01/2013    Influenza, injectable, quadrivalent 09/30/2016, 09/18/2017    Influenza, trivalent, adjuvanted 09/28/2024    Moderna COVID-19 vaccine, 12 years and older (50mcg/0.5mL)(Spikevax) 10/14/2023, 09/28/2024    Novel influenza-H1N1-09, preservative-free 11/22/2009    Pfizer COVID-19 vaccine, bivalent, age 12 years and older (30 mcg/0.3 mL) 10/31/2022    Pfizer Gray Cap SARS-CoV-2 04/18/2022    Pfizer Purple Cap SARS-CoV-2 09/25/2021    Pneumococcal conjugate vaccine, 13-valent (PREVNAR 13) 06/12/2017    Pneumococcal polysaccharide vaccine, 23-valent, age 2 years and older (PNEUMOVAX 23) 10/01/2015    Tdap vaccine, age 7 year and older (BOOSTRIX, ADACEL) 09/21/2007, 06/06/2019, 05/20/2024    Zoster vaccine, recombinant, adult (SHINGRIX) 08/31/2024         Review of Systems  All pertinent positive symptoms are included in the history of present illness.  All other systems have been reviewed and are negative and noncontributory to this patient's current ailments.     Objective   /80   Pulse 78   Ht 1.499 m (4' 11\")   Wt 70.8 kg (156 lb)   SpO2 97%   BMI 31.51 kg/m²   BSA: 1.72 meters squared  Orders Only on 06/09/2025   Component Date Value Ref Range Status    GLUCOSE 06/13/2025 123 (H)  65 - 99 mg/dL Final    Comment:               Fasting reference interval     For someone without known diabetes, a glucose value  between 100 and 125 mg/dL is consistent with  prediabetes and should be confirmed with a  follow-up test.         UREA NITROGEN (BUN) 06/13/2025 19  7 - 25 mg/dL Final    CREATININE 06/13/2025 0.83  0.60 - 1.00 mg/dL Final    EGFR 06/13/2025 74  > OR = 60 mL/min/1.73m2 Final    SODIUM 06/13/2025 134 (L)  135 - 146 mmol/L Final    POTASSIUM 06/13/2025 4.1  3.5 - 5.3 mmol/L Final    CHLORIDE 06/13/2025 95 (L)  98 - 110 mmol/L Final    CARBON DIOXIDE 06/13/2025 " Keep Call bell within reach  - Keep bed low and locked with side rails adjusted as appropriate  - Keep care items and personal belongings within reach  - Initiate and maintain comfort rounds  - Make Fall Risk Sign visible to staff  - Offer Toileting every  Hours, in advance of need  - Initiate/Maintain alarm  - Obtain necessary fall risk management equipment:   - Apply yellow socks and bracelet for high fall risk patients  - Consider moving patient to room near nurses station  Outcome: Progressing  Goal: Maintain or return to baseline ADL function  Description: INTERVENTIONS:  -  Assess patient's ability to carry out ADLs; assess patient's baseline for ADL function and identify physical deficits which impact ability to perform ADLs (bathing, care of mouth/teeth, toileting, grooming, dressing, etc.)  - Assess/evaluate cause of self-care deficits   - Assess range of motion  - Assess patient's mobility; develop plan if impaired  - Assess patient's need for assistive devices and provide as appropriate  - Encourage maximum independence but intervene and supervise when necessary  - Involve family in performance of ADLs  - Assess for home care needs following discharge   - Consider OT consult to assist with ADL evaluation and planning for discharge  - Provide patient education as appropriate  Outcome: Progressing  Goal: Maintains/Returns to pre admission functional level  Description: INTERVENTIONS:  - Perform AM-PAC 6 Click Basic Mobility/ Daily Activity assessment daily.  - Set and communicate daily mobility goal to care team and patient/family/caregiver.   - Collaborate with rehabilitation services on mobility goals if consulted  - Perform Range of Motion times a day.  - Reposition patient every  hours.  - Dangle patient  times a day  - Stand patient  times a day  - Ambulate patient  times a day  - Out of bed to chair  times a day   - Out of bed for meals  times a day  - Out of bed for toileting  - Record patient  28  20 - 32 mmol/L Final    ELECTROLYTE BALANCE 06/13/2025 11  7 - 17 mmol/L (calc) Final    CALCIUM 06/13/2025 10.2  8.6 - 10.4 mg/dL Final    PROTEIN, TOTAL 06/13/2025 7.3  6.1 - 8.1 g/dL Final    ALBUMIN 06/13/2025 4.6  3.6 - 5.1 g/dL Final    BILIRUBIN, TOTAL 06/13/2025 0.6  0.2 - 1.2 mg/dL Final    ALKALINE PHOSPHATASE 06/13/2025 53  37 - 153 U/L Final    AST 06/13/2025 25  10 - 35 U/L Final    ALT 06/13/2025 24  6 - 29 U/L Final    WHITE BLOOD CELL COUNT 06/13/2025 7.2  3.8 - 10.8 Thousand/uL Final    RED BLOOD CELL COUNT 06/13/2025 4.26  3.80 - 5.10 Million/uL Final    HEMOGLOBIN 06/13/2025 13.7  11.7 - 15.5 g/dL Final    HEMATOCRIT 06/13/2025 40.8  35.0 - 45.0 % Final    MCV 06/13/2025 95.8  80.0 - 100.0 fL Final    MCH 06/13/2025 32.2  27.0 - 33.0 pg Final    MCHC 06/13/2025 33.6  32.0 - 36.0 g/dL Final    Comment: For adults, a slight decrease in the calculated MCHC  value (in the range of 30 to 32 g/dL) is most likely  not clinically significant; however, it should be  interpreted with caution in correlation with other  red cell parameters and the patient's clinical  condition.      RDW 06/13/2025 12.1  11.0 - 15.0 % Final    PLATELET COUNT 06/13/2025 307  140 - 400 Thousand/uL Final    MPV 06/13/2025 9.5  7.5 - 12.5 fL Final    ABSOLUTE NEUTROPHILS 06/13/2025 3,398  1,500 - 7,800 cells/uL Final    ABSOLUTE LYMPHOCYTES 06/13/2025 2,902  850 - 3,900 cells/uL Final    ABSOLUTE MONOCYTES 06/13/2025 713  200 - 950 cells/uL Final    ABSOLUTE EOSINOPHILS 06/13/2025 130  15 - 500 cells/uL Final    ABSOLUTE BASOPHILS 06/13/2025 58  0 - 200 cells/uL Final    NEUTROPHILS 06/13/2025 47.2  % Final    LYMPHOCYTES 06/13/2025 40.3  % Final    MONOCYTES 06/13/2025 9.9  % Final    EOSINOPHILS 06/13/2025 1.8  % Final    BASOPHILS 06/13/2025 0.8  % Final    CHOLESTEROL, TOTAL 06/13/2025 242 (H)  <200 mg/dL Final    HDL CHOLESTEROL 06/13/2025 50  > OR = 50 mg/dL Final    TRIGLYCERIDES 06/13/2025 218 (H)  <150 mg/dL Final     progress and toleration of activity level   Outcome: Progressing     Problem: Knowledge Deficit  Goal: Patient/family/caregiver demonstrates understanding of disease process, treatment plan, medications, and discharge instructions  Description: Complete learning assessment and assess knowledge base.  Interventions:  - Provide teaching at level of understanding  - Provide teaching via preferred learning methods  Outcome: Progressing     Problem: DISCHARGE PLANNING  Goal: Discharge to home or other facility with appropriate resources  Description: INTERVENTIONS:  - Identify barriers to discharge w/patient and caregiver  - Arrange for needed discharge resources and transportation as appropriate  - Identify discharge learning needs (meds, wound care, etc.)  - Arrange for interpretive services to assist at discharge as needed  - Refer to Case Management Department for coordinating discharge planning if the patient needs post-hospital services based on physician/advanced practitioner order or complex needs related to functional status, cognitive ability, or social support system  Outcome: Progressing      Comment:    If a non-fasting specimen was collected, consider  repeat triglyceride testing on a fasting specimen  if clinically indicated.   Miguelina et al. J. of Clin. Lipidol. 2015;9:129-169.         LDL-CHOLESTEROL 06/13/2025 153 (H)  mg/dL (calc) Final    Comment: Reference range: <100     Desirable range <100 mg/dL for primary prevention;    <70 mg/dL for patients with CHD or diabetic patients   with > or = 2 CHD risk factors.     LDL-C is now calculated using the Abiola   calculation, which is a validated novel method providing   better accuracy than the Friedewald equation in the   estimation of LDL-C.   Mic ROMAN et al. FAROOQ. 2013;310(19): 4793-3315   (http://education.[employer] Mobile/faq/QDS605)      CHOL/HDLC RATIO 06/13/2025 4.8  <5.0 (calc) Final    NON HDL CHOLESTEROL 06/13/2025 192 (H)  <130 mg/dL (calc) Final    Comment: For patients with diabetes plus 1 major ASCVD risk   factor, treating to a non-HDL-C goal of <100 mg/dL   (LDL-C of <70 mg/dL) is considered a therapeutic   option.      HEMOGLOBIN A1c 06/13/2025 6.0 (H)  <5.7 % Final    Comment: For someone without known diabetes, a hemoglobin   A1c value between 5.7% and 6.4% is consistent with  prediabetes and should be confirmed with a   follow-up test.     For someone with known diabetes, a value <7%  indicates that their diabetes is well controlled. A1c  targets should be individualized based on duration of  diabetes, age, comorbid conditions, and other  considerations.     This assay result is consistent with an increased risk  of diabetes.     Currently, no consensus exists regarding use of  hemoglobin A1c for diagnosis of diabetes for children.         eAG (mg/dL) 06/13/2025 126  mg/dL Final    eAG (mmol/L) 06/13/2025 7.0  mmol/L Final       Physical Exam  CONSTITUTIONAL - well nourished, well developed, looks like stated age, in no acute distress, not ill-appearing, and not tired appearing  SKIN - normal skin color and  pigmentation, normal skin turgor without rash, lesions, or nodules visualized  HEAD - no trauma, normocephalic  EYES - extraocular muscles are intact, and normal external exam  NECK - supple without rigidity, no neck mass was observed, enlarged right side of thyroid, non-tender and no moveable masses  CHEST - clear to auscultation, no wheezing, no crackles and no rales, good effort  CARDIAC - regular rate and regular rhythm, no skipped beats, no murmur  EXTREMITIES - no edema, no deformities  NEUROLOGICAL - normal gait, normal balance, normal motor, no ataxia; alert, oriented and no focal signs  PSYCHIATRIC - alert, pleasant and cordial, age-appropriate  IMMUNOLOGIC - no cervical lymphadenopathy       Assessment/Plan   Problem List Items Addressed This Visit           ICD-10-CM    Benign essential hypertension I10    Relevant Medications    lisinopriL-hydrochlorothiazide 10-12.5 mg tablet    Depression with anxiety F41.8    Relevant Medications    citalopram (CeleXA) 20 mg tablet    Prediabetes R73.03    Relevant Orders    Hemoglobin A1c     Other Visit Diagnoses         Codes      Routine general medical examination at health care facility    -  Primary Z00.00    Relevant Orders    1 Year Follow Up In Primary Care - Wellness Exam      Thyromegaly     E01.0    Relevant Orders    US thyroid      Colon cancer screening     Z12.11    Relevant Orders    Cologuard® colon cancer screening      Mixed hyperlipidemia     E78.2    Relevant Orders    CT cardiac scoring wo IV contrast    Lipid panel          Cholesterol not under control. Will try dietary changes and undergo CT cardiac scoring. Will recheck levels in three months and make further decisions at that point.     Will get US of thyroid for right sided enlargement.    Will revaluate A1c in three months. Has been well controlled with dietary modifications.    Please complete all ordered lab work and screenings. Will reach out to you if the results warrant any change  in management.     Discussed the plan of care with the patient and they provided their understanding. All questions dicussed and answered during visit.         [1]   Current Outpatient Medications on File Prior to Visit   Medication Sig Dispense Refill    acetaminophen (Tylenol 8 Hour) 650 mg ER tablet Take 2 tablets (1,300 mg) by mouth every 8 hours if needed.      calcium carbonate 600 mg calcium (1,500 mg) tablet Take 600 mg by mouth 2 times daily (morning and late afternoon).      cholecalciferol (Vitamin D3) 50 MCG (2000 UT) tablet Take 1 tablet (50 mcg) by mouth once daily.      citalopram (CeleXA) 20 mg tablet Take 1 tablet (20 mg) by mouth once daily. 90 tablet 1    lisinopriL-hydrochlorothiazide 10-12.5 mg tablet Take 1 tablet by mouth once daily. 90 tablet 1    mv-mn/folic ac/calcium/vit K1 (WOMEN'S 50 PLUS MULTIVITAMIN ORAL) Take 1 tablet by mouth once daily.      naproxen sodium (Aleve) 220 mg tablet Take 1 tablet (220 mg) by mouth every 12 hours. With food or milk      omega 3-dha-epa-fish oil (Fish OiL) 1,000 (120-180) mg capsule Take by mouth.       No current facility-administered medications on file prior to visit.   [2]   Allergies  Allergen Reactions    Fexofenadine Other     vertigo    Fluticasone Propionate Headache    Phenytoin Other     Elevated BP

## (undated) DEVICE — ADHESIVE SKIN HIGH VISCOSITY EXOFIN 1ML

## (undated) DEVICE — TROCAR: Brand: KII FIOS FIRST ENTRY

## (undated) DEVICE — EXOFIN PRECISION PEN HIGH VISCOSITY TOPICAL SKIN ADHESIVE: Brand: EXOFIN PRECISION PEN, 1G

## (undated) DEVICE — CHLORHEXIDINE 4PCT 4 OZ

## (undated) DEVICE — GLOVE PI ULTRA TOUCH SZ.7.0

## (undated) DEVICE — SUT MONOCRYL 4-0 PS-2 18 IN Y496G

## (undated) DEVICE — ENSEAL X1 TISSUE SEALER, CURVED JAW, 37 CM SHAFT LENGTH: Brand: ENSEAL

## (undated) DEVICE — CHLORAPREP HI-LITE 26ML ORANGE

## (undated) DEVICE — INTENDED FOR TISSUE SEPARATION, AND OTHER PROCEDURES THAT REQUIRE A SHARP SURGICAL BLADE TO PUNCTURE OR CUT.: Brand: BARD-PARKER SAFETY BLADES SIZE 11, STERILE

## (undated) DEVICE — TROCAR: Brand: KII® SLEEVE

## (undated) DEVICE — GLOVE INDICATOR PI UNDERGLOVE SZ 7.5 BLUE

## (undated) DEVICE — PACK PBDS MINOR GYN LAP RF

## (undated) DEVICE — PREMIUM DRY TRAY LF: Brand: MEDLINE INDUSTRIES, INC.